# Patient Record
Sex: MALE
[De-identification: names, ages, dates, MRNs, and addresses within clinical notes are randomized per-mention and may not be internally consistent; named-entity substitution may affect disease eponyms.]

---

## 2017-02-25 NOTE — CP.PCM.HP
History of Present Illness





- History of Present Illness


History of Present Illness: 


Patient is a 72 year old male with past medical history including MI, CAD with 

history of CABG and valve replacement 6-7 years ago.  Patient reports that for 

the past 2-3 days he has had shortness of breath and dizziness that has been 

getting progressively worse.  Patient also reports right-sided chest pain that 

is reproducible on palpation.  Patient states he has been living in his car and 

has not had medical care recently since moving to [place].  Patient denies 

palpitations, chest pressure, abdominal pain, nausea, or vomiting.  Patient 

also reports taking 5mg of coumadin nightly but does not know for what 

condition he takes it.  Patient denies history of blood clots. Patient 

currently resting comfortably on high flow oxygen by nasal canula.  








PMHx: CAD, MI, arrhythmia


PSHx: CABG with vessel harvest from right leg, valve replacement


Meds: coumadin 5mg


FamHx: denies


Social Hx: smokes 2-3 cigarettes per day for 60 years, denies alcohol and drugs

, homeless- lives in car








Present on Admission





- Present on Admission


Any Indicators Present on Admission: No





Review of Systems





- Constitutional


Constitutional: Lethargy.  absent: Chills, Fever





- EENT


Eyes: absent: Blurred Vision


Nose/Mouth/Throat: absent: Nasal Congestion





- Cardiovascular


Cardiovascular: Dyspnea, Leg Edema, Lightheadedness.  absent: Chest Pain at Rest

, Palpitations





- Respiratory


Respiratory: Dyspnea, Dyspnea on Exertion





- Gastrointestinal


Gastrointestinal: absent: Diarrhea, Nausea, Vomiting





- Genitourinary


Genitourinary: absent: Difficulty Urinating





- Integumentary


Integumentary: absent: Rash





- Neurological


Neurological: Dizziness





Past Patient History





- Infectious Disease


Hx of Infectious Diseases: None





- Past Medical History & Family History


Past Medical History?: Yes





- Past Social History


Smoking Status: Light Smoker < 10 Cigarettes Daily





- CARDIAC


Hx Atrial Fibrillation: Yes


Hx Cardia Arrhythmia: Yes


Hx Congestive Heart Failure: Yes


Hx Hypertension: Yes


Hx Pacemaker: Yes





- PULMONARY


Hx Chronic Obstructive Pulmonary Disease (COPD): Yes





- MUSCULOSKELETAL/RHEUMATOLOGICAL


Hx Falls: No





- PSYCHIATRIC


Hx Substance Use: No





- SURGICAL HISTORY


Hx Coronary Artery Bypass Graft: Yes (4 yrs ago)





- ANESTHESIA


Hx Anesthesia: Yes


Hx Anesthesia Reactions: No


Hx Malignant Hyperthermia: No





Meds


Allergies/Adverse Reactions: 


 Allergies











Allergy/AdvReac Type Severity Reaction Status Date / Time


 


No Known Allergies Allergy   Verified 02/25/17 12:47














Physical Exam





- Constitutional


Appears: Non-toxic, No Acute Distress





- Head Exam


Head Exam: ATRAUMATIC, NORMOCEPHALIC





- Eye Exam


Eye Exam: EOMI





- ENT Exam


ENT Exam: Mucous Membranes Moist





- Respiratory Exam


Respiratory Exam: Rales, Wheezes





- Cardiovascular Exam


Cardiovascular Exam: +S1, +S2





- GI/Abdominal Exam


GI & Abdominal Exam: Normal Bowel Sounds, Soft.  absent: Tenderness





- Extremities Exam


Extremities exam: Positive for: pedal edema (bilateral pitting edema)


Additional comments: 


scar right leg from vessel harvest





- Neurological Exam


Neurological exam: Alert





- Psychiatric Exam


Psychiatric exam: Normal Affect, Normal Mood





- Skin


Skin Exam: Dry, Warm





Results





- Vital Signs


Recent Vital Signs: 





 Last Vital Signs











Temp  98.0 F   02/25/17 19:46


 


Pulse  80   02/25/17 19:46


 


Resp  16   02/25/17 20:10


 


BP  107/75   02/25/17 19:46


 


Pulse Ox  98   02/25/17 19:59














- Labs


Result Diagrams: 


 02/25/17 13:23





 02/25/17 21:37


Labs: 





 Laboratory Results - last 24 hr











  02/25/17





  16:01


 


PT  14.5 H


 


INR  1.3


 


APTT  33


 


D-Dimer, Quantitative  < 200














Assessment & Plan


(1) Acute exacerbation of CHF (congestive heart failure)


Assessment and Plan: 


admit to telemetry





BNP 4560 on admission


patient received 20mg IV lasix in ER





will continue with lasix 40mg IV BID


starting aldactone 25mg PO Daily


starting digoxin- will give 0.25mg today, start 0.125mg tomorrow


starting lisinopril 10mg daily





patient with AICD, will check echo to determine EF





CXR: mild cardiomegaly, mild pulmonary venous congestion.  s/p sternotomy, 

aortic valve replacement, AICD


Status: Acute





(2) History of MI (myocardial infarction)


Assessment and Plan: 


first troponin 0.0360, second troponin 0.0180


will continue to trend


Status: Acute





(3) Hyperkalemia


Assessment and Plan: 


potassium 5.4 on admission


patient received lasix and albuterol nebulizer in ED





repeat BMP potassium 4.1


Status: Acute





(4) Tachycardia


Assessment and Plan: 


EKG in ER: sinus tachycardia at 100, paced, incomplete RBBB


patient started on cardizem drip at 5mg/h in the ER- will discontinue 





starting patient on digoxin- one dose 0.25mg today, 0.125mg starting tomorrow


Status: Acute





(5) Lower extremity edema


Assessment and Plan: 


will check venous doppler


Status: Acute





(6) Prophylactic measure


Assessment and Plan: 


lovenox 40u sc daily


protonix 40mg daily


Status: Acute

## 2017-02-25 NOTE — RAD
PROCEDURE:  CHEST RADIOGRAPH, 1 VIEW



HISTORY:

chest pain



COMPARISON:

None available.



FINDINGS:



LUNGS:

There is pulmonary venous congestion.  There is no focal consolidation



PLEURA:

No pneumothorax or pleural fluid seen.



CARDIOVASCULAR:

There is mild cardiomegaly.  Status post median sternotomy and aortic 

valve replacement.  There is a left-sided AICD.



OSSEOUS STRUCTURES:

No significant abnormalities.



VISUALIZED UPPER ABDOMEN:

Normal.



OTHER FINDINGS:

None. 



IMPRESSION:

Mild cardiomegaly and pulmonary venous congestion.

## 2017-02-25 NOTE — C.PDOC
History Of Present Illness





<Jerica Gaitan - Last Filed: 02/25/17 13:45>





<Sara Jj - Last Filed: 02/25/17 18:14>


73 yo male w/PMHx of CAD s/p CABG 4 yrs ago, smoker, on Coumadin, admits 

homeless at present time (arrived from [place] and currently leave in car for 3 

months), come in for evaluation of SOB on exertion, peripheral edema gradually 

developed for past few days associated with chest tightness, upper back pain. 

Otherwise, pt denies high fever, chills, headache, dizziness, wheezing, 

palpitation, diaphoresis, abd. pain, N/V/D, UTI sx, denies weakness, sensory or 

vascular deficits. At present time, pt appears in mod respiratory distress.


Poor historian. no family member available. (Sara Jj)








<Jerica Gaitan - Last Filed: 02/25/17 13:45>


History Per: Patient


Onset/Duration Of Symptoms: Gradual


Current Symptoms Are (Timing): Worse





<АннаSara - Last Filed: 02/25/17 18:14>


Time Seen by Provider: 02/25/17 12:46





Past Medical History


Reviewed: Historical Data, Nursing Documentation, Vital Signs





- Medical History


PMH: AMI-STEMI, Atrial Fibrillation, CAD, Cardia Arrhythmia, CHF, COPD, HTN


Surgical History: CABG (4 yrs ago)


Family History: States: No Known Family Hx





- Social History


Hx Tobacco Use: Yes


Hx Alcohol Use: No


Hx Substance Use: No





- Immunization History


Hx Tetanus Toxoid Vaccination: No


Hx Influenza Vaccination: No


Hx Pneumococcal Vaccination: No





<Vinky,Sara - Last Filed: 02/25/17 18:14>


Vital Signs: 


 Last Vital Signs











Temp  97.6 F   02/25/17 12:47


 


Pulse  104 H  02/25/17 15:21


 


Resp  23   02/25/17 15:21


 


BP  119/78   02/25/17 15:21


 


Pulse Ox  100   02/25/17 16:12

















Review Of Systems


Except As Marked, All Systems Reviewed And Found Negative.


Constitutional: Negative for: Fever, Chills


Eyes: Negative for: Vision Change


ENT: Negative for: Mouth Swelling, Throat Pain


Cardiovascular: Positive for: Chest Pain, Orthopnea, Edema.  Negative for: 

Palpitations, Light Headedness


Respiratory: Positive for: Shortness of Breath, SOB with Excertion.  Negative 

for: Cough, Wheezing


Gastrointestinal: Negative for: Nausea, Vomiting, Abdominal Pain


Genitourinary: Negative for: Dysuria, Frequency, Incontinence


Musculoskeletal: Negative for: Neck Pain, Back Pain


Skin: Negative for: Rash


Neurological: Negative for: Weakness, Numbness, Altered Mental Status, Headache

, Dizziness





<Sara Jj - Last Filed: 02/25/17 18:14>





Physical Exam





- Physical Exam


Appears: Well, Non-toxic, No Acute Distress


Skin: Normal Color, Warm, Dry, No Rash


Head: Atraumatic, Normacephalic


Eye(s): bilateral: Normal Inspection, PERRL, EOMI


Ear(s): Bilateral: Normal


Nose: Normal, No Discharge


Oral Mucosa: Moist, No Drooling


Tongue: Normal Appearing


Lips: Normal Appearing


Throat: Normal, No Erythema, No Exudate, No Drooling


Neck: Normal, Normal ROM, Trachea Midline


Cardiovascular: Rhythm Irregular (TACHY), No Friction Rub, No Murmur, JVD


Respiratory: Normal Breath Sounds, Decreased Breath Sounds (bibasilar), No 

Stridor, No Wheezing


Gastrointestinal/Abdominal: Normal Exam, Soft, No Tenderness, No Distention, No 

Guarding


Back: Normal Inspection, No Vertebral Tenderness


Extremity: Normal ROM, Pedal Edema (L>R pitting edema 2+/2+ B/L lEs), No Calf 

Tenderness, No Deformity


Neurological/Psych: Oriented x3, Normal Speech, Normal Motor, Normal Sensation, 

Normal Reflexes





<Sara Jj - Last Filed: 02/25/17 18:14>





ED Course And Treatment





- Laboratory Results


Result Diagrams: 


 02/25/17 13:23





 02/25/17 13:23





<Jerica Gaitan - Last Filed: 02/25/17 13:45>





- Laboratory Results


Result Diagrams: 


 02/25/17 13:23





 02/25/17 13:23


ECG: Interpreted By Me (AND ED ATTENDING)


ECG Interpretation: Normal, No Acute Changes, Abnormal


Interpretation Of ECG: sINUS TACHY WITH PACED@111/MIN,INCOMPLETE RBBB


O2 Sat by Pulse Oximetry: 100


Pulse Ox Interpretation: Normal





- Radiology


CXR: Interpreted by Me, Viewed By Me


CXR Interpretation: Yes: Cardiomegaly





- Other Rad


  ** CXR


X-Ray: Read By Radiologist


Interpretation: Accession No. : [id number].  Patient Name / ID : JOHNNIE BOYLE  / [id number].  Exam Date : 02/25/2017 12:55:47 ( Approved ).  Study 

Comment :  Sex / Age : M  / 72Y.  Creator : NIRMALA MCALLISTER MD.  Dictator : NIRMALA MCALLISTER MD.   :  Approver : NIRMALA MCALLISTER MD.  Approver2 :  Report 

Date : 02/25/2017 13:20:21.  My Comment :  *************************************

**********************************************.  PROCEDURE:  CHEST RADIOGRAPH, 

1 VIEW.  HISTORY:  chest pain.  COMPARISON:  None available.  FINDINGS:  LUNGS:

  There is pulmonary venous congestion.  There is no focal consolidation.  

PLEURA:  No pneumothorax or pleural fluid seen.  CARDIOVASCULAR:  There is mild 

cardiomegaly.  Status post median sternotomy and aortic valve replacement.  

There is a left-sided AICD.  OSSEOUS STRUCTURES:  No significant abnormalities.

  VISUALIZED UPPER ABDOMEN:  Normal.  OTHER FINDINGS:  None.  IMPRESSION:  Mild 

cardiomegaly and pulmonary venous congestion.


Progress Note: Pt was OBS in ED for 2 hours and slightly improved during OBS 

after ED tx.  Lasix/Vapotherm/Cardizem qtt.  Cardiac monitor.  Case discussed 

with ED qattenidng and pt was evaluated by .  Blood work review.  Case 

discussed with med-on-call and admission arranged.  Admission to medicine-on-

call  placed





<Sara Jj - Last Filed: 02/25/17 18:14>





Supervising Attending Note





- Supervising Attending Note


The Documented history was done by the: Physician Extender


The documented physical exam was done by the: Physician Extender


The documented procedures were done by the: Physician Extender


EM CAVEAT: Language Barrier





- Attestation:


I have personally seen and examined this patient.: Yes


I have fully participated in the care of the patient.: Yes


I have reviewed all pertinent clinical information, including history, physical 

exam and plan: Yes





<Jerica Gaitan - Last Filed: 02/25/17 13:45>





<Sara Jj - Last Filed: 02/25/17 18:14>





- Notes:


Notes:: 


?NEW ONSET VS EXAC CHF X 2 DAYS. +SOB/FU. +SWELLING. ON COUMADIN. HO PPM.





HO CABG





EXAM AS ABOVE (Jerica Gaitan)








Critical Care Time





- Critical Care Note


Total Time (in mins): 40


Documented critical care: time excludes all time spent performing seperately 

billable procedures.





<Sara Jj - Last Filed: 02/25/17 18:14>





Disposition





<Jerica Gaitan - Last Filed: 02/25/17 13:45>





- Disposition


Disposition Time: 14:50





<Sara Jj - Last Filed: 02/25/17 18:14>





- Disposition


Disposition: HOSPITALIZED


Condition: STABLE





- Clinical Impression


Clinical Impression: 


 Acute exacerbation of CHF (congestive heart failure)

## 2017-02-26 NOTE — CP.PCM.PN
Subjective





- Date & Time of Evaluation


Date of Evaluation: 02/26/17


Time of Evaluation: 10:00





- Subjective


Subjective: 


Medicine Note- Dr Elizabeth's service 





Patient seen and examined.  Patient states that he "feels bad."  He has been 

having shortness of breath for about 3 days associated with swelling his 

bilateral LE.  He says that this is the first time he has experienced these 

symptoms.  The shortness of breath is worse when he lays flat.  He denies 

difficulty with ambulation.  Patient denies history of arrhythmia.  Denies 

chest pain, cough, palpitations, headache, dizziness, syncope, nausea, vomiting

, and abdominal pain. 





Objective





- Vital Signs/Intake and Output


Vital Signs (last 24 hours): 


 











Temp Pulse Resp BP Pulse Ox


 


 97.9 F   96 H  20   162/78 H  98 


 


 02/26/17 07:00  02/26/17 08:21  02/26/17 08:34  02/26/17 09:31  02/26/17 07:00








Intake and Output: 


 











 02/26/17 02/26/17





 06:59 18:59


 


Intake Total 350 


 


Output Total 300 


 


Balance 50 














- Medications


Medications: 


 Current Medications





Digoxin (Lanoxin)  0.125 mg PO DAILY@1800 [facility]


Enoxaparin Sodium (Lovenox)  40 mg SC DAILY [facility]


   Last Admin: 02/26/17 09:31 Dose:  40 mg


Furosemide (Lasix)  40 mg IVP BID [facility]


   Last Admin: 02/26/17 09:31 Dose:  40 mg


Lisinopril (Zestril)  10 mg PO DAILY [facility]


   Last Admin: 02/26/17 09:30 Dose:  10 mg


Pantoprazole Sodium (Protonix Ec Tab)  40 mg PO DAILY [facility]


   Last Admin: 02/26/17 09:30 Dose:  40 mg


Spironolactone (Aldactone)  25 mg PO DAILY [facility]


   Last Admin: 02/26/17 09:30 Dose:  25 mg











- Labs


Labs: 


 





 02/26/17 03:44 





 02/26/17 03:44 





 











PT  14.5 SECONDS (9.7-12.2)  H  02/25/17  16:01    


 


INR  1.3   02/25/17  16:01    


 


APTT  33 SECONDS (21-34)   02/25/17  16:01    














- Constitutional


Appears: Non-toxic, No Acute Distress





- Head Exam


Head Exam: ATRAUMATIC, NORMOCEPHALIC





- Eye Exam


Eye Exam: EOMI, Normal appearance


Pupil Exam: NORMAL ACCOMODATION





- ENT Exam


ENT Exam: Mucous Membranes Moist





- Respiratory Exam


Respiratory Exam: Decreased Breath Sounds, NORMAL BREATHING PATTERN.  absent: 

Accessory Muscle Use, Rhonchi, Respiratory Distress





- Cardiovascular Exam


Cardiovascular Exam: REGULAR RHYTHM, +S1, +S2.  absent: Irregular Rhythm





- GI/Abdominal Exam


GI & Abdominal Exam: Soft, Normal Bowel Sounds.  absent: Guarding, Rigid, 

Tenderness





- Extremities Exam


Extremities Exam: Pedal Edema (bilateral 1+ pitting edema ).  absent: Tenderness


Additional comments: 


2+ pedal pulses





- Neurological Exam


Neurological Exam: Alert, Awake, CN II-XII Intact, Oriented x3





- Psychiatric Exam


Psychiatric exam: Normal Affect, Normal Mood





- Skin


Skin Exam: Dry, Intact, Normal Color, Warm





Assessment and Plan





- Assessment and Plan (Free Text)


Assessment: 


(1) Acute exacerbation of CHF (congestive heart failure)


Admit to telemetry 


BNP 4560 on admission


patient received 20mg IV lasix in ER





will continue with lasix 40mg IV BID


starting aldactone 25mg PO Daily


starting digoxin- will give 0.25mg today, start 0.125mg 2/27


lisinopril 10mg daily


patient with AICD, will check echo to determine EF


CXR: mild cardiomegaly, mild pulmonary venous congestion.  s/p sternotomy, 

aortic valve replacement, AICD








(2) History of MI (myocardial infarction)


ROMIs negative x3


EKG paced 


Denies chest pain 


Crestor 10mg PO HS 


Per Dr Elizabeth, pt likely not taking Coumadin regularly due to low INR.  Will not 

restart this medication until ECHO done. 





(3) Hyperkalemia


Resolved


potassium 5.4 on admission


patient received lasix and albuterol nebulizer in ED


repeat BMP potassium 4.1








(4) Tachycardia


EKG in ER: sinus tachycardia at 100, paced, incomplete RBBB


patient started on cardizem drip at 5mg/h in the ER- will discontinue 


started patient on digoxin





(5) Lower extremity edema


will check venous doppler





(6) Prophylactic measure


lovenox 40u sc daily


protonix 40mg daily


SCD contraindicated due to LE edema





Discussed with Dr Elizabeth

## 2017-02-27 NOTE — VASCLAB
PROCEDURE:  Lower Extremity Venous Duplex Exam.



HISTORY:

lower extremity edema 



PRIORS:

None. 



TECHNIQUE:

Bilateral common femoral, femoral, popliteal and posterior tibial, 

peroneal and great saphenous veins were evaluated. Flow was assessed 

with color Doppler, compressibility, assessment of phasic flow and 

augmentation response.



Report prepared by   ATTILA Lewis



FINDINGS:



RIGHT:

1. Common Femoral Vein: 



1.1. Compressibility - Fully compressible: Thrombus -  None : Flow - 

Phasic: Augmentation -Normal: Reflux - None.



2. Femoral Vein:



2.1. Compressibility - Fully compressible: Thrombus -  None : Flow - 

Phasic: Augmentation -Normal: Reflux - None.



3. Popliteal Vein: 



3.1. Compressibility - Fully compressible: Thrombus - None :  Flow - 

Phasic: Augmentation -Normal: Reflux - None.



4. Posterior Tibial Vein: 



4.1. Compressibility - Fully compressible: Thrombus -  None: Flow - 

Phasic: Augmentation -Normal: Reflux - None.



5. Peroneal Vein:



5.1. Compressibility - Fully compressible: Thrombus -  None: Flow - 

Phasic: Augmentation -Normal: Reflux - None.



6. Great Saphenous Vein:

6.1. Compressibility - Fully compressible: Thrombus - None: Flow - 

Phasic: Augmentation - Normal: Reflux - None.





LEFT:

1. Common Femoral Vein:



1.1.  Compressibility - Fully compressible: Thrombus -  None: Flow - 

Phasic: Augmentation -Normal: Reflux - None.



2. Femoral Vein:



2.1.  Compressibility - Fully compressible: Thrombus -  None: Flow - 

Phasic: Augmentation -Normal: Reflux - None.



3. Popliteal Vein:



3.1.  Compressibility - Fully compressible: Thrombus -  None : Flow - 

Phasic: Augmentation -Normal: Reflux - None.



4. Posterior Tibial Vein:



4.1.  Compressibility - Fully compressible: Thrombus -  None: Flow - 

Phasic: Augmentation -Normal: Reflux - None.



5. Peroneal Vein:



5.1.  Compressibility - Fully compressible: Thrombus -  None: Flow - 

Phasic: Augmentation -Normal: Reflux - None.



6. Great Saphenous Vein:

6.1.  Compressibility - Fully compressible: Thrombus -  None: Flow - 

Phasic: Augmentation - Normal: Reflux - None.





OTHER FINDINGS:  Right: Lower extremity edema.



Left: Lower extremity edema.



IMPRESSION:

Right: 



No evidence of deep or superficial vein thrombosis of the right lower 

extremity. Normal valve function noted of the right side.     



Left: 



No evidence of deep or superficial vein thrombosis of the left lower 

extremity. Normal valve function noted of the left side.

## 2017-02-27 NOTE — CP.PCM.PN
<Sulaiman Patel - Last Filed: 02/27/17 17:57>





Subjective





- Date & Time of Evaluation


Date of Evaluation: 02/27/17


Time of Evaluation: 07:20





- Subjective


Subjective: 


PGY1 Medicine Note- Dr Elizabeth





Patient seen and examined at bedside. No overnight events per nursing. Patient 

admits to SOB while lying flat and while speaking. +BM yesterday and +

urination. Denies difficulty with ambulation or history of arrhythmia. Denies f/

c, headache, dizziness, chest pain, palpitations, cough, abdominal pain, n/v, d/

c, or any additional complaints. 








Objective





- Vital Signs/Intake and Output


Vital Signs (last 24 hours): 


 











Temp Pulse Resp BP Pulse Ox


 


 98.2 F   87   20   106/69   97 


 


 02/27/17 08:36  02/27/17 08:36  02/27/17 08:36  02/27/17 10:39  02/27/17 08:36








Intake and Output: 


 











 02/27/17 02/27/17





 06:59 18:59


 


Output Total 300 


 


Balance -300 














- Medications


Medications: 


 Current Medications





Digoxin (Lanoxin)  0.125 mg PO DAILY@1800 [facility]


   Last Admin: 02/26/17 17:40 Dose:  0.125 mg


Enoxaparin Sodium (Lovenox)  40 mg SC DAILY [facility]


   Last Admin: 02/27/17 10:39 Dose:  40 mg


Furosemide (Lasix)  40 mg IVP BID [facility]


   Last Admin: 02/27/17 10:39 Dose:  40 mg


Lisinopril (Zestril)  10 mg PO DAILY [facility]


   Last Admin: 02/27/17 10:39 Dose:  10 mg


Pantoprazole Sodium (Protonix Ec Tab)  40 mg PO DAILY [facility]


   Last Admin: 02/27/17 10:39 Dose:  40 mg


Rosuvastatin Calcium (Crestor)  10 mg PO HS [facility]


   Last Admin: 02/26/17 21:20 Dose:  Not Given


Spironolactone (Aldactone)  25 mg PO DAILY [facility]


   Last Admin: 02/27/17 10:39 Dose:  25 mg











- Labs


Labs: 


 





 02/26/17 03:44 





 02/26/17 03:44 





 











PT  14.5 SECONDS (9.7-12.2)  H  02/25/17  16:01    


 


INR  1.3   02/25/17  16:01    


 


APTT  33 SECONDS (21-34)   02/25/17  16:01    














- Constitutional


Appears: Non-toxic, No Acute Distress





- Head Exam


Head Exam: ATRAUMATIC





- Eye Exam


Eye Exam: EOMI





- ENT Exam


ENT Exam: Mucous Membranes Moist





- Respiratory Exam


Respiratory Exam: Decreased Breath Sounds, Wheezes, Respiratory Distress (SOB 

while speaking).  absent: Clear to Ausculation Bilateral





- Cardiovascular Exam


Cardiovascular Exam: REGULAR RHYTHM, +S1, +S2





- GI/Abdominal Exam


GI & Abdominal Exam: Soft, Normal Bowel Sounds.  absent: Tenderness





- Extremities Exam


Extremities Exam: Pedal Edema (b/l 1+ pitting to mid thigh).  absent: Tenderness


Additional comments: 


pedal pulses 2+





- Neurological Exam


Neurological Exam: Alert, Awake, Oriented x3





- Psychiatric Exam


Psychiatric exam: Normal Affect





- Skin


Skin Exam: Dry, Normal Color, Warm





Assessment and Plan





- Assessment and Plan (Free Text)


Assessment: 


72 year old male with past medical history including MI, CAD with history of 

CABG and valve replacement 6-7 years ago.  Reports worstenting SOB and 

dizziness with associated swelling of bilateral LE for past 2-3 days. Admits 

this is the first time he has experienced these symptoms. SOB is worse when he 

lays flat.





Plan:


(1) Acute exacerbation of CHF (congestive heart failure)


Admit to telemetry, BNP 4560 on admission


f/u ECHO - Pending Read


f/u LE Duplex - Pending Read


Lasix 40mg IV BID


Digoxin 0.125mg


Lisinopril 10mg daily


Aldactone 25mg PO Daily


patient with AICD, will check echo to determine EF


CXR 2/25: mild cardiomegaly, mild pulmonary venous congestion.  s/p sternotomy, 

aortic valve replacement, AICD





(2) History of MI (myocardial infarction)


Crestor 10mg PO HS 


Per Dr Elizabeth, pt likely not taking Coumadin regularly due to low INR.  Will not 

restart this medication until ECHO done. 


ROMIs negative x3


EKG paced 


Denies chest pain 





(3) Hyperkalemia


Continue to monitor Labs


Resolved


potassium 5.4 on admission -> repeat 4.1


patient received lasix and albuterol nebulizer in ED





(4) Tachycardia


EKG in ER: sinus tachycardia at 100, paced, incomplete RBBB


Discontinued cardizem drip of 5mg/h started in the ER


Continue Digoxin 0.125mg





(5) Lower extremity edema


f/u LE Duplex - Pending Read





(6) Prophylactic measure


lovenox 40u sc daily


protonix 40mg daily


SCD contraindicated due to LE edema





<Evgeny Elizabeth Jr. - Last Filed: 03/09/17 17:00>





Objective





- Vital Signs/Intake and Output


Vital Signs (last 24 hours): 


 











Temp Pulse Resp BP Pulse Ox


 


 97.6 F   89   20   104/66   100 


 


 03/09/17 16:57  03/09/17 16:57  03/09/17 16:57  03/09/17 16:57  03/09/17 16:57








Intake and Output: 


 











 03/09/17 03/09/17





 06:59 18:59


 


Intake Total 300 480


 


Output Total 800 500


 


Balance -500 -20














- Medications


Medications: 


 Current Medications





Albuterol/Ipratropium (Duoneb 3 Mg/0.5 Mg (3 Ml) Ud)  3 ml INH RQ6 [facility]


   Last Admin: 03/09/17 13:05 Dose:  3 ml


Budesonide (Pulmicort Respules)  0.5 mg INH RQ12 [facility]


   Last Admin: 03/09/17 07:42 Dose:  0.5 mg


Digoxin (Lanoxin)  0.125 mg PO DAILY@1800 [facility]


   Last Admin: 03/08/17 17:02 Dose:  0.125 mg


Furosemide (Lasix)  40 mg IVP BID [facility]


   Last Admin: 03/09/17 09:15 Dose:  40 mg


Guaifenesin (Mucinex La)  600 mg PO BID [facility]


   Last Admin: 03/09/17 10:11 Dose:  600 mg


Lisinopril (Zestril)  10 mg PO DAILY [facility]


   Last Admin: 03/09/17 09:15 Dose:  10 mg


Methylprednisolone (Solu-Medrol)  40 mg IVP Q8 [facility]


   Last Admin: 03/09/17 13:21 Dose:  40 mg


Pantoprazole Sodium (Protonix Ec Tab)  40 mg PO DAILY [facility]


   Last Admin: 03/09/17 09:15 Dose:  40 mg


Rosuvastatin Calcium (Crestor)  10 mg PO HS [facility]


   Last Admin: 03/08/17 21:04 Dose:  10 mg


Spironolactone (Aldactone)  25 mg PO DAILY [facility]


   Last Admin: 03/09/17 09:15 Dose:  25 mg


Tiotropium Bromide (Spiriva)  18 mcg INH RQ24 [facility]


   Last Admin: 03/09/17 07:42 Dose:  18 mcg


Warfarin Sodium (Coumadin)  5 mg PO 1800 [facility]


   Stop: 03/09/17 18:01











- Labs


Labs: 


 





 03/09/17 11:10 





 03/09/17 11:10 





 











PT  32.3 SECONDS (9.7-12.2)  H* D 03/09/17  11:10    


 


INR  2.8  D 03/09/17  11:10    


 


APTT  31 SECONDS (21-34)  D 03/09/17  11:10    














Attending/Attestation





- Attestation


I have personally seen and examined this patient.: Yes


I have fully participated in the care of the patient.: Yes


I have reviewed all pertinent clinical information, including history, physical 

exam and plan: Yes


Notes (Text): 





03/09/17 17:00


Patient seen and examined with the resident. Reviewed resident note and agree 

with findings and plan of care. [ ]

## 2017-02-27 NOTE — CARD
--------------- APPROVED REPORT --------------





EKG Measurement

Heart [id number]

[id number]

[id number]

[id number]



&lt;Conclusion&gt;

Mostly ventricular paced rhythm, baseline of atrial fibrillation

Intraventricular conduction delay left bundle type at the baseline 

rhythm

Probable inappropriate sensing, pacing in the safety is not constant

Abnormal ECG

## 2017-02-28 NOTE — CP.PCM.PN
Subjective





- Date & Time of Evaluation


Date of Evaluation: 02/28/17


Time of Evaluation: 07:30





- Subjective


Subjective: 


PGY1 Medicine Note- Dr Elizabeth





Patient seen and examined at bedside. No overnight events per nursing. Patient 

admits to SOB while lying flat and while speaking. +BM yesterday and +

urination. Denies difficulty with ambulation or history of arrhythmia. Denies f/

c, headache, dizziness, chest pain, palpitations, cough, abdominal pain, n/v, d/

c, or any additional complaints. 





Objective





- Vital Signs/Intake and Output


Vital Signs (last 24 hours): 


 











Temp Pulse Resp BP Pulse Ox


 


 98.8 F   105 H  20   109/77   96 


 


 02/27/17 23:20  02/28/17 01:00  02/27/17 23:20  02/27/17 23:20  02/27/17 23:20








Intake and Output: 


 











 02/28/17 02/28/17





 06:59 18:59


 


Intake Total 500 


 


Balance 500 














- Medications


Medications: 


 Current Medications





Digoxin (Lanoxin)  0.125 mg PO DAILY@1800 [facility]


   Last Admin: 02/27/17 17:49 Dose:  0.125 mg


Enoxaparin Sodium (Lovenox)  40 mg SC DAILY [facility]


   Last Admin: 02/27/17 10:39 Dose:  40 mg


Furosemide (Lasix)  40 mg IVP BID [facility]


   Last Admin: 02/27/17 17:51 Dose:  Not Given


Lisinopril (Zestril)  10 mg PO DAILY [facility]


   Last Admin: 02/27/17 10:39 Dose:  10 mg


Pantoprazole Sodium (Protonix Ec Tab)  40 mg PO DAILY [facility]


   Last Admin: 02/27/17 10:39 Dose:  40 mg


Rosuvastatin Calcium (Crestor)  10 mg PO HS [facility]


   Last Admin: 02/27/17 21:56 Dose:  Not Given


Spironolactone (Aldactone)  25 mg PO DAILY [facility]


   Last Admin: 02/27/17 10:39 Dose:  25 mg











- Labs


Labs: 


 





 02/28/17 07:06 





 02/26/17 03:44 





 











PT  14.5 SECONDS (9.7-12.2)  H  02/25/17  16:01    


 


INR  1.3   02/25/17  16:01    


 


APTT  33 SECONDS (21-34)   02/25/17  16:01    














- Additional Findings


Additional findings: 


- Constitutional


Appears: Non-toxic, No Acute Distress





- Head Exam


Head Exam: ATRAUMATIC





- Eye Exam


Eye Exam: EOMI





- ENT Exam


ENT Exam: Mucous Membranes Moist





- Respiratory Exam


Respiratory Exam: Decreased Breath Sounds, Wheezes, Respiratory Distress (SOB 

while speaking).  absent: Clear to Ausculation Bilateral





- Cardiovascular Exam


Cardiovascular Exam: REGULAR RHYTHM, +S1, +S2





- GI/Abdominal Exam


GI & Abdominal Exam: Soft, Normal Bowel Sounds.  absent: Tenderness





- Extremities Exam


Extremities Exam: Pedal Edema (b/l 1+ pitting to mid thigh).  absent: Tenderness


Additional comments: 


pedal pulses 2+





- Neurological Exam


Neurological Exam: Alert, Awake, Oriented x3





- Psychiatric Exam


Psychiatric exam: Normal Affect





- Skin


Skin Exam: Dry, Normal Color, Warm








Assessment and Plan





- Assessment and Plan (Free Text)


Assessment: 


72 year old male with past medical history including MI, CAD with history of 

CABG and valve replacement 6-7 years ago.  Reports worstenting SOB and 

dizziness with associated swelling of bilateral LE for past 2-3 days. Admits 

this is the first time he has experienced these symptoms. SOB is worse when he 

lays flat.





Plan:


Acute exacerbation of CHF (congestive heart failure)


Admit to telemetry, BNP 4560 on admission


f/u ECHO - Pending Read


Consulted Cardiology, Dr. Dotson - f/u recs


Lasix 40mg IV BID


Digoxin 0.125mg


Lisinopril 10mg daily


Aldactone 25mg PO Daily


patient with AICD, will check echo to determine EF


CXR 2/25: mild cardiomegaly, mild pulmonary venous congestion.  s/p sternotomy, 

aortic valve replacement, AICD





History of Cardiac Valve replacement


Cardiac valve replacement 6-7yrs ago


Start Coumadin 10mg


Bridge with Heparin Drip - cardiac protocol, with bolus


f/u PT/INR





History of MI (myocardial infarction)


Crestor 10mg PO HS  


ROMIs negative x3


EKG paced 


Denies chest pain 





Shortness of Breath


+wheezing


Spiriva 18mcg INH RQ24


Advair Diskus 100/50 1puff INH RQ12





Electrolyte Imbalance


Hyperkalemia - resolved





Tachycardia


EKG in ER: sinus tachycardia at 100, paced, incomplete RBBB


Continue Digoxin 0.125mg


Discontinued cardizem drip of 5mg/h started in the ER





Lower extremity edema


LE Duplex - negative. see full report.





Prophylactic measure


STOP lovenox 40u sc daily


protonix 40mg daily


SCD contraindicated due to LE edema

## 2017-03-01 NOTE — CP.PCM.CON
History of Present Illness





- History of Present Illness


History of Present Illness: 


I was asked to see patient by Dr. Elizabeth.





Patient is a 72 year old male with PMH CAD, s/p CABG (graft status unknown), 

ischemic cardiomyopathy s/p AICD, mechnical MVR who presents with dyspnea.  The 

patient's previous cardiac care was in [place].  He was noted to have 

progressive dyspnea for one week.  The patient denies chest pain.  It is 

unclear how compliant patient has been with medical therapy.  





Review of Systems





- Constitutional


Constitutional: absent: As Per HPI, Anorexia, Chills, Daytime Sleepiness, 

Excessive Sweating, Fatigue, Fever, Frequent Falls, Headache, Increased Appetite

, Lethargy, Malaise, Night Sweats, Snoring, Sleep Apnea, Weight Gain, Weight 

Loss, Weakness, Other





- EENT


Eyes: absent: As Per HPI, Blind Spots, Blurred Vision, Change in Vision, 

Decreased Night Vision, Diplopia, Discharge, Dry Eye, Exophthalmos, Floaters, 

Irritation, Itchy Eyes, Loss of Peripheral Vision, Pain, Photophobia, Requires 

Corrective Lenses, Sees Flashes, Spots in Vision, Tunnel Vision, Other Visual 

Disturbances, Loss of Vision, Other


Nose/Mouth/Throat: absent: As Per HPI, Epistaxis, Nasal Congestion, Nasal 

Discharge, Nasal Obstruction, Nasal Trauma, Nose Pain, Post Nasal Drip, Sinus 

Pain, Sinus Pressure, Bleeding Gums, Change in Voice, Dental Pain, Dry Mouth, 

Dysphagia, Halitosis, Hoarsness, Lip Swelling, Mouth Lesions, Mouth Pain, 

Odynophagia, Sore Throat, Throat Swelling, Tongue Swelling, Facial Pain, Neck 

Pain, Neck Mass, Other





- Cardiovascular


Cardiovascular: Dyspnea, Pedal Edema





- Respiratory


Respiratory: Dyspnea





- Gastrointestinal


Gastrointestinal: absent: As Per HPI, Abdominal Pain, Belching, Bloating, 

Change in Bowel Habits, Change in Stool Character, Coffee Ground Emesis, 

Constipation, Cramping, Diarrhea, Dyspepsia, Dysphagia, Early Satiety, 

Excessive Flatus, Fecal Incontinence, Heartburn, Hematemesis, Hematochezia, 

Loose Stools, Melena, Nausea, Odynophagia, Temesmus, Vomiting, Other





- Genitourinary


Genitourinary: absent: As Per HPI, Change in Urinary Stream, Difficulty 

Urinating, Dysuria, Flank Pain, Hematuria, Pyuria, Nocturia, Urinary 

Incontinence, Urinary Frequency, Urinary Hesitance, Urinary Urgency, Voiding 

Freq/Small Amts, Freq UTI, Hx Renal/Bladder Calculi, Hx /Renal Surgery, 

Bladder Distension, Other





- Musculoskeletal


Musculoskeletal: absent: As Per HPI, Abnormal Gait, Arthralgias, Atrophy, Back 

Pain, Deformity, Joint Swelling, Limited Range of Motion, Loss of Height, 

Muscle Cramps, Muscle Weakness, Myalgias, Neck Pain, Numbness, Radiating Pain 

into Limb, Stiffness, Tingling, Other





- Integumentary


Integumentary: absent: As Per HPI, Acne, Alopecia, Bleeding Lesions, Change in 

Hair, Change in Nails, Change in Pigmentation, Changing Lesions, Dry Skin, 

Erythema, Furuncle, Hirsutism, Lesions, New Lesions, Non-Healing Lesions, 

Photosensitivity, Pruritus, Rash, Skin Pain, Skin Ulcer, Sores, Striae, Swelling

, Unusual Bruising, Wounds, Jaundice, Other





- Neurological


Neurological: absent: As Per HPI, Abnormal Gait, Abnormal Hearing, Abnormal 

Movements, Abnormal Speech, Behavioral Changes, Burning Sensations, Confusion, 

Convulsions, Disequilibrium, Dizziness, Numbness, Focal Weakness, Frequent Falls

, Headaches, Lack of Coordination, Loss of Vision, Memory Loss, Paresthesias, 

Radicular Pain, Restless Legs, Sensory Deficit, Syncope, Tingling, Tremor, 

Vertigo, Weakness, Other Visual Disturbances, Other





- Psychiatric


Psychiatric: absent: As Per HPI, Abnormal Sleep Pattern, Anhedonia, Anxiety, 

Auditory Hallucinations, Behavioral Changes, Change in Appetite, Change in 

Libido, Confusion, Depression, Difficulty Concentrating, Hallucinations, 

Homicidal Ideation, Hopelessness, Irritability, Memory Loss, Mood Swings, Panic 

Attacks, Paranoia, Suicidal Ideation, Visual Hallucinations, Tactile 

Hallucinations, Other





- Endocrine


Endocrine: absent: As Per HPI, Change in Body Appearance, Change in Libido, 

Cold Intolorance, Deepening of Voice, Excessive Sweating, Fatigue, Flushing, 

Heat Intolorance, Increase in Ring/Shoe/Hat Size, Palpitations, Polydipsia, 

Polyphagia, Polyuria, Other





- Hematologic/Lymphatic


Hematologic: absent: As Per HPI, Easy Bleeding, Easy Bruising, Lymphadenopathy, 

Other





Past Patient History





- Infectious Disease


Hx of Infectious Diseases: None





- Past Medical History & Family History


Past Medical History?: Yes





- Past Social History


Smoking Status: Light Smoker < 10 Cigarettes Daily





- CARDIAC


Hx Cardiac Disorders:  (A-fib)


Hx Congestive Heart Failure: Yes


Hx Hypertension: Yes





- PULMONARY


Hx Chronic Obstructive Pulmonary Disease (COPD): Yes





- MUSCULOSKELETAL/RHEUMATOLOGICAL


Hx Falls: No





- PSYCHIATRIC


Hx Substance Use: No





- SURGICAL HISTORY


Hx Coronary Artery Bypass Graft: Yes (4 yrs ago)





- ANESTHESIA


Hx Anesthesia: Yes


Hx Anesthesia Reactions: No


Hx Malignant Hyperthermia: No





Meds


Allergies/Adverse Reactions: 


 Allergies











Allergy/AdvReac Type Severity Reaction Status Date / Time


 


No Known Allergies Allergy   Verified 02/25/17 12:47














- Medications


Medications: 


 Current Medications





Digoxin (Lanoxin)  0.125 mg PO DAILY@1800 [facility]


   Last Admin: 02/28/17 20:16 Dose:  0.125 mg


Furosemide (Lasix)  40 mg IVP BID [facility]


   Last Admin: 02/28/17 18:30 Dose:  40 mg


Heparin Sodium/Sodium Chloride (Heparin 20995 Units/250ml 1/2 Normal Saline)  

250 mls @ 11.323 mls/hr IV .Q22H5M PRN; Protocol; 12 UNITS/KG/HR


   PRN Reason: PROTOCOL


   Last Admin: 02/28/17 18:51 Dose:  11.323 mls/hr


Lisinopril (Zestril)  10 mg PO DAILY [facility]


   Last Admin: 02/28/17 09:58 Dose:  10 mg


Pantoprazole Sodium (Protonix Ec Tab)  40 mg PO DAILY [facility]


   Last Admin: 02/28/17 09:58 Dose:  40 mg


Rosuvastatin Calcium (Crestor)  10 mg PO HS [facility]


   Last Admin: 02/28/17 22:24 Dose:  Not Given


Fluticasone/Salmeterol (Advair Diskus 100/50)  1 puff INH RQ12 [facility]


   Last Admin: 02/28/17 21:05 Dose:  1 puff


Spironolactone (Aldactone)  25 mg PO DAILY [facility]


   Last Admin: 02/28/17 09:57 Dose:  25 mg


Tiotropium Bromide (Spiriva)  18 mcg INH RQ24 [facility]











Physical Exam





- Constitutional


Appears: Non-toxic





- Head Exam


Head Exam: NORMAL INSPECTION





- Eye Exam


Eye Exam: Normal appearance





- ENT Exam


ENT Exam: Mucous Membranes Moist





- Neck Exam


Neck exam: Positive for: Full Rom





- Respiratory Exam


Respiratory Exam: Decreased Breath Sounds





- Cardiovascular Exam


Cardiovascular Exam: Irregular Rhythm


Additional comments: 


mechanical first heart sound





- GI/Abdominal Exam


GI & Abdominal Exam: Normal Bowel Sounds





- Rectal Exam


Rectal Exam: Deferred





- Extremities Exam


Extremities exam: Positive for: pedal edema





- Back Exam


Back exam: NORMAL INSPECTION





- Neurological Exam


Neurological exam: Alert, Oriented x3





- Psychiatric Exam


Psychiatric exam: Normal Affect





- Skin


Skin Exam: Normal Color





Results





- Vital Signs


Recent Vital Signs: 


 Last Vital Signs











Temp  97.8 F   02/28/17 23:30


 


Pulse  68   02/28/17 23:30


 


Resp  20   02/28/17 23:30


 


BP  138/65   02/28/17 23:30


 


Pulse Ox  97   02/28/17 23:30














- Labs


Result Diagrams: 


 02/28/17 07:06





 02/28/17 07:06


Labs: 


 Laboratory Results - last 24 hr











  02/28/17





  07:06


 


WBC  4.2 L


 


RBC  4.18 L


 


Hgb  11.2 L


 


Hct  33.7 L


 


MCV  80.7


 


MCH  26.9 L


 


MCHC  33.3


 


RDW  19.5 H


 


Plt Count  172


 


MPV  8.1


 


Neut % (Auto)  67.9


 


Lymph % (Auto)  14.3 L


 


Mono % (Auto)  13.7 H


 


Eos % (Auto)  3.5


 


Baso % (Auto)  0.6


 


Neut #  2.9


 


Lymph #  0.6 L


 


Mono #  0.6


 


Eos #  0.1


 


Baso #  0.0


 


Sodium  136


 


Potassium  4.3


 


Chloride  97 L


 


Carbon Dioxide  29


 


Anion Gap  14


 


BUN  17


 


Creatinine  1.2


 


Est GFR ( Amer)  > 60


 


Est GFR (Non-Af Amer)  60


 


Random Glucose  81


 


Calcium  9.7


 


Phosphorus  3.5


 


Magnesium  1.8


 


Total Bilirubin  0.9


 


AST  25


 


ALT  26


 


Alkaline Phosphatase  95


 


Total Protein  6.5


 


Albumin  3.5


 


Globulin  3.0


 


Albumin/Globulin Ratio  1.2














- EKG Data


EKG Interpreted by: Myself





Assessment & Plan


(1) Systolic dysfunction with acute on chronic heart failure


Assessment and Plan: 


will need duiresis.  will attempt to obtain [employer] for interrogation.


Status: Acute





(2) Chronic atrial fibrillation


Assessment and Plan: 


recommend coumadin therapy


Status: Acute





(3) CAD (coronary artery disease)


Assessment and Plan: 


unknown graft status.  no evidence of ischemia at present


Status: Acute





(4) H/O mitral valve replacement with mechanical valve


Assessment and Plan: 


needs anticoagulation.  echocardiogram reviewed.  valve leaflets are opening 

and functional.  There is no signficant gradient across the valve.  recommend 

INR 2.5 to 3.5.  Needs heparin until anticoagulation therapeutic.


Status: Acute

## 2017-03-01 NOTE — CARD
--------------- APPROVED REPORT --------------





EKG Measurement

Heart [id number]

[id number]

[id number]

[id number]

[id number]



&lt;Conclusion&gt;

Electronic ventricular pacemaker

## 2017-03-01 NOTE — CP.PCM.PN
<Sulaiman Patel - Last Filed: 03/01/17 17:57>





Subjective





- Date & Time of Evaluation


Date of Evaluation: 03/01/17


Time of Evaluation: 07:00





- Subjective


Subjective: 


PGY1 Medicine Note- Dr Elizabeth





Patient seen and examined at bedside. No overnight events per nursing. Patient 

with continued SOB while while speaking. Admits to chest tenderness at R 

sternal border at 2nd rib. OOB today, walked down hallway to complain about 

losing his iPOD. Patient initially refused heparin tx last night due to lost 

iPOD, but eventually accepted tx. However, patient refused all labs today due 

to lost iPOD. Will reattempt. Denies difficulty with ambulation or history of 

arrhythmia. Denies f/c, dizziness, palpitations, cough, abdominal pain, n/v, d/c

, or any additional complaints. 





Objective





- Vital Signs/Intake and Output


Vital Signs (last 24 hours): 


 











Temp Pulse Resp BP Pulse Ox


 


 97.5 F L  77   20   96/64 L  99 


 


 03/01/17 07:02  03/01/17 07:02  03/01/17 07:02  03/01/17 07:02  03/01/17 07:02








Intake and Output: 


 











 03/01/17 03/01/17





 06:59 18:59


 


Intake Total  88


 


Output Total 700 


 


Balance -700 88














- Medications


Medications: 


 Current Medications





Digoxin (Lanoxin)  0.125 mg PO DAILY@1800 Counts include 234 beds at the [facility]


   Last Admin: 02/28/17 20:16 Dose:  0.125 mg


Furosemide (Lasix)  40 mg IVP BID Counts include 234 beds at the [facility]


   Last Admin: 02/28/17 18:30 Dose:  40 mg


Heparin Sodium/Sodium Chloride (Heparin 01935 Units/250ml 1/2 Normal Saline)  

250 mls @ 11.323 mls/hr IV .Q22H5M PRN; Protocol; 12 UNITS/KG/HR


   PRN Reason: PROTOCOL


   Last Admin: 02/28/17 18:51 Dose:  11.323 mls/hr


Lisinopril (Zestril)  10 mg PO DAILY Counts include 234 beds at the [facility]


   Last Admin: 02/28/17 09:58 Dose:  10 mg


Pantoprazole Sodium (Protonix Ec Tab)  40 mg PO DAILY Counts include 234 beds at the [facility]


   Last Admin: 02/28/17 09:58 Dose:  40 mg


Rosuvastatin Calcium (Crestor)  10 mg PO HS Counts include 234 beds at the [facility]


   Last Admin: 02/28/17 22:24 Dose:  Not Given


Fluticasone/Salmeterol (Advair Diskus 100/50)  1 puff INH RQ12 Counts include 234 beds at the [facility]


   Last Admin: 02/28/17 21:05 Dose:  1 puff


Spironolactone (Aldactone)  25 mg PO DAILY Counts include 234 beds at the [facility]


   Last Admin: 02/28/17 09:57 Dose:  25 mg


Tiotropium Bromide (Spiriva)  18 mcg INH RQ24 Counts include 234 beds at the [facility]











- Labs


Labs: 


 





 02/28/17 07:06 





 02/28/17 07:06 





 











PT  14.5 SECONDS (9.7-12.2)  H  02/25/17  16:01    


 


INR  1.3   02/25/17  16:01    


 


APTT  33 SECONDS (21-34)   02/25/17  16:01    














- Additional Findings


Additional findings: 


- Constitutional


Appears: Non-toxic, No Acute Distress





- Head Exam


Head Exam: ATRAUMATIC





- Eye Exam


Eye Exam: EOMI





- ENT Exam


ENT Exam: Mucous Membranes Moist





- Respiratory Exam


Respiratory Exam: Decreased Breath Sounds, Wheezes, Respiratory Distress (SOB 

while speaking).  absent: Clear to Ausculation Bilateral





- Cardiovascular Exam


Cardiovascular Exam: REGULAR RHYTHM, +S1, +S2





- GI/Abdominal Exam


GI & Abdominal Exam: Soft, Normal Bowel Sounds.  absent: Tenderness





- Extremities Exam


Extremities Exam: Pedal Edema (b/l 1+ pitting to mid thigh).  absent: Tenderness


Additional comments: 


pedal pulses 2+





- Neurological Exam


Neurological Exam: Alert, Awake, Oriented x3





- Psychiatric Exam


Psychiatric exam: Normal Affect





- Skin


Skin Exam: Dry, Normal Color, Warm


Tender to palpation at R sternal border, 2nd rib. Protrusion noted.








Assessment and Plan





- Assessment and Plan (Free Text)


Assessment: 


72 year old male with past medical history including MI, CAD with history of 

CABG and valve replacement 6-7 years ago.  Reports worstenting SOB and 

dizziness with associated swelling of bilateral LE for past 2-3 days. Admits 

this is the first time he has experienced these symptoms. SOB is worse when he 

lays flat.





Plan:


Systolic dysfunction with acute on chronic heart failure


Admit to telemetry, BNP 4560 on admission


Consulted Cardiology, Dr. Dotson - f/u recs


   -will need duiresis.  will attempt to obtain company of [facility] for 

interrogation.


Lasix 40mg IV BID


Digoxin 0.125mg


Lisinopril 10mg daily


Aldactone 25mg PO Daily


patient with AICD, will check echo to determine EF


CXR 2/25: mild cardiomegaly, mild pulmonary venous congestion.  s/p sternotomy, 

aortic valve replacement, AICD





Chronic atrial fibrillation


recommend coumadin therapy


Status: Acute





CAD (coronary artery disease) 


unknown graft status.  no evidence of ischemia at present


Status: Acute





H/O mitral valve replacement with mechanical valve


Cardiac valve replacement 6-7yrs ago


Consulted Cardiology, Dr. Dotson - f/u recs


   -echocardiogram reviewed.  valve leaflets are opening and functional.  There 

is no signficant gradient across the valve.  


   -recommend INR 2.5 to 3.5.


   -Needs heparin until anticoagulation therapeutic.


Coumadin 10mg


STOP Heparin Drip - cardiac protocol, with bolus (because patient is refusing 

blood draws)


START Lovenox 95mg SC Q12


f/u PT/INR





History of MI (myocardial infarction)


Crestor 10mg PO HS  


ROMIs negative x3


EKG paced 


Denies chest pain 





Shortness of Breath


+wheezing


Spiriva 18mcg INH RQ24


Advair Diskus 100/50 1puff INH RQ12





Electrolyte Imbalance


Hyperkalemia - resolved





Tachycardia


EKG in ER: sinus tachycardia at 100, paced, incomplete RBBB


Continue Digoxin 0.125mg


Discontinued cardizem drip of 5mg/h started in the ER





Lower extremity edema


LE Duplex - negative. see full report.





Prophylactic measure


Stopped lovenox 40u sc daily


protonix 40mg daily


SCD contraindicated due to LE edema





<Evgeny Elizabeth Jr. - Last Filed: 03/09/17 17:08>





Objective





- Vital Signs/Intake and Output


Vital Signs (last 24 hours): 


 











Temp Pulse Resp BP Pulse Ox


 


 97.6 F   89   20   104/66   100 


 


 03/09/17 16:57  03/09/17 16:57  03/09/17 16:57  03/09/17 16:57  03/09/17 16:57








Intake and Output: 


 











 03/09/17 03/09/17





 06:59 18:59


 


Intake Total 300 480


 


Output Total 800 500


 


Balance -500 -20














- Medications


Medications: 


 Current Medications





Albuterol/Ipratropium (Duoneb 3 Mg/0.5 Mg (3 Ml) Ud)  3 ml INH RQ6 [facility]


   Last Admin: 03/09/17 13:05 Dose:  3 ml


Budesonide (Pulmicort Respules)  0.5 mg INH RQ12 Counts include 234 beds at the [facility]


   Last Admin: 03/09/17 07:42 Dose:  0.5 mg


Digoxin (Lanoxin)  0.125 mg PO DAILY@1800 Counts include 234 beds at the [facility]


   Last Admin: 03/08/17 17:02 Dose:  0.125 mg


Furosemide (Lasix)  40 mg IVP BID Counts include 234 beds at the [facility]


   Last Admin: 03/09/17 09:15 Dose:  40 mg


Guaifenesin (Mucinex La)  600 mg PO BID Counts include 234 beds at the [facility]


   Last Admin: 03/09/17 10:11 Dose:  600 mg


Lisinopril (Zestril)  10 mg PO DAILY Counts include 234 beds at the [facility]


   Last Admin: 03/09/17 09:15 Dose:  10 mg


Methylprednisolone (Solu-Medrol)  40 mg IVP Q8 Counts include 234 beds at the [facility]


   Last Admin: 03/09/17 13:21 Dose:  40 mg


Pantoprazole Sodium (Protonix Ec Tab)  40 mg PO DAILY Counts include 234 beds at the [facility]


   Last Admin: 03/09/17 09:15 Dose:  40 mg


Rosuvastatin Calcium (Crestor)  10 mg PO HS Counts include 234 beds at the [facility]


   Last Admin: 03/08/17 21:04 Dose:  10 mg


Spironolactone (Aldactone)  25 mg PO DAILY Counts include 234 beds at the [facility]


   Last Admin: 03/09/17 09:15 Dose:  25 mg


Tiotropium Bromide (Spiriva)  18 mcg INH RQ24 Counts include 234 beds at the [facility]


   Last Admin: 03/09/17 07:42 Dose:  18 mcg


Warfarin Sodium (Coumadin)  5 mg PO 1800 Counts include 234 beds at the [facility]


   Stop: 03/09/17 18:01











- Labs


Labs: 


 





 03/09/17 11:10 





 03/09/17 11:10 





 











PT  32.3 SECONDS (9.7-12.2)  H* D 03/09/17  11:10    


 


INR  2.8  D 03/09/17  11:10    


 


APTT  31 SECONDS (21-34)  D 03/09/17  11:10    














Attending/Attestation





- Attestation


I have personally seen and examined this patient.: Yes


I have fully participated in the care of the patient.: Yes


I have reviewed all pertinent clinical information, including history, physical 

exam and plan: Yes


Notes (Text): 





03/09/17 17:08


Patient seen and examined with the resident. Reviewed resident note and agree 

with findings and plan of care. [ ]

## 2017-03-02 NOTE — CP.PCM.PN
Subjective





- Date & Time of Evaluation


Date of Evaluation: 03/02/17


Time of Evaluation: 09:30





- Subjective


Subjective: 


patient is upset because of loss of his cell phone.





Objective





- Vital Signs/Intake and Output


Vital Signs (last 24 hours): 


 











Temp Pulse Resp BP Pulse Ox


 


 98.2 F   71   18   95/62 L  96 


 


 03/02/17 07:10  03/02/17 07:10  03/02/17 07:10  03/02/17 07:10  03/02/17 07:10








Intake and Output: 


 











 03/02/17 03/02/17





 06:59 18:59


 


Output Total 300 


 


Balance -300 














- Medications


Medications: 


 Current Medications





Digoxin (Lanoxin)  0.125 mg PO DAILY@1800 [facility]


   Last Admin: 03/01/17 17:25 Dose:  0.125 mg


Enoxaparin Sodium (Lovenox)  95 mg SC Q12 [facility]


   Last Admin: 03/02/17 10:00 Dose:  Not Given


Furosemide (Lasix)  40 mg IVP BID [facility]


   Last Admin: 03/02/17 10:00 Dose:  Not Given


Lisinopril (Zestril)  10 mg PO DAILY [facility]


   Last Admin: 03/02/17 09:48 Dose:  Not Given


Pantoprazole Sodium (Protonix Ec Tab)  40 mg PO DAILY [facility]


   Last Admin: 03/02/17 10:00 Dose:  Not Given


Rosuvastatin Calcium (Crestor)  10 mg PO HS [facility]


   Last Admin: 03/01/17 21:38 Dose:  Not Given


Fluticasone/Salmeterol (Advair Diskus 100/50)  1 puff INH RQ12 [facility]


   Last Admin: 03/02/17 08:44 Dose:  1 puff


Spironolactone (Aldactone)  25 mg PO DAILY [facility]


   Last Admin: 03/02/17 10:01 Dose:  Not Given


Tiotropium Bromide (Spiriva)  18 mcg INH RQ24 [facility]


   Last Admin: 03/02/17 08:45 Dose:  18 mcg











- Labs


Labs: 


 





 03/01/17 20:11 





 03/01/17 20:11 





 











PT  14.1 SECONDS (9.7-12.2)  H  03/01/17  20:11    


 


INR  1.3   03/01/17  20:11    


 


APTT  29 SECONDS (21-34)   03/01/17  20:11    














- Constitutional


Appears: Non-toxic





- Head Exam


Head Exam: NORMAL INSPECTION





- Eye Exam


Eye Exam: Normal appearance





- ENT Exam


ENT Exam: Mucous Membranes Moist





- Neck Exam


Neck Exam: Full ROM





- Respiratory Exam


Respiratory Exam: Decreased Breath Sounds





- Cardiovascular Exam


Cardiovascular Exam: REGULAR RHYTHM





- GI/Abdominal Exam


GI & Abdominal Exam: Normal Bowel Sounds





- Rectal Exam


Rectal Exam: Deferred





- Extremities Exam


Extremities Exam: Normal Inspection





- Back Exam


Back Exam: NORMAL INSPECTION





- Neurological Exam


Neurological Exam: Alert





- Psychiatric Exam


Psychiatric exam: Normal Affect





- Skin


Skin Exam: Normal Color





Assessment and Plan


(1) Systolic dysfunction with acute on chronic heart failure


Assessment & Plan: 


improved volume status


Status: Acute





(2) Chronic atrial fibrillation


Assessment & Plan: 


needs anticoagulation


Status: Acute





(3) CAD (coronary artery disease)


Status: Acute





(4) H/O mitral valve replacement with mechanical valve


Assessment & Plan: 


need coumadin for gaol INR 2.5-3.5


Status: Acute

## 2017-03-02 NOTE — CP.PCM.PN
Subjective





- Date & Time of Evaluation


Date of Evaluation: 03/02/17


Time of Evaluation: 07:15





- Subjective


Subjective: 


PGY1 Medicine Note- Dr Elizabeth





Patient seen and examined at bedside. No overnight events per nursing. Patient 

with continued SOB while while speaking. Persistent chest tenderness at R 

sternal border at 2nd rib. Patient has refused treatment for the last 2 days 

due to his lost iPOD, he is fixated on this. He will sporadically accept 

treatment and labs. I spoke to him at length about his condition and the need 

to bring his INR to a therapeutic level. Denies difficulty with ambulation or 

history of arrhythmia. Denies f/c, dizziness, palpitations, cough, abdominal 

pain, n/v, d/c, or any additional complaints. 








Objective





- Vital Signs/Intake and Output


Vital Signs (last 24 hours): 


 











Temp Pulse Resp BP Pulse Ox


 


 98.0 F   96 H  20   100/69   98 


 


 03/01/17 23:25  03/02/17 01:50  03/01/17 23:25  03/01/17 23:25  03/01/17 23:25








Intake and Output: 


 











 03/02/17 03/02/17





 06:59 18:59


 


Output Total 300 


 


Balance -300 














- Medications


Medications: 


 Current Medications





Digoxin (Lanoxin)  0.125 mg PO DAILY@1800 [facility]


   Last Admin: 03/01/17 17:25 Dose:  0.125 mg


Enoxaparin Sodium (Lovenox)  95 mg SC Q12 [facility]


   Last Admin: 03/01/17 21:47 Dose:  95 mg


Furosemide (Lasix)  40 mg IVP BID [facility]


   Last Admin: 03/01/17 17:25 Dose:  40 mg


Lisinopril (Zestril)  10 mg PO DAILY [facility]


   Last Admin: 03/01/17 10:13 Dose:  Not Given


Pantoprazole Sodium (Protonix Ec Tab)  40 mg PO DAILY [facility]


   Last Admin: 03/01/17 10:11 Dose:  40 mg


Rosuvastatin Calcium (Crestor)  10 mg PO HS [facility]


   Last Admin: 03/01/17 21:38 Dose:  Not Given


Fluticasone/Salmeterol (Advair Diskus 100/50)  1 puff INH RQ12 [facility]


   Last Admin: 03/01/17 20:13 Dose:  1 puff


Spironolactone (Aldactone)  25 mg PO DAILY [facility]


   Last Admin: 03/01/17 11:02 Dose:  25 mg


Tiotropium Bromide (Spiriva)  18 mcg INH RQ24 [facility]


   Last Admin: 03/01/17 08:50 Dose:  18 mcg











- Labs


Labs: 


 





 03/01/17 20:11 





 03/01/17 20:11 





 











PT  14.1 SECONDS (9.7-12.2)  H  03/01/17  20:11    


 


INR  1.3   03/01/17  20:11    


 


APTT  29 SECONDS (21-34)   03/01/17  20:11    














- Additional Findings


Additional findings: 


- Constitutional


Appears: Non-toxic, No Acute Distress





- Head Exam


Head Exam: ATRAUMATIC





- Eye Exam


Eye Exam: EOMI





- ENT Exam


ENT Exam: Mucous Membranes Moist





- Respiratory Exam


Respiratory Exam: Decreased Breath Sounds, Wheezes, Respiratory Distress (SOB 

while speaking).  absent: Clear to Ausculation Bilateral





- Cardiovascular Exam


Cardiovascular Exam: REGULAR RHYTHM, +S1, +S2





- GI/Abdominal Exam


GI & Abdominal Exam: Soft, Normal Bowel Sounds.  absent: Tenderness





- Extremities Exam


Extremities Exam: Pedal Edema (b/l 1+ pitting to mid thigh).  absent: Tenderness


Additional comments: 


pedal pulses 2+





- Neurological Exam


Neurological Exam: Alert, Awake, Oriented x3





- Psychiatric Exam


Psychiatric exam: Normal Affect





- Skin


Skin Exam: Dry, Normal Color, Warm


Tender to palpation at R sternal border, 2nd rib. Protrusion noted.








Assessment and Plan





- Assessment and Plan (Free Text)


Assessment: 


72 year old male with past medical history including MI, CAD with history of 

CABG and valve replacement 6-7 years ago.  Reports worstenting SOB and 

dizziness with associated swelling of bilateral LE for past 2-3 days. Admits 

this is the first time he has experienced these symptoms. SOB is worse when he 

lays flat.





Plan:


Systolic dysfunction with acute on chronic heart failure


Admit to telemetry, BNP 4560 on admission


Consulted Cardiology, Dr. Dotson - f/u recs


   -will need duiresis.  will attempt to obtain company of [employer] for 

interrogation.


Lasix 40mg IV BID


Digoxin 0.125mg


Lisinopril 10mg daily


Aldactone 25mg PO Daily


patient with AICD, will check echo to determine EF


CXR 2/25: mild cardiomegaly, mild pulmonary venous congestion.  s/p sternotomy, 

aortic valve replacement, AICD





Chronic atrial fibrillation


recommend coumadin therapy


Status: Acute





CAD (coronary artery disease) 


unknown graft status.  no evidence of ischemia at present


Status: Acute





H/O mitral valve replacement with mechanical valve


Cardiac valve replacement 6-7yrs ago


Consulted Cardiology, Dr. Dotson - f/u recs


   -echocardiogram reviewed.  valve leaflets are opening and functional.  There 

is no signficant gradient across the valve.  


   -recommend INR 2.5 to 3.5.


   -Needs heparin until anticoagulation therapeutic.


Coumadin 10mg


STOP Heparin Drip - cardiac protocol, with bolus (because patient is refusing 

blood draws)


Lovenox 95mg SC Q12


3/1: PT/INR 1.3


3/2: PT/INR refused


3/3: f/u PT/INR





History of MI (myocardial infarction)


Crestor 10mg PO HS  


ROMIs negative x3


EKG paced 


Denies chest pain 





Shortness of Breath


+wheezing


Spiriva 18mcg INH RQ24


Advair Diskus 100/50 1puff INH RQ12





Electrolyte Imbalance


Hyperkalemia - resolved





Tachycardia


EKG in ER: sinus tachycardia at 100, paced, incomplete RBBB


Continue Digoxin 0.125mg


Discontinued cardizem drip of 5mg/h started in the ER





Lower extremity edema


LE Duplex - negative. see full report.





Prophylactic measure


Stopped lovenox 40u sc daily (bridging lovenox 95u to Coumadin).


protonix 40mg daily


SCD contraindicated due to LE edema

## 2017-03-02 NOTE — CARD
--------------- APPROVED REPORT --------------





EXAM: Two-dimensional and M-mode echocardiogram with Doppler and 

color Doppler.



Other Information 

Quality : GoodRhythm : 



INDICATION

Cardiac Disease: CAD Congestive Heart Failure HX OF MI



Surgery/Intervention

Status/Post Mitral Valve Replacement: 

Mechanical 

Pacemaker: 

CABG: 



M-Mode DIMENSIONS 

RVDd2.89   (2.1-3.2cm)Left Atrium (MM)5.52   (2.5-4.0cm)

IVSd1.65   (0.7-1.1cm)Aortic Root3.21   (2.2-3.7cm)

LVDd7.25   (4.0-5.6cm)Aortic Cusp Exc.1.90   (1.5-2.0cm)

PWd0.82   (0.7-1.1cm)FS (%) 6   %

LVDs6.84   (2.0-3.8cm)LVEF (%)12   (&gt;50%)



Mitral Valve

MV E Ykwpdydi409.4cm/sMV E Peak Gr.12mmHgMV A Pvhrecdy47.1cm/s

MV E Mean Gr.4mmHgE/A ratio3.3



TDI

E/Lateral E'0.0E/Medial E'0.0



Tricuspid Valve

TR Peak [id number]/sTR Peak Gr.17woZiSWZU15cmNp



 LEFT VENTRICLE 

The Left Ventricle is moderately dilated.

There is normal left ventricular wall thickness.

The ejection fraction is severely impaired.

EF = 10-15%

There is global hypokinesis of the left ventricle.

Tissue Doppler imaging reveals abnormal left ventricular diastolic 

dysfunction.

No left ventricle thrombus noted on this study.

There is no ventricular septal defect visualized.

There is no left ventricular aneurysm. 

There is no mass noted in the left ventricle.



 RIGHT VENTRICLE 

The right ventricle is borderline dilated.

There is normal right ventricular wall thickness.

RV Systolic function is moderately to severely reduced.

DEVICE LEAD NOTED IN RV.



 ATRIA 

The left atrium is mildly dilated.

The right atrium size is normal.

The interatrial septum is intact with no evidence for an atrial 

septal defect.



 AORTIC VALVE 

The aortic valve is normal in structure.

AV is trileaflet

No aortic regurgitation is present.

There is no aortic valvular stenosis. 



 MITRAL VALVE 

Mechanical MV noted.  The valve is well seated, no rocking or regurg. 

 MV GRADIENT APPEARS NORMAL



 TRICUSPID VALVE 

The tricuspid valve is normal in structure.

There is moderate ECCENTRIC tricuspid regurgitation.

PAP is 50-60 mmHg

rap IS 10-15 MMhG

There is no tricuspid valve prolapse or vegetation.

There is no tricuspid valve stenosis. 



 PULMONIC VALVE 

The pulmonary valve is normal in structure.

Mild PI



 GREAT VESSELS 

The aortic root is normal in size.



 PERICARDIAL EFFUSION 

There is no pericardial effusion.



&lt;Conclusion&gt;

EF = 10-15%

The ejection fraction is severely impaired.

There is global hypokinesis of the left ventricle.

Tissue Doppler imaging reveals abnormal left ventricular diastolic 

dysfunction.

RV Systolic function is moderately to severely reduced.

DEVICE LEAD NOTED IN RV.

The left atrium is mildly dilated.

Mechanical MV noted.  The valve is well seated, no rocking or regurg. 

 MV GRADIENT APPEARS NORMAL

There is moderate ECCENTRIC tricuspid regurgitation.

PAP is 50-60 mmHg

rap IS 10-15 MMhG

Mild PI

## 2017-03-03 NOTE — CP.PCM.CON
History of Present Illness





- History of Present Illness


History of Present Illness: 


Patient is a 73 y/o male with a history of CHF, MI, CAD, CABG, and AFib. 

Patient was complaining of SOB and swelling in his legs upon admission on 2/25, 

and has since been treated for CHF exacerbation. 


EchoCardio Gram indicated a low Ejection Fraction, as well as hypokinesis of 

the Left Ventricular wall.


Dopper studies demonstrated to signs of DVT.





Patient states that his SOB is till worsening, expecially when laying down. 

Patient denies coughing or wheezing. 


Auscultation revealed crackles in all quadrants.





Patient is a current smoker and lives in his car.





Review of Systems





- Review of Systems


All systems: reviewed and no additional remarkable complaints except





- Constitutional


Constitutional: Weakness





- Cardiovascular


Cardiovascular: Palpitations





- Respiratory


Respiratory: As Per HPI





Past Patient History





- Infectious Disease


Hx of Infectious Diseases: None





- Past Medical History & Family History


Past Medical History?: Yes





- Past Social History


Smoking Status: Light Smoker < 10 Cigarettes Daily





- CARDIAC


Hx Cardiac Disorders:  (A-fib)


Hx Congestive Heart Failure: Yes


Hx Hypertension: Yes





- PULMONARY


Hx Chronic Obstructive Pulmonary Disease (COPD): Yes





- MUSCULOSKELETAL/RHEUMATOLOGICAL


Hx Falls: No





- PSYCHIATRIC


Hx Substance Use: No





- SURGICAL HISTORY


Hx Coronary Artery Bypass Graft: Yes (4 yrs ago)





- ANESTHESIA


Hx Anesthesia: Yes


Hx Anesthesia Reactions: No


Hx Malignant Hyperthermia: No





Meds


Allergies/Adverse Reactions: 


 Allergies











Allergy/AdvReac Type Severity Reaction Status Date / Time


 


No Known Allergies Allergy   Verified 02/25/17 12:47














- Medications


Medications: 


 Current Medications





Digoxin (Lanoxin)  0.125 mg PO DAILY@1800 [facility]


   Last Admin: 03/02/17 17:55 Dose:  0.125 mg


Enoxaparin Sodium (Lovenox)  95 mg SC Q12 [facility]


   Last Admin: 03/03/17 09:46 Dose:  Not Given


Furosemide (Lasix)  40 mg IVP BID [facility]


   Last Admin: 03/02/17 17:55 Dose:  40 mg


Lisinopril (Zestril)  10 mg PO DAILY [facility]


   Last Admin: 03/03/17 09:42 Dose:  Not Given


Pantoprazole Sodium (Protonix Ec Tab)  40 mg PO DAILY [facility]


   Last Admin: 03/03/17 09:37 Dose:  40 mg


Rosuvastatin Calcium (Crestor)  10 mg PO HS [facility]


   Last Admin: 03/02/17 21:20 Dose:  10 mg


Fluticasone/Salmeterol (Advair Diskus 250/50)  1 puff INH RQ12 [facility]


   Last Admin: 03/03/17 08:20 Dose:  1 puff


Spironolactone (Aldactone)  25 mg PO DAILY [facility]


   Last Admin: 03/03/17 09:44 Dose:  Not Given


Tiotropium Bromide (Spiriva)  18 mcg INH RQ24 [facility]


   Last Admin: 03/03/17 08:20 Dose:  18 mcg











Physical Exam





- Constitutional


Appears: Unkempt





- Head Exam


Head Exam: NORMAL INSPECTION





- Eye Exam


Eye Exam: Normal appearance





- ENT Exam


ENT Exam: Mucous Membranes Moist





- Respiratory Exam


Respiratory Exam: Rales





- Cardiovascular Exam


Cardiovascular Exam: Irregular Rhythm, +S1, +S2





- Neurological Exam


Neurological exam: Alert, Oriented x3





- Psychiatric Exam


Psychiatric exam: Normal Affect, Normal Mood





- Skin


Skin Exam: Normal Color





Results





- Vital Signs


Recent Vital Signs: 


 Last Vital Signs











Temp  97.4 F L  03/03/17 07:45


 


Pulse  83   03/03/17 07:45


 


Resp  18   03/03/17 07:45


 


BP  100/65   03/03/17 07:45


 


Pulse Ox  100   03/03/17 07:45














- Labs


Result Diagrams: 


 03/03/17 04:58





 03/03/17 04:58


Labs: 


 Laboratory Results - last 24 hr











  03/02/17 03/03/17





  11:45 04:58


 


WBC  4.9  5.1


 


RBC  4.37 L  4.21 L


 


Hgb  11.6 L  11.6 L


 


Hct  36.2  33.8 L


 


MCV  82.7  80.3  D


 


MCH  26.6 L  27.6


 


MCHC  32.2 L  34.3


 


RDW  19.6 H  19.2 H


 


Plt Count  175  145


 


MPV  8.9  8.4


 


Neut % (Auto)  70.5  69.0


 


Lymph % (Auto)  11.4 L  14.9 L


 


Mono % (Auto)  14.5 H  11.9 H


 


Eos % (Auto)  2.7  3.4


 


Baso % (Auto)  0.9  0.8


 


Neut #  3.4  3.5


 


Lymph #  0.6 L  0.8 L


 


Mono #  0.7  0.6


 


Eos #  0.1  0.2


 


Baso #  0.0  0.0


 


PT   16.7 H


 


INR   1.5


 


Sodium  139  139


 


Potassium  5.0  4.2


 


Chloride  96 L  96 L


 


Carbon Dioxide  32 H  30


 


Anion Gap  16  17


 


BUN  18  23 H


 


Creatinine  1.1  1.3


 


Est GFR ( Amer)  > 60  > 60


 


Est GFR (Non-Af Amer)  > 60  54


 


Random Glucose  88  86


 


Calcium  9.9  9.7


 


Phosphorus  3.3  3.7


 


Magnesium  2.0  2.0


 


Total Bilirubin  0.7  0.7


 


AST  28  29


 


ALT  19 L  33


 


Alkaline Phosphatase  86  82


 


Total Creatine Kinase   43 L


 


CK-MB (Mass)   1.08


 


Troponin I, Quant   < 0.0120


 


Total Protein  7.1  7.0


 


Albumin  3.9  3.6


 


Globulin  3.2  3.4


 


Albumin/Globulin Ratio  1.2  1.1














Assessment & Plan


(1) COPD (chronic obstructive pulmonary disease)


Status: Acute


Comment: Patient with long history of smoking most likely has underlying COPD.  

Nebulizer treatment.  Inhaled steroids.  Spiriva.  pulmonary function test as 

out pt








(2) Acute exacerbation of CHF (congestive heart failure)


Assessment and Plan: 


Continue diuresis regiment to decrease fluid load


supplemental oxygen


Nebulizer treatment as needed





follow up with results of echocardiogram to asses EF and wall motility


Status: Acute





(3) Chronic atrial fibrillation


Status: Acute

## 2017-03-03 NOTE — CP.PCM.PN
<Sulaiman Patel - Last Filed: 03/03/17 19:38>





Subjective





- Date & Time of Evaluation


Date of Evaluation: 03/03/17


Time of Evaluation: 07:05





- Subjective


Subjective: 


PGY1 Medicine Note- Dr Elizabeth





Patient seen and examined at bedside. No overnight events per nursing. Patient 

with continued SOB while while speaking and laying flat. Patient is more calm 

today about losing his phone and is being more compliant with his medication 

regimen. Denies difficulty with ambulation or history of arrhythmia. Denies f/c

, dizziness, palpitations, cough, abdominal pain, n/v, d/c, or any additional 

complaints. 








Objective





- Vital Signs/Intake and Output


Vital Signs (last 24 hours): 


 











Temp Pulse Resp BP Pulse Ox


 


 98 F   74   20   108/72   98 


 


 03/03/17 04:30  03/03/17 04:30  03/03/17 04:30  03/03/17 04:30  03/03/17 04:30








Intake and Output: 


 











 03/03/17 03/03/17





 06:59 18:59


 


Intake Total 260 


 


Output Total 300 


 


Balance -40 














- Medications


Medications: 


 Current Medications





Digoxin (Lanoxin)  0.125 mg PO DAILY@1800 [facility]


   Last Admin: 03/02/17 17:55 Dose:  0.125 mg


Enoxaparin Sodium (Lovenox)  95 mg SC Q12 [facility]


   Last Admin: 03/02/17 21:20 Dose:  95 mg


Furosemide (Lasix)  40 mg IVP BID [facility]


   Last Admin: 03/02/17 17:55 Dose:  40 mg


Lisinopril (Zestril)  10 mg PO DAILY [facility]


   Last Admin: 03/02/17 09:48 Dose:  Not Given


Pantoprazole Sodium (Protonix Ec Tab)  40 mg PO DAILY [facility]


   Last Admin: 03/02/17 10:00 Dose:  Not Given


Rosuvastatin Calcium (Crestor)  10 mg PO HS [facility]


   Last Admin: 03/02/17 21:20 Dose:  10 mg


Fluticasone/Salmeterol (Advair Diskus 250/50)  1 puff INH RQ12 [facility]


   Last Admin: 03/02/17 20:47 Dose:  1 puff


Spironolactone (Aldactone)  25 mg PO DAILY [facility]


   Last Admin: 03/02/17 10:01 Dose:  Not Given


Tiotropium Bromide (Spiriva)  18 mcg INH RQ24 [facility]


   Last Admin: 03/02/17 08:45 Dose:  18 mcg











- Labs


Labs: 


 





 03/03/17 04:58 





 03/03/17 04:58 





 











PT  16.7 SECONDS (9.7-12.2)  H  03/03/17  04:58    


 


INR  1.5   03/03/17  04:58    


 


APTT  29 SECONDS (21-34)   03/01/17  20:11    














- Additional Findings


Additional findings: 


- Constitutional


Appears: Non-toxic, No Acute Distress





- Head Exam


Head Exam: ATRAUMATIC





- Eye Exam


Eye Exam: EOMI





- ENT Exam


ENT Exam: Mucous Membranes Moist





- Respiratory Exam


Respiratory Exam: Decreased Breath Sounds, Wheezes, Respiratory Distress (SOB 

while speaking).  absent: Clear to Ausculation Bilateral





- Cardiovascular Exam


Cardiovascular Exam: REGULAR RHYTHM, +S1, +S2





- GI/Abdominal Exam


GI & Abdominal Exam: Soft, Normal Bowel Sounds.  absent: Tenderness





- Extremities Exam


Extremities Exam: Pedal Edema (b/l 1+ pitting to mid thigh).  absent: Tenderness


Additional comments: 


pedal pulses 2+





- Neurological Exam


Neurological Exam: Alert, Awake, Oriented x3





- Psychiatric Exam


Psychiatric exam: Normal Affect





- Skin


Skin Exam: Dry, Normal Color, Warm


Tender to palpation at R sternal border, 2nd rib. Protrusion noted.








Assessment and Plan





- Assessment and Plan (Free Text)


Assessment: 


72 year old male with past medical history including MI, CAD with history of 

CABG and valve replacement 6-7 years ago.  Reports worstenting SOB and 

dizziness with associated swelling of bilateral LE for past 2-3 days. Admits 

this is the first time he has experienced these symptoms. SOB is worse when he 

lays flat.


Likely D/C 3/4/17.





Plan:


Systolic dysfunction with acute on chronic heart failure


Admit to telemetry, BNP 4560 on admission


Consulted Cardiology, Dr. Dotson - f/u recs


   -will need duiresis.  will attempt to obtain company of A[employer] for 

interrogation.


Lasix 40mg IV BID


Digoxin 0.125mg


Lisinopril 10mg daily


Aldactone 25mg PO Daily


patient with AICD, will check echo to determine EF


CXR 2/25: mild cardiomegaly, mild pulmonary venous congestion.  s/p sternotomy, 

aortic valve replacement, AICD





H/O mitral valve replacement with mechanical valve


Cardiac valve replacement 6-7yrs ago


Consulted Cardiology, Dr. Dotson - f/u recs


   -echocardiogram reviewed.  valve leaflets are opening and functional.  There 

is no signficant gradient across the valve.  


   -recommend INR 2.5 to 3.5.


Coumadin 10mg


Lovenox 95mg SC Q12


STOP Heparin Drip - cardiac protocol, with bolus (because patient is refusing 

blood draws)


3/1: PT/INR 1.3


3/2: PT/INR refused, Coumadin 10mg


3/3: PT/INR 1.5, increasing, Coumadin 10mg


3/4: f/u PT / INR








Chronic atrial fibrillation


Consulted Cardiology, Dr. Dotson - f/u recs


   -echocardiogram reviewed.  valve leaflets are opening and functional.  There 

is no signficant gradient across the valve.  


   -recommend INR 2.5 to 3.5.


Coumadin 10mg


Lovenox 95mg SC Q12








COPD (chronic obstructive pulmonary disease)


Consulted Pulmonology, Dr. Varela for persistent shortness of breath


-Patient with long history of smoking most likely has underlying COPD.  

Nebulizer treatment.  Inhaled steroids.  Spiriva.  pulmonary function test as 

out pt





CAD (coronary artery disease) 


unknown graft status.  no evidence of ischemia at present








History of MI (myocardial infarction)


Crestor 10mg PO HS  


ROMIs negative x3


EKG paced 


Denies chest pain 





Electrolyte Imbalance


Hyperkalemia - resolved





Tachycardia


EKG in ER: sinus tachycardia at 100, paced, incomplete RBBB


Continue Digoxin 0.125mg


Discontinued cardizem drip of 5mg/h started in the ER





Lower extremity edema


LE Duplex - negative. see full report.





Prophylactic measure


Stopped lovenox 40u sc daily (bridging lovenox 95u to Coumadin).


protonix 40mg daily


SCD contraindicated due to LE edema





<Evgeny Elizabeth Jr. - Last Filed: 03/09/17 17:13>





Objective





- Vital Signs/Intake and Output


Vital Signs (last 24 hours): 


 











Temp Pulse Resp BP Pulse Ox


 


 97.6 F   89   20   104/66   100 


 


 03/09/17 16:57  03/09/17 16:57  03/09/17 16:57  03/09/17 16:57  03/09/17 16:57








Intake and Output: 


 











 03/09/17 03/09/17





 06:59 18:59


 


Intake Total 300 480


 


Output Total 800 500


 


Balance -500 -20














- Medications


Medications: 


 Current Medications





Albuterol/Ipratropium (Duoneb 3 Mg/0.5 Mg (3 Ml) Ud)  3 ml INH RQ6 [facility]


   Last Admin: 03/09/17 13:05 Dose:  3 ml


Budesonide (Pulmicort Respules)  0.5 mg INH RQ12 [facility]


   Last Admin: 03/09/17 07:42 Dose:  0.5 mg


Digoxin (Lanoxin)  0.125 mg PO DAILY@1800 [facility]


   Last Admin: 03/08/17 17:02 Dose:  0.125 mg


Furosemide (Lasix)  40 mg IVP BID [facility]


   Last Admin: 03/09/17 09:15 Dose:  40 mg


Guaifenesin (Mucinex La)  600 mg PO BID [facility]


   Last Admin: 03/09/17 10:11 Dose:  600 mg


Lisinopril (Zestril)  10 mg PO DAILY [facility]


   Last Admin: 03/09/17 09:15 Dose:  10 mg


Methylprednisolone (Solu-Medrol)  40 mg IVP Q8 [facility]


   Last Admin: 03/09/17 13:21 Dose:  40 mg


Pantoprazole Sodium (Protonix Ec Tab)  40 mg PO DAILY [facility]


   Last Admin: 03/09/17 09:15 Dose:  40 mg


Rosuvastatin Calcium (Crestor)  10 mg PO HS [facility]


   Last Admin: 03/08/17 21:04 Dose:  10 mg


Spironolactone (Aldactone)  25 mg PO DAILY [facility]


   Last Admin: 03/09/17 09:15 Dose:  25 mg


Tiotropium Bromide (Spiriva)  18 mcg INH RQ24 [facility]


   Last Admin: 03/09/17 07:42 Dose:  18 mcg


Warfarin Sodium (Coumadin)  5 mg PO 1800 [facility]


   Stop: 03/09/17 18:01











- Labs


Labs: 


 





 03/09/17 11:10 





 03/09/17 11:10 





 











PT  32.3 SECONDS (9.7-12.2)  H* D 03/09/17  11:10    


 


INR  2.8  D 03/09/17  11:10    


 


APTT  31 SECONDS (21-34)  D 03/09/17  11:10    














Attending/Attestation





- Attestation


I have personally seen and examined this patient.: Yes


I have fully participated in the care of the patient.: Yes


I have reviewed all pertinent clinical information, including history, physical 

exam and plan: Yes


Notes (Text): 





03/09/17 17:13


Patient seen and examined with the resident. Reviewed resident note and agree 

with findings and plan of care. [ ]

## 2017-03-04 NOTE — CP.PCM.PN
Subjective





- Date & Time of Evaluation


Date of Evaluation: 03/04/17


Time of Evaluation: 12:25





- Subjective


Subjective: 


patient denies chest pain.  less cough.





Objective





- Vital Signs/Intake and Output


Vital Signs (last 24 hours): 


 











Temp Pulse Resp BP Pulse Ox


 


 97.3 F L  71   20   110/70   97 


 


 03/04/17 08:14  03/04/17 08:14  03/04/17 08:14  03/04/17 09:19  03/04/17 08:14








Intake and Output: 


 











 03/04/17 03/04/17





 06:59 18:59


 


Intake Total 210 


 


Balance 210 














- Medications


Medications: 


 Current Medications





Albuterol/Ipratropium (Duoneb 3 Mg/0.5 Mg (3 Ml) Ud)  3 ml INH RQ6 [facility]


   Last Admin: 03/04/17 08:12 Dose:  3 ml


Budesonide (Pulmicort Respules)  0.5 mg INH RQ12 [facility]


   Last Admin: 03/04/17 08:12 Dose:  0.5 mg


Digoxin (Lanoxin)  0.125 mg PO DAILY@1800 [facility]


   Last Admin: 03/03/17 18:25 Dose:  0.125 mg


Enoxaparin Sodium (Lovenox)  95 mg SC Q12 [facility]


   Last Admin: 03/04/17 09:19 Dose:  95 mg


Furosemide (Lasix)  40 mg IVP BID [facility]


   Last Admin: 03/04/17 09:19 Dose:  40 mg


Lisinopril (Zestril)  10 mg PO DAILY [facility]


   Last Admin: 03/04/17 09:20 Dose:  10 mg


Pantoprazole Sodium (Protonix Ec Tab)  40 mg PO DAILY [facility]


   Last Admin: 03/04/17 09:20 Dose:  40 mg


Rosuvastatin Calcium (Crestor)  10 mg PO HS [facility]


   Last Admin: 03/03/17 21:34 Dose:  10 mg


Spironolactone (Aldactone)  25 mg PO DAILY [facility]


   Last Admin: 03/04/17 09:20 Dose:  25 mg


Tiotropium Bromide (Spiriva)  18 mcg INH RQ24 [facility]


   Last Admin: 03/04/17 08:12 Dose:  18 mcg











- Labs


Labs: 


 





 03/04/17 11:55 





 03/04/17 11:55 





 











PT  16.7 SECONDS (9.7-12.2)  H  03/03/17  04:58    


 


INR  1.5   03/03/17  04:58    


 


APTT  29 SECONDS (21-34)   03/01/17  20:11    














- Constitutional


Appears: Non-toxic





- Head Exam


Head Exam: NORMAL INSPECTION





- Eye Exam


Eye Exam: Normal appearance





- ENT Exam


ENT Exam: Mucous Membranes Moist





- Neck Exam


Neck Exam: Full ROM





- Respiratory Exam


Respiratory Exam: Decreased Breath Sounds





- Cardiovascular Exam


Cardiovascular Exam: Irregular Rhythm


Additional comments: 


mechanical first heart sound





- GI/Abdominal Exam


GI & Abdominal Exam: Normal Bowel Sounds





- Rectal Exam


Rectal Exam: Deferred





- Extremities Exam


Extremities Exam: Pedal Edema





- Back Exam


Back Exam: NORMAL INSPECTION





- Neurological Exam


Neurological Exam: Alert





- Psychiatric Exam


Psychiatric exam: Normal Affect





- Skin


Skin Exam: Normal Color





Assessment and Plan


(1) Systolic dysfunction with acute on chronic heart failure


Assessment & Plan: 


improving volume status


Status: Acute





(2) Chronic atrial fibrillation


Assessment & Plan: 


rate controlled.  recommend coumadin.


Status: Acute





(3) CAD (coronary artery disease)


Assessment & Plan: 


no current angina


Status: Acute





(4) H/O mitral valve replacement with mechanical valve


Assessment & Plan: 


needs INR to be therapeutic (2.5-3.5) prior to discharge.


Status: Acute

## 2017-03-04 NOTE — CP.PCM.PN
<Sulaiman Patel - Last Filed: 03/04/17 15:37>





Subjective





- Date & Time of Evaluation


Date of Evaluation: 03/04/17


Time of Evaluation: 08:00





- Subjective


Subjective: 


PGY1 Medicine Note- Dr Elizabeth





Patient seen and examined at bedside. No overnight events per nursing. Patient 

with continued SOB while while speaking and laying flat. Volume status is 

improving. Patient reports 2 hours worth of chest pressure + numbness/tingling 

in his b/l hands this afternoon with, no radiation. Heart rate was normal. Will 

re-evaluate, Dr. Elizabeth and Dr. Dotson aware. Denies difficulty with ambulation 

or history of arrhythmia. Denies f/c, dizziness, palpitations, cough, abdominal 

pain, n/v, d/c, or any additional complaints. 





Objective





- Vital Signs/Intake and Output


Vital Signs (last 24 hours): 


 











Temp Pulse Resp BP Pulse Ox


 


 97.3 F L  80   20   95/58 L  98 


 


 03/04/17 08:14  03/04/17 14:45  03/04/17 14:45  03/04/17 14:45  03/04/17 14:45








Intake and Output: 


 











 03/04/17 03/04/17





 06:59 18:59


 


Intake Total 210 400


 


Output Total  800


 


Balance 210 -400














- Medications


Medications: 


 Current Medications





Albuterol/Ipratropium (Duoneb 3 Mg/0.5 Mg (3 Ml) Ud)  3 ml INH RQ6 [facility]


   Last Admin: 03/04/17 13:39 Dose:  3 ml


Budesonide (Pulmicort Respules)  0.5 mg INH RQ12 [facility]


   Last Admin: 03/04/17 08:12 Dose:  0.5 mg


Digoxin (Lanoxin)  0.125 mg PO DAILY@1800 [facility]


   Last Admin: 03/03/17 18:25 Dose:  0.125 mg


Enoxaparin Sodium (Lovenox)  95 mg SC Q12 [facility]


   Last Admin: 03/04/17 09:19 Dose:  95 mg


Furosemide (Lasix)  40 mg IVP BID [facility]


   Last Admin: 03/04/17 09:19 Dose:  40 mg


Lisinopril (Zestril)  10 mg PO DAILY [facility]


   Last Admin: 03/04/17 09:20 Dose:  10 mg


Pantoprazole Sodium (Protonix Ec Tab)  40 mg PO DAILY [facility]


   Last Admin: 03/04/17 09:20 Dose:  40 mg


Rosuvastatin Calcium (Crestor)  10 mg PO HS [facility]


   Last Admin: 03/03/17 21:34 Dose:  10 mg


Spironolactone (Aldactone)  25 mg PO DAILY [facility]


   Last Admin: 03/04/17 09:20 Dose:  25 mg


Tiotropium Bromide (Spiriva)  18 mcg INH RQ24 [facility]


   Last Admin: 03/04/17 08:12 Dose:  18 mcg











- Labs


Labs: 


 





 03/04/17 11:55 





 03/04/17 11:55 





 











PT  22.4 SECONDS (9.7-12.2)  H D 03/04/17  11:55    


 


INR  2.0  D 03/04/17  11:55    


 


APTT  43 SECONDS (21-34)  H D 03/04/17  11:55    














- Additional Findings


Additional findings: 


- Constitutional


Appears: Non-toxic, No Acute Distress





- Head Exam


Head Exam: ATRAUMATIC





- Eye Exam


Eye Exam: EOMI





- ENT Exam


ENT Exam: Mucous Membranes Moist





- Respiratory Exam


Respiratory Exam: Decreased Breath Sounds, Wheezes, Respiratory Distress (SOB 

while speaking).  absent: Clear to Ausculation Bilateral





- Cardiovascular Exam


Cardiovascular Exam: REGULAR RHYTHM, +S1, +S2





- GI/Abdominal Exam


GI & Abdominal Exam: Soft, Normal Bowel Sounds.  absent: Tenderness





- Extremities Exam


Extremities Exam: Pedal Edema (b/l 1+ pitting to mid thigh).  absent: Tenderness


Additional comments: 


pedal pulses 2+





- Neurological Exam


Neurological Exam: Alert, Awake, Oriented x3


b/l hand numbness / tingling today





- Psychiatric Exam


Psychiatric exam: Normal Affect





- Skin


Skin Exam: Dry, Normal Color, Warm


Tender to palpation at R sternal border, 2nd rib. Protrusion noted.





Assessment and Plan





- Assessment and Plan (Free Text)


Assessment: 


72 year old male with past medical history including MI, CAD with history of 

CABG and valve replacement 6-7 years ago.  Reports worstenting SOB and 

dizziness with associated swelling of bilateral LE for past 2-3 days. Admits 

this is the first time he has experienced these symptoms. SOB is worse when he 

lays flat.


-Discharge planning once INR in 2.5-3.5 range





Plan:


Systolic dysfunction with acute on chronic heart failure


Admit to telemetry, BNP 4560 on admission


Consulted Cardiology, Dr. Dotson - f/u recs


   -will need duiresis.  will attempt to obtain company of [employer] for 

interrogation.


   -Improving Volume status


Lasix 40mg IV BID


Digoxin 0.125mg


Lisinopril 10mg daily


Aldactone 25mg PO Daily


patient with AICD, will check echo to determine EF


CXR 2/25: mild cardiomegaly, mild pulmonary venous congestion.  s/p sternotomy, 

aortic valve replacement, AICD





H/O mitral valve replacement with mechanical valve


Cardiac valve replacement 6-7yrs ago


Consulted Cardiology, Dr. Mauri Last f/u recs


   -echocardiogram reviewed.  valve leaflets are opening and functional.  There 

is no signficant gradient across the valve.  


   -recommend INR 2.5 to 3.5.


STOP Lovenox 95mg SC Q12 (last dose 3/4/17)


STOP Heparin Drip - cardiac protocol, with bolus (because patient is refusing 

blood draws)


3/1: PT/INR 1.3


3/2: PT/INR refused, Coumadin 10mg


3/3: PT/INR 1.5, Coumadin 10mg


3/4: PT/INR 2.0, Coumadin 10mg








Chronic atrial fibrillation


Consulted Cardiology, Dr. Mauri Last f/u recs


   -Rate controlled


   -echocardiogram reviewed.  valve leaflets are opening and functional.  There 

is no signficant gradient across the valve.  


   -recommend INR 2.5 to 3.5.


   -See PT/INR trends above.


Coumadin 10mg


STOP Lovenox 95mg SC Q12 (last dose 3/4/17)








COPD (chronic obstructive pulmonary disease)


Consulted Pulmonology, Dr. Varela for persistent shortness of breath


-Patient with long history of smoking most likely has underlying COPD.  

Nebulizer treatment.  Inhaled steroids.  Spiriva.  pulmonary function test as 

out pt





CAD (coronary artery disease) 


Consulted Cardiology, Dr. Mauri Last f/u recs


   -unknown graft status.  no evidence of ischemia at present


   -No current angina





History of MI (myocardial infarction)


Crestor 10mg PO HS  


ROMIs negative x3


EKG paced 


Denies chest pain 





Electrolyte Imbalance


Hyperkalemia - resolved





Tachycardia


EKG in ER: sinus tachycardia at 100, paced, incomplete RBBB


Continue Digoxin 0.125mg


Discontinued cardizem drip of 5mg/h started in the ER





Lower extremity edema


LE Duplex - negative. see full report.





Prophylactic measure


Stopped lovenox 40u sc daily (bridging lovenox 95u to Coumadin).


protonix 40mg daily


SCD contraindicated due to LE edema





[All management as per Dr. Elizabeth]





<Evgeny Elizabeth Jr. - Last Filed: 03/09/17 17:14>





Objective





- Vital Signs/Intake and Output


Vital Signs (last 24 hours): 


 











Temp Pulse Resp BP Pulse Ox


 


 97.6 F   89   20   104/66   100 


 


 03/09/17 16:57  03/09/17 16:57  03/09/17 16:57  03/09/17 16:57  03/09/17 16:57








Intake and Output: 


 











 03/09/17 03/09/17





 06:59 18:59


 


Intake Total 300 480


 


Output Total 800 500


 


Balance -500 -20














- Medications


Medications: 


 Current Medications





Albuterol/Ipratropium (Duoneb 3 Mg/0.5 Mg (3 Ml) Ud)  3 ml INH RQ6 [facility]


   Last Admin: 03/09/17 13:05 Dose:  3 ml


Budesonide (Pulmicort Respules)  0.5 mg INH RQ12 [facility]


   Last Admin: 03/09/17 07:42 Dose:  0.5 mg


Digoxin (Lanoxin)  0.125 mg PO DAILY@1800 [facility]


   Last Admin: 03/08/17 17:02 Dose:  0.125 mg


Furosemide (Lasix)  40 mg IVP BID [facility]


   Last Admin: 03/09/17 09:15 Dose:  40 mg


Guaifenesin (Mucinex La)  600 mg PO BID [facility]


   Last Admin: 03/09/17 10:11 Dose:  600 mg


Lisinopril (Zestril)  10 mg PO DAILY [facility]


   Last Admin: 03/09/17 09:15 Dose:  10 mg


Methylprednisolone (Solu-Medrol)  40 mg IVP Q8 [facility]


   Last Admin: 03/09/17 13:21 Dose:  40 mg


Pantoprazole Sodium (Protonix Ec Tab)  40 mg PO DAILY [facility]


   Last Admin: 03/09/17 09:15 Dose:  40 mg


Rosuvastatin Calcium (Crestor)  10 mg PO HS [facility]


   Last Admin: 03/08/17 21:04 Dose:  10 mg


Spironolactone (Aldactone)  25 mg PO DAILY [facility]


   Last Admin: 03/09/17 09:15 Dose:  25 mg


Tiotropium Bromide (Spiriva)  18 mcg INH RQ24 [facility]


   Last Admin: 03/09/17 07:42 Dose:  18 mcg


Warfarin Sodium (Coumadin)  5 mg PO 1800 [facility]


   Stop: 03/09/17 18:01











- Labs


Labs: 


 





 03/09/17 11:10 





 03/09/17 11:10 





 











PT  32.3 SECONDS (9.7-12.2)  H* D 03/09/17  11:10    


 


INR  2.8  D 03/09/17  11:10    


 


APTT  31 SECONDS (21-34)  D 03/09/17  11:10    














Attending/Attestation





- Attestation


I have personally seen and examined this patient.: Yes


I have fully participated in the care of the patient.: Yes


I have reviewed all pertinent clinical information, including history, physical 

exam and plan: Yes


Notes (Text): 





03/09/17 17:14


Patient seen and examined with the resident. Reviewed resident note and agree 

with findings and plan of care. [ ]

## 2017-03-05 NOTE — CP.PCM.PN
<Sulaiman Patel - Last Filed: 03/05/17 19:07>





Subjective





- Date & Time of Evaluation


Date of Evaluation: 03/05/17


Time of Evaluation: 07:05





- Subjective


Subjective: 


PGY1 Medicine Note- Dr Elizabeth





Patient seen and examined at bedside. No overnight events per nursing. Patient 

with less SOB while speaking today, however still c/o SOB. Will have PT walk 

patient without oxygen to monitor for de-saturation. Denies difficulty with 

ambulation or history of arrhythmia. Denies f/c, dizziness, palpitations, cough

, abdominal pain, n/v, d/c, or any additional complaints. 





Objective





- Vital Signs/Intake and Output


Vital Signs (last 24 hours): 


 











Temp Pulse Resp BP Pulse Ox


 


 98.1 F   72   20   99/61 L  98 


 


 03/05/17 07:39  03/05/17 09:00  03/05/17 07:39  03/05/17 07:39  03/05/17 07:39











- Medications


Medications: 


 Current Medications





Albuterol/Ipratropium (Duoneb 3 Mg/0.5 Mg (3 Ml) Ud)  3 ml INH RQ6 [facility]


   Last Admin: 03/05/17 07:39 Dose:  3 ml


Budesonide (Pulmicort Respules)  0.5 mg INH RQ12 [facility]


   Last Admin: 03/05/17 07:39 Dose:  0.5 mg


Digoxin (Lanoxin)  0.125 mg PO DAILY@1800 [facility]


   Last Admin: 03/04/17 18:15 Dose:  0.125 mg


Enoxaparin Sodium (Lovenox)  80 mg SC Q12 [facility]


Furosemide (Lasix)  40 mg IVP BID [facility]


   Last Admin: 03/04/17 18:28 Dose:  Not Given


Lisinopril (Zestril)  10 mg PO DAILY [facility]


   Last Admin: 03/04/17 09:20 Dose:  10 mg


Pantoprazole Sodium (Protonix Ec Tab)  40 mg PO DAILY [facility]


   Last Admin: 03/04/17 09:20 Dose:  40 mg


Rosuvastatin Calcium (Crestor)  10 mg PO HS [facility]


   Last Admin: 03/04/17 23:03 Dose:  Not Given


Spironolactone (Aldactone)  25 mg PO DAILY [facility]


   Last Admin: 03/04/17 09:20 Dose:  25 mg


Tiotropium Bromide (Spiriva)  18 mcg INH RQ24 [facility]


   Last Admin: 03/05/17 07:39 Dose:  18 mcg











- Labs


Labs: 


 





 03/04/17 11:55 





 03/04/17 11:55 





 











PT  22.4 SECONDS (9.7-12.2)  H D 03/04/17  11:55    


 


INR  2.0  D 03/04/17  11:55    


 


APTT  43 SECONDS (21-34)  H D 03/04/17  11:55    














- Additional Findings


Additional findings: 


- Constitutional


Appears: Non-toxic, No Acute Distress





- Head Exam


Head Exam: ATRAUMATIC





- Eye Exam


Eye Exam: EOMI





- ENT Exam


ENT Exam: Mucous Membranes Moist





- Respiratory Exam


Respiratory Exam: Decreased Breath Sounds, Wheezes, Rhonchi, Respiratory 

Distress (SOB while speaking, improving).  absent: Clear to Ausculation 

Bilateral


-reports SOB, however does not always wear NC when it is near, and often 

removes it.





- Cardiovascular Exam


Cardiovascular Exam: REGULAR RHYTHM, +S1, +S2





- GI/Abdominal Exam


GI & Abdominal Exam: Soft, Normal Bowel Sounds.  absent: Tenderness





- Extremities Exam


Extremities Exam: Pedal Edema (b/l 1+ pitting to mid thigh).  absent: Tenderness


Additional comments: 


pedal pulses 2+





- Neurological Exam


Neurological Exam: Alert, Awake, Oriented x3


b/l hand numbness / tingling today





- Psychiatric Exam


Psychiatric exam: Normal Affect





- Skin


Skin Exam: Dry, Normal Color, Warm


Tender to palpation at R sternal border, 2nd rib. Protrusion noted.








Assessment and Plan





- Assessment and Plan (Free Text)


Plan: 


72 year old male with past medical history including MI, CAD with history of 

CABG and valve replacement 6-7 years ago.  Reports worstenting SOB and 

dizziness with associated swelling of bilateral LE for past 2-3 days. Admits 

this is the first time he has experienced these symptoms. SOB is worse when he 

lays flat.


-Discharge planning once INR in 2.5-3.5 range





Plan:


Systolic dysfunction with acute on chronic heart failure


Admit to telemetry, BNP 4560 on admission


Consulted Cardiology, Dr. Dotson - f/u recs


   -will need duiresis.  will attempt to obtain company of [employer] for 

interrogation.


   -Improving Volume status


Lasix 40mg IV BID


Digoxin 0.125mg


Lisinopril 10mg daily


Aldactone 25mg PO Daily


patient with AICD, will check echo to determine EF


CXR 2/25: mild cardiomegaly, mild pulmonary venous congestion.  s/p sternotomy, 

aortic valve replacement, AICD





H/O mitral valve replacement with mechanical valve


Cardiac valve replacement 6-7yrs ago


Consulted Cardiology, Dr. Dotson - f/u recs


   -echocardiogram reviewed.  valve leaflets are opening and functional.  There 

is no signficant gradient across the valve.  


   -recommend INR 2.5 to 3.5.


STOP Lovenox 3/5


STOP Heparin Drip - cardiac protocol, with bolus (because patient is refusing 

blood draws)


3/1: PT/INR 1.3


3/2: PT/INR refused, Coumadin 10mg


3/3: PT/INR 1.5, Coumadin 10mg


3/4: PT/INR 2.0, Coumadin 10mg


3/5: PT/INR 2.4, Coumadin 7.5mg; patient received one more dose of Lovenox 80mg 

SC today, now discontinued as bridge complete.


3/6: f/u PT/INR








Chronic atrial fibrillation


Consulted Cardiology, Dr. Dotson - f/u recs


   -Rate controlled


   -echocardiogram reviewed.  valve leaflets are opening and functional.  There 

is no signficant gradient across the valve.  


   -recommend INR 2.5 to 3.5.


   -See PT/INR trends above


   Coumadin 7.5mg


   STOP Lovenox 3/5








COPD (chronic obstructive pulmonary disease)


Consulted Pulmonology, Dr. Varela for persistent shortness of breath


-Patient with long history of smoking most likely has underlying COPD.  

Nebulizer treatment.  Inhaled steroids.  Spiriva.  pulmonary function test as 

out pt


-f/u Physical Therapy to walk patient and assess for desaturation 3/6





CAD (coronary artery disease) 


Consulted Cardiology, Dr. Dotson - f/u recs


   -unknown graft status.  no evidence of ischemia at present


   -No current angina





History of MI (myocardial infarction)


Crestor 10mg PO HS  


ROMIs negative x3


EKG paced 


Denies chest pain 





Electrolyte Imbalance


Hyperkalemia - resolved





Tachycardia


EKG in ER: sinus tachycardia at 100, paced, incomplete RBBB


Continue Digoxin 0.125mg


Discontinued cardizem drip of 5mg/h started in the ER





Lower extremity edema


LE Duplex - negative. see full report.





Prophylactic measure


Stopped lovenox 40u sc daily (bridging lovenox 95u to Coumadin).


protonix 40mg daily


SCD contraindicated due to LE edema





[All management as per Dr. Elizabeth]





<Evgeny Elizabeth Jr. - Last Filed: 03/09/17 17:17>





Objective





- Vital Signs/Intake and Output


Vital Signs (last 24 hours): 


 











Temp Pulse Resp BP Pulse Ox


 


 97.6 F   89   20   104/66   100 


 


 03/09/17 16:57  03/09/17 16:57  03/09/17 16:57  03/09/17 16:57  03/09/17 16:57








Intake and Output: 


 











 03/09/17 03/09/17





 06:59 18:59


 


Intake Total 300 480


 


Output Total 800 500


 


Balance -500 -20














- Medications


Medications: 


 Current Medications





Albuterol/Ipratropium (Duoneb 3 Mg/0.5 Mg (3 Ml) Ud)  3 ml INH RQ6 [facility]


   Last Admin: 03/09/17 13:05 Dose:  3 ml


Budesonide (Pulmicort Respules)  0.5 mg INH RQ12 [facility]


   Last Admin: 03/09/17 07:42 Dose:  0.5 mg


Digoxin (Lanoxin)  0.125 mg PO DAILY@1800 [facility]


   Last Admin: 03/08/17 17:02 Dose:  0.125 mg


Furosemide (Lasix)  40 mg IVP BID [facility]


   Last Admin: 03/09/17 09:15 Dose:  40 mg


Guaifenesin (Mucinex La)  600 mg PO BID [facility]


   Last Admin: 03/09/17 10:11 Dose:  600 mg


Lisinopril (Zestril)  10 mg PO DAILY [facility]


   Last Admin: 03/09/17 09:15 Dose:  10 mg


Methylprednisolone (Solu-Medrol)  40 mg IVP Q8 [facility]


   Last Admin: 03/09/17 13:21 Dose:  40 mg


Pantoprazole Sodium (Protonix Ec Tab)  40 mg PO DAILY [facility]


   Last Admin: 03/09/17 09:15 Dose:  40 mg


Rosuvastatin Calcium (Crestor)  10 mg PO HS [facility]


   Last Admin: 03/08/17 21:04 Dose:  10 mg


Spironolactone (Aldactone)  25 mg PO DAILY [facility]


   Last Admin: 03/09/17 09:15 Dose:  25 mg


Tiotropium Bromide (Spiriva)  18 mcg INH RQ24 [facility]


   Last Admin: 03/09/17 07:42 Dose:  18 mcg


Warfarin Sodium (Coumadin)  5 mg PO 1800 [facility]


   Stop: 03/09/17 18:01











- Labs


Labs: 


 





 03/09/17 11:10 





 03/09/17 11:10 





 











PT  32.3 SECONDS (9.7-12.2)  H* D 03/09/17  11:10    


 


INR  2.8  D 03/09/17  11:10    


 


APTT  31 SECONDS (21-34)  D 03/09/17  11:10    














Attending/Attestation





- Attestation


I have personally seen and examined this patient.: Yes


I have fully participated in the care of the patient.: Yes


I have reviewed all pertinent clinical information, including history, physical 

exam and plan: Yes


Notes (Text): 





03/09/17 17:17


Patient seen and examined with the resident. Reviewed resident note and agree 

with findings and plan of care. [ ]

## 2017-03-05 NOTE — CP.PCM.PN
Subjective





- Date & Time of Evaluation


Date of Evaluation: 03/05/17


Time of Evaluation: 11:20





- Subjective


Subjective: 


patient wants to go home .  no chest pain.





Objective





- Vital Signs/Intake and Output


Vital Signs (last 24 hours): 


 











Temp Pulse Resp BP Pulse Ox


 


 98.1 F   72   20   99/61 L  98 


 


 03/05/17 07:39  03/05/17 09:00  03/05/17 07:39  03/05/17 11:02  03/05/17 07:39











- Medications


Medications: 


 Current Medications





Albuterol/Ipratropium (Duoneb 3 Mg/0.5 Mg (3 Ml) Ud)  3 ml INH RQ6 [facility]


   Last Admin: 03/05/17 07:39 Dose:  3 ml


Budesonide (Pulmicort Respules)  0.5 mg INH RQ12 [facility]


   Last Admin: 03/05/17 07:39 Dose:  0.5 mg


Digoxin (Lanoxin)  0.125 mg PO DAILY@1800 [facility]


   Last Admin: 03/04/17 18:15 Dose:  0.125 mg


Enoxaparin Sodium (Lovenox)  80 mg SC Q12 [facility]


   Last Admin: 03/05/17 11:03 Dose:  80 mg


Furosemide (Lasix)  40 mg IVP BID [facility]


   Last Admin: 03/05/17 11:02 Dose:  40 mg


Lisinopril (Zestril)  10 mg PO DAILY [facility]


   Last Admin: 03/05/17 11:00 Dose:  Not Given


Pantoprazole Sodium (Protonix Ec Tab)  40 mg PO DAILY [facility]


   Last Admin: 03/05/17 11:03 Dose:  40 mg


Rosuvastatin Calcium (Crestor)  10 mg PO HS [facility]


   Last Admin: 03/04/17 23:03 Dose:  Not Given


Spironolactone (Aldactone)  25 mg PO DAILY [facility]


   Last Admin: 03/05/17 10:59 Dose:  Not Given


Tiotropium Bromide (Spiriva)  18 mcg INH RQ24 [facility]


   Last Admin: 03/05/17 07:39 Dose:  18 mcg











- Labs


Labs: 


 





 03/04/17 11:55 





 03/04/17 11:55 





 











PT  22.4 SECONDS (9.7-12.2)  H D 03/04/17  11:55    


 


INR  2.0  D 03/04/17  11:55    


 


APTT  43 SECONDS (21-34)  H D 03/04/17  11:55    














- Constitutional


Appears: Non-toxic





- Head Exam


Head Exam: NORMAL INSPECTION





- Eye Exam


Eye Exam: Normal appearance





- ENT Exam


ENT Exam: Mucous Membranes Moist





- Neck Exam


Neck Exam: Full ROM





- Respiratory Exam


Respiratory Exam: Decreased Breath Sounds





- Cardiovascular Exam


Cardiovascular Exam: REGULAR RHYTHM





- GI/Abdominal Exam


GI & Abdominal Exam: Normal Bowel Sounds





- Rectal Exam


Rectal Exam: Deferred





- Extremities Exam


Extremities Exam: absent: Pedal Edema





- Back Exam


Back Exam: NORMAL INSPECTION





- Neurological Exam


Neurological Exam: Alert





- Psychiatric Exam


Psychiatric exam: Normal Affect





- Skin


Skin Exam: Normal Color





Assessment and Plan


(1) Systolic dysfunction with acute on chronic heart failure


Assessment & Plan: 


will continue medical therapy


Status: Acute





(2) Chronic atrial fibrillation


Assessment & Plan: 


anticoagulation with coumadin.


Status: Acute





(3) CAD (coronary artery disease)


Assessment & Plan: 


no current angina


Status: Acute





(4) H/O mitral valve replacement with mechanical valve


Assessment & Plan: 


coumadin goal INR 2.5-3.5


Status: Acute

## 2017-03-06 NOTE — CP.PCM.PN
Subjective





- Date & Time of Evaluation


Date of Evaluation: 03/06/17


Time of Evaluation: 10:00





- Subjective


Subjective: 


Patient was seen and examined at bedside


Patient was in no acute distress, however continued to complain of SOB





Patient complains of Cough that kept him up at night, however ceased today.


patient states that he has not gotten up from his bed to move around much since 

admitted.





Objective





- Vital Signs/Intake and Output


Vital Signs (last 24 hours): 


 











Temp Pulse Resp BP Pulse Ox


 


 97.8 F   98 H  18   120/75   96 


 


 03/06/17 07:02  03/06/17 13:34  03/06/17 13:34  03/06/17 10:33  03/06/17 13:34








Intake and Output: 


 











 03/06/17 03/06/17





 06:59 18:59


 


Output Total 300 


 


Balance -300 














- Medications


Medications: 


 Current Medications





Albuterol/Ipratropium (Duoneb 3 Mg/0.5 Mg (3 Ml) Ud)  3 ml INH RQ6 [facility]


   Last Admin: 03/06/17 13:21 Dose:  3 ml


Budesonide (Pulmicort Respules)  0.5 mg INH RQ12 [facility]


   Last Admin: 03/06/17 07:47 Dose:  0.5 mg


Digoxin (Lanoxin)  0.125 mg PO DAILY@1800 [facility]


   Last Admin: 03/05/17 17:02 Dose:  0.125 mg


Furosemide (Lasix)  40 mg IVP BID [facility]


   Last Admin: 03/06/17 10:33 Dose:  40 mg


Lisinopril (Zestril)  10 mg PO DAILY [facility]


   Last Admin: 03/06/17 10:32 Dose:  Not Given


Methylprednisolone (Solu-Medrol)  40 mg IVP Q8 [facility]


   Last Admin: 03/06/17 13:42 Dose:  40 mg


Pantoprazole Sodium (Protonix Ec Tab)  40 mg PO DAILY [facility]


   Last Admin: 03/06/17 10:33 Dose:  40 mg


Rosuvastatin Calcium (Crestor)  10 mg PO HS [facility]


   Last Admin: 03/05/17 21:31 Dose:  10 mg


Spironolactone (Aldactone)  25 mg PO DAILY [facility]


   Last Admin: 03/06/17 10:33 Dose:  25 mg


Tiotropium Bromide (Spiriva)  18 mcg INH RQ24 [facility]


   Last Admin: 03/06/17 07:47 Dose:  18 mcg











- Labs


Labs: 


 





 03/06/17 11:01 





 03/06/17 11:01 





 











PT  28.8 SECONDS (9.7-12.2)  H  03/06/17  11:01    


 


INR  2.5   03/06/17  11:01    


 


APTT  41 SECONDS (21-34)  H D 03/06/17  11:01    














- Constitutional


Appears: No Acute Distress





- Head Exam


Head Exam: NORMAL INSPECTION





- Eye Exam


Eye Exam: Normal appearance





- ENT Exam


ENT Exam: Mucous Membranes Moist





- Respiratory Exam


Respiratory Exam: Decreased Breath Sounds, NORMAL BREATHING PATTERN.  absent: 

Rales


Additional comments: 


Patient's breath sounds much improved over the course of the last 2 days





- Cardiovascular Exam


Cardiovascular Exam: REGULAR RHYTHM, +S1, +S2





- Neurological Exam


Neurological Exam: Alert, Awake, Oriented x3





- Psychiatric Exam


Psychiatric exam: Normal Affect, Normal Mood





Assessment and Plan


(1) COPD (chronic obstructive pulmonary disease)


Assessment & Plan: 


agreed with medical team to discontinue supplemental oxygen via nasal canula 

pending performance of pulse oxymetry walk test for 6 minutes to assess for 

desaturation





continue duoneb, budesonide, solumedrol, tiotroprium


Status: Acute





(2) Acute exacerbation of CHF (congestive heart failure)


Status: Acute





(3) Chronic atrial fibrillation


Status: Acute

## 2017-03-06 NOTE — CP.PCM.PN
Subjective





- Date & Time of Evaluation


Date of Evaluation: 03/06/17


Time of Evaluation: 07:50





- Subjective


Subjective: 


patient mainly complains about his cellphone.





Objective





- Vital Signs/Intake and Output


Vital Signs (last 24 hours): 


 











Temp Pulse Resp BP Pulse Ox


 


 98.8 F   77   20   92/63 L  95 


 


 03/05/17 23:35  03/05/17 23:35  03/05/17 23:35  03/05/17 23:35  03/05/17 23:35








Intake and Output: 


 











 03/06/17 03/06/17





 06:59 18:59


 


Output Total 300 


 


Balance -300 














- Medications


Medications: 


 Current Medications





Albuterol/Ipratropium (Duoneb 3 Mg/0.5 Mg (3 Ml) Ud)  3 ml INH RQ6 [facility]


   Last Admin: 03/06/17 07:47 Dose:  3 ml


Budesonide (Pulmicort Respules)  0.5 mg INH RQ12 [facility]


   Last Admin: 03/06/17 07:47 Dose:  0.5 mg


Digoxin (Lanoxin)  0.125 mg PO DAILY@1800 [facility]


   Last Admin: 03/05/17 17:02 Dose:  0.125 mg


Furosemide (Lasix)  40 mg IVP BID [facility]


   Last Admin: 03/05/17 17:00 Dose:  40 mg


Lisinopril (Zestril)  10 mg PO DAILY [facility]


   Last Admin: 03/05/17 11:00 Dose:  Not Given


Pantoprazole Sodium (Protonix Ec Tab)  40 mg PO DAILY [facility]


   Last Admin: 03/05/17 11:03 Dose:  40 mg


Rosuvastatin Calcium (Crestor)  10 mg PO HS [facility]


   Last Admin: 03/05/17 21:31 Dose:  10 mg


Spironolactone (Aldactone)  25 mg PO DAILY [facility]


   Last Admin: 03/05/17 10:59 Dose:  Not Given


Tiotropium Bromide (Spiriva)  18 mcg INH RQ24 [facility]


   Last Admin: 03/06/17 07:47 Dose:  18 mcg











- Labs


Labs: 


 





 03/05/17 11:00 





 03/05/17 11:00 





 











PT  27.4 SECONDS (9.7-12.2)  H D 03/05/17  11:00    


 


INR  2.4   03/05/17  11:00    


 


APTT  35 SECONDS (21-34)  H D 03/05/17  11:00    














- Constitutional


Appears: Non-toxic





- Head Exam


Head Exam: NORMAL INSPECTION





- Eye Exam


Eye Exam: Normal appearance





- ENT Exam


ENT Exam: Mucous Membranes Moist





- Neck Exam


Neck Exam: Full ROM





- Respiratory Exam


Respiratory Exam: Decreased Breath Sounds





- Cardiovascular Exam


Cardiovascular Exam: Irregular Rhythm





- GI/Abdominal Exam


GI & Abdominal Exam: Normal Bowel Sounds





- Rectal Exam


Rectal Exam: Deferred





- Extremities Exam


Extremities Exam: Pedal Edema





- Back Exam


Back Exam: NORMAL INSPECTION





- Neurological Exam


Neurological Exam: Alert





- Psychiatric Exam


Psychiatric exam: Normal Affect





- Skin


Skin Exam: Normal Color





Assessment and Plan


(1) Systolic dysfunction with acute on chronic heart failure


Assessment & Plan: 


stable.


Status: Acute





(2) Chronic atrial fibrillation


Assessment & Plan: 


continue coumadin


Status: Acute





(3) CAD (coronary artery disease)


Assessment & Plan: 


no current angina


Status: Acute





(4) H/O mitral valve replacement with mechanical valve


Assessment & Plan: 


goal INR 2.5-3.5


Status: Acute

## 2017-03-06 NOTE — CP.PCM.PN
<Sulaiman Patel - Last Filed: 03/06/17 16:03>





Subjective





- Date & Time of Evaluation


Date of Evaluation: 03/06/17


Time of Evaluation: 07:50





- Subjective


Subjective: 


PGY1 Medicine Note- Dr Elizabeth





Patient seen and examined at bedside. No overnight events per nursing. Patient 

continues to be SOB while speaking. Walked 20 steps today down parsons, and 

patient became dizzy, SOB, and almost collapsed. Returned to bed safely. PT to 

walk patient without oxygen to monitor for de-saturation. Denies difficulty 

with ambulation or history of arrhythmia. Denies f/c, dizziness, palpitations, 

cough, abdominal pain, n/v, d/c, or any additional complaints. 





Objective





- Vital Signs/Intake and Output


Vital Signs (last 24 hours): 


 











Temp Pulse Resp BP Pulse Ox


 


 97.8 F   91 H  20   120/75   100 


 


 03/06/17 07:02  03/06/17 08:47  03/06/17 07:02  03/06/17 10:33  03/06/17 07:02








Intake and Output: 


 











 03/06/17 03/06/17





 06:59 18:59


 


Output Total 300 


 


Balance -300 














- Medications


Medications: 


 Current Medications





Albuterol/Ipratropium (Duoneb 3 Mg/0.5 Mg (3 Ml) Ud)  3 ml INH RQ6 [facility]


   Last Admin: 03/06/17 07:47 Dose:  3 ml


Budesonide (Pulmicort Respules)  0.5 mg INH RQ12 [facility]


   Last Admin: 03/06/17 07:47 Dose:  0.5 mg


Digoxin (Lanoxin)  0.125 mg PO DAILY@1800 [facility]


   Last Admin: 03/05/17 17:02 Dose:  0.125 mg


Furosemide (Lasix)  40 mg IVP BID [facility]


   Last Admin: 03/06/17 10:33 Dose:  40 mg


Lisinopril (Zestril)  10 mg PO DAILY [facility]


   Last Admin: 03/06/17 10:32 Dose:  Not Given


Methylprednisolone (Solu-Medrol)  40 mg IVP Q8 PRN


   PRN Reason: Shortness of Breath


Pantoprazole Sodium (Protonix Ec Tab)  40 mg PO DAILY [facility]


   Last Admin: 03/06/17 10:33 Dose:  40 mg


Rosuvastatin Calcium (Crestor)  10 mg PO HS [facility]


   Last Admin: 03/05/17 21:31 Dose:  10 mg


Spironolactone (Aldactone)  25 mg PO DAILY [facility]


   Last Admin: 03/06/17 10:33 Dose:  25 mg


Tiotropium Bromide (Spiriva)  18 mcg INH RQ24 [facility]


   Last Admin: 03/06/17 07:47 Dose:  18 mcg











- Labs


Labs: 


 





 03/06/17 11:01 





 03/06/17 11:01 





 











PT  28.8 SECONDS (9.7-12.2)  H  03/06/17  11:01    


 


INR  2.5   03/06/17  11:01    


 


APTT  41 SECONDS (21-34)  H D 03/06/17  11:01    














- Additional Findings


Additional findings: 


- Constitutional


Appears: Non-toxic, No Acute Distress





- Head Exam


Head Exam: ATRAUMATIC





- Eye Exam


Eye Exam: EOMI





- ENT Exam


ENT Exam: Mucous Membranes Moist





- Respiratory Exam


Respiratory Exam: Decreased Breath Sounds, Wheezes, Rhonchi, Respiratory 

Distress (SOB while speaking).  absent: Clear to Ausculation Bilateral


-attempted walk patient 20 steps today, nearly collapsed from dizziness/SOB





- Cardiovascular Exam


Cardiovascular Exam: REGULAR RHYTHM, +S1, +S2





- GI/Abdominal Exam


GI & Abdominal Exam: Soft, Normal Bowel Sounds.  absent: Tenderness





- Extremities Exam


Extremities Exam: Pedal Edema (b/l 1+ pitting to mid thigh).  absent: Tenderness


Additional comments: 


pedal pulses 2+





- Neurological Exam


Neurological Exam: Alert, Awake, Oriented x3


b/l hand numbness / tingling today





- Psychiatric Exam


Psychiatric exam: Normal Affect





- Skin


Skin Exam: Dry, Normal Color, Warm


Tender to palpation at R sternal border, 2nd rib. Protrusion noted.





Assessment and Plan





- Assessment and Plan (Free Text)


Assessment: 


72 year old male with past medical history including MI, CAD with history of 

CABG and valve replacement 6-7 years ago.  Reports worstenting SOB and 

dizziness with associated swelling of bilateral LE for past 2-3 days. Admits 

this is the first time he has experienced these symptoms. SOB is worse when he 

lays flat.


-Discharge planning to [facility] once INR in 2.5-3.5 range





Plan:


Systolic dysfunction with acute on chronic heart failure


Admit to telemetry, BNP 4560 on admission


Consulted Cardiology, Dr. Dotson - f/u recs


   -will need duiresis.  will attempt to obtain company [employer] for 

interrogation.


   -Improving Volume status


Lasix 40mg IV BID


Digoxin 0.125mg


Lisinopril 10mg daily


Aldactone 25mg PO Daily


patient with AICD, will check echo to determine EF


CXR 2/25: mild cardiomegaly, mild pulmonary venous congestion.  s/p sternotomy, 

aortic valve replacement, AICD





H/O mitral valve replacement with mechanical valve


Cardiac valve replacement 6-7yrs ago


Consulted Cardiology, Dr. Dotson - f/u recs


   -echocardiogram reviewed.  valve leaflets are opening and functional.  There 

is no signficant gradient across the valve.  


   -recommend INR 2.5 to 3.5.


STOP Lovenox 3/5


STOP Heparin Drip - cardiac protocol, with bolus (because patient is refusing 

blood draws)


3/1: PT/INR 1.3


3/2: PT/INR refused, Coumadin 10mg


3/3: PT/INR 1.5, Coumadin 10mg


3/4: PT/INR 2.0, Coumadin 10mg


3/5: PT/INR 2.4, Coumadin 7.5mg; patient received one more dose of Lovenox 80mg 

SC today, now discontinued as bridge complete.


3/6: PT/INR 2.5, Coumadin 7.5mg, f/u INR








Chronic atrial fibrillation


Consulted Cardiology, Dr. Dotson - f/u recs


   -Rate controlled


   -echocardiogram reviewed.  valve leaflets are opening and functional.  There 

is no signficant gradient across the valve.  


   -recommend INR 2.5 to 3.5.


   -See PT/INR trends above


   Coumadin as above


   STOP Lovenox 3/5








COPD (chronic obstructive pulmonary disease)


-3/7:  f/u Physical Therapy to walk patient and assess for desaturation


-3/6: Walked 20 steps today down [address], and patient became dizzy, SOB, and 

almost collapsed.


- Solumedrol 40mg IVP Q8H [facility] (3/6)


- Consulted Pulmonology, Dr. Varela, f/u recs


- persistent shortness of breath


- Patient with long history of smoking most likely has underlying COPD.  

Nebulizer treatment.  Inhaled steroids.  Spiriva.  pulmonary function test as 

out pt








CAD (coronary artery disease) 


Consulted Cardiology, Dr. Dotson - f/u recs


   -unknown graft status.  no evidence of ischemia at present


   -No current angina





History of MI (myocardial infarction)


Crestor 10mg PO HS  


ROMIs negative x3


EKG paced 


Denies chest pain 





Electrolyte Imbalance


Hyperkalemia - resolved





Tachycardia


EKG in ER: sinus tachycardia at 100, paced, incomplete RBBB


Continue Digoxin 0.125mg


Discontinued cardizem drip of 5mg/h started in the ER





Lower extremity edema


LE Duplex - negative. see full report.





Prophylactic measure


Stopped lovenox 40u sc daily (bridging lovenox 95u to Coumadin).


protonix 40mg daily


SCD contraindicated due to LE edema





[All management as per Dr. Elizabeth]








<Evgeny Elizabeth Jr. - Last Filed: 03/09/17 17:20>





Objective





- Vital Signs/Intake and Output


Vital Signs (last 24 hours): 


 











Temp Pulse Resp BP Pulse Ox


 


 97.6 F   89   20   104/66   100 


 


 03/09/17 16:57  03/09/17 16:57  03/09/17 16:57  03/09/17 16:57  03/09/17 16:57








Intake and Output: 


 











 03/09/17 03/09/17





 06:59 18:59


 


Intake Total 300 480


 


Output Total 800 500


 


Balance -500 -20














- Medications


Medications: 


 Current Medications





Albuterol/Ipratropium (Duoneb 3 Mg/0.5 Mg (3 Ml) Ud)  3 ml INH RQ6 [facility]


   Last Admin: 03/09/17 13:05 Dose:  3 ml


Budesonide (Pulmicort Respules)  0.5 mg INH RQ12 [facility]


   Last Admin: 03/09/17 07:42 Dose:  0.5 mg


Digoxin (Lanoxin)  0.125 mg PO DAILY@1800 [facility]


   Last Admin: 03/08/17 17:02 Dose:  0.125 mg


Furosemide (Lasix)  40 mg IVP BID [facility]


   Last Admin: 03/09/17 09:15 Dose:  40 mg


Guaifenesin (Mucinex La)  600 mg PO BID [facility]


   Last Admin: 03/09/17 10:11 Dose:  600 mg


Lisinopril (Zestril)  10 mg PO DAILY [facility]


   Last Admin: 03/09/17 09:15 Dose:  10 mg


Methylprednisolone (Solu-Medrol)  40 mg IVP Q8 [facility]


   Last Admin: 03/09/17 13:21 Dose:  40 mg


Pantoprazole Sodium (Protonix Ec Tab)  40 mg PO DAILY [facility]


   Last Admin: 03/09/17 09:15 Dose:  40 mg


Rosuvastatin Calcium (Crestor)  10 mg PO HS [facility]


   Last Admin: 03/08/17 21:04 Dose:  10 mg


Spironolactone (Aldactone)  25 mg PO DAILY [facility]


   Last Admin: 03/09/17 09:15 Dose:  25 mg


Tiotropium Bromide (Spiriva)  18 mcg INH RQ24 [facility]


   Last Admin: 03/09/17 07:42 Dose:  18 mcg


Warfarin Sodium (Coumadin)  5 mg PO 1800 [facility]


   Stop: 03/09/17 18:01











- Labs


Labs: 


 





 03/09/17 11:10 





 03/09/17 11:10 





 











PT  32.3 SECONDS (9.7-12.2)  H* D 03/09/17  11:10    


 


INR  2.8  D 03/09/17  11:10    


 


APTT  31 SECONDS (21-34)  D 03/09/17  11:10    














Attending/Attestation





- Attestation


I have personally seen and examined this patient.: Yes


I have fully participated in the care of the patient.: Yes


I have reviewed all pertinent clinical information, including history, physical 

exam and plan: Yes


Notes (Text): 





03/09/17 17:20


Patient seen and examined with the resident. Reviewed resident note and agree 

with findings and plan of care. [ ]

## 2017-03-06 NOTE — CARD
--------------- APPROVED REPORT --------------





EKG Measurement

Heart [id number]

[id number]

[id number]

[id number]



&lt;Conclusion&gt;

Demand pacemaker, interpretation is based on intrinsic rhythm

Atrial fibrillation with premature ventricular or aberrantly 

conducted complexes

Right bundle branch block

Abnormal ECG

## 2017-03-07 NOTE — CP.PCM.PN
<Sulaiman Patel - Last Filed: 03/07/17 15:03>





Subjective





- Date & Time of Evaluation


Date of Evaluation: 03/07/17


Time of Evaluation: 07:05





- Subjective


Subjective: 


PGY1 Medicine Note- Dr Elizabeth





Patient seen and examined at bedside. No overnight events per nursing. Patient 

continues to be SOB while speaking. PT performed desaturation test while 

ambulating today. Denies pain with ambulation, although he becomes very SOB. 

Denies f/c, dizziness, palpitations, cough, abdominal pain, n/v, d/c, or any 

additional complaints. 





Objective





- Vital Signs/Intake and Output


Vital Signs (last 24 hours): 


 











Temp Pulse Resp BP Pulse Ox


 


 97.5 F L  87   20   119/75   95 


 


 03/06/17 23:25  03/07/17 02:01  03/06/17 23:25  03/06/17 23:25  03/06/17 23:25








Intake and Output: 


 











 03/07/17 03/07/17





 06:59 18:59


 


Intake Total 650 


 


Output Total 1300 


 


Balance -650 














- Medications


Medications: 


 Current Medications





Albuterol/Ipratropium (Duoneb 3 Mg/0.5 Mg (3 Ml) Ud)  3 ml INH RQ6 [facility]


   Last Admin: 03/07/17 01:29 Dose:  Not Given


Budesonide (Pulmicort Respules)  0.5 mg INH RQ12 [facility]


   Last Admin: 03/06/17 19:37 Dose:  0.5 mg


Digoxin (Lanoxin)  0.125 mg PO DAILY@1800 [facility]


   Last Admin: 03/06/17 19:07 Dose:  0.125 mg


Furosemide (Lasix)  40 mg IVP BID [facility]


   Last Admin: 03/06/17 19:08 Dose:  40 mg


Lisinopril (Zestril)  10 mg PO DAILY [facility]


   Last Admin: 03/06/17 10:32 Dose:  Not Given


Methylprednisolone (Solu-Medrol)  40 mg IVP Q8 [facility]


   Last Admin: 03/07/17 06:41 Dose:  40 mg


Pantoprazole Sodium (Protonix Ec Tab)  40 mg PO DAILY [facility]


   Last Admin: 03/06/17 10:33 Dose:  40 mg


Rosuvastatin Calcium (Crestor)  10 mg PO HS [facility]


   Last Admin: 03/06/17 21:57 Dose:  10 mg


Spironolactone (Aldactone)  25 mg PO DAILY [facility]


   Last Admin: 03/06/17 10:33 Dose:  25 mg


Tiotropium Bromide (Spiriva)  18 mcg INH RQ24 [facility]


   Last Admin: 03/06/17 07:47 Dose:  18 mcg











- Labs


Labs: 


 





 03/06/17 11:01 





 03/06/17 11:01 





 











PT  28.8 SECONDS (9.7-12.2)  H  03/06/17  11:01    


 


INR  2.5   03/06/17  11:01    


 


APTT  41 SECONDS (21-34)  H D 03/06/17  11:01    














- Additional Findings


Additional findings: 


- Constitutional


Appears: Non-toxic, No Acute Distress





- Head Exam


Head Exam: ATRAUMATIC





- Eye Exam


Eye Exam: EOMI





- ENT Exam


ENT Exam: Mucous Membranes Moist





- Respiratory Exam


Respiratory Exam: Decreased Breath Sounds, Wheezes, Rhonchi, Respiratory 

Distress (SOB while speaking).  absent: Clear to Ausculation Bilateral


-desaturates to 82% while walking, 88% with NC 2L





- Cardiovascular Exam


Cardiovascular Exam: REGULAR RHYTHM, +S1, +S2





- GI/Abdominal Exam


GI & Abdominal Exam: Soft, Normal Bowel Sounds.  absent: Tenderness





- Extremities Exam


Extremities Exam: Pedal Edema (b/l 1+ pitting to mid thigh).  absent: Tenderness


Additional comments: 


pedal pulses 2+





- Neurological Exam


Neurological Exam: Alert, Awake, Oriented x3


b/l hand numbness / tingling today resolved





- Psychiatric Exam


Psychiatric exam: Normal Affect





- Skin


Skin Exam: Dry, Normal Color, Warm


Tender to palpation at R sternal border, 2nd rib. Protrusion noted.





Assessment and Plan





- Assessment and Plan (Free Text)


Assessment: 


72 year old male with past medical history including MI, CAD with history of 

CABG and valve replacement 6-7 years ago.  Reports worstenting SOB and 

dizziness with associated swelling of bilateral LE for past 2-3 days. Admits 

this is the first time he has experienced these symptoms. SOB is worse when he 

lays flat.


-Discharge planning to [facility] once INR in 2.5-3.5 range and SOB is better 

controlled.





Plan:


Systolic dysfunction with acute on chronic heart failure


Admit to telemetry, BNP 4560 on admission


Consulted Cardiology, Dr. Dotson - f/u recs


   -will need duiresis.  will attempt to obtain company of [facility] for 

interrogation.


   -Improving Volume status


Lasix 40mg IV BID


Digoxin 0.125mg


Lisinopril 10mg daily


Aldactone 25mg PO Daily


patient with AICD, will check echo to determine EF


CXR 2/25: mild cardiomegaly, mild pulmonary venous congestion.  s/p sternotomy, 

aortic valve replacement, AICD





H/O mitral valve replacement with mechanical valve


Cardiac valve replacement 6-7yrs ago


Consulted Cardiology, Dr. Dotson - f/u recs


   -echocardiogram reviewed.  valve leaflets are opening and functional.  There 

is no signficant gradient across the valve.  


   -recommend INR 2.5 to 3.5.


STOP Lovenox 3/5


STOP Heparin Drip - cardiac protocol, with bolus (because patient is refusing 

blood draws)


3/1: PT/INR 1.3


3/2: PT/INR refused, Coumadin 10mg


3/3: PT/INR 1.5, Coumadin 10mg


3/4: PT/INR 2.0, Coumadin 10mg


3/5: PT/INR 2.4, Coumadin 7.5mg; patient received one more dose of Lovenox 80mg 

SC today, now discontinued as bridge complete.


3/6: PT/INR 2.5, Coumadin 7.5mg


3/7: PT/INR 3.1, Coumadin 5mg, f/u INR





Chronic atrial fibrillation


Consulted Cardiology, Dr. Dotson - f/u recs


   -Rate controlled


   -echocardiogram reviewed.  valve leaflets are opening and functional.  There 

is no signficant gradient across the valve.  


   -recommend INR 2.5 to 3.5.


   -See PT/INR trends above


   Coumadin as above


   STOP Lovenox 3/5








COPD (chronic obstructive pulmonary disease)


-3/7:  PT session: 98% O2 at 2L at rest, 96% room air at rest sitting, 82% room 

air during ambulation, 88% at 2 liter ambulation.


-3/6: Walked 20 steps today down [address], and patient became dizzy, SOB, and 

almost collapsed.


- Solumedrol 40mg IVP Q8H [facility] (3/6)


- Consulted Pulmonology, Dr. Varela, f/u recs


   - Aware of HR in 140's and SOB after eating and low levels of exertion.


   -f/u ABG, LDH, HIV (3/8)


- persistent shortness of breath


- Patient with long history of smoking most likely has underlying COPD.  

Nebulizer treatment.  Inhaled steroids.  Spiriva.  pulmonary function test as 

out pt








CAD (coronary artery disease) 


Consulted Cardiology, Dr. Dotson - f/u recs


   -Aware of HR in 140's and SOB after eating and low levels of exertion.


   -unknown graft status.  no evidence of ischemia at present


   -No current angina





History of MI (myocardial infarction)


Crestor 10mg PO HS  


ROMIs negative x3


EKG paced 


Denies chest pain 





Electrolyte Imbalance


Hyperkalemia - resolved





Tachycardia


3/7: Patient becomes SOB very easily, desaturating to 82% while ambulating. 

Anytime he eats or gets up, HR climbs into 140's


Continue Digoxin 0.125mg


EKG in ER: sinus tachycardia at 100, paced, incomplete RBBB


Discontinued cardizem drip of 5mg/h started in the ER





Lower extremity edema


LE Duplex - negative. see full report.





Prophylactic measure


Stopped lovenox 40u sc daily (bridging lovenox to Coumadin).


protonix 40mg daily


SCD contraindicated due to LE edema





[All management as per Dr. Elizabeth]





<Evgeny Elizabeth Jr. - Last Filed: 03/09/17 17:21>





Objective





- Vital Signs/Intake and Output


Vital Signs (last 24 hours): 


 











Temp Pulse Resp BP Pulse Ox


 


 97.6 F   89   20   104/66   100 


 


 03/09/17 16:57  03/09/17 16:57  03/09/17 16:57  03/09/17 16:57  03/09/17 16:57








Intake and Output: 


 











 03/09/17 03/09/17





 06:59 18:59


 


Intake Total 300 480


 


Output Total 800 500


 


Balance -500 -20














- Medications


Medications: 


 Current Medications





Albuterol/Ipratropium (Duoneb 3 Mg/0.5 Mg (3 Ml) Ud)  3 ml INH RQ6 [facility]


   Last Admin: 03/09/17 13:05 Dose:  3 ml


Budesonide (Pulmicort Respules)  0.5 mg INH RQ12 [facility]


   Last Admin: 03/09/17 07:42 Dose:  0.5 mg


Digoxin (Lanoxin)  0.125 mg PO DAILY@1800 [facility]


   Last Admin: 03/08/17 17:02 Dose:  0.125 mg


Furosemide (Lasix)  40 mg IVP BID [facility]


   Last Admin: 03/09/17 09:15 Dose:  40 mg


Guaifenesin (Mucinex La)  600 mg PO BID [facility]


   Last Admin: 03/09/17 10:11 Dose:  600 mg


Lisinopril (Zestril)  10 mg PO DAILY [facility]


   Last Admin: 03/09/17 09:15 Dose:  10 mg


Methylprednisolone (Solu-Medrol)  40 mg IVP Q8 [facility]


   Last Admin: 03/09/17 13:21 Dose:  40 mg


Pantoprazole Sodium (Protonix Ec Tab)  40 mg PO DAILY [facility]


   Last Admin: 03/09/17 09:15 Dose:  40 mg


Rosuvastatin Calcium (Crestor)  10 mg PO HS [facility]


   Last Admin: 03/08/17 21:04 Dose:  10 mg


Spironolactone (Aldactone)  25 mg PO DAILY [facility]


   Last Admin: 03/09/17 09:15 Dose:  25 mg


Tiotropium Bromide (Spiriva)  18 mcg INH RQ24 [facility]


   Last Admin: 03/09/17 07:42 Dose:  18 mcg


Warfarin Sodium (Coumadin)  5 mg PO 1800 [facility]


   Stop: 03/09/17 18:01











- Labs


Labs: 


 





 03/09/17 11:10 





 03/09/17 11:10 





 











PT  32.3 SECONDS (9.7-12.2)  H* D 03/09/17  11:10    


 


INR  2.8  D 03/09/17  11:10    


 


APTT  31 SECONDS (21-34)  D 03/09/17  11:10    














Attending/Attestation





- Attestation


I have personally seen and examined this patient.: Yes


I have fully participated in the care of the patient.: Yes


I have reviewed all pertinent clinical information, including history, physical 

exam and plan: Yes


Notes (Text): 





03/09/17 17:21


Patient seen and examined with the resident. Reviewed resident note and agree 

with findings and plan of care. [ ]

## 2017-03-07 NOTE — CP.PCM.PN
Subjective





- Date & Time of Evaluation


Date of Evaluation: 03/07/17


Time of Evaluation: 09:50





- Subjective


Subjective: 


patient states breathing is improved.





Objective





- Vital Signs/Intake and Output


Vital Signs (last 24 hours): 


 











Temp Pulse Resp BP Pulse Ox


 


 97.5 F L  89   20   112/67   95 


 


 03/07/17 07:59  03/07/17 08:52  03/07/17 07:59  03/07/17 09:23  03/07/17 07:59








Intake and Output: 


 











 03/07/17 03/07/17





 06:59 18:59


 


Intake Total 650 


 


Output Total 1300 


 


Balance -650 














- Medications


Medications: 


 Current Medications





Albuterol/Ipratropium (Duoneb 3 Mg/0.5 Mg (3 Ml) Ud)  3 ml INH RQ6 [facility]


   Last Admin: 03/07/17 08:37 Dose:  3 ml


Budesonide (Pulmicort Respules)  0.5 mg INH RQ12 [facility]


   Last Admin: 03/07/17 08:37 Dose:  0.5 mg


Digoxin (Lanoxin)  0.125 mg PO DAILY@1800 [facility]


   Last Admin: 03/06/17 19:07 Dose:  0.125 mg


Furosemide (Lasix)  40 mg IVP BID [facility]


   Last Admin: 03/07/17 09:23 Dose:  40 mg


Lisinopril (Zestril)  10 mg PO DAILY [facility]


   Last Admin: 03/06/17 10:32 Dose:  Not Given


Methylprednisolone (Solu-Medrol)  40 mg IVP Q8 [facility]


   Last Admin: 03/07/17 06:41 Dose:  40 mg


Pantoprazole Sodium (Protonix Ec Tab)  40 mg PO DAILY [facility]


   Last Admin: 03/07/17 09:22 Dose:  40 mg


Rosuvastatin Calcium (Crestor)  10 mg PO HS [facility]


   Last Admin: 03/06/17 21:57 Dose:  10 mg


Spironolactone (Aldactone)  25 mg PO DAILY [facility]


   Last Admin: 03/07/17 09:23 Dose:  25 mg


Tiotropium Bromide (Spiriva)  18 mcg INH RQ24 [facility]


   Last Admin: 03/07/17 08:38 Dose:  18 mcg











- Labs


Labs: 


 





 03/06/17 11:01 





 03/06/17 11:01 





 











PT  28.8 SECONDS (9.7-12.2)  H  03/06/17  11:01    


 


INR  2.5   03/06/17  11:01    


 


APTT  41 SECONDS (21-34)  H D 03/06/17  11:01    














- Constitutional


Appears: Non-toxic





- Head Exam


Head Exam: NORMAL INSPECTION





- Eye Exam


Eye Exam: Normal appearance





- ENT Exam


ENT Exam: Mucous Membranes Moist





- Neck Exam


Neck Exam: Full ROM





- Respiratory Exam


Respiratory Exam: Decreased Breath Sounds





- Cardiovascular Exam


Cardiovascular Exam: Irregular Rhythm





- GI/Abdominal Exam


GI & Abdominal Exam: Normal Bowel Sounds





- Rectal Exam


Rectal Exam: Deferred





- Extremities Exam


Extremities Exam: absent: Pedal Edema





- Back Exam


Back Exam: NORMAL INSPECTION





- Neurological Exam


Neurological Exam: Alert





- Psychiatric Exam


Psychiatric exam: Normal Affect





- Skin


Skin Exam: Normal Color





Assessment and Plan


(1) Systolic dysfunction with acute on chronic heart failure


Assessment & Plan: 


improving volume status.


Status: Acute





(2) Chronic atrial fibrillation


Assessment & Plan: 


maintain coumadin


Status: Acute





(3) CAD (coronary artery disease)


Assessment & Plan: 


no current angina


Status: Acute





(4) H/O mitral valve replacement with mechanical valve


Assessment & Plan: 


continue coumadin, goal INR 2.5-3.5


Status: Acute

## 2017-03-07 NOTE — CP.PCM.PN
Subjective





- Date & Time of Evaluation


Date of Evaluation: 03/07/17


Time of Evaluation: 09:30





- Subjective


Subjective: 


Patient was seen and examined at bedside


Patient was in no acute distress. Patient still complains of SOB that waxes and 

wanes in severity. 


Patient states that he was able to ambulate down the parsons yesterday unassisted, 

however he was severely SOB upon this exertional activity





Patient states that his cough is less severe in comparison to yesterday, 

however he Does complain of phlegm buildup





Objective





- Vital Signs/Intake and Output


Vital Signs (last 24 hours): 


 











Temp Pulse Resp BP Pulse Ox


 


 97.5 F L  89   20   112/67   95 


 


 03/07/17 07:59  03/07/17 08:52  03/07/17 07:59  03/07/17 09:23  03/07/17 07:59








Intake and Output: 


 











 03/07/17 03/07/17





 06:59 18:59


 


Intake Total 650 


 


Output Total 1300 


 


Balance -650 














- Medications


Medications: 


 Current Medications





Albuterol/Ipratropium (Duoneb 3 Mg/0.5 Mg (3 Ml) Ud)  3 ml INH RQ6 [facility]


   Last Admin: 03/07/17 08:37 Dose:  3 ml


Budesonide (Pulmicort Respules)  0.5 mg INH RQ12 [facility]


   Last Admin: 03/07/17 08:37 Dose:  0.5 mg


Digoxin (Lanoxin)  0.125 mg PO DAILY@1800 [facility]


   Last Admin: 03/06/17 19:07 Dose:  0.125 mg


Furosemide (Lasix)  40 mg IVP BID [facility]


   Last Admin: 03/07/17 09:23 Dose:  40 mg


Lisinopril (Zestril)  10 mg PO DAILY [facility]


   Last Admin: 03/06/17 10:32 Dose:  Not Given


Methylprednisolone (Solu-Medrol)  40 mg IVP Q8 [facility]


   Last Admin: 03/07/17 06:41 Dose:  40 mg


Pantoprazole Sodium (Protonix Ec Tab)  40 mg PO DAILY [facility]


   Last Admin: 03/07/17 09:22 Dose:  40 mg


Rosuvastatin Calcium (Crestor)  10 mg PO HS [facility]


   Last Admin: 03/06/17 21:57 Dose:  10 mg


Spironolactone (Aldactone)  25 mg PO DAILY [facility]


   Last Admin: 03/07/17 09:23 Dose:  25 mg


Tiotropium Bromide (Spiriva)  18 mcg INH RQ24 [facility]


   Last Admin: 03/07/17 08:38 Dose:  18 mcg











- Labs


Labs: 


 





 03/06/17 11:01 





 03/06/17 11:01 





 











PT  28.8 SECONDS (9.7-12.2)  H  03/06/17  11:01    


 


INR  2.5   03/06/17  11:01    


 


APTT  41 SECONDS (21-34)  H D 03/06/17  11:01    














- Constitutional


Appears: No Acute Distress





- Head Exam


Head Exam: NORMAL INSPECTION





- Eye Exam


Eye Exam: Normal appearance





- ENT Exam


ENT Exam: Mucous Membranes Moist





- Respiratory Exam


Respiratory Exam: Prolonged Expiratory Phase, Rales


Additional comments: 


Right > Left





- Cardiovascular Exam


Cardiovascular Exam: REGULAR RHYTHM, +S1, +S2





- Neurological Exam


Neurological Exam: Alert, Awake, Oriented x3





- Psychiatric Exam


Psychiatric exam: Normal Affect, Normal Mood





- Skin


Skin Exam: Dry, Normal Color, Warm





Assessment and Plan


(1) COPD (chronic obstructive pulmonary disease)


Assessment & Plan: 


Repeat ABG





Continue duoneb, budenoside, solumedrol, tiotroprium


Communicated with medicine service for PT to do a 6 minute pulse oxymetry walk 

test today to test for desaturation


Status: Acute





(2) Acute exacerbation of CHF (congestive heart failure)


Status: Acute





(3) Chronic atrial fibrillation


Status: Acute

## 2017-03-08 NOTE — CP.PCM.PN
Subjective





- Date & Time of Evaluation


Date of Evaluation: 03/08/17


Time of Evaluation: 07:40





- Subjective


Subjective: 


patient denies chest pain.  he had an episode of desaturation with ambulation.





Objective





- Vital Signs/Intake and Output


Vital Signs (last 24 hours): 


 











Temp Pulse Resp BP Pulse Ox


 


 98 F   50 L  20   109/68   97 


 


 03/08/17 00:39  03/08/17 00:39  03/08/17 00:39  03/08/17 00:39  03/08/17 00:39








Intake and Output: 


 











 03/08/17 03/08/17





 06:59 18:59


 


Intake Total 240 


 


Output Total 600 


 


Balance -360 














- Medications


Medications: 


 Current Medications





Albuterol/Ipratropium (Duoneb 3 Mg/0.5 Mg (3 Ml) Ud)  3 ml INH RQ6 [facility]


   Last Admin: 03/08/17 07:16 Dose:  3 ml


Budesonide (Pulmicort Respules)  0.5 mg INH RQ12 [facility]


   Last Admin: 03/07/17 19:07 Dose:  0.5 mg


Digoxin (Lanoxin)  0.125 mg PO DAILY@1800 [facility]


   Last Admin: 03/07/17 18:00 Dose:  0.125 mg


Furosemide (Lasix)  40 mg IVP BID [facility]


   Last Admin: 03/07/17 19:05 Dose:  40 mg


Lisinopril (Zestril)  10 mg PO DAILY [facility]


   Last Admin: 03/07/17 11:50 Dose:  Not Given


Methylprednisolone (Solu-Medrol)  40 mg IVP Q8 [facility]


   Last Admin: 03/08/17 06:12 Dose:  40 mg


Pantoprazole Sodium (Protonix Ec Tab)  40 mg PO DAILY [facility]


   Last Admin: 03/07/17 09:22 Dose:  40 mg


Rosuvastatin Calcium (Crestor)  10 mg PO HS [facility]


   Last Admin: 03/07/17 22:04 Dose:  10 mg


Spironolactone (Aldactone)  25 mg PO DAILY [facility]


   Last Admin: 03/07/17 09:23 Dose:  25 mg


Tiotropium Bromide (Spiriva)  18 mcg INH RQ24 [facility]


   Last Admin: 03/08/17 07:16 Dose:  18 mcg











- Labs


Labs: 


 





 03/07/17 11:10 





 03/07/17 11:10 





 











PT  35.3 SECONDS (9.7-12.2)  H* D 03/07/17  11:10    


 


INR  3.1   03/07/17  11:10    


 


APTT  35 SECONDS (21-34)  H D 03/07/17  11:10    














- Constitutional


Appears: Non-toxic





- Head Exam


Head Exam: NORMAL INSPECTION





- Eye Exam


Eye Exam: Normal appearance





- ENT Exam


ENT Exam: Mucous Membranes Moist





- Neck Exam


Neck Exam: Full ROM





- Respiratory Exam


Respiratory Exam: Decreased Breath Sounds





- Cardiovascular Exam


Cardiovascular Exam: Irregular Rhythm





- GI/Abdominal Exam


GI & Abdominal Exam: Normal Bowel Sounds





- Rectal Exam


Rectal Exam: Deferred





- Extremities Exam


Extremities Exam: absent: Pedal Edema





- Back Exam


Back Exam: NORMAL INSPECTION





- Neurological Exam


Neurological Exam: Alert





- Psychiatric Exam


Psychiatric exam: Normal Affect





- Skin


Skin Exam: Normal Color





Assessment and Plan


(1) Systolic dysfunction with acute on chronic heart failure


Assessment & Plan: 


will need continue diuretic therapy.  likely deconditioned.  will benefit from 

rehab


Status: Acute





(2) Chronic atrial fibrillation


Assessment & Plan: 


maintain coumadin


Status: Acute





(3) CAD (coronary artery disease)


Assessment & Plan: 


no current angina.  


Status: Acute





(4) H/O mitral valve replacement with mechanical valve


Assessment & Plan: 


goal INR 2.5-3.5


Status: Acute

## 2017-03-08 NOTE — CP.PCM.PN
Subjective





- Date & Time of Evaluation


Date of Evaluation: 03/08/17


Time of Evaluation: 10:00





- Subjective


Subjective: 


Patient was seen and examined at bedside. patient appeared to be in no acute 

distress. patient complains of occasional SOB that is worsened upon exertion 

and laying flat. Patient states that he no longer has cough or phlegm buildup.





Walking desaturation test indicated 82% O2 saturation on room air, and 88% O2 

saturation on 2L nasal canula. During the test, patient stated that he was 

extremely SOB





Objective





- Vital Signs/Intake and Output


Vital Signs (last 24 hours): 


 











Temp Pulse Resp BP Pulse Ox


 


 97.9 F   91 H  20   99/66 L  99 


 


 03/08/17 07:22  03/08/17 08:00  03/08/17 07:22  03/08/17 09:34  03/08/17 07:22








Intake and Output: 


 











 03/08/17 03/08/17





 06:59 18:59


 


Intake Total 240 


 


Output Total 600 


 


Balance -360 














- Medications


Medications: 


 Current Medications





Albuterol/Ipratropium (Duoneb 3 Mg/0.5 Mg (3 Ml) Ud)  3 ml INH RQ6 [facility]


   Last Admin: 03/08/17 07:16 Dose:  3 ml


Budesonide (Pulmicort Respules)  0.5 mg INH RQ12 [facility]


   Last Admin: 03/07/17 19:07 Dose:  0.5 mg


Digoxin (Lanoxin)  0.125 mg PO DAILY@1800 [facility]


   Last Admin: 03/07/17 18:00 Dose:  0.125 mg


Furosemide (Lasix)  40 mg IVP BID [facility]


   Last Admin: 03/08/17 09:34 Dose:  Not Given


Lisinopril (Zestril)  10 mg PO DAILY [facility]


   Last Admin: 03/08/17 09:32 Dose:  10 mg


Methylprednisolone (Solu-Medrol)  40 mg IVP Q8 [facility]


   Last Admin: 03/08/17 06:12 Dose:  40 mg


Pantoprazole Sodium (Protonix Ec Tab)  40 mg PO DAILY [facility]


   Last Admin: 03/08/17 09:32 Dose:  40 mg


Rosuvastatin Calcium (Crestor)  10 mg PO HS [facility]


   Last Admin: 03/07/17 22:04 Dose:  10 mg


Spironolactone (Aldactone)  25 mg PO DAILY [facility]


   Last Admin: 03/08/17 09:32 Dose:  25 mg


Tiotropium Bromide (Spiriva)  18 mcg INH RQ24 [facility]


   Last Admin: 03/08/17 07:16 Dose:  18 mcg











- Labs


Labs: 


 





 03/07/17 11:10 





 03/07/17 11:10 





 











PT  35.3 SECONDS (9.7-12.2)  H* D 03/07/17  11:10    


 


INR  3.1   03/07/17  11:10    


 


APTT  35 SECONDS (21-34)  H D 03/07/17  11:10    














- Constitutional


Appears: Non-toxic, No Acute Distress





- Head Exam


Head Exam: NORMAL INSPECTION





- Eye Exam


Eye Exam: Normal appearance





- ENT Exam


ENT Exam: Mucous Membranes Moist





- Respiratory Exam


Respiratory Exam: Decreased Breath Sounds, Prolonged Expiratory Phase, Wheezes





- Cardiovascular Exam


Cardiovascular Exam: REGULAR RHYTHM, +S1, +S2





- Extremities Exam


Extremities Exam: Pedal Edema





- Neurological Exam


Neurological Exam: Alert, Awake, Oriented x3





- Skin


Skin Exam: Normal Color





Assessment and Plan


(1) COPD (chronic obstructive pulmonary disease)


Assessment & Plan: 


Will need home oxygen





Continue duoneb, prednisone, tiotroprium





Follow up with outpatient treatment of COPD and CHF once discharged


Status: Acute





(2) Acute exacerbation of CHF (congestive heart failure)


Status: Acute





(3) Chronic atrial fibrillation


Status: Acute

## 2017-03-08 NOTE — CP.PCM.PN
<Sulaiman Patel - Last Filed: 03/08/17 22:50>





Subjective





- Date & Time of Evaluation


Date of Evaluation: 03/08/17


Time of Evaluation: 07:15





- Subjective


Subjective: 


PGY1 Medicine Note- Dr Elizabeth





Patient seen and examined at bedside, resting comfortably, slept well. No 

overnight events per nursing. Patient less SOB while speaking today. PT 

performed desaturation test while ambulating yesterday - 82% on room air / 88% 

on 2L NC. Denies pain with ambulation, although he becomes very SOB. +BM, +

urination. Denies f/c, dizziness, palpitations, cough, abdominal pain, n/v, d/c

, or any additional complaints. 





Objective





- Vital Signs/Intake and Output


Vital Signs (last 24 hours): 


 











Temp Pulse Resp BP Pulse Ox


 


 98 F   50 L  20   109/68   97 


 


 03/08/17 00:39  03/08/17 00:39  03/08/17 00:39  03/08/17 00:39  03/08/17 00:39








Intake and Output: 


 











 03/08/17 03/08/17





 06:59 18:59


 


Intake Total 240 


 


Output Total 600 


 


Balance -360 














- Medications


Medications: 


 Current Medications





Albuterol/Ipratropium (Duoneb 3 Mg/0.5 Mg (3 Ml) Ud)  3 ml INH RQ6 [facility]


   Last Admin: 03/08/17 07:16 Dose:  3 ml


Budesonide (Pulmicort Respules)  0.5 mg INH RQ12 [facility]


   Last Admin: 03/07/17 19:07 Dose:  0.5 mg


Digoxin (Lanoxin)  0.125 mg PO DAILY@1800 [facility]


   Last Admin: 03/07/17 18:00 Dose:  0.125 mg


Furosemide (Lasix)  40 mg IVP BID [facility]


   Last Admin: 03/07/17 19:05 Dose:  40 mg


Lisinopril (Zestril)  10 mg PO DAILY [facility]


   Last Admin: 03/07/17 11:50 Dose:  Not Given


Methylprednisolone (Solu-Medrol)  40 mg IVP Q8 [facility]


   Last Admin: 03/08/17 06:12 Dose:  40 mg


Pantoprazole Sodium (Protonix Ec Tab)  40 mg PO DAILY [facility]


   Last Admin: 03/07/17 09:22 Dose:  40 mg


Rosuvastatin Calcium (Crestor)  10 mg PO HS [facility]


   Last Admin: 03/07/17 22:04 Dose:  10 mg


Spironolactone (Aldactone)  25 mg PO DAILY [facility]


   Last Admin: 03/07/17 09:23 Dose:  25 mg


Tiotropium Bromide (Spiriva)  18 mcg INH RQ24 [facility]


   Last Admin: 03/08/17 07:16 Dose:  18 mcg











- Labs


Labs: 


 





 03/07/17 11:10 





 03/07/17 11:10 





 











PT  35.3 SECONDS (9.7-12.2)  H* D 03/07/17  11:10    


 


INR  3.1   03/07/17  11:10    


 


APTT  35 SECONDS (21-34)  H D 03/07/17  11:10    














- Additional Findings


Additional findings: 


- Constitutional


Appears: Non-toxic, No Acute Distress





- Head Exam


Head Exam: ATRAUMATIC





- Eye Exam


Eye Exam: EOMI





- ENT Exam


ENT Exam: Mucous Membranes Moist





- Respiratory Exam


Respiratory Exam: Decreased Breath Sounds, Wheezes (improving), Rhonchi (mid-

lung), Respiratory Distress (SOB while speaking improved on NC).  absent: Clear 

to Ausculation Bilateral


-desaturates to 82% while walking, 88% with NC 2L





- Cardiovascular Exam


Cardiovascular Exam: REGULAR RHYTHM, +S1, +S2





- GI/Abdominal Exam


GI & Abdominal Exam: Soft, Normal Bowel Sounds.  absent: Tenderness





- Extremities Exam


Extremities Exam: Pedal Edema (b/l 1+ pitting to mid thigh).  absent: Tenderness


Additional comments: 


pedal pulses 2+





- Neurological Exam


Neurological Exam: Alert, Awake, Oriented x3


b/l hand numbness / tingling today resolved





- Psychiatric Exam


Psychiatric exam: Normal Affect





- Skin


Skin Exam: Dry, Normal Color, Warm


Tender to palpation at R sternal border, 2nd rib. Protrusion noted.





Assessment and Plan





- Assessment and Plan (Free Text)


Assessment: 


72 year old male with past medical history including MI, CAD with history of 

CABG and valve replacement 6-7 years ago.  Reports worstenting SOB and 

dizziness with associated swelling of bilateral LE for past 2-3 days. Admits 

this is the first time he has experienced these symptoms. SOB is worse when he 

lays flat.


-Discharge planning to [facility] once INR in 2.5-3.5 range and SOB is better 

controlled. Case working on 





Plan:


Systolic dysfunction with acute on chronic heart failure


Admit to telemetry, BNP 4560 on admission


Consulted Cardiology, Dr. Dotson - f/u recs


   -will attempt to obtain company of AICD for interrogation.


   -continue diuretic therapy.  likely deconditioned.  will benefit from rehab


Lasix 40mg IV BID


Digoxin 0.125mg


Lisinopril 10mg daily


Aldactone 25mg PO Daily


patient with AICD, will check echo to determine EF


CXR 2/25: mild cardiomegaly, mild pulmonary venous congestion.  s/p sternotomy, 

aortic valve replacement, AICD





H/O mitral valve replacement with mechanical valve


Cardiac valve replacement 6-7yrs ago


Consulted Cardiology, Dr. Dotson - f/u recs


   -echocardiogram reviewed.  valve leaflets are opening and functional.  There 

is no signficant gradient across the valve.  


   -recommend INR 2.5 to 3.5.


STOP Lovenox 3/5


STOP Heparin Drip - cardiac protocol, with bolus (because patient is refusing 

blood draws)


3/1: PT/INR 1.3


3/2: PT/INR refused, Coumadin 10mg


3/3: PT/INR 1.5, Coumadin 10mg


3/4: PT/INR 2.0, Coumadin 10mg


3/5: PT/INR 2.4, Coumadin 7.5mg; patient received one more dose of Lovenox 80mg 

SC today, now discontinued as bridge complete.


3/6: PT/INR 2.5, Coumadin 7.5mg


3/7: PT/INR 3.1, Coumadin 5mg


3/8: PT/INR 3.7, Coumadin 5mg, f/u INR in am





Chronic atrial fibrillation


Consulted Cardiology, Dr. Dotson - f/u recs


   -Rate controlled


   -echocardiogram reviewed.  valve leaflets are opening and functional.  There 

is no signficant gradient across the valve.  


   -recommend INR 2.5 to 3.5.


   -See PT/INR trends above


   Coumadin as above


   STOP Lovenox 3/5








COPD (chronic obstructive pulmonary disease)


-3/7:  PT session: 98% O2 at 2L at rest, 96% room air at rest sitting, 82% room 

air during ambulation, 88% at 2 liter ambulation.


-3/6: Walked 20 steps today down [address], and patient became dizzy, SOB, and 

almost collapsed.


- Solumedrol 40mg IVP Q8H [facility] (3/6)


- Consulted Pulmonology, Dr. aVrela, f/u recs


   - Aware of HR in 140's and SOB after eating and low levels of exertion.


   -f/u ABG (not collected)


   -LDH 654H


   -HIV - negative


   -planning for home O2


- persistent shortness of breath


- Patient with long history of smoking most likely has underlying COPD.  

Nebulizer treatment.  Inhaled steroids.  Spiriva.  pulmonary function test as 

out pt








CAD (coronary artery disease) 


Consulted Cardiology, Dr. Dotson - f/u recs


   -Aware of HR in 140's and SOB after eating and low levels of exertion.


   -unknown graft status.  no evidence of ischemia at present


   -No current angina





History of MI (myocardial infarction)


Crestor 10mg PO HS  


ROMIs negative x3


EKG paced 


Denies chest pain 





Electrolyte Imbalance


Hyperkalemia - resolved





Tachycardia


3/7: Patient becomes SOB very easily, desaturating to 82% while ambulating. 

Anytime he eats or gets up, HR climbs into 140's


Continue Digoxin 0.125mg


EKG in ER: sinus tachycardia at 100, paced, incomplete RBBB


Discontinued cardizem drip of 5mg/h started in the ER





Lower extremity edema


LE Duplex - negative. see full report.





Prophylactic measure


Stopped lovenox 40u sc daily (bridging lovenox to Coumadin).


protonix 40mg daily


SCD contraindicated due to LE edema





[All management as per Dr. Elizabeth]





<Evgeny Elizabeth Jr. - Last Filed: 03/09/17 17:22>





Objective





- Vital Signs/Intake and Output


Vital Signs (last 24 hours): 


 











Temp Pulse Resp BP Pulse Ox


 


 97.6 F   89   20   104/66   100 


 


 03/09/17 16:57  03/09/17 16:57  03/09/17 16:57  03/09/17 16:57  03/09/17 16:57








Intake and Output: 


 











 03/09/17 03/09/17





 06:59 18:59


 


Intake Total 300 480


 


Output Total 800 500


 


Balance -500 -20














- Medications


Medications: 


 Current Medications





Albuterol/Ipratropium (Duoneb 3 Mg/0.5 Mg (3 Ml) Ud)  3 ml INH RQ6 [facility]


   Last Admin: 03/09/17 13:05 Dose:  3 ml


Budesonide (Pulmicort Respules)  0.5 mg INH RQ12 [facility]


   Last Admin: 03/09/17 07:42 Dose:  0.5 mg


Digoxin (Lanoxin)  0.125 mg PO DAILY@1800 [facility]


   Last Admin: 03/08/17 17:02 Dose:  0.125 mg


Furosemide (Lasix)  40 mg IVP BID [facility]


   Last Admin: 03/09/17 09:15 Dose:  40 mg


Guaifenesin (Mucinex La)  600 mg PO BID [facility]


   Last Admin: 03/09/17 10:11 Dose:  600 mg


Lisinopril (Zestril)  10 mg PO DAILY [facility]


   Last Admin: 03/09/17 09:15 Dose:  10 mg


Methylprednisolone (Solu-Medrol)  40 mg IVP Q8 [facility]


   Last Admin: 03/09/17 13:21 Dose:  40 mg


Pantoprazole Sodium (Protonix Ec Tab)  40 mg PO DAILY [facility]


   Last Admin: 03/09/17 09:15 Dose:  40 mg


Rosuvastatin Calcium (Crestor)  10 mg PO HS [facility]


   Last Admin: 03/08/17 21:04 Dose:  10 mg


Spironolactone (Aldactone)  25 mg PO DAILY [facility]


   Last Admin: 03/09/17 09:15 Dose:  25 mg


Tiotropium Bromide (Spiriva)  18 mcg INH RQ24 [facility]


   Last Admin: 03/09/17 07:42 Dose:  18 mcg


Warfarin Sodium (Coumadin)  5 mg PO 1800 [facility]


   Stop: 03/09/17 18:01











- Labs


Labs: 


 





 03/09/17 11:10 





 03/09/17 11:10 





 











PT  32.3 SECONDS (9.7-12.2)  H* D 03/09/17  11:10    


 


INR  2.8  D 03/09/17  11:10    


 


APTT  31 SECONDS (21-34)  D 03/09/17  11:10    














Attending/Attestation





- Attestation


I have personally seen and examined this patient.: Yes


I have fully participated in the care of the patient.: Yes


I have reviewed all pertinent clinical information, including history, physical 

exam and plan: Yes


Notes (Text): 





03/09/17 17:22


Patient seen and examined with the resident. Reviewed resident note and agree 

with findings and plan of care. [ ]

## 2017-03-09 NOTE — CP.PCM.PN
<Sulaiman Patel - Last Filed: 03/09/17 14:19>





Subjective





- Date & Time of Evaluation


Date of Evaluation: 03/09/17


Time of Evaluation: 07:10





- Subjective


Subjective: 


PGY1 Medicine Note- Dr Elizabeth





Patient seen and examined at bedside, resting comfortably, slept well. No 

overnight events per nursing. Patient complains of worsening cough with green-

tinged phlegm. Patient also states he has persistent orthopnea and SOB at rest. 

Patient states that the nasal cannula is not providing enough oxygen and needs 

something better. Patient complains of intermittent pressure in the chest; also 

states he experiences dizziness upon sitting/standing. Denies f/c, palpitations

, n/v, d/c, or any additional complaints. 





Objective





- Vital Signs/Intake and Output


Vital Signs (last 24 hours): 


 











Temp Pulse Resp BP Pulse Ox


 


 98.4 F   90   20   100/71   99 


 


 03/09/17 00:00  03/09/17 00:09  03/09/17 00:00  03/09/17 00:00  03/09/17 00:00








Intake and Output: 


 











 03/09/17 03/09/17





 06:59 18:59


 


Intake Total 300 


 


Output Total 800 


 


Balance -500 














- Medications


Medications: 


 Current Medications





Albuterol/Ipratropium (Duoneb 3 Mg/0.5 Mg (3 Ml) Ud)  3 ml INH RQ6 [facility]


   Last Admin: 03/09/17 07:42 Dose:  3 ml


Budesonide (Pulmicort Respules)  0.5 mg INH RQ12 [facility]


   Last Admin: 03/09/17 07:42 Dose:  0.5 mg


Digoxin (Lanoxin)  0.125 mg PO DAILY@1800 [facility]


   Last Admin: 03/08/17 17:02 Dose:  0.125 mg


Furosemide (Lasix)  40 mg IVP BID [facility]


   Last Admin: 03/08/17 17:02 Dose:  40 mg


Lisinopril (Zestril)  10 mg PO DAILY [facility]


   Last Admin: 03/08/17 09:32 Dose:  10 mg


Methylprednisolone (Solu-Medrol)  40 mg IVP Q8 [facility]


   Last Admin: 03/09/17 06:02 Dose:  40 mg


Pantoprazole Sodium (Protonix Ec Tab)  40 mg PO DAILY [facility]


   Last Admin: 03/08/17 09:32 Dose:  40 mg


Rosuvastatin Calcium (Crestor)  10 mg PO HS [facility]


   Last Admin: 03/08/17 21:04 Dose:  10 mg


Spironolactone (Aldactone)  25 mg PO DAILY [facility]


   Last Admin: 03/08/17 09:32 Dose:  25 mg


Tiotropium Bromide (Spiriva)  18 mcg INH RQ24 [facility]


   Last Admin: 03/09/17 07:42 Dose:  18 mcg











- Labs


Labs: 


 





 03/07/17 11:10 





 03/08/17 13:54 





 











PT  43.1 SECONDS (9.7-12.2)  H* D 03/08/17  13:54    


 


INR  3.7   03/08/17  13:54    


 


APTT  39 SECONDS (21-34)  H  03/08/17  13:54    














- Additional Findings


Additional findings: 


- Constitutional


Appears: Non-toxic, No Acute Distress





- Head Exam


Head Exam: ATRAUMATIC





- Eye Exam


Eye Exam: EOMI





- ENT Exam


ENT Exam: Mucous Membranes Moist





- Respiratory Exam


Respiratory Exam: Decreased Breath Sounds, Wheezes, Rhonchi (mid-lung b/l), 

Respiratory Distress (orthopnea).  absent: Clear to Ausculation Bilateral


-desaturates to 82% while walking, 88% with NC 2L





- Cardiovascular Exam


Cardiovascular Exam: REGULAR RHYTHM, +S1, +S2





- GI/Abdominal Exam


GI & Abdominal Exam: Soft, Normal Bowel Sounds, Tenderness (LUQ)





- Extremities Exam


Extremities Exam: Pedal Edema (b/l 1+ pitting to mid thigh).  absent: Tenderness


Additional comments: 


pedal pulses 2+





- Neurological Exam


Neurological Exam: Alert, Awake, Oriented x3


b/l hand numbness / tingling resolved





- Psychiatric Exam


Psychiatric exam: Normal Affect





- Skin


Skin Exam: Dry, Normal Color, Warm


Tender to palpation at R sternal border, 2nd rib. Protrusion noted.





Assessment and Plan





- Assessment and Plan (Free Text)


Assessment: 


72 year old male with past medical history including MI, CAD with history of 

CABG and valve replacement 6-7 years ago.  Reports worstenting SOB and 

dizziness with associated swelling of bilateral LE for past 2-3 days. Admits 

this is the first time he has experienced these symptoms. SOB is worse when he 

lays flat.


-Discharge planning to [facility] once INR in 2.5-3.5 range and SOB is better 

controlled. Case working on logistics.





Plan:


COPD (chronic obstructive pulmonary disease)


-3/9-3/9: Patient complaining of SOB with exertion and orthopnea. Maintain Head 

of bed elevated 30 degrees.


-3/7:  PT session: 98% O2 at 2L at rest, 96% room air at rest sitting, 82% room 

air during ambulation, 88% at 2 liter ambulation.


-3/6: Walked 20 steps today down [address], and patient became dizzy, SOB, and 

almost collapsed.


- Solumedrol 40mg IVP Q8H [facility] (3/6)


- Consulted Pulmonology, Dr. Varela, f/u recs


   - Aware of HR in 140's and SOB after eating and low levels of exertion.


   -f/u ABG (not collected)


   -LDH 654H


   -HIV - negative


   -planning for home O2


   -persistent shortness of breath


   - Patient with long history of smoking most likely has underlying COPD.  

Nebulizer treatment.  Inhaled steroids.  Spiriva.  PFT as out pt








Cough, acute


-3/9: patient developed productive cough with yellow sputum today.


-Mucinex 600mg PO BID.





Systolic dysfunction with acute on chronic heart failure


Admit to telemetry, BNP 4560 on admission


Consulted Cardiology, Dr. Dotson - f/u recs


   -will attempt to obtain company of AICD for interrogation.


   -continue diuretic therapy.  likely deconditioned.  will benefit from rehab


Lasix 40mg IV BID


Digoxin 0.125mg


Lisinopril 10mg daily


Aldactone 25mg PO Daily


patient with AICD, will check echo to determine EF


CXR 2/25: mild cardiomegaly, mild pulmonary venous congestion.  s/p sternotomy, 

aortic valve replacement, AICD








H/O mitral valve replacement with mechanical valve


Cardiac valve replacement 6-7yrs ago


Consulted Cardiology, Dr. Dotson - f/u recs


   -echocardiogram reviewed.  valve leaflets are opening and functional.  There 

is no signficant gradient across the valve.  


   -recommend INR 2.5 to 3.5.


STOP Lovenox 3/5


STOP Heparin Drip - cardiac protocol, with bolus (because patient is refusing 

blood draws)


3/1: PT/INR 1.3


3/2: PT/INR refused, Coumadin 10mg


3/3: PT/INR 1.5, Coumadin 10mg


3/4: PT/INR 2.0, Coumadin 10mg


3/5: PT/INR 2.4, Coumadin 7.5mg; patient received one more dose of Lovenox 80mg 

SC today, now discontinued as bridge complete.


3/6: PT/INR 2.5, Coumadin 7.5mg


3/7: PT/INR 3.1, Coumadin 5mg


3/8: PT/INR 3.7, Coumadin 5mg (stopped by pharmacy)


3/9: PT/INR 2.8, Coumadin 5mg, f/u inr








Chronic atrial fibrillation


Consulted Cardiology, Dr. Dotson - f/u recs


   -Rate controlled


   -echocardiogram reviewed.  valve leaflets are opening and functional.  There 

is no signficant gradient across the valve.  


   -recommend INR 2.5 to 3.5.


   -See PT/INR trends above


   Coumadin as above


   STOP Lovenox 3/5








CAD (coronary artery disease) 


Consulted Cardiology, Dr. Dotson - f/u recs


   -Aware of HR in 140's and SOB after eating and low levels of exertion.


   -unknown graft status.  no evidence of ischemia at present


   -No current angina





History of MI (myocardial infarction)


Crestor 10mg PO HS  


ROMIs negative x3


EKG paced 


Denies chest pain 





Transaminitis, acute


- AST 77 / ALT 98 -> previously normal.


- Continue to monitor





Electrolyte Imbalance


Hyperkalemia - resolved





Tachycardia


3/7: Patient becomes SOB very easily, desaturating to 82% while ambulating. 

Anytime he eats or gets up, HR climbs into 140's


Continue Digoxin 0.125mg


EKG in ER: sinus tachycardia at 100, paced, incomplete RBBB


Discontinued cardizem drip of 5mg/h started in the ER





Lower extremity edema


LE Duplex - negative. see full report.





Prophylactic measure


Stopped lovenox 40u sc daily (bridging lovenox to Coumadin).


protonix 40mg daily


SCD contraindicated due to LE edema





[All management as per Dr. Elizabeth]





<Evgeny Elizabeth Jr. - Last Filed: 03/09/17 17:24>





Objective





- Vital Signs/Intake and Output


Vital Signs (last 24 hours): 


 











Temp Pulse Resp BP Pulse Ox


 


 97.6 F   89   20   104/66   100 


 


 03/09/17 16:57  03/09/17 16:57  03/09/17 16:57  03/09/17 16:57  03/09/17 16:57








Intake and Output: 


 











 03/09/17 03/09/17





 06:59 18:59


 


Intake Total 300 480


 


Output Total 800 500


 


Balance -500 -20














- Medications


Medications: 


 Current Medications





Albuterol/Ipratropium (Duoneb 3 Mg/0.5 Mg (3 Ml) Ud)  3 ml INH RQ6 [facility]


   Last Admin: 03/09/17 13:05 Dose:  3 ml


Budesonide (Pulmicort Respules)  0.5 mg INH RQ12 [facility]


   Last Admin: 03/09/17 07:42 Dose:  0.5 mg


Digoxin (Lanoxin)  0.125 mg PO DAILY@1800 [facility]


   Last Admin: 03/08/17 17:02 Dose:  0.125 mg


Furosemide (Lasix)  40 mg IVP BID [facility]


   Last Admin: 03/09/17 09:15 Dose:  40 mg


Guaifenesin (Mucinex La)  600 mg PO BID [facility]


   Last Admin: 03/09/17 10:11 Dose:  600 mg


Lisinopril (Zestril)  10 mg PO DAILY [facility]


   Last Admin: 03/09/17 09:15 Dose:  10 mg


Methylprednisolone (Solu-Medrol)  40 mg IVP Q8 [facility]


   Last Admin: 03/09/17 13:21 Dose:  40 mg


Pantoprazole Sodium (Protonix Ec Tab)  40 mg PO DAILY [facility]


   Last Admin: 03/09/17 09:15 Dose:  40 mg


Rosuvastatin Calcium (Crestor)  10 mg PO HS [facility]


   Last Admin: 03/08/17 21:04 Dose:  10 mg


Spironolactone (Aldactone)  25 mg PO DAILY [facility]


   Last Admin: 03/09/17 09:15 Dose:  25 mg


Tiotropium Bromide (Spiriva)  18 mcg INH RQ24 [facility]


   Last Admin: 03/09/17 07:42 Dose:  18 mcg


Warfarin Sodium (Coumadin)  5 mg PO 1800 [facility]


   Stop: 03/09/17 18:01











- Labs


Labs: 


 





 03/09/17 11:10 





 03/09/17 11:10 





 











PT  32.3 SECONDS (9.7-12.2)  H* D 03/09/17  11:10    


 


INR  2.8  D 03/09/17  11:10    


 


APTT  31 SECONDS (21-34)  D 03/09/17  11:10    














Attending/Attestation





- Attestation


I have personally seen and examined this patient.: Yes


I have fully participated in the care of the patient.: Yes


I have reviewed all pertinent clinical information, including history, physical 

exam and plan: Yes


Notes (Text): 





03/09/17 17:24


Patient seen and examined with the resident. Reviewed resident note and agree 

with findings and plan of care. [ ]

## 2017-03-09 NOTE — CP.PCM.PN
Objective





- Vital Signs/Intake and Output


Vital Signs (last 24 hours): 


 











Temp Pulse Resp BP Pulse Ox


 


 98.5 F   94 H  20   116/69   100 


 


 03/09/17 08:11  03/09/17 08:11  03/09/17 08:11  03/09/17 09:15  03/09/17 08:11








Intake and Output: 


 











 03/09/17 03/09/17





 06:59 18:59


 


Intake Total 300 


 


Output Total 800 


 


Balance -500 














- Medications


Medications: 


 Current Medications





Albuterol/Ipratropium (Duoneb 3 Mg/0.5 Mg (3 Ml) Ud)  3 ml INH RQ6 [facility]


   Last Admin: 03/09/17 07:42 Dose:  3 ml


Budesonide (Pulmicort Respules)  0.5 mg INH RQ12 [facility]


   Last Admin: 03/09/17 07:42 Dose:  0.5 mg


Digoxin (Lanoxin)  0.125 mg PO DAILY@1800 [facility]


   Last Admin: 03/08/17 17:02 Dose:  0.125 mg


Furosemide (Lasix)  40 mg IVP BID [facility]


   Last Admin: 03/09/17 09:15 Dose:  40 mg


Guaifenesin (Mucinex La)  600 mg PO BID [facility]


   Last Admin: 03/09/17 10:11 Dose:  600 mg


Lisinopril (Zestril)  10 mg PO DAILY [facility]


   Last Admin: 03/09/17 09:15 Dose:  10 mg


Methylprednisolone (Solu-Medrol)  40 mg IVP Q8 [facility]


   Last Admin: 03/09/17 06:02 Dose:  40 mg


Pantoprazole Sodium (Protonix Ec Tab)  40 mg PO DAILY [facility]


   Last Admin: 03/09/17 09:15 Dose:  40 mg


Rosuvastatin Calcium (Crestor)  10 mg PO HS [facility]


   Last Admin: 03/08/17 21:04 Dose:  10 mg


Spironolactone (Aldactone)  25 mg PO DAILY [facility]


   Last Admin: 03/09/17 09:15 Dose:  25 mg


Tiotropium Bromide (Spiriva)  18 mcg INH RQ24 [facility]


   Last Admin: 03/09/17 07:42 Dose:  18 mcg











- Labs


Labs: 


 





 03/07/17 11:10 





 03/08/17 13:54 





 











PT  43.1 SECONDS (9.7-12.2)  H* D 03/08/17  13:54    


 


INR  3.7   03/08/17  13:54    


 


APTT  39 SECONDS (21-34)  H  03/08/17  13:54    














Assessment and Plan


(1) COPD (chronic obstructive pulmonary disease)


Status: Acute





(2) Acute exacerbation of CHF (congestive heart failure)


Status: Acute





(3) Chronic atrial fibrillation


Status: Acute

## 2017-03-10 NOTE — CP.PCM.PN
Objective





- Vital Signs/Intake and Output


Vital Signs (last 24 hours): 


 











Temp Pulse Resp BP Pulse Ox


 


 98.5 F   84   20   93/58 L  99 


 


 03/10/17 16:55  03/10/17 16:55  03/10/17 16:55  03/10/17 16:55  03/10/17 16:55








Intake and Output: 


 











 03/10/17 03/11/17





 18:59 06:59


 


Intake Total 800 


 


Output Total 900 


 


Balance -100 














- Medications


Medications: 


 Current Medications





Albuterol/Ipratropium (Duoneb 3 Mg/0.5 Mg (3 Ml) Ud)  3 ml INH RQ6 [facility]


   Last Admin: 03/10/17 19:17 Dose:  3 ml


Budesonide (Pulmicort Respules)  0.5 mg INH RQ12 [facility]


   Last Admin: 03/10/17 19:17 Dose:  0.5 mg


Digoxin (Lanoxin)  0.125 mg PO DAILY@1800 [facility]


   Last Admin: 03/10/17 18:57 Dose:  0.125 mg


Furosemide (Lasix)  40 mg IVP DAILY [facility]


Guaifenesin (Mucinex La)  600 mg PO BID [facility]


   Last Admin: 03/10/17 18:57 Dose:  600 mg


Lisinopril (Zestril)  10 mg PO DAILY [facility]


   Last Admin: 03/10/17 09:28 Dose:  10 mg


Methylprednisolone (Solu-Medrol)  40 mg IVP Q8 [facility]


   Last Admin: 03/10/17 13:17 Dose:  40 mg


Pantoprazole Sodium (Protonix Ec Tab)  40 mg PO DAILY [facility]


   Last Admin: 03/10/17 09:28 Dose:  40 mg


Rosuvastatin Calcium (Crestor)  10 mg PO HS [facility]


   Last Admin: 03/09/17 21:38 Dose:  10 mg


Spironolactone (Aldactone)  25 mg PO DAILY [facility]


   Last Admin: 03/10/17 09:27 Dose:  25 mg


Tiotropium Bromide (Spiriva)  18 mcg INH RQ24 [facility]


   Last Admin: 03/10/17 09:16 Dose:  18 mcg











- Labs


Labs: 


 





 03/10/17 10:55 





 03/10/17 13:03 





 











PT  30.2 SECONDS (9.7-12.2)  H*  03/10/17  13:03    


 


INR  2.6   03/10/17  13:03    


 


APTT  33 SECONDS (21-34)   03/10/17  10:55

## 2017-03-10 NOTE — CP.PCM.PN
Subjective





- Date & Time of Evaluation


Date of Evaluation: 03/10/17


Time of Evaluation: 10:30





- Subjective


Subjective: 


Patient is a 72 year old male with PMH CAD, s/p CABG (graft status unknown), 

ischemic cardiomyopathy s/p AICD, mechnical MVR who presents with dyspnea.  The 

patient's previous cardiac care was in [place].  He was noted to have 

progressive dyspnea for one week.  The patient denies chest pain.  It is 

unclear how compliant patient has been with medical therapy.  





Pt acutely dyspnic with accesory muscle use on exam.  Rhonchi and wheezing 

noted.  unable to complete sentences.  Rn alerted, resp treatment initiated.  

EKG to follow.





Objective





- Vital Signs/Intake and Output


Vital Signs (last 24 hours): 


 











Temp Pulse Resp BP Pulse Ox


 


 98.5 F   84   20   93/58 L  99 


 


 03/10/17 16:55  03/10/17 16:55  03/10/17 16:55  03/10/17 16:55  03/10/17 16:55








Intake and Output: 


 











 03/10/17 03/11/17





 18:59 06:59


 


Intake Total 800 


 


Output Total 900 


 


Balance -100 














- Medications


Medications: 


 Current Medications





Albuterol/Ipratropium (Duoneb 3 Mg/0.5 Mg (3 Ml) Ud)  3 ml INH RQ6 [facility]


   Last Admin: 03/10/17 19:17 Dose:  3 ml


Budesonide (Pulmicort Respules)  0.5 mg INH RQ12 [facility]


   Last Admin: 03/10/17 19:17 Dose:  0.5 mg


Digoxin (Lanoxin)  0.125 mg PO DAILY@1800 [facility]


   Last Admin: 03/10/17 18:57 Dose:  0.125 mg


Furosemide (Lasix)  40 mg IVP DAILY [facility]


Guaifenesin (Mucinex La)  600 mg PO BID [facility]


   Last Admin: 03/10/17 18:57 Dose:  600 mg


Lisinopril (Zestril)  10 mg PO DAILY [facility]


   Last Admin: 03/10/17 09:28 Dose:  10 mg


Methylprednisolone (Solu-Medrol)  40 mg IVP Q8 [facility]


   Last Admin: 03/10/17 13:17 Dose:  40 mg


Pantoprazole Sodium (Protonix Ec Tab)  40 mg PO DAILY [facility]


   Last Admin: 03/10/17 09:28 Dose:  40 mg


Rosuvastatin Calcium (Crestor)  10 mg PO HS [facility]


   Last Admin: 03/09/17 21:38 Dose:  10 mg


Spironolactone (Aldactone)  25 mg PO DAILY [facility]


   Last Admin: 03/10/17 09:27 Dose:  25 mg


Tiotropium Bromide (Spiriva)  18 mcg INH RQ24 [facility]


   Last Admin: 03/10/17 09:16 Dose:  18 mcg











- Labs


Labs: 


 





 03/10/17 10:55 





 03/10/17 13:03 





 











PT  30.2 SECONDS (9.7-12.2)  H*  03/10/17  13:03    


 


INR  2.6   03/10/17  13:03    


 


APTT  33 SECONDS (21-34)   03/10/17  10:55    














- Constitutional


Appears: Well





- Head Exam


Head Exam: ATRAUMATIC, NORMAL INSPECTION, NORMOCEPHALIC





- Eye Exam


Eye Exam: EOMI, Normal appearance, PERRL





- ENT Exam


ENT Exam: Mucous Membranes Moist, Normal Exam





- Neck Exam


Neck Exam: Full ROM, Normal Inspection.  absent: Lymphadenopathy





- Respiratory Exam


Respiratory Exam: Rhonchi, Wheezes


Additional comments: 


tachipnic with accessory muscle use.





- Cardiovascular Exam


Cardiovascular Exam: Irregular Rhythm, +S1, +S2, Murmur





- GI/Abdominal Exam


GI & Abdominal Exam: Soft, Normal Bowel Sounds.  absent: Tenderness





- Extremities Exam


Extremities Exam: Pedal Edema





- Back Exam


Back Exam: NORMAL INSPECTION





- Neurological Exam


Neurological Exam: Alert, Awake, Oriented x3





- Psychiatric Exam


Psychiatric exam: Normal Affect, Normal Mood





- Skin


Skin Exam: Dry, Intact, Normal Color, Warm





Assessment and Plan


(1) CAD (coronary artery disease)


Status: Acute





(2) COPD (chronic obstructive pulmonary disease)


Status: Acute





(3) Chronic atrial fibrillation


Status: Acute





(4) H/O mitral valve replacement with mechanical valve


Status: Acute





(5) History of MI (myocardial infarction)


Status: Acute





(6) Lower extremity edema


Status: Acute








- Assessment and Plan (Free Text)


Plan: 


pts sob appears pulmonary in etiology.  no crackles and min edema.  no jvd.  

not improved with elevation of head of bed.  exp wheezing


neb treatment ordered.  


afib controlled


ekg once less treatment given


alerted staff


continue tele monitor.


will follow


90 min critical care time

## 2017-03-10 NOTE — CP.PCM.PN
<Sulaiman Patel - Last Filed: 03/10/17 20:41>





Subjective





- Date & Time of Evaluation


Date of Evaluation: 03/10/17


Time of Evaluation: 07:05





- Subjective


Subjective: 


PGY1 Medicine Note- Dr Clara MARQUEZ CALLED AT 12:35. Patient in acute distress, with SOB which was not far 

from his baseline. BP in 80's/50's, tachychardic, saturating at 98% on 3L NC. 

His typical baseline has been 90/60's - 120/70's. Patient AAO x3. Dr. Elizabeth was 

contacted, and patient was bolused 500cc of NS, stat EKG showed paced 

tachycardia, stat CXR ordered showing mild hazy opacities in lung bases with 

mild increase in pulmonary vascular congestion. ICU eval was called - Dr. Cobos ordered BIPAP 10/5 at 30%.  ProBNP 4070H and SHEYLA negative. Patient 

stabilized, however later refused BIPAP and refused ABG.





Patient seen and examined at bedside in the AM, resting comfortably, slept 

well. Nursing staff noted BP of 80/52 yesterday at 8PM; lasix held. Patient 

still complains of worsening cough with yellow phlegm. Patient also states he 

has persistent orthopnea and SOB at rest. Patient states that the nasal cannula 

is not providing enough oxygen, especially with the nasal congestion he is now 

experiencing. Patient complains of intermittent pressure in the chest, 

localized to the right side, which is largely unchanged since admission. 

Patient also complains of dizziness upon sitting/standing. Denies f/c, 

palpitations, n/v, d/c, or any additional complaints. 





Objective





- Vital Signs/Intake and Output


Vital Signs (last 24 hours): 


 











Temp Pulse Resp BP Pulse Ox


 


 97.6 F   93 H  20   91/53 L  100 


 


 03/09/17 23:20  03/09/17 23:30  03/09/17 23:20  03/09/17 23:20  03/09/17 23:20











- Medications


Medications: 


 Current Medications





Albuterol/Ipratropium (Duoneb 3 Mg/0.5 Mg (3 Ml) Ud)  3 ml INH RQ6 [facility]


   Last Admin: 03/10/17 01:11 Dose:  Not Given


Budesonide (Pulmicort Respules)  0.5 mg INH RQ12 [facility]


   Last Admin: 03/09/17 19:29 Dose:  0.5 mg


Digoxin (Lanoxin)  0.125 mg PO DAILY@1800 [facility]


   Last Admin: 03/09/17 18:10 Dose:  0.125 mg


Furosemide (Lasix)  40 mg IVP BID [facility]


   Last Admin: 03/09/17 18:05 Dose:  Not Given


Guaifenesin (Mucinex La)  600 mg PO BID [facility]


   Last Admin: 03/09/17 18:10 Dose:  600 mg


Lisinopril (Zestril)  10 mg PO DAILY [facility]


   Last Admin: 03/09/17 09:15 Dose:  10 mg


Methylprednisolone (Solu-Medrol)  40 mg IVP Q8 [facility]


   Last Admin: 03/10/17 06:05 Dose:  40 mg


Pantoprazole Sodium (Protonix Ec Tab)  40 mg PO DAILY [facility]


   Last Admin: 03/09/17 09:15 Dose:  40 mg


Rosuvastatin Calcium (Crestor)  10 mg PO HS [facility]


   Last Admin: 03/09/17 21:38 Dose:  10 mg


Spironolactone (Aldactone)  25 mg PO DAILY [facility]


   Last Admin: 03/09/17 09:15 Dose:  25 mg


Tiotropium Bromide (Spiriva)  18 mcg INH RQ24 [facility]


   Last Admin: 03/09/17 07:42 Dose:  18 mcg











- Labs


Labs: 


 





 03/09/17 11:10 





 03/09/17 11:10 





 











PT  32.3 SECONDS (9.7-12.2)  H* D 03/09/17  11:10    


 


INR  2.8  D 03/09/17  11:10    


 


APTT  31 SECONDS (21-34)  D 03/09/17  11:10    














- Additional Findings


Additional findings: 


- Constitutional


Appears: Non-toxic, No Acute Distress





- Head Exam


Head Exam: ATRAUMATIC





- Eye Exam


Eye Exam: EOMI





- ENT Exam


ENT Exam: Mucous Membranes Moist





- Respiratory Exam


Respiratory Exam: Decreased Breath Sounds, Wheezes, Rhonchi (mid/upper zones b/l

), Respiratory Distress (orthopnea).


-desaturates to 82% while walking, 88% with NC 2L


-accessory muscle use





- Cardiovascular Exam


Cardiovascular Exam: REGULAR RHYTHM, +S1, +S2





- GI/Abdominal Exam


GI & Abdominal Exam: Soft, Normal Bowel Sounds, Tenderness (LUQ, mild)





- Extremities Exam


Extremities Exam: Pedal Edema (b/l 1+ pitting to mid thigh).  absent: Tenderness


Additional comments: 


pedal pulses 2+





- Neurological Exam


Neurological Exam: Alert, Awake, Oriented x3


b/l hand numbness / tingling returned today





- Psychiatric Exam


Psychiatric exam: Normal Affect





- Skin


Skin Exam: Dry, Normal Color, Warm


Tender to palpation at R sternal border, 2nd rib. Protrusion noted.





Assessment and Plan





- Assessment and Plan (Free Text)


Assessment: 


72 year old male with past medical history including MI, CAD with history of 

CABG and valve replacement 6-7 years ago.  Reports worstenting SOB and 

dizziness with associated swelling of bilateral LE for past 2-3 days. Admits 

this is the first time he has experienced these symptoms. SOB is worse when he 

lays flat.


-Discharge planning to [facility] once INR in 2.5-3.5 range and SOB is better 

controlled. Case working on logistics.





Plan:


COPD (chronic obstructive pulmonary disease)


-3/10: RAPID called due to SOB. BP 80/50's. Bolused 500cc NS. Ordered BIPAP and 

ABG, however patient refused. Placed back on NC 3L.


-3/8-3/9: Patient complaining of SOB with exertion and orthopnea. Maintain Head 

of bed elevated 30 degrees.


-3/7:  PT session: 98% O2 at 2L at rest, 96% room air at rest sitting, 82% room 

air during ambulation, 88% at 2 liter ambulation.


-3/6: Walked 20 steps today down [address], and patient became dizzy, SOB, and 

almost collapsed.


- Solumedrol 40mg IVP Q8H [facility] (3/6)


- Consulted Pulmonology, Dr. Varela, f/u recs


   - Aware of HR in 140's and SOB after eating and low levels of exertion.


   -f/u ABG (not collected)


   -LDH 654H


   -HIV - negative


   -planning for home O2


   -persistent shortness of breath


   - Patient with long history of smoking most likely has underlying COPD.  

Nebulizer treatment.  Inhaled steroids.  Spiriva.  PFT as out pt








Cough, acute


-3/9: patient developed productive cough with yellow sputum today.


-Mucinex 600mg PO BID.





Systolic dysfunction with acute on chronic heart failure


-3/10: RAPID called due to SOB. BP 80/50's. Bolused 500cc NS. Ordered BIPAP and 

ABG, however patient refused. Placed back on NC 3L. ProBNP 4070H (less than # 

on admission) and SHEYLA negative. 


-3/10: Decrease to Lasix 40mg PO daily (was BID).


Admit to telemetry, BNP 4560 on admission


Consulted Cardiology, Dr. Dotson - f/u recs


   -will attempt to obtain company of AICD for interrogation.


   -continue diuretic therapy.  likely deconditioned.  will benefit from rehab


- EF 10-15%, mechanical MV- no regurg, tricuspid regurg. see full report


Digoxin 0.125mg


Lisinopril 10mg daily


Aldactone 25mg PO Daily


patient with AICD, will check echo to determine EF


CXR 2/25: mild cardiomegaly, mild pulmonary venous congestion.  s/p sternotomy, 

aortic valve replacement, AICD








H/O mitral valve replacement with mechanical valve


Cardiac valve replacement 6-7yrs ago


Consulted Cardiology, Dr. Dotson - f/u recs


   -echocardiogram reviewed.  valve leaflets are opening and functional.  There 

is no signficant gradient across the valve.  


   -recommend INR 2.5 to 3.5.


STOP Lovenox 3/5


STOP Heparin Drip - cardiac protocol, with bolus (because patient is refusing 

blood draws)


3/1: PT/INR 1.3


3/2: PT/INR refused, Coumadin 10mg


3/3: PT/INR 1.5, Coumadin 10mg


3/4: PT/INR 2.0, Coumadin 10mg


3/5: PT/INR 2.4, Coumadin 7.5mg; patient received one more dose of Lovenox 80mg 

SC today, now discontinued as bridge complete.


3/6: PT/INR 2.5, Coumadin 7.5mg


3/7: PT/INR 3.1, Coumadin 5mg


3/8: PT/INR 3.7, Coumadin 5mg (stopped by pharmacy)


3/9: PT/INR 2.8, Coumadin 5mg


3/10: PT/INR 2.6, Coumadin 4mg, f/u INR am








Chronic atrial fibrillation


Consulted Cardiology, Dr. Dotson - f/u recs


   -Rate controlled


   -echocardiogram reviewed.  valve leaflets are opening and functional.  There 

is no signficant gradient across the valve.  


   -recommend INR 2.5 to 3.5.


   -See PT/INR trends above


   Coumadin as above


   STOP Lovenox 3/5








CAD (coronary artery disease) 


Consulted Cardiology, Dr. Dotson - f/u recs


   -Aware of HR in 140's and SOB after eating and low levels of exertion.


   -unknown graft status.  no evidence of ischemia at present


   -No current angina





History of MI (myocardial infarction)


Crestor 10mg PO HS  


ROMIs negative x3


EKG paced 


Denies chest pain 





Transaminitis, acute


3/10: AST 90 /  - increasing -> hold Crestor tonight.


- AST 77 / ALT 98 -> previously normal.


- Continue to monitor





Electrolyte Imbalance


Hyperkalemia - resolved





Tachycardia


3/7: Patient becomes SOB very easily, desaturating to 82% while ambulating. 

Anytime he eats or gets up, HR climbs into 140's


Continue Digoxin 0.125mg


EKG in ER: sinus tachycardia at 100, paced, incomplete RBBB


Discontinued cardizem drip of 5mg/h started in the ER





Lower extremity edema


LE Duplex - negative. see full report.





Prophylactic measure


Stopped lovenox 40u sc daily (bridging lovenox to Coumadin).


protonix 40mg daily


SCD contraindicated due to LE edema





[All management as per Dr. Elizabeth]





<Evgeny Elizabeth Jr. - Last Filed: 03/12/17 19:52>





Objective





- Vital Signs/Intake and Output


Vital Signs (last 24 hours): 


 











Temp Pulse Resp BP Pulse Ox


 


 97.6 F   57 L  20   94/57 L  98 


 


 03/12/17 15:43  03/12/17 15:43  03/12/17 15:43  03/12/17 15:43  03/12/17 15:43











- Medications


Medications: 


 Current Medications





Albuterol/Ipratropium (Duoneb 3 Mg/0.5 Mg (3 Ml) Ud)  3 ml INH RQ6 [facility]


   Last Admin: 03/12/17 19:25 Dose:  3 ml


Budesonide (Pulmicort Respules)  0.5 mg INH RQ12 [facility]


   Last Admin: 03/12/17 19:25 Dose:  0.5 mg


Digoxin (Lanoxin)  0.125 mg PO DAILY@1800 [facility]


   Last Admin: 03/12/17 18:07 Dose:  0.125 mg


Furosemide (Lasix)  40 mg IVP DAILY [facility]


   Last Admin: 03/12/17 10:32 Dose:  40 mg


Guaifenesin (Mucinex La)  600 mg PO BID [facility]


   Last Admin: 03/12/17 18:07 Dose:  600 mg


Lisinopril (Zestril)  10 mg PO DAILY [facility]


   Last Admin: 03/12/17 10:32 Dose:  10 mg


Methylprednisolone (Solu-Medrol)  40 mg IVP Q8 [facility]


   Last Admin: 03/12/17 13:23 Dose:  40 mg


Pantoprazole Sodium (Protonix Ec Tab)  40 mg PO DAILY [facility]


   Last Admin: 03/12/17 10:32 Dose:  40 mg


Rosuvastatin Calcium (Crestor)  10 mg PO HS [facility]


   Last Admin: 03/11/17 22:41 Dose:  10 mg


Spironolactone (Aldactone)  25 mg PO DAILY [facility]


   Last Admin: 03/12/17 10:32 Dose:  25 mg


Tiotropium Bromide (Spiriva)  18 mcg INH RQ24 [facility]


   Last Admin: 03/12/17 08:29 Dose:  18 mcg











- Labs


Labs: 


 





 03/12/17 10:50 





 03/12/17 10:50 





 











PT  25.6 SECONDS (9.7-12.2)  H D 03/12/17  10:50    


 


INR  2.3   03/12/17  10:50    


 


APTT  29 SECONDS (21-34)   03/12/17  10:50    














Attending/Attestation





- Attestation


I have personally seen and examined this patient.: Yes


I have fully participated in the care of the patient.: Yes


I have reviewed all pertinent clinical information, including history, physical 

exam and plan: Yes


Notes (Text): 





03/12/17 19:52


Patient seen and examined with the resident. Reviewed resident note and agree 

with findings and plan of care. [ ]

## 2017-03-10 NOTE — RAD
HISTORY:

hypotension, SOB  



COMPARISON:

02/25/2017 



FINDINGS:



LUNGS:

Mild hazy opacities in the lung bases. Mild increased pulmonary 

vascular congestion. 



PLEURA:

No significant pleural effusion identified, no pneumothorax apparent.



CARDIOVASCULAR:

Enlarged cardiomediastinal silhouette, stable.



OSSEOUS STRUCTURES:

No significant abnormalities.



VISUALIZED UPPER ABDOMEN:

Upper abdomen is suboptimally evaluated.



OTHER FINDINGS:

Midline sternotomy wires and surgical clips along the left heart 

border noted. AICD. 



IMPRESSION:

Mild hazy opacities in the lung bases. Mild increased pulmonary 

vascular congestion.

## 2017-03-10 NOTE — CP.PCM.PN
Subjective





- Date & Time of Evaluation


Date of Evaluation: 03/10/17


Time of Evaluation: 09:10





- Subjective


Subjective: 


Pt seen and examined at bedside. Pt laying in bed with headrest at 30 degrees.


Pt continues to complain of SOB, but symptoms improve on nebulizer treatment 

and supplemental oxygen. 


Pt denies chest pain, but admits to occasional wheezing. 


S/P rapid response, placed on BIPAP





Objective





- Vital Signs/Intake and Output


Vital Signs (last 24 hours): 


 











Temp Pulse Resp BP Pulse Ox


 


 97.5 F L  80   20   102/68   100 


 


 03/10/17 07:20  03/10/17 08:38  03/10/17 07:20  03/10/17 09:28  03/10/17 07:20











- Medications


Medications: 


 Current Medications





Albuterol/Ipratropium (Duoneb 3 Mg/0.5 Mg (3 Ml) Ud)  3 ml INH RQ6 [facility]


   Last Admin: 03/10/17 09:16 Dose:  3 ml


Budesonide (Pulmicort Respules)  0.5 mg INH RQ12 [facility]


   Last Admin: 03/10/17 09:16 Dose:  0.5 mg


Digoxin (Lanoxin)  0.125 mg PO DAILY@1800 [facility]


   Last Admin: 03/09/17 18:10 Dose:  0.125 mg


Furosemide (Lasix)  40 mg IVP BID [facility]


   Last Admin: 03/10/17 09:28 Dose:  40 mg


Guaifenesin (Mucinex La)  600 mg PO BID [facility]


   Last Admin: 03/10/17 09:28 Dose:  600 mg


Lisinopril (Zestril)  10 mg PO DAILY [facility]


   Last Admin: 03/10/17 09:28 Dose:  10 mg


Methylprednisolone (Solu-Medrol)  40 mg IVP Q8 [facility]


   Last Admin: 03/10/17 06:05 Dose:  40 mg


Pantoprazole Sodium (Protonix Ec Tab)  40 mg PO DAILY [facility]


   Last Admin: 03/10/17 09:28 Dose:  40 mg


Rosuvastatin Calcium (Crestor)  10 mg PO HS [facility]


   Last Admin: 03/09/17 21:38 Dose:  10 mg


Spironolactone (Aldactone)  25 mg PO DAILY [facility]


   Last Admin: 03/10/17 09:27 Dose:  25 mg


Tiotropium Bromide (Spiriva)  18 mcg INH RQ24 [facility]


   Last Admin: 03/10/17 09:16 Dose:  18 mcg











- Labs


Labs: 


 





 03/09/17 11:10 





 03/09/17 11:10 





 











PT  32.3 SECONDS (9.7-12.2)  H* D 03/09/17  11:10    


 


INR  2.8  D 03/09/17  11:10    


 


APTT  31 SECONDS (21-34)  D 03/09/17  11:10    














- Constitutional


Appears: No Acute Distress





- Head Exam


Head Exam: ATRAUMATIC, NORMOCEPHALIC





- Eye Exam


Eye Exam: Normal appearance


Pupil Exam: NORMAL ACCOMODATION





- ENT Exam


ENT Exam: Mucous Membranes Moist





- Respiratory Exam


Respiratory Exam: Wheezes (bilateral, mild), NORMAL BREATHING PATTERN.  absent: 

Prolonged Expiratory Phase, Rales, Rhonchi, Respiratory Distress





- Cardiovascular Exam


Cardiovascular Exam: REGULAR RHYTHM, +S1, +S2





- Neurological Exam


Neurological Exam: Alert, Awake, Oriented x3





- Psychiatric Exam


Psychiatric exam: Normal Affect, Normal Mood





- Skin


Skin Exam: Dry, Intact, Normal Color, Warm





Assessment and Plan


(1) COPD (chronic obstructive pulmonary disease)


Assessment & Plan: 


Continue duonebs, prednisone, tiotroprium, bipap, F/U ABG





Recommend outpatient treatment followup for COPD and CHF once discharged





Needs home oxygen





Status: Acute





(2) Acute exacerbation of CHF (congestive heart failure)


Status: Acute





(3) Chronic atrial fibrillation


Status: Acute

## 2017-03-11 NOTE — CP.PCM.PN
<Cyrus Duval - Last Filed: 03/11/17 00:50>





Subjective





- Date & Time of Evaluation


Date of Evaluation: 03/11/17


Time of Evaluation: 00:50





- Subjective


Subjective: 


PGY-1 note for Dr Schulte service





Pt seen and examined at bedside. Pt states that he feels ok but that he begins 

to cough more with lots of talking. He denies any sob at rest. Denies fevers, 

chills, chest pain, palpitations, nausea or vomiting. 





Objective





- Vital Signs/Intake and Output


Vital Signs (last 24 hours): 


 











Temp Pulse Resp BP Pulse Ox


 


 98.5 F   100 H  20   93/58 L  99 


 


 03/10/17 16:55  03/10/17 20:49  03/10/17 16:55  03/10/17 16:55  03/10/17 16:55








Intake and Output: 


 











 03/10/17 03/11/17





 18:59 06:59


 


Intake Total 800 550


 


Output Total 900 


 


Balance -100 550














- Medications


Medications: 


 Current Medications





Albuterol/Ipratropium (Duoneb 3 Mg/0.5 Mg (3 Ml) Ud)  3 ml INH RQ6 [facility]


   Last Admin: 03/10/17 19:17 Dose:  3 ml


Budesonide (Pulmicort Respules)  0.5 mg INH RQ12 [facility]


   Last Admin: 03/10/17 19:17 Dose:  0.5 mg


Digoxin (Lanoxin)  0.125 mg PO DAILY@1800 [facility]


   Last Admin: 03/10/17 18:57 Dose:  0.125 mg


Furosemide (Lasix)  40 mg IVP DAILY [facility]


Guaifenesin (Mucinex La)  600 mg PO BID [facility]


   Last Admin: 03/10/17 18:57 Dose:  600 mg


Lisinopril (Zestril)  10 mg PO DAILY [facility]


   Last Admin: 03/10/17 09:28 Dose:  10 mg


Methylprednisolone (Solu-Medrol)  40 mg IVP Q8 [facility]


   Last Admin: 03/10/17 22:09 Dose:  40 mg


Pantoprazole Sodium (Protonix Ec Tab)  40 mg PO DAILY [facility]


   Last Admin: 03/10/17 09:28 Dose:  40 mg


Rosuvastatin Calcium (Crestor)  10 mg PO HS [facility]


   Last Admin: 03/09/17 21:38 Dose:  10 mg


Spironolactone (Aldactone)  25 mg PO DAILY [facility]


   Last Admin: 03/10/17 09:27 Dose:  25 mg


Tiotropium Bromide (Spiriva)  18 mcg INH RQ24 [facility]


   Last Admin: 03/10/17 09:16 Dose:  18 mcg











- Labs


Labs: 


 





 03/10/17 10:55 





 03/10/17 13:03 





 











PT  30.2 SECONDS (9.7-12.2)  H*  03/10/17  13:03    


 


INR  2.6   03/10/17  13:03    


 


APTT  33 SECONDS (21-34)   03/10/17  10:55    














- Constitutional


Appears: Chronically Ill





- Head Exam


Head Exam: ATRAUMATIC, NORMOCEPHALIC





- Eye Exam


Eye Exam: Normal appearance


Pupil Exam: PERRL





- ENT Exam


ENT Exam: Mucous Membranes Moist





- Respiratory Exam


Respiratory Exam: Clear to Ausculation Bilateral, NORMAL BREATHING PATTERN.  

absent: Rales, Rhonchi, Wheezes





- Cardiovascular Exam


Cardiovascular Exam: +S1, +S2





- GI/Abdominal Exam


GI & Abdominal Exam: Soft, Normal Bowel Sounds





- Neurological Exam


Neurological Exam: Alert, Awake





- Skin


Skin Exam: Dry, Warm





Assessment and Plan





- Assessment and Plan (Free Text)


Assessment: 


72 year old male with past medical history including MI, CAD with history of 

CABG and valve replacement 6-7 years ago.  Reports worstenting SOB and 

dizziness with associated swelling of bilateral LE for past 2-3 days. Admits 

this is the first time he has experienced these symptoms. SOB is worse when he 

lays flat.


-Discharge planning to [facility] ([place]) once INR in 2.5-3.5 range and SOB is better 

controlled. Case working on logistics.


Plan: 


COPD (chronic obstructive pulmonary disease)


-3/11: no events, Pt just complains of some sob/coughing with too much talking. 

Pt still not using BiPAP


-3/10: RAPID called due to SOB. BP 80/50's. Bolused 500cc NS. Ordered BIPAP and 

ABG, however patient refused. Placed back on NC 3L.


-3/8-3/9: Patient complaining of SOB with exertion and orthopnea. Maintain Head 

of bed elevated 30 degrees.


-3/7:  PT session: 98% O2 at 2L at rest, 96% room air at rest sitting, 82% room 

air during ambulation, 88% at 2 liter ambulation.


-3/6: Walked 20 steps today down [address]ons, and patient became dizzy, SOB, and 

almost collapsed.


- Solumedrol 40mg IVP Q8H [facility] (3/6)


- Consulted Pulmonology, Dr. Varela, f/u recs


   - Aware of HR in 140's and SOB after eating and low levels of exertion.


   -f/u ABG (not collected)


   -LDH 654H


   -HIV - negative


   -planning for home O2


   -persistent shortness of breath


   - Patient with long history of smoking most likely has underlying COPD.  

Nebulizer treatment.  Inhaled steroids.  Spiriva.  PFT as out pt








Cough, acute


-3/11: cough is persistent and exacerbated with talking


-3/9: patient developed productive cough with yellow sputum today.


-Mucinex 600mg PO BID.





Systolic dysfunction with acute on chronic heart failure


-3/10: RAPID called due to SOB. BP 80/50's. Bolused 500cc NS. Ordered BIPAP and 

ABG, however patient refused. Placed back on NC 3L. ProBNP 4070H (less than # 

on admission) and SHEYLA negative. 


-3/10: Decrease to Lasix 40mg PO daily (was BID).


Admit to telemetry, BNP 4560 on admission


Consulted Cardiology, Dr. Dotson - f/u recs


   -will attempt to obtain company of AICD for interrogation.


   -continue diuretic therapy.  likely deconditioned.  will benefit from rehab


- EF 10-15%, mechanical MV- no regurg, tricuspid regurg. see full report


Digoxin 0.125mg


Lisinopril 10mg daily


Aldactone 25mg PO Daily


patient with AICD, will check echo to determine EF


CXR 2/25: mild cardiomegaly, mild pulmonary venous congestion.  s/p sternotomy, 

aortic valve replacement, AICD








H/O mitral valve replacement with mechanical valve


Cardiac valve replacement 6-7yrs ago


Consulted Cardiology, Dr. Dotson - f/u recs


   -echocardiogram reviewed.  valve leaflets are opening and functional.  There 

is no signficant gradient across the valve.  


   -recommend INR 2.5 to 3.5.


STOP Lovenox 3/5


STOP Heparin Drip - cardiac protocol, with bolus (because patient is refusing 

blood draws)


3/1: PT/INR 1.3


3/2: PT/INR refused, Coumadin 10mg


3/3: PT/INR 1.5, Coumadin 10mg


3/4: PT/INR 2.0, Coumadin 10mg


3/5: PT/INR 2.4, Coumadin 7.5mg; patient received one more dose of Lovenox 80mg 

SC today, now discontinued as bridge complete.


3/6: PT/INR 2.5, Coumadin 7.5mg


3/7: PT/INR 3.1, Coumadin 5mg


3/8: PT/INR 3.7, Coumadin 5mg (stopped by pharmacy)


3/9: PT/INR 2.8, Coumadin 5mg


3/10: PT/INR 2.6, Coumadin 4mg


3/11: F/U PT/INR in AM








Chronic atrial fibrillation


Consulted Cardiology, Dr. Dotson - f/u recs


   -Rate controlled


   -echocardiogram reviewed.  valve leaflets are opening and functional.  There 

is no signficant gradient across the valve.  


   -recommend INR 2.5 to 3.5.


   -See PT/INR trends above


   Coumadin as above


   STOP Lovenox 3/5








CAD (coronary artery disease) 


Consulted Cardiology, Dr. Dotson - f/u recs


   -Aware of HR in 140's and SOB after eating and low levels of exertion.


   -unknown graft status.  no evidence of ischemia at present


   -No current angina





History of MI (myocardial infarction)


Crestor 10mg PO HS  - HELD due to transaminitis


ROMIs negative x3


EKG paced 


Denies chest pain 





Transaminitis, acute


3/11: trend liver enzymes  f/u


3/10: AST 90 /  - increasing -> hold Crestor 


- AST 77 / ALT 98 -> previously normal.


- Continue to monitor





Electrolyte Imbalance


Hyperkalemia - resolved





Tachycardia


3/7: Patient becomes SOB very easily, desaturating to 82% while ambulating. 

Anytime he eats or gets up, HR climbs into 140's


Continue Digoxin 0.125mg


EKG in ER: sinus tachycardia at 100, paced, incomplete RBBB


Discontinued cardizem drip of 5mg/h started in the ER





Lower extremity edema


LE Duplex - negative. see full report.





Prophylactic measure


Stopped lovenox 40u sc daily (bridging lovenox to Coumadin).


protonix 40mg daily


SCD contraindicated due to LE edema





[All management as per Dr. Elizabeth]





<Evgeny Elizabeth Jr. - Last Filed: 03/12/17 19:54>





Objective





- Vital Signs/Intake and Output


Vital Signs (last 24 hours): 


 











Temp Pulse Resp BP Pulse Ox


 


 97.6 F   57 L  20   94/57 L  98 


 


 03/12/17 15:43  03/12/17 15:43  03/12/17 15:43  03/12/17 15:43  03/12/17 15:43











- Medications


Medications: 


 Current Medications





Albuterol/Ipratropium (Duoneb 3 Mg/0.5 Mg (3 Ml) Ud)  3 ml INH RQ6 [facility]


   Last Admin: 03/12/17 19:25 Dose:  3 ml


Budesonide (Pulmicort Respules)  0.5 mg INH RQ12 [facility]


   Last Admin: 03/12/17 19:25 Dose:  0.5 mg


Digoxin (Lanoxin)  0.125 mg PO DAILY@1800 [facility]


   Last Admin: 03/12/17 18:07 Dose:  0.125 mg


Furosemide (Lasix)  40 mg IVP DAILY [facility]


   Last Admin: 03/12/17 10:32 Dose:  40 mg


Guaifenesin (Mucinex La)  600 mg PO BID [facility]


   Last Admin: 03/12/17 18:07 Dose:  600 mg


Lisinopril (Zestril)  10 mg PO DAILY [facility]


   Last Admin: 03/12/17 10:32 Dose:  10 mg


Methylprednisolone (Solu-Medrol)  40 mg IVP Q8 [facility]


   Last Admin: 03/12/17 13:23 Dose:  40 mg


Pantoprazole Sodium (Protonix Ec Tab)  40 mg PO DAILY [facility]


   Last Admin: 03/12/17 10:32 Dose:  40 mg


Rosuvastatin Calcium (Crestor)  10 mg PO HS [facility]


   Last Admin: 03/11/17 22:41 Dose:  10 mg


Spironolactone (Aldactone)  25 mg PO DAILY [facility]


   Last Admin: 03/12/17 10:32 Dose:  25 mg


Tiotropium Bromide (Spiriva)  18 mcg INH RQ24 [facility]


   Last Admin: 03/12/17 08:29 Dose:  18 mcg











- Labs


Labs: 


 





 03/12/17 10:50 





 03/12/17 10:50 





 











PT  25.6 SECONDS (9.7-12.2)  H D 03/12/17  10:50    


 


INR  2.3   03/12/17  10:50    


 


APTT  29 SECONDS (21-34)   03/12/17  10:50    














Attending/Attestation





- Attestation


I have personally seen and examined this patient.: Yes


I have fully participated in the care of the patient.: Yes


I have reviewed all pertinent clinical information, including history, physical 

exam and plan: Yes


Notes (Text): 





03/12/17 19:54


Patient seen and examined with the resident. Reviewed resident note and agree 

with findings and plan of care. [ ]

## 2017-03-11 NOTE — CP.PCM.PN
Subjective





- Date & Time of Evaluation


Date of Evaluation: 03/11/17


Time of Evaluation: 14:35





- Subjective


Subjective: 


patient seen and examined


Shortness of breath


On BiPAP


Complaining of cough


afebrile





Objective





- Vital Signs/Intake and Output


Vital Signs (last 24 hours): 


 











Temp Pulse Resp BP Pulse Ox


 


 97.2 F L  99 H  18   120/65   100 


 


 03/11/17 07:25  03/11/17 07:35  03/11/17 07:25  03/11/17 10:03  03/11/17 07:25








Intake and Output: 


 











 03/11/17 03/11/17





 06:59 18:59


 


Intake Total 550 


 


Output Total 1100 


 


Balance -550 














- Medications


Medications: 


 Current Medications





Albuterol/Ipratropium (Duoneb 3 Mg/0.5 Mg (3 Ml) Ud)  3 ml INH RQ6 [facility]


   Last Admin: 03/11/17 13:45 Dose:  3 ml


Budesonide (Pulmicort Respules)  0.5 mg INH RQ12 [facility]


   Last Admin: 03/11/17 07:45 Dose:  0.5 mg


Digoxin (Lanoxin)  0.125 mg PO DAILY@1800 [facility]


   Last Admin: 03/10/17 18:57 Dose:  0.125 mg


Furosemide (Lasix)  40 mg IVP DAILY [facility]


   Last Admin: 03/11/17 10:03 Dose:  40 mg


Guaifenesin (Mucinex La)  600 mg PO BID [facility]


   Last Admin: 03/11/17 10:01 Dose:  600 mg


Lisinopril (Zestril)  10 mg PO DAILY [facility]


   Last Admin: 03/11/17 10:00 Dose:  10 mg


Methylprednisolone (Solu-Medrol)  40 mg IVP Q8 [facility]


   Last Admin: 03/11/17 13:20 Dose:  40 mg


Pantoprazole Sodium (Protonix Ec Tab)  40 mg PO DAILY [facility]


   Last Admin: 03/11/17 10:00 Dose:  40 mg


Rosuvastatin Calcium (Crestor)  10 mg PO HS [facility]


   Last Admin: 03/09/17 21:38 Dose:  10 mg


Spironolactone (Aldactone)  25 mg PO DAILY [facility]


   Last Admin: 03/11/17 10:00 Dose:  25 mg


Tiotropium Bromide (Spiriva)  18 mcg INH RQ24 [facility]


   Last Admin: 03/11/17 07:45 Dose:  18 mcg











- Labs


Labs: 


 





 03/11/17 10:32 





 03/11/17 10:32 





 











PT  31.2 SECONDS (9.7-12.2)  H*  03/11/17  10:32    


 


INR  2.7   03/11/17  10:32    


 


APTT  33 SECONDS (21-34)   03/11/17  10:32    














- Head Exam


Head Exam: ATRAUMATIC, NORMOCEPHALIC





- Eye Exam


Eye Exam: Normal appearance





- ENT Exam


ENT Exam: Mucous Membranes Moist





- Neck Exam


Neck Exam: Normal Inspection





- Respiratory Exam


Respiratory Exam: Rales, Rhonchi





- Cardiovascular Exam


Cardiovascular Exam: Irregular Rhythm





- GI/Abdominal Exam


GI & Abdominal Exam: Soft, Normal Bowel Sounds





- Extremities Exam


Extremities Exam: Normal Inspection





- Neurological Exam


Neurological Exam: Awake





Assessment and Plan


(1) COPD (chronic obstructive pulmonary disease)


Assessment & Plan: 


continue IV steroids and nebulizer medicines


Continue BiPAP


Continue antibiotics


Follow-up chest x-ray


home oxygen


Status: Acute





(2) Acute exacerbation of CHF (congestive heart failure)


Status: Acute





(3) Chronic atrial fibrillation


Status: Chronic

## 2017-03-11 NOTE — CP.PCM.PN
Subjective





- Date & Time of Evaluation


Date of Evaluation: 03/11/17


Time of Evaluation: 11:00





- Subjective


Subjective: 


LESS SOB TODAY.  GETTING NEBS AND STEROIDS





Objective





- Vital Signs/Intake and Output


Vital Signs (last 24 hours): 


 











Temp Pulse Resp BP Pulse Ox


 


 98.2 F   84   20   120/76   96 


 


 03/11/17 15:00  03/11/17 16:18  03/11/17 15:00  03/11/17 15:00  03/11/17 15:00








Intake and Output: 


 











 03/11/17 03/11/17





 06:59 18:59


 


Intake Total 550 


 


Output Total 1100 


 


Balance -550 














- Medications


Medications: 


 Current Medications





Albuterol/Ipratropium (Duoneb 3 Mg/0.5 Mg (3 Ml) Ud)  3 ml INH RQ6 [facility]


   Last Admin: 03/11/17 13:45 Dose:  3 ml


Budesonide (Pulmicort Respules)  0.5 mg INH RQ12 [facility]


   Last Admin: 03/11/17 07:45 Dose:  0.5 mg


Digoxin (Lanoxin)  0.125 mg PO DAILY@1800 [facility]


   Last Admin: 03/10/17 18:57 Dose:  0.125 mg


Furosemide (Lasix)  40 mg IVP DAILY [facility]


   Last Admin: 03/11/17 10:03 Dose:  40 mg


Guaifenesin (Mucinex La)  600 mg PO BID [facility]


   Last Admin: 03/11/17 10:01 Dose:  600 mg


Lisinopril (Zestril)  10 mg PO DAILY [facility]


   Last Admin: 03/11/17 10:00 Dose:  10 mg


Methylprednisolone (Solu-Medrol)  40 mg IVP Q8 [facility]


   Last Admin: 03/11/17 13:20 Dose:  40 mg


Pantoprazole Sodium (Protonix Ec Tab)  40 mg PO DAILY [facility]


   Last Admin: 03/11/17 10:00 Dose:  40 mg


Rosuvastatin Calcium (Crestor)  10 mg PO HS [facility]


   Last Admin: 03/09/17 21:38 Dose:  10 mg


Spironolactone (Aldactone)  25 mg PO DAILY [facility]


   Last Admin: 03/11/17 10:00 Dose:  25 mg


Tiotropium Bromide (Spiriva)  18 mcg INH RQ24 [facility]


   Last Admin: 03/11/17 07:45 Dose:  18 mcg


Warfarin Sodium (Coumadin)  4 mg PO 1800 [facility]


   Stop: 03/11/17 18:01











- Labs


Labs: 


 





 03/11/17 10:32 





 03/11/17 10:32 





 











PT  31.2 SECONDS (9.7-12.2)  H*  03/11/17  10:32    


 


INR  2.7   03/11/17  10:32    


 


APTT  33 SECONDS (21-34)   03/11/17  10:32    














- Constitutional


Appears: Well





- Head Exam


Head Exam: ATRAUMATIC, NORMAL INSPECTION, NORMOCEPHALIC





- Eye Exam


Eye Exam: EOMI, Normal appearance, PERRL


Pupil Exam: NORMAL ACCOMODATION, PERRL





- ENT Exam


ENT Exam: Mucous Membranes Moist, Normal Exam





- Neck Exam


Neck Exam: Full ROM, Normal Inspection.  absent: Lymphadenopathy





- Respiratory Exam


Respiratory Exam: Rhonchi, Wheezes, NORMAL BREATHING PATTERN





- Cardiovascular Exam


Cardiovascular Exam: Irregular Rhythm, Murmur





- GI/Abdominal Exam


GI & Abdominal Exam: Soft, Normal Bowel Sounds.  absent: Tenderness





- Extremities Exam


Extremities Exam: Full ROM, Normal Capillary Refill, Normal Inspection.  absent

: Joint Swelling, Pedal Edema





- Back Exam


Back Exam: NORMAL INSPECTION





- Neurological Exam


Neurological Exam: Alert, Awake, CN II-XII Intact, Normal Gait, Oriented x3





- Psychiatric Exam


Psychiatric exam: Normal Affect, Normal Mood





- Skin


Skin Exam: Dry, Intact, Normal Color, Warm





Assessment and Plan


(1) CAD (coronary artery disease)


Status: Acute





(2) COPD (chronic obstructive pulmonary disease)


Status: Acute





(3) Chronic atrial fibrillation


Status: Acute





(4) H/O mitral valve replacement with mechanical valve


Status: Acute





(5) History of MI (myocardial infarction)


Status: Acute





(6) Lower extremity edema


Status: Acute








- Assessment and Plan (Free Text)


Plan: 


CONTINUE CURRENT CARE


HR CONTROLLED


BP CONTROLLED


ECHO PENDING


WILL FOLLOW

## 2017-03-12 NOTE — CP.PCM.PN
Subjective





- Date & Time of Evaluation


Date of Evaluation: 03/12/17


Time of Evaluation: 21:39





- Subjective


Subjective: 


C/O SOB, COUGH, IMPROVED FROM PRIOR EXAM.





Objective





- Vital Signs/Intake and Output


Vital Signs (last 24 hours): 


 











Temp Pulse Resp BP Pulse Ox


 


 97.6 F   57 L  20   94/57 L  98 


 


 03/12/17 15:43  03/12/17 15:43  03/12/17 15:43  03/12/17 15:43  03/12/17 15:43











- Medications


Medications: 


 Current Medications





Albuterol/Ipratropium (Duoneb 3 Mg/0.5 Mg (3 Ml) Ud)  3 ml INH RQ6 [facility]


   Last Admin: 03/12/17 19:25 Dose:  3 ml


Budesonide (Pulmicort Respules)  0.5 mg INH RQ12 [facility]


   Last Admin: 03/12/17 19:25 Dose:  0.5 mg


Digoxin (Lanoxin)  0.125 mg PO DAILY@1800 [facility]


   Last Admin: 03/12/17 18:07 Dose:  0.125 mg


Guaifenesin (Mucinex La)  600 mg PO BID [facility]


   Last Admin: 03/12/17 18:07 Dose:  600 mg


Lisinopril (Zestril)  10 mg PO DAILY [facility]


   Last Admin: 03/12/17 10:32 Dose:  10 mg


Methylprednisolone (Solu-Medrol)  40 mg IVP Q8 [facility]


   Last Admin: 03/12/17 13:23 Dose:  40 mg


Pantoprazole Sodium (Protonix Ec Tab)  40 mg PO DAILY [facility]


   Last Admin: 03/12/17 10:32 Dose:  40 mg


Rosuvastatin Calcium (Crestor)  10 mg PO HS [facility]


   Last Admin: 03/11/17 22:41 Dose:  10 mg


Spironolactone (Aldactone)  25 mg PO DAILY [facility]


   Last Admin: 03/12/17 10:32 Dose:  25 mg


Tiotropium Bromide (Spiriva)  18 mcg INH RQ24 [facility]


   Last Admin: 03/12/17 08:29 Dose:  18 mcg











- Labs


Labs: 


 





 03/12/17 10:50 





 03/12/17 10:50 





 











PT  25.6 SECONDS (9.7-12.2)  H D 03/12/17  10:50    


 


INR  2.3   03/12/17  10:50    


 


APTT  29 SECONDS (21-34)   03/12/17  10:50    














- Constitutional


Appears: Well





- Head Exam


Head Exam: ATRAUMATIC, NORMAL INSPECTION, NORMOCEPHALIC





- Eye Exam


Eye Exam: EOMI, Normal appearance, PERRL


Pupil Exam: NORMAL ACCOMODATION, PERRL





- ENT Exam


ENT Exam: Mucous Membranes Dry





- Neck Exam


Neck Exam: Full ROM, Normal Inspection.  absent: Lymphadenopathy





- Respiratory Exam


Respiratory Exam: Rhonchi, Wheezes





- Cardiovascular Exam


Cardiovascular Exam: +S1, +S2, Murmur





- GI/Abdominal Exam


GI & Abdominal Exam: Soft, Normal Bowel Sounds.  absent: Tenderness





- Extremities Exam


Extremities Exam: Full ROM, Normal Capillary Refill, Normal Inspection.  absent

: Joint Swelling, Pedal Edema





- Back Exam


Back Exam: NORMAL INSPECTION





- Neurological Exam


Neurological Exam: Alert, Awake, CN II-XII Intact, Normal Gait, Oriented x3





- Psychiatric Exam


Psychiatric exam: Normal Affect, Normal Mood





- Skin


Skin Exam: Dry, Intact, Normal Color, Warm





Assessment and Plan


(1) CAD (coronary artery disease)


Status: Chronic





(2) COPD (chronic obstructive pulmonary disease)


Status: Acute





(3) Chronic atrial fibrillation


Status: Chronic





(4) H/O mitral valve replacement with mechanical valve


Status: Chronic





(5) History of MI (myocardial infarction)


Status: Chronic





(6) Lower extremity edema


Status: Resolved








- Assessment and Plan (Free Text)


Plan: 


PTS SOB APPEARS TO BE SECONDARY TO PULM ETIOLOGY.  IMPROVES WITH NEBS.  PT WITH 

RHONCHI B/L.  MAY BENEFIT FROM PULM TOILET.  WILL HOLD LASIX FOR 1 TO 2 DAYS 

AND CONT MUCINEX.  PT PRERENAL AND APPEARS DRY.  ON BOTH LASIX AND 

SPIRONOLACTONE.





CONT TELE AND HTN CONTROL

## 2017-03-12 NOTE — CP.PCM.PN
<MukundCyrus - Last Filed: 03/12/17 00:08>





Subjective





- Date & Time of Evaluation


Date of Evaluation: 03/12/17


Time of Evaluation: 00:08





- Subjective


Subjective: 


PGY-1 note for Dr Schulte service





Pt seen and examined at bedside. Pt still refusing ABG and BiPAP but also still 

complaining of some sob with coughing. States he may try BiPAP later.  Denies 

fevers, chills, chest pain, palpitations, nausea or vomiting. 





Objective





- Vital Signs/Intake and Output


Vital Signs (last 24 hours): 


 











Temp Pulse Resp BP Pulse Ox


 


 98.2 F   86   20   120/76   96 


 


 03/11/17 15:00  03/11/17 23:58  03/11/17 15:00  03/11/17 15:00  03/11/17 15:00











- Medications


Medications: 


 Current Medications





Albuterol/Ipratropium (Duoneb 3 Mg/0.5 Mg (3 Ml) Ud)  3 ml INH RQ6 [facility]


   Last Admin: 03/11/17 19:26 Dose:  3 ml


Budesonide (Pulmicort Respules)  0.5 mg INH RQ12 [facility]


   Last Admin: 03/11/17 19:26 Dose:  0.5 mg


Digoxin (Lanoxin)  0.125 mg PO DAILY@1800 [facility]


   Last Admin: 03/11/17 19:00 Dose:  0.125 mg


Furosemide (Lasix)  40 mg IVP DAILY [facility]


   Last Admin: 03/11/17 10:03 Dose:  40 mg


Guaifenesin (Mucinex La)  600 mg PO BID [facility]


   Last Admin: 03/11/17 19:02 Dose:  600 mg


Lisinopril (Zestril)  10 mg PO DAILY [facility]


   Last Admin: 03/11/17 10:00 Dose:  10 mg


Methylprednisolone (Solu-Medrol)  40 mg IVP Q8 [facility]


   Last Admin: 03/11/17 22:41 Dose:  40 mg


Pantoprazole Sodium (Protonix Ec Tab)  40 mg PO DAILY [facility]


   Last Admin: 03/11/17 10:00 Dose:  40 mg


Rosuvastatin Calcium (Crestor)  10 mg PO HS [facility]


   Last Admin: 03/11/17 22:41 Dose:  10 mg


Spironolactone (Aldactone)  25 mg PO DAILY [facility]


   Last Admin: 03/11/17 10:00 Dose:  25 mg


Tiotropium Bromide (Spiriva)  18 mcg INH RQ24 [facility]


   Last Admin: 03/11/17 07:45 Dose:  18 mcg











- Labs


Labs: 


 





 03/11/17 10:32 





 03/11/17 10:32 





 











PT  31.2 SECONDS (9.7-12.2)  H*  03/11/17  10:32    


 


INR  2.7   03/11/17  10:32    


 


APTT  33 SECONDS (21-34)   03/11/17  10:32    














- Constitutional


Appears: Chronically Ill





- Head Exam


Head Exam: ATRAUMATIC, NORMOCEPHALIC





- Eye Exam


Eye Exam: Normal appearance


Pupil Exam: PERRL





- ENT Exam


ENT Exam: Mucous Membranes Moist





- Respiratory Exam


Respiratory Exam: Wheezes, NORMAL BREATHING PATTERN





- Cardiovascular Exam


Cardiovascular Exam: +S1, +S2





- GI/Abdominal Exam


GI & Abdominal Exam: Soft, Normal Bowel Sounds.  absent: Tenderness





- Neurological Exam


Neurological Exam: Alert, Awake





- Skin


Skin Exam: Dry, Warm





Assessment and Plan





- Assessment and Plan (Free Text)


Assessment: 


72 year old male with past medical history including MI, CAD with history of 

CABG and valve replacement 6-7 years ago.  Reports worsening SOB and dizziness 

with associated swelling of bilateral LE for past 2-3 days. Admits this is the 

first time he has experienced these symptoms. SOB is worse when he lays flat.


-Discharge planning to [facility] once INR in 2.5-3.5 range and SOB is better 

controlled. Case working on logistics.


Plan: 


COPD (chronic obstructive pulmonary disease)


- 3/12: Cont current management


-3/11: no events, Pt just complains of some sob/coughing with too much talking. 

Pt still not using BiPAP


-3/10: RAPID called due to SOB. BP 80/50's. Bolused 500cc NS. Ordered BIPAP and 

ABG, however patient refused. Placed back on NC 3L.


-3/8-3/9: Patient complaining of SOB with exertion and orthopnea. Maintain Head 

of bed elevated 30 degrees.


-3/7:  PT session: 98% O2 at 2L at rest, 96% room air at rest sitting, 82% room 

air during ambulation, 88% at 2 liter ambulation.


-3/6: Walked 20 steps today down parsons, and patient became dizzy, SOB, and 

almost collapsed.


- Solumedrol 40mg IVP Q8H [facility] (3/6)


- Consulted Pulmonology, Dr. Varela, f/u recs


   - Aware of HR in 140's and SOB after eating and low levels of exertion.


   -f/u ABG (not collected)


   -LDH 654H


   -HIV - negative


   -planning for home O2


   -persistent shortness of breath


   - Patient with long history of smoking most likely has underlying COPD.  

Nebulizer treatment.  Inhaled steroids.  Spiriva.  PFT as out pt








Cough, acute


-3/11: cough is persistent and exacerbated with talking


-3/9: patient developed productive cough with yellow sputum today.


-Mucinex 600mg PO BID.





Systolic dysfunction with acute on chronic heart failure


-3/10: RAPID called due to SOB. BP 80/50's. Bolused 500cc NS. Ordered BIPAP and 

ABG, however patient refused. Placed back on NC 3L. ProBNP 4070H (less than # 

on admission) and SHEYLA negative. 


-3/10: Decrease to Lasix 40mg PO daily (was BID).


Admit to telemetry, BNP 4560 on admission


Consulted Cardiology, Dr. Dotson - f/u recs


   -will attempt to obtain company of [employer] for interrogation.


   -continue diuretic therapy.  likely deconditioned.  will benefit from rehab


- EF 10-15%, mechanical MV- no regurg, tricuspid regurg. see full report


Digoxin 0.125mg


Lisinopril 10mg daily


Aldactone 25mg PO Daily


patient with AICD, will check echo to determine EF


CXR 2/25: mild cardiomegaly, mild pulmonary venous congestion.  s/p sternotomy, 

aortic valve replacement, AICD








H/O mitral valve replacement with mechanical valve


Cardiac valve replacement 6-7yrs ago


Consulted Cardiology, Dr. Dotson - f/u recs


   -echocardiogram reviewed.  valve leaflets are opening and functional.  There 

is no signficant gradient across the valve.  


   -recommend INR 2.5 to 3.5.


STOP Lovenox 3/5


STOP Heparin Drip - cardiac protocol, with bolus (because patient is refusing 

blood draws)


3/1: PT/INR 1.3


3/2: PT/INR refused, Coumadin 10mg


3/3: PT/INR 1.5, Coumadin 10mg


3/4: PT/INR 2.0, Coumadin 10mg


3/5: PT/INR 2.4, Coumadin 7.5mg; patient received one more dose of Lovenox 80mg 

SC today, now discontinued as bridge complete.


3/6: PT/INR 2.5, Coumadin 7.5mg


3/7: PT/INR 3.1, Coumadin 5mg


3/8: PT/INR 3.7, Coumadin 5mg (stopped by pharmacy)


3/9: PT/INR 2.8, Coumadin 5mg


3/10: PT/INR 2.6, Coumadin 4mg


3/11: PT/INR 2.7, Coumadin 4 mg


3/12: f/u Am labs








Chronic atrial fibrillation


Consulted Cardiology, Dr. Dotson - f/u recs


   -Rate controlled


   -echocardiogram reviewed.  valve leaflets are opening and functional.  There 

is no signficant gradient across the valve.  


   -recommend INR 2.5 to 3.5.


   -See PT/INR trends above


   Coumadin as above


   STOP Lovenox 3/5








CAD (coronary artery disease) 


Consulted Cardiology, Dr. Dotson - f/u recs


   -Aware of HR in 140's and SOB after eating and low levels of exertion.


   -unknown graft status.  no evidence of ischemia at present


   -No current angina





History of MI (myocardial infarction)


Crestor 10mg PO HS  - HELD due to transaminitis


ROMIs negative x3


EKG paced 


Denies chest pain 





Transaminitis, acute


3/11: AST/ALT trending up


3/10: AST 90 /  - increasing -> hold Crestor 


- AST 77 / ALT 98 -> previously normal.


- Continue to monitor





Electrolyte Imbalance


Hyperkalemia - resolved





Tachycardia


3/7: Patient becomes SOB very easily, desaturating to 82% while ambulating. 

Anytime he eats or gets up, HR climbs into 140's


Continue Digoxin 0.125mg


EKG in ER: sinus tachycardia at 100, paced, incomplete RBBB


Discontinued cardizem drip of 5mg/h started in the ER





Lower extremity edema


LE Duplex - negative. see full report.





Prophylactic measure


Stopped lovenox 40u sc daily (bridging lovenox to Coumadin).


protonix 40mg daily


SCD contraindicated due to LE edema





[All management as per Dr. Elizabeth]








<Evgeny Elizabeth Jr. - Last Filed: 03/12/17 19:58>





Objective





- Vital Signs/Intake and Output


Vital Signs (last 24 hours): 


 











Temp Pulse Resp BP Pulse Ox


 


 97.6 F   57 L  20   94/57 L  98 


 


 03/12/17 15:43  03/12/17 15:43  03/12/17 15:43  03/12/17 15:43  03/12/17 15:43











- Medications


Medications: 


 Current Medications





Albuterol/Ipratropium (Duoneb 3 Mg/0.5 Mg (3 Ml) Ud)  3 ml INH RQ6 [facility]


   Last Admin: 03/12/17 19:25 Dose:  3 ml


Budesonide (Pulmicort Respules)  0.5 mg INH RQ12 [facility]


   Last Admin: 03/12/17 19:25 Dose:  0.5 mg


Digoxin (Lanoxin)  0.125 mg PO DAILY@1800 [facility]


   Last Admin: 03/12/17 18:07 Dose:  0.125 mg


Furosemide (Lasix)  40 mg IVP DAILY [facility]


   Last Admin: 03/12/17 10:32 Dose:  40 mg


Guaifenesin (Mucinex La)  600 mg PO BID [facility]


   Last Admin: 03/12/17 18:07 Dose:  600 mg


Lisinopril (Zestril)  10 mg PO DAILY [facility]


   Last Admin: 03/12/17 10:32 Dose:  10 mg


Methylprednisolone (Solu-Medrol)  40 mg IVP Q8 [facility]


   Last Admin: 03/12/17 13:23 Dose:  40 mg


Pantoprazole Sodium (Protonix Ec Tab)  40 mg PO DAILY [facility]


   Last Admin: 03/12/17 10:32 Dose:  40 mg


Rosuvastatin Calcium (Crestor)  10 mg PO HS [facility]


   Last Admin: 03/11/17 22:41 Dose:  10 mg


Spironolactone (Aldactone)  25 mg PO DAILY [facility]


   Last Admin: 03/12/17 10:32 Dose:  25 mg


Tiotropium Bromide (Spiriva)  18 mcg INH RQ24 [facility]


   Last Admin: 03/12/17 08:29 Dose:  18 mcg











- Labs


Labs: 


 





 03/12/17 10:50 





 03/12/17 10:50 





 











PT  25.6 SECONDS (9.7-12.2)  H D 03/12/17  10:50    


 


INR  2.3   03/12/17  10:50    


 


APTT  29 SECONDS (21-34)   03/12/17  10:50    














Attending/Attestation





- Attestation


I have personally seen and examined this patient.: Yes


I have fully participated in the care of the patient.: Yes


I have reviewed all pertinent clinical information, including history, physical 

exam and plan: Yes


Notes (Text): 





03/12/17 19:58


Patient seen and examined with the resident. Reviewed resident note and agree 

with findings and plan of care. [ ]

## 2017-03-13 NOTE — CP.PCM.PN
<Sulaiman Patel - Last Filed: 03/13/17 19:34>





Subjective





- Date & Time of Evaluation


Date of Evaluation: 03/13/17


Time of Evaluation: 07:25





- Subjective


Subjective: 


PGY-1 note for Dr Schulte service





Patient seen and examined at bedside. No overnight events as per nursing. 

Patient still complains of worsening cough with yellow phlegm. Patient also 

states he has persistent orthopnea and SOB at rest; He was observed wearing 

BIPAP today, sometimes refuses. Patient complains of dizziness upon sitting/

standing. +BM (last night). +urination. Denies f/c, palpitations, n/v, d/c, or 

any additional complaints. PT was held on Friday 3/10 secondary to rapid 

response at request of nursing staff. Will resume.





Objective





- Vital Signs/Intake and Output


Vital Signs (last 24 hours): 


 











Temp Pulse Resp BP Pulse Ox


 


 97.6 F   78   20   114/59 L  95 


 


 03/13/17 08:45  03/13/17 08:45  03/13/17 08:45  03/13/17 08:45  03/13/17 08:45











- Medications


Medications: 


 Current Medications





Albuterol/Ipratropium (Duoneb 3 Mg/0.5 Mg (3 Ml) Ud)  3 ml INH RQ6 [facility]


   Last Admin: 03/13/17 07:48 Dose:  3 ml


Budesonide (Pulmicort Respules)  0.5 mg INH RQ12 [facility]


   Last Admin: 03/13/17 07:48 Dose:  0.5 mg


Digoxin (Lanoxin)  0.125 mg PO DAILY@1800 [facility]


   Last Admin: 03/12/17 18:07 Dose:  0.125 mg


Guaifenesin (Mucinex La)  600 mg PO BID [facility]


   Last Admin: 03/13/17 09:21 Dose:  600 mg


Methylprednisolone (Solu-Medrol)  40 mg IVP Q8 [facility]


   Last Admin: 03/13/17 06:28 Dose:  40 mg


Rosuvastatin Calcium (Crestor)  10 mg PO HS [facility]


   Last Admin: 03/12/17 22:03 Dose:  10 mg


Tiotropium Bromide (Spiriva)  18 mcg INH RQ24 [facility]


   Last Admin: 03/13/17 07:48 Dose:  18 mcg











- Labs


Labs: 


 





 03/12/17 10:50 





 03/12/17 10:50 





 











PT  25.6 SECONDS (9.7-12.2)  H D 03/12/17  10:50    


 


INR  2.3   03/12/17  10:50    


 


APTT  29 SECONDS (21-34)   03/12/17  10:50    














- Additional Findings


Additional findings: 


- Constitutional


Appears: Non-toxic, No Acute Distress





- Head Exam


Head Exam: ATRAUMATIC





- Eye Exam


Eye Exam: EOMI





- ENT Exam


ENT Exam: Mucous Membranes Moist





- Respiratory Exam


Respiratory Exam: Decreased Breath Sounds, Wheezes, Rhonchi (mid/upper zones b/l

), Respiratory Distress (orthopnea).


-desaturates to 82% while walking, 88% with NC 2L





- Cardiovascular Exam


Cardiovascular Exam: REGULAR RHYTHM, +S1, +S2


Reproducible chest pain with palpation localized to the mid-sternum and over 

site of pacemaker.





- GI/Abdominal Exam


GI & Abdominal Exam: Soft, Normal Bowel Sounds,  absent: Tenderness





- Extremities Exam


Extremities Exam: Pedal Edema (b/l 1+ pitting to mid thigh).  absent: Tenderness


Additional comments: 


pedal pulses 2+





- Neurological Exam


Neurological Exam: Alert, Awake, Oriented x3


b/l hand numbness / tingling resolved





- Psychiatric Exam


Psychiatric exam: Normal Affect





- Skin


Skin Exam: Dry, Normal Color, Warm


Tender to palpation at R sternal border, 2nd rib. Protrusion noted.





Assessment and Plan





- Assessment and Plan (Free Text)


Assessment: 


72 year old male with past medical history including MI, CAD with history of 

CABG and valve replacement 6-7 years ago.  Reports worstenting SOB and 

dizziness with associated swelling of bilateral LE for past 2-3 days. Admits 

this is the first time he has experienced these symptoms. SOB is worse when he 

lays flat.


-Discharge planning to [facility] once INR in 2.5-3.5 range and SOB is better 

controlled. Case working on logistics.





Plan:


COPD (chronic obstructive pulmonary disease)


-3/13: Continue BIPAP, it helps however patient sometimes refuses despite being 

SOB. 


-3/10: RAPID called due to SOB. BP 80/50's. Bolused 500cc NS. Ordered BIPAP and 

ABG, however patient refused. Placed back on NC 3L.


-3/8-3/9: Patient complaining of SOB with exertion and orthopnea. Maintain Head 

of bed elevated 30 degrees.


-3/7:  PT session: 98% O2 at 2L at rest, 96% room air at rest sitting, 82% room 

air during ambulation, 88% at 2 liter ambulation.


-3/6: Walked 20 steps today down parsons, and patient became dizzy, SOB, and 

almost collapsed.


- Solumedrol 40mg IVP Q8H [facility] (3/6)


- Consulted Pulmonology, Dr. Varela, f/u recs


   - Aware of HR in 140's and SOB after eating and low levels of exertion.


   -f/u ABG (not collected)


   -LDH 654H


   -HIV - negative


   -planning for home O2


   -persistent shortness of breath


   - Patient with long history of smoking most likely has underlying COPD.  

Nebulizer treatment.  Inhaled steroids.  Spiriva.  PFT as out pt








Cough, acute


-3/13: Persists. Continue Mucinex.


-3/9: patient developed productive cough with yellow sputum today.


-Mucinex 600mg PO BID.





Systolic dysfunction with acute on chronic heart failure





-3/10: RAPID called due to SOB. BP 80/50's. Bolused 500cc NS. Ordered BIPAP and 

ABG, however patient refused. Placed back on NC 3L. ProBNP 4070H (less than # 

on admission) and SHEYLA negative. 


-3/10: Decrease to Lasix 40mg PO daily (was BID).


Admit to telemetry, BNP 4560 on admission


Consulted Cardiology, Dr. Dotson - f/u recs


   -3/13: HOLD LASIX FOR 1 TO 2 DAYS AND CONT MUCINEX.  PT PRERENAL AND APPEARS 

DRY.  ON BOTH LASIX AND SPIRONOLACTONE.


   -SOB APPEARS TO BE SECONDARY TO PULM ETIOLOGY.  IMPROVES WITH NEBS.  PT WITH 

RHONCHI B/L.  MAY BENEFIT FROM PULM TOILET.


   [facility].


   -likely deconditioned.  will benefit from rehab


- EF 10-15%, mechanical MV- no regurg, tricuspid regurg. see full report


Digoxin 0.125mg


Lisinopril 10mg daily


Aldactone 25mg PO Daily


patient with AICD, will check echo to determine EF


CXR 2/25: mild cardiomegaly, mild pulmonary venous congestion.  s/p sternotomy, 

aortic valve replacement, AICD








H/O mitral valve replacement with mechanical valve


Cardiac valve replacement 6-7yrs ago


Consulted Cardiology, Dr. Dotson - f/u recs


   -echocardiogram reviewed.  valve leaflets are opening and functional.  There 

is no signficant gradient across the valve.  


   -recommend INR 2.5 to 3.5.


STOP Lovenox 3/5


STOP Heparin Drip - cardiac protocol, with bolus (because patient is refusing 

blood draws)


3/1: PT/INR 1.3


3/2: PT/INR refused, Coumadin 10mg


3/3: PT/INR 1.5, Coumadin 10mg


3/4: PT/INR 2.0, Coumadin 10mg


3/5: PT/INR 2.4, Coumadin 7.5mg; patient received one more dose of Lovenox 80mg 

SC today, now discontinued as bridge complete.


3/6: PT/INR 2.5, Coumadin 7.5mg


3/7: PT/INR 3.1, Coumadin 5mg


3/8: PT/INR 3.7, Coumadin 5mg (stopped by pharmacy)


3/9: PT/INR 2.8, Coumadin 5mg


3/10: PT/INR 2.6, Coumadin 4mg


3/11: PT/INR 2.7, Coumadin 4 mg


3/12: PT/INR 2.3  Coumadin 4mg 


3/13: PT/INR 2.1, Coumadin 6mg





Chronic atrial fibrillation


Consulted Cardiology, Dr. Dotson - f/u recs


   -Rate controlled


   -echocardiogram reviewed.  valve leaflets are opening and functional.  There 

is no signficant gradient across the valve.  


   -recommend INR 2.5 to 3.5.


   -See PT/INR trends above


   Coumadin as above


   STOP Lovenox 3/5








CAD (coronary artery disease) 


Consulted Cardiology, Dr. Dotson - f/u recs


   -Aware of HR in 140's and SOB after eating and low levels of exertion.


   -unknown graft status.  no evidence of ischemia at present


   -No current angina





History of MI (myocardial infarction)


Crestor 10mg PO HS  


ROMIs negative x3


EKG paced 


Denies chest pain 





Transaminitis, acute


3/13: AST 70 /  - increasing -> hold crestor for 2 nights.


3/10: AST 90 /  - increasing -> hold Crestor tonight.


- AST 77 / ALT 98 -> previously normal.


- Continue to monitor





Electrolyte Imbalance


Hyperkalemia - resolved





Tachycardia


3/7: Patient becomes SOB very easily, desaturating to 82% while ambulating. 

Anytime he eats or gets up, HR climbs into 140's


Continue Digoxin 0.125mg


EKG in ER: sinus tachycardia at 100, paced, incomplete RBBB


Discontinued cardizem drip of 5mg/h started in the ER





Lower extremity edema


LE Duplex - negative. see full report.





Prophylactic measure


Stopped lovenox 40u sc daily (bridging lovenox to Coumadin).


protonix 40mg daily


SCD contraindicated due to LE edema





[All management as per Dr. Elizabeth]








<Evgeny Elizabeth Jr. - Last Filed: 03/14/17 14:30>





Objective





- Vital Signs/Intake and Output


Vital Signs (last 24 hours): 


 











Temp Pulse Resp BP Pulse Ox


 


 97.6 F   76   20   111/70   99 


 


 03/14/17 07:43  03/14/17 08:00  03/14/17 07:43  03/14/17 07:43  03/14/17 07:43








Intake and Output: 


 











 03/14/17 03/14/17





 06:59 18:59


 


Intake Total 320 


 


Output Total 1650 


 


Balance -1330 














- Medications


Medications: 


 Current Medications





Albuterol/Ipratropium (Duoneb 3 Mg/0.5 Mg (3 Ml) Ud)  3 ml INH RQ6 [facility]


   Last Admin: 03/14/17 13:12 Dose:  3 ml


Budesonide (Pulmicort Respules)  0.5 mg INH RQ12 [facility]


   Last Admin: 03/14/17 07:52 Dose:  Not Given


Digoxin (Lanoxin)  0.125 mg PO DAILY@1800 [facility]


   Last Admin: 03/13/17 17:37 Dose:  0.125 mg


Guaifenesin (Mucinex La)  600 mg PO BID [facility]


   Last Admin: 03/14/17 08:59 Dose:  600 mg


Methylprednisolone (Solu-Medrol)  40 mg IVP Q8 [facility]


   Last Admin: 03/14/17 14:01 Dose:  40 mg


Rosuvastatin Calcium (Crestor)  10 mg PO HS [facility]


   Last Admin: 03/12/17 22:03 Dose:  10 mg


Tiotropium Bromide (Spiriva)  18 mcg INH RQ24 [facility]


   Last Admin: 03/14/17 07:52 Dose:  Not Given











- Labs


Labs: 


 





 03/13/17 11:00 





 03/14/17 11:00 





 











PT  23.5 SECONDS (9.7-12.2)  H  03/13/17  11:00    


 


INR  2.1   03/13/17  11:00    


 


APTT  27 SECONDS (21-34)   03/13/17  11:00    














Attending/Attestation





- Attestation


I have personally seen and examined this patient.: Yes


I have fully participated in the care of the patient.: Yes


I have reviewed all pertinent clinical information, including history, physical 

exam and plan: Yes


Notes (Text): 





03/14/17 14:29

## 2017-03-13 NOTE — CP.PCM.PN
Subjective





- Date & Time of Evaluation


Date of Evaluation: 03/13/17


Time of Evaluation: 10:45





- Subjective


Subjective: 


Pt seen and examined at bedside. Pt laying in bed with head rest at 30 degrees. 


Pt states that he continues to have shortness of breath which is exacerbated by 

him exerting himself while walking to the bathroom. He also states that his SOB 

gets worse after talking for too long. 





Pt also admits to coughing at times but denies any productivity of phlegm. Pt 

also denies CP or subjective fever. 





As per nurses note today, patient is refusing his Bi-Pap treatment. 








Objective





- Vital Signs/Intake and Output


Vital Signs (last 24 hours): 


 











Temp Pulse Resp BP Pulse Ox


 


 97.6 F   78   20   114/59 L  95 


 


 03/13/17 08:45  03/13/17 08:45  03/13/17 08:45  03/13/17 08:45  03/13/17 08:45











- Medications


Medications: 


 Current Medications





Albuterol/Ipratropium (Duoneb 3 Mg/0.5 Mg (3 Ml) Ud)  3 ml INH RQ6 [facility]


   Last Admin: 03/13/17 07:48 Dose:  3 ml


Budesonide (Pulmicort Respules)  0.5 mg INH RQ12 [facility]


   Last Admin: 03/13/17 07:48 Dose:  0.5 mg


Digoxin (Lanoxin)  0.125 mg PO DAILY@1800 [facility]


   Last Admin: 03/12/17 18:07 Dose:  0.125 mg


Guaifenesin (Mucinex La)  600 mg PO BID [facility]


   Last Admin: 03/13/17 09:21 Dose:  600 mg


Methylprednisolone (Solu-Medrol)  40 mg IVP Q8 [facility]


   Last Admin: 03/13/17 06:28 Dose:  40 mg


Rosuvastatin Calcium (Crestor)  10 mg PO HS [facility]


   Last Admin: 03/12/17 22:03 Dose:  10 mg


Tiotropium Bromide (Spiriva)  18 mcg INH RQ24 [facility]


   Last Admin: 03/13/17 07:48 Dose:  18 mcg


Warfarin Sodium (Coumadin)  6 mg PO 1800 [facility]


   Stop: 03/13/17 18:01











- Labs


Labs: 


 





 03/13/17 11:00 





 03/13/17 11:00 





 











PT  23.5 SECONDS (9.7-12.2)  H  03/13/17  11:00    


 


INR  2.1   03/13/17  11:00    


 


APTT  27 SECONDS (21-34)   03/13/17  11:00    














- Constitutional


Appears: Non-toxic, No Acute Distress





- Head Exam


Head Exam: ATRAUMATIC, NORMOCEPHALIC





- Eye Exam


Eye Exam: Normal appearance


Pupil Exam: NORMAL ACCOMODATION





- ENT Exam


ENT Exam: Mucous Membranes Moist





- Respiratory Exam


Respiratory Exam: Decreased Breath Sounds, Rhonchi.  absent: Respiratory 

Distress





- Cardiovascular Exam


Cardiovascular Exam: REGULAR RHYTHM, +S1, +S2





- Neurological Exam


Neurological Exam: Alert, Awake, Oriented x3





- Skin


Skin Exam: Dry, Normal Color, Warm





Assessment and Plan


(1) COPD (chronic obstructive pulmonary disease)


Assessment & Plan: 


Continue duonebs, pulmicort, solu-medrol, spirivia. 





 pt on importance of bipap to help with his SOB





Recommend outpatient treatment followup for COPD and CHF





Pt needs home oxygen upon discharge








Status: Acute





(2) Acute exacerbation of CHF (congestive heart failure)


Status: Acute





(3) Chronic atrial fibrillation


Status: Chronic

## 2017-03-14 NOTE — CP.PCM.PN
Subjective





- Date & Time of Evaluation


Date of Evaluation: 03/14/17


Time of Evaluation: 19:00





- Subjective


Subjective: 


patient seen and examined.


Still complaining of dyspnea on minimal exertion


On BiPAP at night


Afebrile


denies any chest pain








Objective





- Vital Signs/Intake and Output


Vital Signs (last 24 hours): 


 











Temp Pulse Resp BP Pulse Ox


 


 98 F   86   20   98/56 L  100 


 


 03/14/17 15:56  03/14/17 17:00  03/14/17 15:56  03/14/17 15:56  03/14/17 15:56








Intake and Output: 


 











 03/14/17 03/14/17





 06:59 18:59


 


Intake Total 320 


 


Output Total 1650 


 


Balance -1330 














- Medications


Medications: 


 Current Medications





Albuterol/Ipratropium (Duoneb 3 Mg/0.5 Mg (3 Ml) Ud)  3 ml [facility]H [facility]


   Last Admin: 03/14/17 13:12 Dose:  3 ml


Budesonide (Pulmicort Respules)  0.5 mg INH RQ12 [facility]


   Last Admin: 03/14/17 07:52 Dose:  Not Given


Digoxin (Lanoxin)  0.125 mg PO DAILY@1800 [facility]


   Last Admin: 03/13/17 17:37 Dose:  0.125 mg


Guaifenesin (Mucinex La)  600 mg PO BID [facility]


   Last Admin: 03/14/17 08:59 Dose:  600 mg


Methylprednisolone (Solu-Medrol)  40 mg IVP Q8 [facility]


   Last Admin: 03/14/17 14:01 Dose:  40 mg


Rosuvastatin Calcium (Crestor)  10 mg PO HS [facility]


   Last Admin: 03/12/17 22:03 Dose:  10 mg


Tiotropium Bromide (Spiriva)  18 mcg INH RQ24 [facility]


   Last Admin: 03/14/17 07:52 Dose:  Not Given


Warfarin Sodium (Coumadin)  5 mg PO 1800 [facility]


   Stop: 03/14/17 18:32











- Labs


Labs: 


 





 03/14/17 17:09 





 03/14/17 11:00 





 











PT  29.5 SECONDS (9.7-12.2)  H* D 03/14/17  17:09    


 


INR  2.6  D 03/14/17  17:09    


 


APTT  29 SECONDS (21-34)   03/14/17  17:09    














- Head Exam


Head Exam: ATRAUMATIC, NORMOCEPHALIC





- Eye Exam


Eye Exam: Normal appearance





- ENT Exam


ENT Exam: Mucous Membranes Moist





- Neck Exam


Neck Exam: Normal Inspection





- Respiratory Exam


Respiratory Exam: Rales, Rhonchi





- Cardiovascular Exam


Cardiovascular Exam: Irregular Rhythm





- GI/Abdominal Exam


GI & Abdominal Exam: Soft, Normal Bowel Sounds





- Extremities Exam


Extremities Exam: Normal Inspection





- Neurological Exam


Neurological Exam: Awake, Oriented x3





Assessment and Plan


(1) COPD (chronic obstructive pulmonary disease)


Assessment & Plan: 


continue BiPAP as needed


continue IV steroids and bronchodilators


will need home oxygen


Status: Acute





(2) Acute exacerbation of CHF (congestive heart failure)


Status: Acute





(3) Chronic atrial fibrillation


Status: Chronic

## 2017-03-14 NOTE — CP.PCM.PN
<Sulaiman Patel - Last Filed: 03/14/17 18:30>





Subjective





- Date & Time of Evaluation


Date of Evaluation: 03/14/17


Time of Evaluation: 07:05





- Subjective


Subjective: 


PGY-1 note for Dr Schulte service





Patient seen and examined at bedside. No overnight events as per nursing. 

Patient with persistent cough, non-productive, however he felt mucinex helped 

loosen mucous. Orthopnea and SOB at rest persist; Patient did not wear BIPAP 

overnight, however he will wear this morning (he sometimes refuses). Denies f/c

, palpitations, n/v, d/c, or any additional complaints.








Objective





- Vital Signs/Intake and Output


Vital Signs (last 24 hours): 


 











Temp Pulse Resp BP Pulse Ox


 


 97.6 F   83   20   111/70   99 


 


 03/14/17 07:43  03/14/17 07:53  03/14/17 07:43  03/14/17 07:43  03/14/17 07:43








Intake and Output: 


 











 03/14/17 03/14/17





 06:59 18:59


 


Intake Total 320 


 


Output Total 1650 


 


Balance -1330 














- Medications


Medications: 


 Current Medications





Albuterol/Ipratropium (Duoneb 3 Mg/0.5 Mg (3 Ml) Ud)  3 ml INH RQ6 [facility]


   Last Admin: 03/14/17 07:48 Dose:  3 ml


Budesonide (Pulmicort Respules)  0.5 mg INH RQ12 [facility]


   Last Admin: 03/14/17 07:52 Dose:  Not Given


Digoxin (Lanoxin)  0.125 mg PO DAILY@1800 [facility]


   Last Admin: 03/13/17 17:37 Dose:  0.125 mg


Guaifenesin (Mucinex La)  600 mg PO BID [facility]


   Last Admin: 03/13/17 17:37 Dose:  600 mg


Methylprednisolone (Solu-Medrol)  40 mg IVP Q8 [facility]


   Last Admin: 03/14/17 06:21 Dose:  40 mg


Rosuvastatin Calcium (Crestor)  10 mg PO HS [facility]


   Last Admin: 03/12/17 22:03 Dose:  10 mg


Tiotropium Bromide (Spiriva)  18 mcg INH RQ24 [facility]


   Last Admin: 03/14/17 07:52 Dose:  Not Given











- Labs


Labs: 


 





 03/13/17 11:00 





 03/13/17 11:00 





 











PT  23.5 SECONDS (9.7-12.2)  H  03/13/17  11:00    


 


INR  2.1   03/13/17  11:00    


 


APTT  27 SECONDS (21-34)   03/13/17  11:00    














- Additional Findings


Additional findings: 


- Constitutional


Appears: Non-toxic, No Acute Distress





- Head Exam


Head Exam: ATRAUMATIC





- Eye Exam


Eye Exam: EOMI





- ENT Exam


ENT Exam: Mucous Membranes Moist





- Respiratory Exam


Respiratory Exam: Decreased Breath Sounds, Wheezes, Rhonchi (mid/upper zones b/l

), Respiratory Distress (orthopnea).


-desaturates to 82% while walking, 88% with NC 2L





- Cardiovascular Exam


Cardiovascular Exam: REGULAR RHYTHM, +S1, +S2


Tender to palpation at R sternal border, 2nd rib. Protrusion noted (chronic)


Tender to palpation over site of pacemaker (chronic)





- GI/Abdominal Exam


GI & Abdominal Exam: Soft, Normal Bowel Sounds,  absent: Tenderness





- Extremities Exam


Extremities Exam: Pedal Edema (b/l 1+ pitting to mid thigh).  absent: Tenderness


Additional comments: 


pedal pulses 2+





- Neurological Exam


Neurological Exam: Alert, Awake, Oriented x3


b/l hand numbness / tingling present this morning





- Psychiatric Exam


Psychiatric exam: Normal Affect





- Skin


Skin Exam: Dry, Normal Color, Warm








Assessment and Plan





- Assessment and Plan (Free Text)


Assessment: 


72 year old male with past medical history including MI, CAD with history of 

CABG and valve replacement 6-7 years ago.  Reports worstenting SOB and 

dizziness with associated swelling of bilateral LE for past 2-3 days. Admits 

this is the first time he has experienced these symptoms. SOB is worse when he 

lays flat.


-Discharge planning to [facility] once INR in 2.5-3.5 range and SOB is better 

controlled. Case working on logistics.





Plan:


COPD (chronic obstructive pulmonary disease)


-3/13-3/14: Continue BIPAP, it helps however patient sometimes refuses despite 

being SOB. 


-3/10: RAPID called due to SOB. BP 80/50's. Bolused 500cc NS. Ordered BIPAP and 

ABG, however patient refused. Placed back on NC 3L.


-3/8-3/9: Patient complaining of SOB with exertion and orthopnea. Maintain Head 

of bed elevated 30 degrees.


-3/7:  PT session: 98% O2 at 2L at rest, 96% room air at rest sitting, 82% room 

air during ambulation, 88% at 2 liter ambulation.


-3/6: Walked 20 steps today down [address], and patient became dizzy, SOB, and 

almost collapsed.


- Solumedrol 40mg IVP Q8H [facility] (3/6)


- Consulted Pulmonology, Dr. Varela, f/u recs


   - Aware of HR in 140's and SOB after eating and low levels of exertion.


   -f/u ABG (not collected)


   -LDH 654H


   -HIV - negative


   -planning for home O2


   -persistent shortness of breath


   - Patient with long history of smoking most likely has underlying COPD.  

Nebulizer treatment.  Inhaled steroids.  Spiriva.  PFT as out pt








Cough, acute


-3/13: Persists. Continue Mucinex.


-3/9: patient developed productive cough with yellow sputum today.


-Mucinex 600mg PO BID.





Systolic dysfunction with acute on chronic heart failure


-3/10: RAPID called due to SOB. BP 80/50's. Bolused 500cc NS. Ordered BIPAP and 

ABG, however patient refused. Placed back on NC 3L. ProBNP 4070H (less than # 

on admission) and SHEYLA negative. 


-3/10: Decrease to Lasix 40mg PO daily (was BID).


Admit to telemetry, BNP 4560 on admission


Consulted Cardiology, Dr. Dotson - f/u recs


   -3/13: HOLD LASIX FOR 1 TO 2 DAYS AND CONT MUCINEX.  PT PRERENAL AND APPEARS 

DRY.  ON BOTH LASIX AND SPIRONOLACTONE.


   -SOB APPEARS TO BE SECONDARY TO PULM ETIOLOGY.  IMPROVES WITH NEBS.  PT WITH 

RHONCHI B/L.  MAY BENEFIT FROM PULM TOILET.


   [facility].


   -likely deconditioned.  will benefit from rehab


- EF 10-15%, mechanical MV- no regurg, tricuspid regurg. see full report


Digoxin 0.125mg


Lisinopril 10mg daily


Aldactone 25mg PO Daily


patient with AICD, will check echo to determine EF


CXR 2/25: mild cardiomegaly, mild pulmonary venous congestion.  s/p sternotomy, 

aortic valve replacement, AICD








H/O mitral valve replacement with mechanical valve


Cardiac valve replacement 6-7yrs ago


Consulted Cardiology, Dr. Dotson - f/u recs


   -echocardiogram reviewed.  valve leaflets are opening and functional.  There 

is no signficant gradient across the valve.  


   -recommend INR 2.5 to 3.5.


STOP Lovenox 3/5


STOP Heparin Drip - cardiac protocol, with bolus (because patient is refusing 

blood draws)


3/1: INR 1.3


3/2: INR refused, Coumadin 10mg


3/3: INR 1.5, Coumadin 10mg


3/4: INR 2.0, Coumadin 10mg


3/5: INR 2.4, Coumadin 7.5mg; patient received one more dose of Lovenox 80mg SC 

today, now discontinued as bridge complete.


3/6: INR 2.5, Coumadin 7.5mg


3/7: INR 3.1, Coumadin 5mg


3/8: INR 3.7, Coumadin 5mg (stopped by pharmacy)


3/9: INR 2.8, Coumadin 5mg


3/10: INR 2.6, Coumadin 4mg


3/11: INR 2.7, Coumadin 4 mg


3/12: INR 2.3  Coumadin 4mg 


3/13: INR 2.1, Coumadin 6mg


3/14: INR 2.6, Coumadin 5mg, f/u INR





Chronic atrial fibrillation


Consulted Cardiology, Dr. Dotson - f/u recs


   -Rate controlled


   -echocardiogram reviewed.  valve leaflets are opening and functional.  There 

is no signficant gradient across the valve.  


   -recommend INR 2.5 to 3.5.


   -See PT/INR trends above


   Coumadin as above


   STOP Lovenox 3/5








CAD (coronary artery disease) 


Consulted Cardiology, Dr. Dotson - f/u recs


   -Aware of HR in 140's and SOB after eating and low levels of exertion.


   -unknown graft status.  no evidence of ischemia at present


   -No current angina





History of MI (myocardial infarction)


HOLD Crestor 10mg PO HS (last dose 3/12- due to elevated LFTs)


ROMIs negative x3


EKG paced 


Denies chest pain 





Transaminitis, acute


3/14: AST 75 /  -> HOLD Crestor 10mg PO HS (last dose 3/12- due to 

elevated LFTs)


3/13: AST 70 /  - increasing -> hold crestor for 2 nights 3/13, 3/14


3/10: AST 90 /  - increasing -> hold Crestor tonight.


- AST 77 / ALT 98 -> previously normal.


- Continue to monitor





Electrolyte Imbalance


Hyperkalemia - resolved


Hypokalemia, 





Tachycardia


3/14: HR WNL today.


3/7: Patient becomes SOB very easily, desaturating to 82% while ambulating. 

Anytime he eats or gets up, HR climbs into 140's


Continue Digoxin 0.125mg


EKG in ER: sinus tachycardia at 100, paced, incomplete RBBB


Discontinued cardizem drip of 5mg/h started in the ER





Lower extremity edema


LE Duplex - negative. see full report.





Prophylactic measure


Coumadin as above.


protonix 40mg daily


SCD contraindicated due to LE edema





[All management as per Dr. Elizabeth]





<Evgeny Elizabeth Jr. - Last Filed: 03/15/17 14:53>





Objective





- Vital Signs/Intake and Output


Vital Signs (last 24 hours): 


 











Temp Pulse Resp BP Pulse Ox


 


 97.4 F L  70   18   106/71   100 


 


 03/15/17 07:02  03/15/17 08:00  03/15/17 07:02  03/15/17 07:02  03/15/17 07:02








Intake and Output: 


 











 03/15/17 03/15/17





 06:59 18:59


 


Intake Total 500 


 


Output Total 1100 


 


Balance -600 














- Medications


Medications: 


 Current Medications





Albuterol/Ipratropium (Duoneb 3 Mg/0.5 Mg (3 Ml) Ud)  3 ml INH RQ6 [facility]


   Last Admin: 03/15/17 13:26 Dose:  3 ml


Budesonide (Pulmicort Respules)  0.5 mg INH RQ12 [facility]


   Last Admin: 03/15/17 08:06 Dose:  0.5 mg


Digoxin (Lanoxin)  0.125 mg PO DAILY@1800 [facility]


   Last Admin: 03/14/17 21:00 Dose:  0.125 mg


Guaifenesin (Mucinex La)  600 mg PO BID [facility]


   Last Admin: 03/15/17 09:31 Dose:  600 mg


Methylprednisolone (Solu-Medrol)  40 mg IVP Q8 [facility]


   Last Admin: 03/15/17 13:00 Dose:  40 mg


Rosuvastatin Calcium (Crestor)  10 mg PO HS [facility]


   Last Admin: 03/12/17 22:03 Dose:  10 mg


Tiotropium Bromide (Spiriva)  18 mcg INH RQ24 [facility]


   Last Admin: 03/15/17 08:06 Dose:  18 mcg











- Labs


Labs: 


 





 03/15/17 11:01 





 03/15/17 11:01 





 











PT  29.8 SECONDS (9.7-12.2)  H*  03/15/17  11:01    


 


INR  2.6   03/15/17  11:01    


 


APTT  30 SECONDS (21-34)   03/15/17  11:01    














Attending/Attestation





- Attestation


I have personally seen and examined this patient.: Yes


I have fully participated in the care of the patient.: Yes


I have reviewed all pertinent clinical information, including history, physical 

exam and plan: Yes


Notes (Text): 





03/15/17 14:53


Patient seen and examined with the resident. Reviewed resident note and agree 

with findings and plan of care. [ ]

## 2017-03-15 NOTE — CP.PCM.PN
Subjective





- Date & Time of Evaluation


Date of Evaluation: 03/15/17


Time of Evaluation: 10:00





- Subjective


Subjective: 


Patient was seen and examined at bedside. Patient appeared to be in no acute 

distress. Patient continues to complain of SOB and wheezing. Patient states 

that BiPAP treatment greatly helps him at night. Though patient is SOB upon 

examination, he was able to converse without the aid of using his BiPAP





Objective





- Vital Signs/Intake and Output


Vital Signs (last 24 hours): 


 











Temp Pulse Resp BP Pulse Ox


 


 97.4 F L  75   18   106/71   100 


 


 03/15/17 07:02  03/15/17 07:02  03/15/17 07:02  03/15/17 07:02  03/15/17 07:02








Intake and Output: 


 











 03/15/17 03/15/17





 06:59 18:59


 


Intake Total 500 


 


Output Total 1100 


 


Balance -600 














- Medications


Medications: 


 Current Medications





Albuterol/Ipratropium (Duoneb 3 Mg/0.5 Mg (3 Ml) Ud)  3 ml INH RQ6 [facility]


   Last Admin: 03/15/17 08:06 Dose:  3 ml


Budesonide (Pulmicort Respules)  0.5 mg INH RQ12 [facility]


   Last Admin: 03/15/17 08:06 Dose:  0.5 mg


Digoxin (Lanoxin)  0.125 mg PO DAILY@1800 [facility]


   Last Admin: 03/14/17 21:00 Dose:  0.125 mg


Guaifenesin (Mucinex La)  600 mg PO BID [facility]


   Last Admin: 03/15/17 09:31 Dose:  600 mg


Methylprednisolone (Solu-Medrol)  40 mg IVP Q8 [facility]


   Last Admin: 03/15/17 05:18 Dose:  40 mg


Rosuvastatin Calcium (Crestor)  10 mg PO HS [facility]


   Last Admin: 03/12/17 22:03 Dose:  10 mg


Tiotropium Bromide (Spiriva)  18 mcg INH RQ24 [facility]


   Last Admin: 03/15/17 08:06 Dose:  18 mcg











- Labs


Labs: 


 





 03/14/17 17:09 





 03/14/17 11:00 





 











PT  29.5 SECONDS (9.7-12.2)  H* D 03/14/17  17:09    


 


INR  2.6  D 03/14/17  17:09    


 


APTT  29 SECONDS (21-34)   03/14/17  17:09    














- Constitutional


Appears: Non-toxic, No Acute Distress





- Head Exam


Head Exam: NORMAL INSPECTION





- Eye Exam


Eye Exam: Normal appearance





- ENT Exam


ENT Exam: Mucous Membranes Moist





- Respiratory Exam


Respiratory Exam: Prolonged Expiratory Phase, Rales, Wheezes





- Cardiovascular Exam


Cardiovascular Exam: REGULAR RHYTHM





- Neurological Exam


Neurological Exam: Alert, Awake





- Psychiatric Exam


Psychiatric exam: Normal Affect, Normal Mood





- Skin


Skin Exam: Dry, Intact, Normal Color, Warm





Assessment and Plan


(1) COPD (chronic obstructive pulmonary disease)


Assessment & Plan: 


Continue duonebs, pulmicort, solu-medrol, spirivia. 





Patient is now agreeing to use BiPAP regularly, especially at night when he 

sleeps.





Recommend outpatient treatment followup for COPD and CHF. Recommend home oxygen





Status: Acute





(2) Acute exacerbation of CHF (congestive heart failure)


Status: Acute





(3) Chronic atrial fibrillation


Status: Chronic

## 2017-03-15 NOTE — CP.PCM.PN
<Sulaiman Patel - Last Filed: 03/15/17 19:38>





Subjective





- Date & Time of Evaluation


Date of Evaluation: 03/15/17


Time of Evaluation: 07:15





- Subjective


Subjective: 


PGY-1 note for Dr Schulte service





Patient seen and examined at bedside. No overnight events as per nursing. 

Patient wore BIPAP throughout the night, for the first time, reporting that it 

helped him. Mild non-productive cough, will continue mucinex. Orthopnea and SOB 

at rest persist. Denies f/c, palpitations, n/v, d/c, or any additional 

complaints.





Objective





- Vital Signs/Intake and Output


Vital Signs (last 24 hours): 


 











Temp Pulse Resp BP Pulse Ox


 


 98.0 F   85   20   107/73   97 


 


 03/14/17 23:35  03/15/17 02:10  03/14/17 23:35  03/14/17 23:35  03/14/17 23:35








Intake and Output: 


 











 03/15/17 03/15/17





 06:59 18:59


 


Intake Total 500 


 


Output Total 1100 


 


Balance -600 














- Medications


Medications: 


 Current Medications





Albuterol/Ipratropium (Duoneb 3 Mg/0.5 Mg (3 Ml) Ud)  3 ml INH RQ6 [facility]


   Last Admin: 03/15/17 02:09 Dose:  3 ml


Budesonide (Pulmicort Respules)  0.5 mg INH RQ12 [facility]


   Last Admin: 03/14/17 19:32 Dose:  0.5 mg


Digoxin (Lanoxin)  0.125 mg PO DAILY@1800 [facility]


   Last Admin: 03/14/17 21:00 Dose:  0.125 mg


Guaifenesin (Mucinex La)  600 mg PO BID [facility]


   Last Admin: 03/14/17 21:00 Dose:  600 mg


Methylprednisolone (Solu-Medrol)  40 mg IVP Q8 [facility]


   Last Admin: 03/15/17 05:18 Dose:  40 mg


Rosuvastatin Calcium (Crestor)  10 mg PO HS [facility]


   Last Admin: 03/12/17 22:03 Dose:  10 mg


Tiotropium Bromide (Spiriva)  18 mcg INH RQ24 [facility]


   Last Admin: 03/14/17 07:52 Dose:  Not Given











- Labs


Labs: 


 





 03/14/17 17:09 





 03/14/17 11:00 





 











PT  29.5 SECONDS (9.7-12.2)  H* D 03/14/17  17:09    


 


INR  2.6  D 03/14/17  17:09    


 


APTT  29 SECONDS (21-34)   03/14/17  17:09    














- Additional Findings


Additional findings: 


- Constitutional


Appears: Non-toxic, No Acute Distress





- Head Exam


Head Exam: ATRAUMATIC





- Eye Exam


Eye Exam: EOMI





- ENT Exam


ENT Exam: Mucous Membranes Moist





- Respiratory Exam


Respiratory Exam: Decreased Breath Sounds, Wheezes, Rhonchi (mid/upper zones b/l

), Respiratory Distress (orthopnea).


-desaturates to 82% while walking, 88% with NC 2L





- Cardiovascular Exam


Cardiovascular Exam: REGULAR RHYTHM, +S1, +S2


-Tender to palpation at R sternal border, 2nd rib. Protrusion noted (chronic)


-Waxing/waning tenderness to palpation over site of pacemaker (chronic)





- GI/Abdominal Exam


GI & Abdominal Exam: Soft, Normal Bowel Sounds,  absent: Tenderness





- Extremities Exam


Extremities Exam: Pedal Edema (b/l 1+ pitting to mid thigh).  absent: Tenderness


Additional comments: 


pedal pulses 2+





- Neurological Exam


Neurological Exam: Alert, Awake, Oriented x3


-waxing/waning b/l hand numbness / tingling (present when more SOB).





- Psychiatric Exam


Psychiatric exam: Normal Affect





- Skin


Skin Exam: Dry, Normal Color, Warm





Assessment and Plan





- Assessment and Plan (Free Text)


Assessment: 


72 year old male with past medical history including MI, CAD with history of 

CABG and valve replacement 6-7 years ago.  Reports worstenting SOB and 

dizziness with associated swelling of bilateral LE for past 2-3 days. Admits 

this is the first time he has experienced these symptoms. SOB is worse when he 

lays flat.


-Discharge planning to [facility] once INR in 2.5-3.5 range and SOB is better 

controlled. Case working on logistics.





Plan:


COPD (chronic obstructive pulmonary disease)


-3/13-3/15: Continue BIPAP, patient more compliant (sometimes refuses despite 

being SOB). 


-3/10: RAPID called due to SOB. BP 80/50's. Bolused 500cc NS. Ordered BIPAP and 

ABG, however patient refused. Placed back on NC 3L.


-3/8-3/9: Patient complaining of SOB with exertion and orthopnea. Maintain Head 

of bed elevated 30 degrees.


-3/7:  PT session: 98% O2 at 2L at rest, 96% room air at rest sitting, 82% room 

air during ambulation, 88% at 2 liter ambulation.


-3/6: Walked 20 steps today down [address], and patient became dizzy, SOB, and 

almost collapsed.


- Solumedrol 40mg IVP Q8H [facility] (3/6)


- Consulted Pulmonology, Dr. Varela, f/u recs


   - Aware of HR in 140's and SOB after eating and low levels of exertion.


   -f/u ABG (not collected)


   -LDH 654H


   -HIV - negative


   -planning for home O2


   -persistent shortness of breath


   - Patient with long history of smoking most likely has underlying COPD.  

Nebulizer treatment.  Inhaled steroids.  Spiriva.  PFT as out pt








Cough, acute


-3/13-3/15: Persists. Continue Mucinex.


-3/9: patient developed productive cough with yellow sputum today.


-Mucinex 600mg PO BID.





Systolic dysfunction with acute on chronic heart failure


-3/10: RAPID called due to SOB. BP 80/50's. Bolused 500cc NS. Ordered BIPAP and 

ABG, however patient refused. Placed back on NC 3L. ProBNP 4070H (less than # 

on admission) and SHEYLA negative. 


-3/10: Decrease to Lasix 40mg PO daily (was BID).


Admit to telemetry, BNP 4560 on admission


Consulted Cardiology, Dr. Dotson - f/u recs


   -3/13: HOLD LASIX FOR 1 TO 2 DAYS AND CONT MUCINEX.  PT PRERENAL AND APPEARS 

DRY.  ON BOTH LASIX AND SPIRONOLACTONE.


   -SOB APPEARS TO BE SECONDARY TO PULM ETIOLOGY.  IMPROVES WITH NEBS.  PT WITH 

RHONCHI B/L.  MAY BENEFIT FROM PULM TOILET.


   -will attempt to obtain company of AICD for interrogation.


   -likely deconditioned.  will benefit from rehab


- EF 10-15%, mechanical MV- no regurg, tricuspid regurg. see full report


Digoxin 0.125mg


Lisinopril 10mg daily


Aldactone 25mg PO Daily


patient with AICD, will check echo to determine EF


CXR 2/25: mild cardiomegaly, mild pulmonary venous congestion.  s/p sternotomy, 

aortic valve replacement, AICD








H/O mitral valve replacement with mechanical valve


Cardiac valve replacement 6-7yrs ago


Consulted Cardiology, Dr. Dotson - f/u recs


   -echocardiogram reviewed.  valve leaflets are opening and functional.  There 

is no signficant gradient across the valve.  


   -recommend INR 2.5 to 3.5.


STOP Lovenox 3/5


STOP Heparin Drip - cardiac protocol, with bolus (because patient is refusing 

blood draws)


3/1: INR 1.3


3/2: INR refused, Coumadin 10mg


3/3: INR 1.5, Coumadin 10mg


3/4: INR 2.0, Coumadin 10mg


3/5: INR 2.4, Coumadin 7.5mg; patient received one more dose of Lovenox 80mg SC 

today, now discontinued as bridge complete.


3/6: INR 2.5, Coumadin 7.5mg


3/7: INR 3.1, Coumadin 5mg


3/8: INR 3.7, Coumadin 5mg (stopped by pharmacy)


3/9: INR 2.8, Coumadin 5mg


3/10: INR 2.6, Coumadin 4mg


3/11: INR 2.7, Coumadin 4 mg


3/12: INR 2.3  Coumadin 4mg 


3/13: INR 2.1, Coumadin 6mg


3/14: INR 2.6, Coumadin 5mg


3/15: INR 2.6, Coumadin 5mg, f/u INR





Chronic atrial fibrillation


Consulted Cardiology, Dr. Dotson - f/u recs


   -Rate controlled


   -echocardiogram reviewed.  valve leaflets are opening and functional.  There 

is no signficant gradient across the valve.  


   -recommend INR 2.5 to 3.5.


   -See PT/INR trends above


   Coumadin as above


   STOP Lovenox 3/5








CAD (coronary artery disease) 


Consulted Cardiology, Dr. Dotson - f/u recs


   -Aware of HR in 140's and SOB after eating and low levels of exertion.


   -unknown graft status.  no evidence of ischemia at present


   -No current angina





History of MI (myocardial infarction)


HOLD Crestor 10mg PO HS (last dose 3/12- due to elevated LFTs)


ROMIs negative x3


EKG paced 


Denies chest pain 





Transaminitis, acute


3/15: AST 57 / , decreasing. HOLD Crestor 10mg PO HS (last dose 3/12- 

due to elevated LFTs)


3/14: AST 75 /   


3/13: AST 70 /  - increasing -> hold crestor for 2 nights 3/13, 3/14


3/10: AST 90 /  - increasing -> hold Crestor tonight.


- AST 77 / ALT 98 -> previously normal.


- Continue to monitor





Electrolyte Imbalance


Hyperkalemia - resolved


Hypokalemia, 





Tachycardia


3/14-3/15: HR WNL


3/7: Patient becomes SOB very easily, desaturating to 82% while ambulating. 

Anytime he eats or gets up, HR climbs into 140's


Continue Digoxin 0.125mg


EKG in ER: sinus tachycardia at 100, paced, incomplete RBBB


Discontinued cardizem drip of 5mg/h started in the ER





Lower extremity edema


LE Duplex - negative. see full report.





Prophylactic measure


Coumadin as above.


protonix 40mg daily


SCD contraindicated due to LE edema





[All management as per Dr. Elizabeth]





<Evgeny Elizabeth Jr. - Last Filed: 03/19/17 14:01>





Objective





- Vital Signs/Intake and Output


Vital Signs (last 24 hours): 


 











Temp Pulse Resp BP Pulse Ox


 


 97.7 F   71   18   112/65   99 


 


 03/19/17 09:04  03/19/17 09:04  03/19/17 09:04  03/19/17 10:46  03/19/17 09:04








Intake and Output: 


 











 03/19/17 03/19/17





 06:59 18:59


 


Output Total 1300 


 


Balance -1300 














- Medications


Medications: 


 Current Medications





Albuterol/Ipratropium (Duoneb 3 Mg/0.5 Mg (3 Ml) Ud)  3 ml INH RQ6 [facility]


   Last Admin: 03/19/17 13:05 Dose:  3 ml


Budesonide (Pulmicort Respules)  0.5 mg INH RQ12 [facility]


   Last Admin: 03/19/17 07:30 Dose:  0.5 mg


Digoxin (Lanoxin)  0.125 mg PO DAILY@1800 [facility]


   Last Admin: 03/18/17 18:23 Dose:  0.125 mg


Furosemide (Lasix)  40 mg IVP DAILY [facility]


   Last Admin: 03/19/17 10:46 Dose:  40 mg


Guaifenesin (Mucinex La)  600 mg PO BID [facility]


   Last Admin: 03/19/17 10:47 Dose:  600 mg


Methylprednisolone (Solu-Medrol)  40 mg IVP Q8 [facility]


   Last Admin: 03/19/17 05:56 Dose:  40 mg


Rosuvastatin Calcium (Crestor)  10 mg PO HS [facility]


   Last Admin: 03/12/17 22:03 Dose:  10 mg


Spironolactone (Aldactone)  25 mg PO DAILY [facility]


   Last Admin: 03/19/17 10:46 Dose:  25 mg


Tiotropium Bromide (Spiriva)  18 mcg INH RQ24 [facility]


   Last Admin: 03/19/17 07:45 Dose:  18 mcg











- Labs


Labs: 


 





 03/18/17 11:33 





 03/18/17 11:33 





 











PT  42.4 SECONDS (9.7-12.2)  H* D 03/18/17  11:33    


 


INR  3.6   03/18/17  11:33    


 


APTT  30 SECONDS (21-34)   03/18/17  11:33    














Attending/Attestation





- Attestation


I have personally seen and examined this patient.: Yes


I have fully participated in the care of the patient.: Yes


I have reviewed all pertinent clinical information, including history, physical 

exam and plan: Yes


Notes (Text): 





03/19/17 14:01


Patient seen and examined with the resident. Reviewed resident note and agree 

with findings and plan of care. [ ]

## 2017-03-15 NOTE — CARD
--------------- APPROVED REPORT --------------





EKG Measurement

Heart [id number]

[id number]

[id number]

[id number]



<Conclusion>

Bi ventricular pacemaker

## 2017-03-16 NOTE — RAD
HISTORY:

worstening pulmonary congestion  



COMPARISON:

3/10/2017 



FINDINGS:



LUNGS:

Low lung volumes noted. No consolidation.



PLEURA:

No significant pleural effusion identified, no pneumothorax apparent.



CARDIOVASCULAR:

Cardiomegaly with left ventricular enlargement configuration -similar



OSSEOUS STRUCTURES:

Acromioclavicular joint osteoarthrosis.



VISUALIZED UPPER ABDOMEN:

Normal.



OTHER FINDINGS:

Pacemaker/AICD device.  Valvular/ring prosthesis . Intact sternal 

wires noted 



IMPRESSION:

No interval pathology noted

## 2017-03-16 NOTE — CP.PCM.PN
Subjective





- Date & Time of Evaluation


Date of Evaluation: 03/16/17


Time of Evaluation: 08:00





- Subjective


Subjective: 


Patient seen and examined.


Complaining of dyspnea on minimal exertion


Remains on oxygen


For CAT scan of the chest





Objective





- Vital Signs/Intake and Output


Vital Signs (last 24 hours): 


 











Temp Pulse Resp BP Pulse Ox


 


 97.7 F   78   20   113/70   100 


 


 03/16/17 09:06  03/16/17 09:06  03/16/17 09:06  03/16/17 09:06  03/16/17 09:06








Intake and Output: 


 











 03/16/17 03/16/17





 06:59 18:59


 


Intake Total 500 


 


Balance 500 














- Medications


Medications: 


 Current Medications





Albuterol/Ipratropium (Duoneb 3 Mg/0.5 Mg (3 Ml) Ud)  3 ml INH RQ6 [facility]


   Last Admin: 03/16/17 07:23 Dose:  3 ml


Budesonide (Pulmicort Respules)  0.5 mg INH RQ12 [facility]


   Last Admin: 03/16/17 07:23 Dose:  0.5 mg


Digoxin (Lanoxin)  0.125 mg PO DAILY@1800 [facility]


   Last Admin: 03/15/17 17:26 Dose:  0.125 mg


Furosemide (Lasix)  40 mg IVP DAILY [facility]


Guaifenesin (Mucinex La)  600 mg PO BID [facility]


   Last Admin: 03/16/17 09:46 Dose:  600 mg


Methylprednisolone (Solu-Medrol)  40 mg IVP Q8 [facility]


   Last Admin: 03/16/17 06:15 Dose:  40 mg


Rosuvastatin Calcium (Crestor)  10 mg PO HS [facility]


   Last Admin: 03/12/17 22:03 Dose:  10 mg


Spironolactone (Aldactone)  25 mg PO DAILY [facility]


   Last Admin: 03/16/17 09:46 Dose:  25 mg


Tiotropium Bromide (Spiriva)  18 mcg INH RQ24 [facility]


   Last Admin: 03/16/17 08:23 Dose:  18 mcg


Warfarin Sodium (Coumadin)  6 mg PO 1800 [facility]


   Stop: 03/16/17 18:01











- Labs


Labs: 


 





 03/15/17 11:01 





 03/15/17 11:01 





 











PT  29.8 SECONDS (9.7-12.2)  H*  03/15/17  11:01    


 


INR  2.6   03/15/17  11:01    


 


APTT  30 SECONDS (21-34)   03/15/17  11:01    














- Head Exam


Head Exam: ATRAUMATIC, NORMOCEPHALIC





- Eye Exam


Eye Exam: Normal appearance





- ENT Exam


ENT Exam: Mucous Membranes Moist





- Neck Exam


Neck Exam: Normal Inspection





- Respiratory Exam


Respiratory Exam: Rales, Rhonchi





- Cardiovascular Exam


Cardiovascular Exam: REGULAR RHYTHM





- GI/Abdominal Exam


GI & Abdominal Exam: Soft, Normal Bowel Sounds





- Extremities Exam


Extremities Exam: Pedal Edema





Assessment and Plan


(1) COPD (chronic obstructive pulmonary disease)


Assessment & Plan: 


Continue IV steroids and nebulizer treatment


Continue oxygen and BiPAP as needed


Status: Acute





(2) Acute exacerbation of CHF (congestive heart failure)


Status: Acute





(3) Chronic atrial fibrillation


Status: Chronic

## 2017-03-16 NOTE — CT
CT chest without IV contrast 



Indication: persistent SOB



Technique: 



Contiguous axial images were obtained through the chest without 

intravenous contrast enhancement. Sagittal and coronal 

reconstructions were generated and reviewed.







Radiation dose (DLP):  729.21 MGy-cm. 



Comparison: Chest x-ray performed 3/16/17 



Findings: 



Streak artifact obscures evaluation of the neck/upper chest. Limited 

visualization of the inferior thyroid gland appears heterogeneous. 

Cardiomegaly. Cardiac valve prosthesis. Left-sided AICD. Dense 

coronary artery calcifications. 



Small consolidation at the left lung base. Bibasilar atelectasis. 

Small airways disease. Mild ground-glass and nodular opacities within 

the right upper lobe. 



7 mm left upper lobe pulmonary nodule (series 3, image 31). 



Small hiatal hernia/distal esophageal wall thickening. 



Hepatic capsular calcification. Numerous low-density lesions 

throughout the liver. Decompressed gallbladder limits evaluation. 

Bilateral hypodense renal lesions. Right adrenal gland hypertrophy. 

12 mm left adrenal gland nodule measures approximately 9 Hounsfield 

units, likely adenoma. 



Median sternotomy wires.  Degenerative changes of the spine. 



Impression:  



Heterogeneous included portions of the thyroid gland. Cardiomegaly. 

Cardiac valve prosthesis. Left-sided AICD. Dense coronary artery 

calcifications. 



Small consolidation (favored to reflect atelectasis rather than 

pneumonia) at the left lung base. Bibasilar atelectasis. Small 

airways disease. Mild ground-glass and fine nodular opacities within 

the right upper lobe, possibly infectious or inflammatory.  Recommend 

attention on follow-up. 



7 mm left upper lobe pulmonary nodule. Guidelines by the Fleischner 

society (radiology 2005; 237:390 5-400) suggests that in patients 

with low risk for lung cancer, with nodules less than or equal to 8 

mm in diameter should have follow-up in approximately 6-12 months.  

In patients with high-risk, including smokers, follow-up is 

recommended 3-6 months.  Patient with a known malignancy for risk for 

metastases should receive 3 month follow-up. 



Hepatic capsular calcification. 



Numerous low-density lesions throughout the liver, possibly cysts. 

Decompressed gallbladder limits evaluation. Bilateral hypodense renal 

lesions. Right adrenal gland hypertrophy. 12 mm left adrenal gland 

nodule measures approximately 9 Hounsfield units, likely adenoma.

## 2017-03-16 NOTE — CP.PCM.PN
Subjective





- Date & Time of Evaluation


Date of Evaluation: 03/16/17


Time of Evaluation: 11:00





- Subjective


Subjective: 


patient has dyspnea.  he denies chest pain.





Objective





- Vital Signs/Intake and Output


Vital Signs (last 24 hours): 


 











Temp Pulse Resp BP Pulse Ox


 


 97.7 F   78   20   113/70   100 


 


 03/16/17 09:06  03/16/17 09:06  03/16/17 09:06  03/16/17 09:06  03/16/17 09:06








Intake and Output: 


 











 03/16/17 03/16/17





 06:59 18:59


 


Intake Total 500 


 


Balance 500 














- Medications


Medications: 


 Current Medications





Albuterol/Ipratropium (Duoneb 3 Mg/0.5 Mg (3 Ml) Ud)  3 ml INH RQ6 [facility]


   Last Admin: 03/16/17 07:23 Dose:  3 ml


Budesonide (Pulmicort Respules)  0.5 mg INH RQ12 [facility]


   Last Admin: 03/16/17 07:23 Dose:  0.5 mg


Digoxin (Lanoxin)  0.125 mg PO DAILY@1800 [facility]


   Last Admin: 03/15/17 17:26 Dose:  0.125 mg


Furosemide (Lasix)  40 mg IVP DAILY [facility]


Guaifenesin (Mucinex La)  600 mg PO BID [facility]


   Last Admin: 03/16/17 09:46 Dose:  600 mg


Methylprednisolone (Solu-Medrol)  40 mg IVP Q8 [facility]


   Last Admin: 03/16/17 06:15 Dose:  40 mg


Rosuvastatin Calcium (Crestor)  10 mg PO HS [facility]


   Last Admin: 03/12/17 22:03 Dose:  10 mg


Spironolactone (Aldactone)  25 mg PO DAILY [facility]


   Last Admin: 03/16/17 09:46 Dose:  25 mg


Tiotropium Bromide (Spiriva)  18 mcg INH RQ24 [facility]


   Last Admin: 03/16/17 08:23 Dose:  18 mcg


Warfarin Sodium (Coumadin)  6 mg PO 1800 [facility]


   Stop: 03/16/17 18:01











- Labs


Labs: 


 





 03/16/17 11:20 





 03/16/17 11:20 





 











PT  28.9 SECONDS (9.7-12.2)  H  03/16/17  11:20    


 


INR  2.5   03/16/17  11:20    


 


APTT  30 SECONDS (21-34)   03/16/17  11:20    














- Constitutional


Appears: Chronically Ill





- Head Exam


Head Exam: NORMAL INSPECTION





- Eye Exam


Eye Exam: Normal appearance





- ENT Exam


ENT Exam: Mucous Membranes Moist





- Neck Exam


Neck Exam: Full ROM





- Respiratory Exam


Respiratory Exam: Decreased Breath Sounds





- Cardiovascular Exam


Cardiovascular Exam: REGULAR RHYTHM





- GI/Abdominal Exam


GI & Abdominal Exam: Normal Bowel Sounds





- Rectal Exam


Rectal Exam: Deferred





- Extremities Exam


Extremities Exam: absent: Pedal Edema





- Back Exam


Back Exam: NORMAL INSPECTION





- Neurological Exam


Neurological Exam: Alert





- Psychiatric Exam


Psychiatric exam: Normal Affect





- Skin


Skin Exam: Normal Color





Assessment and Plan


(1) Systolic dysfunction with acute on chronic heart failure


Assessment & Plan: 


will restart IV diuretic therapy


Status: Acute





(2) Chronic atrial fibrillation


Assessment & Plan: 


continue coumadin.  rate controlled


Status: Chronic





(3) CAD (coronary artery disease)


Assessment & Plan: 


no current angina


Status: Chronic





(4) H/O mitral valve replacement with mechanical valve


Assessment & Plan: 


INR 2.5-3.5


Status: Chronic

## 2017-03-16 NOTE — CP.PCM.PN
<Sulaiman Patel - Last Filed: 03/16/17 20:31>





Subjective





- Date & Time of Evaluation


Date of Evaluation: 03/16/17


Time of Evaluation: 07:05





- Subjective


Subjective: 


PGY-1 Medicine Note - Dr. Hanna (covering for Dr Elizabeth)





Patient seen and examined at bedside. No overnight events as per nursing. 

Patient wore BIPAP throughout the night again, compliant. Non-productive cough 

persists. Orthopnea and SOB at rest persist. Feels SOB is worst despite mildly 

improving CXR. Denies f/c, palpitations, n/v, d/c, or any additional complaints.





Objective





- Vital Signs/Intake and Output


Vital Signs (last 24 hours): 


 











Temp Pulse Resp BP Pulse Ox


 


 97.7 F   78   20   113/70   100 


 


 03/16/17 09:06  03/16/17 09:06  03/16/17 09:06  03/16/17 09:06  03/16/17 09:06








Intake and Output: 


 











 03/16/17 03/16/17





 06:59 18:59


 


Intake Total 500 


 


Balance 500 














- Medications


Medications: 


 Current Medications





Albuterol/Ipratropium (Duoneb 3 Mg/0.5 Mg (3 Ml) Ud)  3 ml INH RQ6 [facility]


   Last Admin: 03/16/17 07:23 Dose:  3 ml


Budesonide (Pulmicort Respules)  0.5 mg INH RQ12 [facility]


   Last Admin: 03/16/17 07:23 Dose:  0.5 mg


Digoxin (Lanoxin)  0.125 mg PO DAILY@1800 [facility]


   Last Admin: 03/15/17 17:26 Dose:  0.125 mg


Furosemide (Lasix)  20 mg PO DAILY [facility]


Guaifenesin (Mucinex La)  600 mg PO BID [facility]


   Last Admin: 03/16/17 09:46 Dose:  600 mg


Methylprednisolone (Solu-Medrol)  40 mg IVP Q8 [facility]


   Last Admin: 03/16/17 06:15 Dose:  40 mg


Rosuvastatin Calcium (Crestor)  10 mg PO HS [facility]


   Last Admin: 03/12/17 22:03 Dose:  10 mg


Spironolactone (Aldactone)  25 mg PO DAILY [facility]


   Last Admin: 03/16/17 09:46 Dose:  25 mg


Tiotropium Bromide (Spiriva)  18 mcg INH RQ24 [facility]


   Last Admin: 03/16/17 08:23 Dose:  18 mcg











- Labs


Labs: 


 





 03/15/17 11:01 





 03/15/17 11:01 





 











PT  29.8 SECONDS (9.7-12.2)  H*  03/15/17  11:01    


 


INR  2.6   03/15/17  11:01    


 


APTT  30 SECONDS (21-34)   03/15/17  11:01    














- Additional Findings


Additional findings: 


- Constitutional


Appears: Non-toxic, No Acute Distress





- Head Exam


Head Exam: ATRAUMATIC





- Eye Exam


Eye Exam: EOMI





- ENT Exam


ENT Exam: Mucous Membranes Moist





- Respiratory Exam


Respiratory Exam: Decreased Breath Sounds, Wheezes, Rhonchi (scattered, improved

), Respiratory Distress (orthopnea).


-desaturates to 82% while walking, 88% with NC 2L





- Cardiovascular Exam


Cardiovascular Exam: REGULAR RHYTHM, +S1, +S2


-Tender to palpation at R sternal border, 2nd rib. Protrusion noted (chronic)


-Waxing/waning tenderness to palpation over site of pacemaker (chronic)





- GI/Abdominal Exam


GI & Abdominal Exam: Soft, Normal Bowel Sounds,  absent: Tenderness





- Extremities Exam


Extremities Exam: Pedal Edema (b/l 1+ pitting to mid thigh).  absent: Tenderness


Additional comments: 


pedal pulses 2+





- Neurological Exam


Neurological Exam: Alert, Awake, Oriented x3


-waxing/waning b/l hand numbness / tingling (present when more SOB).





- Psychiatric Exam


Psychiatric exam: Normal Affect





- Skin


Skin Exam: Dry, Normal Color, Warm





Assessment and Plan





- Assessment and Plan (Free Text)


Assessment: 


72 year old male with past medical history including MI, CAD with history of 

CABG and valve replacement 6-7 years ago.  Reports worstenting SOB and 

dizziness with associated swelling of bilateral LE for past 2-3 days. Admits 

this is the first time he has experienced these symptoms. SOB is worse when he 

lays flat.





Plan:


-Discharge planning to [facility] once INR in 2.5-3.5 range and SOB is better 

controlled. Case working on logistics.


-3/16: Dr. Hanna recommended pulmonary rehab, f/u.








COPD (chronic obstructive pulmonary disease)


-3/13-3/16: Continue BIPAP, patient more compliant (sometimes refuses despite 

being SOB). 


-3/10: RAPID called due to SOB. BP 80/50's. Bolused 500cc NS. Ordered BIPAP and 

ABG, however patient refused. Placed back on NC 3L.


-3/8-3/9: Patient complaining of SOB with exertion and orthopnea. Maintain Head 

of bed elevated 30 degrees.


-3/7:  PT session: 98% O2 at 2L at rest, 96% room air at rest sitting, 82% room 

air during ambulation, 88% at 2 liter ambulation.


-3/6: Walked 20 steps today down [address], and patient became dizzy, SOB, and 

almost collapsed.


- Solumedrol 40mg IVP Q8H MOISES (3/6)


- Consulted Pulmonology, Dr. Varela, f/u recs


   - Aware of HR in 140's and SOB after eating and low levels of exertion.


   -f/u ABG (not collected)


   -LDH 654H


   -HIV - negative


   -planning for home O2


   -persistent shortness of breath


   - Patient with long history of smoking most likely has underlying COPD.  

Nebulizer treatment.  Inhaled steroids.  Spiriva.  PFT as out pt








Systolic dysfunction with acute on chronic heart failure


-Chest CT 3/16 - Cardiomegaly. Cardiac valve prosthesis. Left-sided AICD. Dense 

coronary artery calcifications. 


   -Small consolidation (favored to reflect atelectasis rather than pneumonia) 

at the left lung base. 


   -Bibasilar atelectasis. Small airways disease. Mild ground-glass and fine 

nodular opacities within the right upper lobe, possibly infectious or 

inflammatory.    


   -7 mm left upper lobe pulmonary nodule. Guidelines by the Fleischner society 

(radiology 2005; 237:390 5-400) suggests that in patients with low risk for 

lung cancer, with nodules less than or equal to 8 mm in diameter should have 

follow-up in approximately 6-12 months.  In patients with high-risk, including 

smokers, follow-up is recommended 3-6 months.  Patient with a known malignancy 

for risk for metastases should receive 3 month follow-up. 


   -Hepatic capsular calcification. 


   -Numerous low-density lesions throughout the liver, possibly cysts. 

Decompressed gallbladder limits evaluation. Bilateral hypodense renal lesions. 

Right adrenal gland hypertrophy. 12 mm left adrenal gland nodule measures 

approximately 9 Hounsfield units, likely adenoma.


-Consulted Cardiology, Dr. Dotson - f/u recs


   -3/16: Lasix 40mg IVP daily, Spironolactone 25mg daily. CXR - no interval 

pathology change. 


   -3/13: HOLD LASIX FOR 1 TO 2 DAYS AND CONT MUCINEX.  PT PRERENAL AND APPEARS 

DRY.  ON BOTH LASIX AND SPIRONOLACTONE.


   -SOB APPEARS TO BE SECONDARY TO PULM ETIOLOGY.  IMPROVES WITH NEBS.  PT WITH 

RHONCHI B/L.  MAY BENEFIT FROM PULM TOILET.


   -will attempt to obtain company of AICD for interrogation.


   -likely deconditioned.  will benefit from rehab


- EF 10-15%, mechanical MV- no regurg, tricuspid regurg. see full report


-3/10: RAPID called due to SOB. BP 80/50's. Bolused 500cc NS. Ordered BIPAP and 

ABG, however patient refused. Placed back on NC 3L. ProBNP 4070H (less than # 

on admission) and SHEYLA negative. 


-3/10: Decrease to Lasix 40mg PO daily (was BID).


Admit to telemetry, BNP 4560 on admission


Digoxin 0.125mg


Lisinopril 10mg daily


Aldactone 25mg PO Daily


patient with AICD, will check echo to determine EF


CXR 2/25: mild cardiomegaly, mild pulmonary venous congestion.  s/p sternotomy, 

aortic valve replacement, AICD








H/O mitral valve replacement with mechanical valve


Cardiac valve replacement 6-7yrs ago


Consulted Cardiology, Dr. Dotson - f/u recs


   -echocardiogram reviewed.  valve leaflets are opening and functional.  There 

is no signficant gradient across the valve.  


   -recommend INR 2.5 to 3.5.


STOP Lovenox 3/5


STOP Heparin Drip - cardiac protocol, with bolus (because patient is refusing 

blood draws)


3/1: INR 1.3


3/2: INR refused, Coumadin 10mg


3/3: INR 1.5, Coumadin 10mg


3/4: INR 2.0, Coumadin 10mg


3/5: INR 2.4, Coumadin 7.5mg; patient received one more dose of Lovenox 80mg SC 

today, now discontinued as bridge complete.


3/6: INR 2.5, Coumadin 7.5mg


3/7: INR 3.1, Coumadin 5mg


3/8: INR 3.7, Coumadin 5mg (stopped by pharmacy)


3/9: INR 2.8, Coumadin 5mg


3/10: INR 2.6, Coumadin 4mg


3/11: INR 2.7, Coumadin 4 mg


3/12: INR 2.3  Coumadin 4mg 


3/13: INR 2.1, Coumadin 6mg


3/14: INR 2.6, Coumadin 5mg


3/15: INR 2.6, Coumadin 5mg


3/16: INR 2.5, Coumadin 6mg, f/u INR





Chronic atrial fibrillation


Consulted Cardiology, Dr. Dotson - f/u recs


   -Rate controlled


   -echocardiogram reviewed.  valve leaflets are opening and functional.  There 

is no signficant gradient across the valve.  


   -recommend INR 2.5 to 3.5.


   -See PT/INR trends above


   Coumadin as above


   STOP Lovenox 3/5








Cough, acute


-3/13-3/16: Persists. Non-productive. Continue Mucinex.


-3/9: patient developed productive cough with yellow sputum today.


-Mucinex 600mg PO BID.








CAD (coronary artery disease) 


Consulted Cardiology, Dr. Dotson - f/u recs


   -Aware of HR in 140's and SOB after eating and low levels of exertion.


   -unknown graft status.  no evidence of ischemia at present


   -No current angina





History of MI (myocardial infarction)


HOLD Crestor 10mg PO HS (last dose 3/12- due to elevated LFTs)


ROMIs negative x3


EKG paced 


Denies chest pain 





Transaminitis, acute


3/16: AST / ALT down trending - continue to hold Crestor


3/15: AST 57 / , decreasing. HOLD Crestor 10mg PO HS (last dose 3/12- 

due to elevated LFTs)


3/14: AST 75 /   


3/13: AST 70 /  - increasing -> hold crestor for 2 nights 3/13, 3/14


3/10: AST 90 /  - increasing -> hold Crestor tonight.


- AST 77 / ALT 98 -> previously normal.


- Continue to monitor





Electrolyte Imbalance


Hyperkalemia - resolved


Hypokalemia, 





Tachycardia


3/14-3/15: HR WNL


3/7: Patient becomes SOB very easily, desaturating to 82% while ambulating. 

Anytime he eats or gets up, HR climbs into 140's


Continue Digoxin 0.125mg


EKG in ER: sinus tachycardia at 100, paced, incomplete RBBB


Discontinued cardizem drip of 5mg/h started in the ER





Lower extremity edema


LE Duplex - negative. see full report.





Prophylactic measure


Coumadin as above.


protonix 40mg daily


SCD contraindicated due to LE edema





[All management as per Dr. Elizabeth]





<Jimenez Hanna - Last Filed: 04/11/17 22:11>





Objective





- Vital Signs/Intake and Output


Vital Signs (last 24 hours): 


 











Temp Pulse Resp BP Pulse Ox


 


 97.4 F L  82   20   95/64 L  99 


 


 04/11/17 16:25  04/11/17 16:25  04/11/17 16:25  04/11/17 16:25  04/11/17 16:25











- Medications


Medications: 


 Current Medications





Acetylcysteine (Acetylcysteine 20%)  4 ml INH RQ6 [facility]


   Last Admin: 04/11/17 19:23 Dose:  4 ml


Albuterol Sulfate (Albuterol 0.042% Inhal Sol (1.25mg/3ml) Ud)  1.25 mg INH RQ4 

[facility]


   Last Admin: 04/11/17 19:24 Dose:  1.25 mg


Benzocaine/Menthol (Cepacol Sore Throat)  1 keegan MT Q6H PRN


   PRN Reason: Sore Throat


   Last Admin: 04/09/17 09:32 Dose:  1 keegan


Budesonide (Pulmicort Respules)  0.5 mg INH RQ12 [facility]


   Last Admin: 04/07/17 07:34 Dose:  0.5 mg


Digoxin (Lanoxin)  0.25 mg PO DAILY@1800 [facility]


   Last Admin: 04/11/17 17:15 Dose:  0.25 mg


Famotidine (Pepcid)  20 mg PO DAILY [facility]


   Last Admin: 04/11/17 11:05 Dose:  20 mg


Guaifenesin (Mucinex La)  600 mg PO BID [facility]


   Last Admin: 04/11/17 17:15 Dose:  600 mg


Cefepime HCl (Maxipime Iv 2 Gm Premix)  100 mls @ 100 mls/hr IVPB Q12 [facility]


   Stop: 04/12/17 22:01


   Last Admin: 04/11/17 21:02 Dose:  Not Given


Vancomycin/Sodium Chloride (Vancocin)  200 mls @ 100 mls/hr IVPB DAILY [facility]


   Last Admin: 04/11/17 11:05 Dose:  Not Given


Ipratropium Bromide (Atrovent)  0.5 mg IH RQ4 [facility]


   Last Admin: 04/11/17 15:56 Dose:  0.5 mg


Lisinopril (Zestril)  2.5 mg PO DAILY [facility]


   Last Admin: 04/11/17 11:05 Dose:  2.5 mg


Prednisone (Prednisone Tab)  20 mg PO DAILY [facility]


   Last Admin: 04/11/17 11:05 Dose:  20 mg


Spironolactone (Aldactone)  25 mg PO DAILY [facility]


   Last Admin: 04/11/17 11:06 Dose:  25 mg


Tiotropium Bromide (Spiriva)  18 mcg INH RQ24 [facility]


   Last Admin: 04/11/17 07:34 Dose:  Not Given











- Labs


Labs: 


 





 04/10/17 13:23 





 04/10/17 13:23 





 











PT  36.3 SECONDS (9.7-12.2)  H* D 04/10/17  13:23    


 


INR  3.0   04/10/17  13:23    


 


APTT  41 SECONDS (21-34)  H  04/10/17  13:23    














Attending/Attestation





- Attestation


I have personally seen and examined this patient.: Yes


I have fully participated in the care of the patient.: Yes


I have reviewed all pertinent clinical information, including history, physical 

exam and plan: Yes

## 2017-03-17 NOTE — CP.PCM.PN
<Sulaiman Patel - Last Filed: 03/17/17 23:30>





Subjective





- Date & Time of Evaluation


Date of Evaluation: 03/17/17


Time of Evaluation: 07:20





- Subjective


Subjective: 


PGY-1 Medicine Note - Dr. Hanna (covering for Dr Elizabeth)





Patient seen and examined at bedside. No overnight events as per nursing. 

Patient states he is still experiencing SOB and reports that the high flow 

oxygen mask is not helping, although it is better than NC. States that he has 

dizziness upon standing/sitting, which has been ongoing since admission. +BM, +

urination, +appetite. Denies fever, chills, CP, N/V, D/C. Denies any other 

complaints. 








Objective





- Vital Signs/Intake and Output


Vital Signs (last 24 hours): 


 











Temp Pulse Resp BP Pulse Ox


 


 97.4 F L  75   18   103/64   100 


 


 03/17/17 07:02  03/17/17 07:02  03/17/17 07:02  03/17/17 07:02  03/17/17 07:02








Intake and Output: 


 











 03/17/17 03/17/17





 06:59 18:59


 


Intake Total 120 


 


Output Total 1100 


 


Balance -980 














- Medications


Medications: 


 Current Medications





Albuterol/Ipratropium (Duoneb 3 Mg/0.5 Mg (3 Ml) Ud)  3 ml INH RQ6 [facility]


   Last Admin: 03/17/17 07:33 Dose:  3 ml


Budesonide (Pulmicort Respules)  0.5 mg INH RQ12 [facility]


   Last Admin: 03/17/17 07:33 Dose:  0.5 mg


Digoxin (Lanoxin)  0.125 mg PO DAILY@1800 [facility]


   Last Admin: 03/16/17 18:09 Dose:  0.125 mg


Furosemide (Lasix)  40 mg IVP DAILY [facility]


Guaifenesin (Mucinex La)  600 mg PO BID [facility]


   Last Admin: 03/16/17 18:09 Dose:  600 mg


Methylprednisolone (Solu-Medrol)  40 mg IVP Q8 [facility]


   Last Admin: 03/17/17 05:38 Dose:  40 mg


Rosuvastatin Calcium (Crestor)  10 mg PO HS [facility]


   Last Admin: 03/12/17 22:03 Dose:  10 mg


Spironolactone (Aldactone)  25 mg PO DAILY [facility]


   Last Admin: 03/16/17 09:46 Dose:  25 mg


Tiotropium Bromide (Spiriva)  18 mcg INH RQ24 [facility]


   Last Admin: 03/17/17 07:33 Dose:  18 mcg











- Labs


Labs: 


 





 03/16/17 11:20 





 03/16/17 11:20 





 











PT  28.9 SECONDS (9.7-12.2)  H  03/16/17  11:20    


 


INR  2.5   03/16/17  11:20    


 


APTT  30 SECONDS (21-34)   03/16/17  11:20    














- Additional Findings


Additional findings: 


- Constitutional


Appears: Non-toxic, No Acute Distress





- Head Exam


Head Exam: ATRAUMATIC





- Eye Exam


Eye Exam: EOMI





- ENT Exam


ENT Exam: Mucous Membranes Moist





- Respiratory Exam


Respiratory Exam: Decreased Breath Sounds, Wheezes (expiratory, diffuse), 

Rhonchi (scattered, improved), Respiratory Distress (orthopnea).


-desaturates to 82% while walking, 88% with NC 2L





- Cardiovascular Exam


Cardiovascular Exam: REGULAR RHYTHM, +S1, +S2


-Tender to palpation at R sternal border, 2nd rib. Protrusion noted (chronic)


-Waxing/waning tenderness to palpation over site of pacemaker (chronic)





- GI/Abdominal Exam


GI & Abdominal Exam: Soft, Normal Bowel Sounds, Tenderness (+suprapubic 

tenderness)





- Extremities Exam


Extremities Exam: Pedal Edema (b/l 1+ pitting to mid thigh).  absent: Tenderness


Additional comments: 


pedal pulses 2+





- Neurological Exam


Neurological Exam: Alert, Awake, Oriented x3


-waxing/waning b/l hand numbness / tingling (present when more SOB).





- Psychiatric Exam


Psychiatric exam: Normal Affect





- Skin


Skin Exam: Dry, Normal Color, Warm





Assessment and Plan





- Assessment and Plan (Free Text)


Assessment: 


72 year old male with past medical history including MI, CAD with history of 

CABG and valve replacement 6-7 years ago.  Reports worstenting SOB and 

dizziness with associated swelling of bilateral LE for past 2-3 days. Admits 

this is the first time he has experienced these symptoms. SOB is worse when he 

lays flat.





Plan:


-Discharge planning to [facility] once INR in 2.5-3.5 range and SOB is better 

controlled. Patient has [place] insurance, case is working on it.


-3/16: Dr. Hanna recommends pulmonary rehab, case is looking into facilities.








COPD (chronic obstructive pulmonary disease)


-3/13-3/17: Continue BIPAP, patient more compliant (sometimes refuses despite 

being SOB). 


-3/10: RAPID called due to SOB. BP 80/50's. Bolused 500cc NS. Ordered BIPAP and 

ABG, however patient refused. Placed back on NC 3L.


-3/8-3/9: Patient complaining of SOB with exertion and orthopnea. Maintain Head 

of bed elevated 30 degrees.


-3/7:  PT session: 98% O2 at 2L at rest, 96% room air at rest sitting, 82% room 

air during ambulation, 88% at 2 liter ambulation.


-3/6: Walked 20 steps today down [address], and patient became dizzy, SOB, and 

almost collapsed.


- Solumedrol 40mg IVP Q8H [facility] (3/6)


- Consulted Pulmonology, Dr. Varela, f/u recs


   - Aware of HR in 140's and SOB after eating and low levels of exertion.


   -f/u ABG (not collected)


   -LDH 654H


   -HIV - negative


   -planning for home O2


   -persistent shortness of breath


   - Patient with long history of smoking most likely has underlying COPD.  

Nebulizer treatment.  Inhaled steroids.  Spiriva.  PFT as out pt








Systolic dysfunction with acute on chronic heart failure


-Chest CT 3/16 - Cardiomegaly. Cardiac valve prosthesis. Left-sided AICD. Dense 

coronary artery calcifications. 


   -Small consolidation (favored to reflect atelectasis rather than pneumonia) 

at the left lung base. 


   -Bibasilar atelectasis. Small airways disease. Mild ground-glass and fine 

nodular opacities within the right upper lobe, possibly infectious or 

inflammatory.    


   -7 mm left upper lobe pulmonary nodule. Guidelines by the Fleischner society 

(radiology 2005; 237:390 5-400) suggests that in patients with low risk for 

lung cancer, with nodules less than or equal to 8 mm in diameter should have 

follow-up in approximately 6-12 months.  In patients with high-risk, including 

smokers, follow-up is recommended 3-6 months.  Patient with a known malignancy 

for risk for metastases should receive 3 month follow-up. 


   -Hepatic capsular calcification. 


   -Numerous low-density lesions throughout the liver, possibly cysts. 

Decompressed gallbladder limits evaluation. Bilateral hypodense renal lesions. 

Right adrenal gland hypertrophy. 12 mm left adrenal gland nodule measures 

approximately 9 Hounsfield units, likely adenoma.


-Consulted Cardiology, Dr. Dotson - f/u recs


   -3/16: Lasix 40mg IVP daily, Spironolactone 25mg daily. CXR - no interval 

pathology change. 


   -3/13: HOLD LASIX FOR 1 TO 2 DAYS AND CONT MUCINEX.  PT PRERENAL AND APPEARS 

DRY.  ON BOTH LASIX AND SPIRONOLACTONE.


   -SOB APPEARS TO BE SECONDARY TO PULM ETIOLOGY.  IMPROVES WITH NEBS.  PT WITH 

RHONCHI B/L.  MAY BENEFIT FROM PULM TOILET.


   -will attempt to obtain company of [employer] for interrogation.


   -likely deconditioned.  will benefit from rehab


- EF 10-15%, mechanical MV- no regurg, tricuspid regurg. see full report


-3/10: RAPID called due to SOB. BP 80/50's. Bolused 500cc NS. Ordered BIPAP and 

ABG, however patient refused. Placed back on NC 3L. ProBNP 4070H (less than # 

on admission) and SHEYLA negative. 


-3/10: Decrease to Lasix 40mg PO daily (was BID).


Admit to telemetry, BNP 4560 on admission


Digoxin 0.125mg


Lisinopril 10mg daily


Aldactone 25mg PO Daily


patient with AICD, will check echo to determine EF


CXR 2/25: mild cardiomegaly, mild pulmonary venous congestion.  s/p sternotomy, 

aortic valve replacement, AICD








H/O mitral valve replacement with mechanical valve


Cardiac valve replacement 6-7yrs ago


Consulted Cardiology, Dr. Dotson - f/u recs


   -echocardiogram reviewed.  valve leaflets are opening and functional.  There 

is no signficant gradient across the valve.  


   -recommend INR 2.5 to 3.5.


STOP Lovenox 3/5


STOP Heparin Drip - cardiac protocol, with bolus (because patient is refusing 

blood draws)


3/1: INR 1.3


3/2: INR refused, Coumadin 10mg


3/3: INR 1.5, Coumadin 10mg


3/4: INR 2.0, Coumadin 10mg


3/5: INR 2.4, Coumadin 7.5mg; patient received one more dose of Lovenox 80mg SC 

today, now discontinued as bridge complete.


3/6: INR 2.5, Coumadin 7.5mg


3/7: INR 3.1, Coumadin 5mg


3/8: INR 3.7, Coumadin 5mg (stopped by pharmacy)


3/9: INR 2.8, Coumadin 5mg


3/10: INR 2.6, Coumadin 4mg


3/11: INR 2.7, Coumadin 4 mg


3/12: INR 2.3  Coumadin 4mg 


3/13: INR 2.1, Coumadin 6mg


3/14: INR 2.6, Coumadin 5mg


3/15: INR 2.6, Coumadin 5mg


3/16: INR 2.5, Coumadin 6mg


3/17: INR 2.9, Coumadin 6mg, f/u INR





Chronic atrial fibrillation


Consulted Cardiology, Dr. Dotson - f/u recs


   -Rate controlled


   -echocardiogram reviewed.  valve leaflets are opening and functional.  There 

is no signficant gradient across the valve.  


   -recommend INR 2.5 to 3.5.


   -See PT/INR trends above


   Coumadin as above


   STOP Lovenox 3/5








Cough, acute


-3/13-3/16: Persists. Non-productive. Continue Mucinex.


-3/9: patient developed productive cough with yellow sputum today.


-Mucinex 600mg PO BID.








CAD (coronary artery disease) 


Consulted Cardiology, Dr. Dotson - f/u recs


   -Aware of HR in 140's and SOB after eating and low levels of exertion.


   -unknown graft status.  no evidence of ischemia at present


   -No current angina





History of MI (myocardial infarction)


HOLD Crestor 10mg PO HS (last dose 3/12- due to elevated LFTs)


ROMIs negative x3


EKG paced 


Denies chest pain 





Transaminitis, acute


3/16-3/17: AST / ALT down trending - continue to hold Crestor


3/15: AST 57 / , decreasing. HOLD Crestor 10mg PO HS (last dose 3/12- 

due to elevated LFTs)


3/14: AST 75 /   


3/13: AST 70 /  - increasing -> hold crestor for 2 nights 3/13, 3/14


3/10: AST 90 /  - increasing -> hold Crestor tonight.


- AST 77 / ALT 98 -> previously normal.


- Continue to monitor





Electrolyte Imbalance


Hyperkalemia - resolved


Hypokalemia, 





Tachycardia


3/14-3/17: HR WNL


3/7: Patient becomes SOB very easily, desaturating to 82% while ambulating. 

Anytime he eats or gets up, HR climbs into 140's


Continue Digoxin 0.125mg


EKG in ER: sinus tachycardia at 100, paced, incomplete RBBB


Discontinued cardizem drip of 5mg/h started in the ER





Lower extremity edema


LE Duplex - negative. see full report.





Prophylactic measure


Coumadin as above.


protonix 40mg daily


SCD contraindicated due to LE edema





[All management as per Dr. Elizabeth]





<Jimenez Hanna - Last Filed: 03/18/17 12:15>





Objective





- Vital Signs/Intake and Output


Vital Signs (last 24 hours): 


 











Temp Pulse Resp BP Pulse Ox


 


 97.5 F L  86   22   120/70   98 


 


 03/18/17 08:11  03/18/17 09:52  03/18/17 09:52  03/18/17 10:23  03/18/17 09:52








Intake and Output: 


 











 03/18/17 03/18/17





 06:59 18:59


 


Output Total 750 


 


Balance -750 














- Medications


Medications: 


 Current Medications





Albuterol/Ipratropium (Duoneb 3 Mg/0.5 Mg (3 Ml) Ud)  3 ml INH RQ6 [facility]


   Last Admin: 03/18/17 07:20 Dose:  3 ml


Budesonide (Pulmicort Respules)  0.5 mg INH RQ12 [facility]


   Last Admin: 03/18/17 07:20 Dose:  0.5 mg


Digoxin (Lanoxin)  0.125 mg PO DAILY@1800 [facility]


   Last Admin: 03/17/17 17:31 Dose:  0.125 mg


Furosemide (Lasix)  40 mg IVP DAILY [facility]


   Last Admin: 03/18/17 10:23 Dose:  40 mg


Guaifenesin (Mucinex La)  600 mg PO BID [facility]


   Last Admin: 03/18/17 10:23 Dose:  600 mg


Methylprednisolone (Solu-Medrol)  40 mg IVP Q8 [facility]


   Last Admin: 03/18/17 05:58 Dose:  40 mg


Rosuvastatin Calcium (Crestor)  10 mg PO HS [facility]


   Last Admin: 03/12/17 22:03 Dose:  10 mg


Spironolactone (Aldactone)  25 mg PO DAILY [facility]


   Last Admin: 03/18/17 10:23 Dose:  25 mg


Tiotropium Bromide (Spiriva)  18 mcg INH RQ24 [facility]


   Last Admin: 03/18/17 07:52 Dose:  18 mcg











- Labs


Labs: 


 





 03/18/17 11:33 





 03/18/17 11:33 





 











PT  33.4 SECONDS (9.7-12.2)  H*  03/17/17  10:25    


 


INR  2.9   03/17/17  10:25    


 


APTT  29 SECONDS (21-34)   03/17/17  10:25    














Attending/Attestation





- Attestation


I have personally seen and examined this patient.: Yes


I have fully participated in the care of the patient.: Yes


I have reviewed all pertinent clinical information, including history, physical 

exam and plan: Yes


Notes (Text): 


Patient admitted with COPD exacerbation, CHF w/ systolic dysfunction; still 

short of breath but relatively unchanged; relatively euvolemic on exam;





On coumadin for mechanical mitral valve, INR 2.9 close to goal, continue 

coumadin 6 mg daily;





On lasix IV 40 mg daily and spironolactone 25 mg PO daily, continue;





On solumedrol 40 mg q8h; unclear benefit at this point, will discuss with pulm 

about tapering;





Dispo: Patient awaiting insurance authorization for [facility] placement.

## 2017-03-18 NOTE — CP.PCM.PN
<Lizandro Rosas - Last Filed: 03/18/17 23:49>





Subjective





- Date & Time of Evaluation


Date of Evaluation: 03/18/17


Time of Evaluation: 10:40





- Subjective


Subjective: 


PGY-1 Medicine Note - Dr. Hanna (covering for Dr Elizabeth)





Patient seen and examined at bedside. No overnight events as per nursing. 

Patient was having difficulty breathing on room air and after reconnecting his 

nasal canula, he started breathing fine. States that he has dizziness upon 

standing/sitting, which has been ongoing since admission. Denies headache, fever

, chills, chest pain, palpitations, nausea, vomiting, diarrhea. 





Objective





- Vital Signs/Intake and Output


Vital Signs (last 24 hours): 


 











Temp Pulse Resp BP Pulse Ox


 


 97.1 F L  72   20   96/58 L  100 


 


 03/18/17 15:44  03/18/17 15:44  03/18/17 15:44  03/18/17 15:44  03/18/17 15:44








Intake and Output: 


 











 03/18/17 03/19/17





 18:59 06:59


 


Intake Total 700 


 


Output Total 2600 


 


Balance -1900 














- Medications


Medications: 


 Current Medications





Albuterol/Ipratropium (Duoneb 3 Mg/0.5 Mg (3 Ml) Ud)  3 ml INH RQ6 [facility]


   Last Admin: 03/18/17 19:10 Dose:  3 ml


Budesonide (Pulmicort Respules)  0.5 mg INH RQ12 [facility]


   Last Admin: 03/18/17 19:10 Dose:  0.5 mg


Digoxin (Lanoxin)  0.125 mg PO DAILY@1800 [facility]


   Last Admin: 03/18/17 18:23 Dose:  0.125 mg


Furosemide (Lasix)  40 mg IVP DAILY [facility]


   Last Admin: 03/18/17 10:23 Dose:  40 mg


Guaifenesin (Mucinex La)  600 mg PO BID [facility]


   Last Admin: 03/18/17 18:24 Dose:  600 mg


Methylprednisolone (Solu-Medrol)  40 mg IVP Q8 [facility]


   Last Admin: 03/18/17 21:58 Dose:  40 mg


Rosuvastatin Calcium (Crestor)  10 mg PO HS [facility]


   Last Admin: 03/12/17 22:03 Dose:  10 mg


Spironolactone (Aldactone)  25 mg PO DAILY [facility]


   Last Admin: 03/18/17 10:23 Dose:  25 mg


Tiotropium Bromide (Spiriva)  18 mcg INH RQ24 [facility]


   Last Admin: 03/18/17 07:52 Dose:  18 mcg











- Labs


Labs: 


 





 03/18/17 11:33 





 03/18/17 11:33 





 











PT  42.4 SECONDS (9.7-12.2)  H* D 03/18/17  11:33    


 


INR  3.6   03/18/17  11:33    


 


APTT  30 SECONDS (21-34)   03/18/17  11:33    














- Constitutional


Appears: Non-toxic, No Acute Distress





- Head Exam


Head Exam: ATRAUMATIC, NORMOCEPHALIC





- Eye Exam


Eye Exam: EOMI





- ENT Exam


ENT Exam: Mucous Membranes Moist





- Neck Exam


Neck Exam: Full ROM





- Respiratory Exam


Respiratory Exam: Rhonchi (right side), NORMAL BREATHING PATTERN





- Cardiovascular Exam


Cardiovascular Exam: REGULAR RHYTHM, +S1, +S2


Additional comments: 


-Tender to palpation at R sternal border, 2nd rib. Protrusion noted (chronic)


-Waxing/waning tenderness to palpation over site of pacemaker (chronic)





- GI/Abdominal Exam


GI & Abdominal Exam: Soft, Normal Bowel Sounds





- Neurological Exam


Neurological Exam: Alert, Awake





- Psychiatric Exam


Psychiatric exam: Normal Affect, Normal Mood





- Skin


Skin Exam: Dry, Warm





Assessment and Plan





- Assessment and Plan (Free Text)


Plan: 


Assessment: 


72 year old male with past medical history including MI, CAD with history of 

CABG and valve replacement 6-7 years ago.  Reports worstenting SOB and 

dizziness with associated swelling of bilateral LE for past 2-3 days. Admits 

this is the first time he has experienced these symptoms. SOB is worse when he 

lays flat.





Plan:


-Discharge planning to [facility] once INR in 2.5-3.5 range and SOB is better 

controlled. Patient has [place] insurance, case is working on it.


-3/16: Dr. Hanna recommends pulmonary rehab, case is looking into facilities.








COPD (chronic obstructive pulmonary disease)


-3/13-3/17: Continue BIPAP, patient more compliant (sometimes refuses despite 

being SOB). 


-3/10: RAPID called due to SOB. BP 80/50's. Bolused 500cc NS. Ordered BIPAP and 

ABG, however patient refused. Placed back on NC 3L.


-3/8-3/9: Patient complaining of SOB with exertion and orthopnea. Maintain Head 

of bed elevated 30 degrees.


-3/7:  PT session: 98% O2 at 2L at rest, 96% room air at rest sitting, 82% room 

air during ambulation, 88% at 2 liter ambulation.


-3/6: Walked 20 steps today down [address], and patient became dizzy, SOB, and 

almost collapsed.


- Solumedrol 40mg IVP Q8H [facility] (3/6)


- Consulted Pulmonology, Dr. Varela, f/u recs


   - Aware of HR in 140's and SOB after eating and low levels of exertion.


   -f/u ABG (not collected)


   -LDH 654H


   -HIV - negative


   -planning for home O2


   -persistent shortness of breath


   - Patient with long history of smoking most likely has underlying COPD.  

Nebulizer treatment.  Inhaled steroids.  Spiriva.  PFT as out pt








Systolic dysfunction with acute on chronic heart failure


-Chest CT 3/16 - Cardiomegaly. Cardiac valve prosthesis. Left-sided AICD. Dense 

coronary artery calcifications. 


   -Small consolidation (favored to reflect atelectasis rather than pneumonia) 

at the left lung base. 


   -Bibasilar atelectasis. Small airways disease. Mild ground-glass and fine 

nodular opacities within the right upper lobe, possibly infectious or 

inflammatory.    


   -7 mm left upper lobe pulmonary nodule. Guidelines by the Fleischner society 

(radiology 2005; 237:390 5-400) suggests that in patients with low risk for 

lung cancer, with nodules less than or equal to 8 mm in diameter should have 

follow-up in approximately 6-12 months.  In patients with high-risk, including 

smokers, follow-up is recommended 3-6 months.  Patient with a known malignancy 

for risk for metastases should receive 3 month follow-up. 


   -Hepatic capsular calcification. 


   -Numerous low-density lesions throughout the liver, possibly cysts. 

Decompressed gallbladder limits evaluation. Bilateral hypodense renal lesions. 

Right adrenal gland hypertrophy. 12 mm left adrenal gland nodule measures 

approximately 9 Hounsfield units, likely adenoma.


-Consulted Cardiology, Dr. Dotson - f/u recs


   -3/16: Lasix 40mg IVP daily, Spironolactone 25mg daily. CXR - no interval 

pathology change. 


   -3/13: HOLD LASIX FOR 1 TO 2 DAYS AND CONT MUCINEX.  PT PRERENAL AND APPEARS 

DRY.  ON BOTH LASIX AND SPIRONOLACTONE.


   -SOB APPEARS TO BE SECONDARY TO PULM ETIOLOGY.  IMPROVES WITH NEBS.  PT WITH 

RHONCHI B/L.  MAY BENEFIT FROM PULM TOILET.


   -will attempt to obtain company of AICD for interrogation.


   -likely deconditioned.  will benefit from rehab


- EF 10-15%, mechanical MV- no regurg, tricuspid regurg. see full report


-3/10: RAPID called due to SOB. BP 80/50's. Bolused 500cc NS. Ordered BIPAP and 

ABG, however patient refused. Placed back on NC 3L. ProBNP 4070H (less than # 

on admission) and SHEYLA negative. 


-3/10: Decrease to Lasix 40mg PO daily (was BID).


Admit to telemetry, BNP 4560 on admission


Digoxin 0.125mg


Lisinopril 10mg daily


Aldactone 25mg PO Daily


patient with AICD, will check echo to determine EF


CXR 2/25: mild cardiomegaly, mild pulmonary venous congestion.  s/p sternotomy, 

aortic valve replacement, AICD








H/O mitral valve replacement with mechanical valve


Cardiac valve replacement 6-7yrs ago


Consulted Cardiology, Dr. Dotson - f/u recs


   -echocardiogram reviewed.  valve leaflets are opening and functional.  There 

is no signficant gradient across the valve.  


   -recommend INR 2.5 to 3.5.


STOP Lovenox 3/5


STOP Heparin Drip - cardiac protocol, with bolus (because patient is refusing 

blood draws)


3/1: INR 1.3


3/2: INR refused, Coumadin 10mg


3/3: INR 1.5, Coumadin 10mg


3/4: INR 2.0, Coumadin 10mg


3/5: INR 2.4, Coumadin 7.5mg; patient received one more dose of Lovenox 80mg SC 

today, now discontinued as bridge complete.


3/6: INR 2.5, Coumadin 7.5mg


3/7: INR 3.1, Coumadin 5mg


3/8: INR 3.7, Coumadin 5mg (stopped by pharmacy)


3/9: INR 2.8, Coumadin 5mg


3/10: INR 2.6, Coumadin 4mg


3/11: INR 2.7, Coumadin 4 mg


3/12: INR 2.3  Coumadin 4mg 


3/13: INR 2.1, Coumadin 6mg


3/14: INR 2.6, Coumadin 5mg


3/15: INR 2.6, Coumadin 5mg


3/16: INR 2.5, Coumadin 6mg


3/17: INR 2.9, Coumadin 6mg, 


3/18: INR 3.6, Coumadin 6mg, f/u INR





Chronic atrial fibrillation


Consulted Cardiology, Dr. Dotson - f/u recs


   -Rate controlled


   -echocardiogram reviewed.  valve leaflets are opening and functional.  There 

is no signficant gradient across the valve.  


   -recommend INR 2.5 to 3.5.


   -See PT/INR trends above


   Coumadin as above


   STOP Lovenox 3/5








Cough, acute


-3/18 does not complain of cough


-3/13-3/16: Persists. Non-productive. Continue Mucinex.


-3/9: patient developed productive cough with yellow sputum today.


-Mucinex 600mg PO BID.








CAD (coronary artery disease) 


Consulted Cardiology, Dr. Dotson - f/u recs


   -Aware of HR in 140's and SOB after eating and low levels of exertion.


   -unknown graft status.  no evidence of ischemia at present


   -No current angina





History of MI (myocardial infarction)


HOLD Crestor 10mg PO HS (last dose 3/12- due to elevated LFTs)


ROMIs negative x3


EKG paced 


Denies chest pain 





Transaminitis, acute


3/18: 51/164 AST / ALT down trending


3/16-3/17: AST / ALT down trending - continue to hold Crestor


3/15: AST 57 / , decreasing. HOLD Crestor 10mg PO HS (last dose 3/12- 

due to elevated LFTs)


3/14: AST 75 /   


3/13: AST 70 /  - increasing -> hold crestor for 2 nights 3/13, 3/14


3/10: AST 90 /  - increasing -> hold Crestor tonight.


- AST 77 / ALT 98 -> previously normal.


- Continue to monitor





Electrolyte Imbalance


Hyperkalemia - resolved


Hypokalemia, 





Tachycardia


3/14-3/17: HR WNL


3/7: Patient becomes SOB very easily, desaturating to 82% while ambulating. 

Anytime he eats or gets up, HR climbs into 140's


Continue Digoxin 0.125mg


EKG in ER: sinus tachycardia at 100, paced, incomplete RBBB


Discontinued cardizem drip of 5mg/h started in the ER





Lower extremity edema


LE Duplex - negative. see full report.





Prophylactic measure


Coumadin as above.


protonix 40mg daily


SCD contraindicated due to LE edema


D/C when bed available





<Jimenez Hanna - Last Filed: 03/19/17 10:38>





Objective





- Vital Signs/Intake and Output


Vital Signs (last 24 hours): 


 











Temp Pulse Resp BP Pulse Ox


 


 97.7 F   71   18   112/65   99 


 


 03/19/17 09:04  03/19/17 09:04  03/19/17 09:04  03/19/17 09:04  03/19/17 09:04








Intake and Output: 


 











 03/19/17 03/19/17





 06:59 18:59


 


Output Total 1300 


 


Balance -1300 














- Medications


Medications: 


 Current Medications





Albuterol/Ipratropium (Duoneb 3 Mg/0.5 Mg (3 Ml) Ud)  3 ml INH RQ6 [facility]


   Last Admin: 03/19/17 07:30 Dose:  3 ml


Budesonide (Pulmicort Respules)  0.5 mg INH RQ12 [facility]


   Last Admin: 03/19/17 07:30 Dose:  0.5 mg


Digoxin (Lanoxin)  0.125 mg PO DAILY@1800 [facility]


   Last Admin: 03/18/17 18:23 Dose:  0.125 mg


Furosemide (Lasix)  40 mg IVP DAILY [facility]


   Last Admin: 03/18/17 10:23 Dose:  40 mg


Guaifenesin (Mucinex La)  600 mg PO BID [facility]


   Last Admin: 03/18/17 18:24 Dose:  600 mg


Methylprednisolone (Solu-Medrol)  40 mg IVP Q8 [facility]


   Last Admin: 03/19/17 05:56 Dose:  40 mg


Rosuvastatin Calcium (Crestor)  10 mg PO HS [facility]


   Last Admin: 03/12/17 22:03 Dose:  10 mg


Spironolactone (Aldactone)  25 mg PO DAILY [facility]


   Last Admin: 03/18/17 10:23 Dose:  25 mg


Tiotropium Bromide (Spiriva)  18 mcg INH RQ24 [facility]


   Last Admin: 03/19/17 07:45 Dose:  18 mcg











- Labs


Labs: 


 





 03/18/17 11:33 





 03/18/17 11:33 





 











PT  42.4 SECONDS (9.7-12.2)  H* D 03/18/17  11:33    


 


INR  3.6   03/18/17  11:33    


 


APTT  30 SECONDS (21-34)   03/18/17  11:33    














Attending/Attestation





- Attestation


I have personally seen and examined this patient.: Yes


I have fully participated in the care of the patient.: Yes


I have reviewed all pertinent clinical information, including history, physical 

exam and plan: Yes


Notes (Text): 


Patient admitted with COPD exacerbation, CHF w/ systolic dysfunction; still 

short of breath but relatively unchanged; 





On coumadin 6 mg daily for mechanical mitral valve, INR 2.9-> 3.6 (goal 2.5-3.5)

; holding dose today, reassess tomorrow; likely will need alternating doses;





On lasix IV 40 mg daily and spironolactone 25 mg PO daily, continue; relatively 

euvolemic on exam;





On solumedrol 40 mg q8h; unclear benefit at this point although pulm saying to 

continue;





Dispo: Patient awaiting insurance authorization for [facility] placement.

## 2017-03-18 NOTE — CP.PCM.PN
Subjective





- Date & Time of Evaluation


Date of Evaluation: 03/18/17


Time of Evaluation: 13:00





- Subjective


Subjective: 


Patient seen and examined.


Feels better with oxygen


Dyspnea on minimal exertion


Awaiting for transfer to subacute


Continue nebulizer treatment and IV steroids








Objective





- Vital Signs/Intake and Output


Vital Signs (last 24 hours): 


 











Temp Pulse Resp BP Pulse Ox


 


 97.1 F L  72   20   96/58 L  100 


 


 03/18/17 15:44  03/18/17 15:44  03/18/17 15:44  03/18/17 15:44  03/18/17 15:44








Intake and Output: 


 











 03/18/17 03/19/17





 18:59 06:59


 


Intake Total 700 


 


Output Total 2600 


 


Balance -1900 














- Medications


Medications: 


 Current Medications





Albuterol/Ipratropium (Duoneb 3 Mg/0.5 Mg (3 Ml) Ud)  3 ml INH RQ6 [facility]


   Last Admin: 03/18/17 19:10 Dose:  3 ml


Budesonide (Pulmicort Respules)  0.5 mg INH RQ12 [facility]


   Last Admin: 03/18/17 19:10 Dose:  0.5 mg


Digoxin (Lanoxin)  0.125 mg PO DAILY@1800 [facility]


   Last Admin: 03/18/17 18:23 Dose:  0.125 mg


Furosemide (Lasix)  40 mg IVP DAILY [facility]


   Last Admin: 03/18/17 10:23 Dose:  40 mg


Guaifenesin (Mucinex La)  600 mg PO BID [facility]


   Last Admin: 03/18/17 18:24 Dose:  600 mg


Methylprednisolone (Solu-Medrol)  40 mg IVP Q8 [facility]


   Last Admin: 03/18/17 13:12 Dose:  40 mg


Rosuvastatin Calcium (Crestor)  10 mg PO HS [facility]


   Last Admin: 03/12/17 22:03 Dose:  10 mg


Spironolactone (Aldactone)  25 mg PO DAILY [facility]


   Last Admin: 03/18/17 10:23 Dose:  25 mg


Tiotropium Bromide (Spiriva)  18 mcg INH RQ24 [facility]


   Last Admin: 03/18/17 07:52 Dose:  18 mcg











- Labs


Labs: 


 





 03/18/17 11:33 





 03/18/17 11:33 





 











PT  42.4 SECONDS (9.7-12.2)  H* D 03/18/17  11:33    


 


INR  3.6   03/18/17  11:33    


 


APTT  30 SECONDS (21-34)   03/18/17  11:33    














Assessment and Plan


(1) COPD (chronic obstructive pulmonary disease)


Status: Acute





(2) Acute exacerbation of CHF (congestive heart failure)


Status: Acute





(3) Chronic atrial fibrillation


Status: Chronic

## 2017-03-19 NOTE — CP.PCM.PN
Subjective





- Date & Time of Evaluation


Date of Evaluation: 03/19/17


Time of Evaluation: 07:10





- Subjective


Subjective: 


PGY-1 Medicine Note - Dr. Dr Elizabeth





Patient seen and examined at bedside. No overnight events as per nursing. 

Patient was having difficulty breathing on room air and after reconnecting his 

nasal canula, he started breathing fine. States that he has dizziness upon 

standing/sitting, which has been ongoing since admission. He refused his labs 

this morning. He denies headache, fever, chills, chest pain, palpitations, 

nausea, vomiting, diarrhea.








Objective





- Vital Signs/Intake and Output


Vital Signs (last 24 hours): 


 











Temp Pulse Resp BP Pulse Ox


 


 97.3 F L  76   20   104/65   99 


 


 03/19/17 16:00  03/19/17 16:00  03/19/17 16:00  03/19/17 16:00  03/19/17 16:00








Intake and Output: 


 











 03/19/17 03/20/17





 18:59 06:59


 


Intake Total 600 


 


Output Total 400 300


 


Balance 200 -300














- Medications


Medications: 


 Current Medications





Albuterol/Ipratropium (Duoneb 3 Mg/0.5 Mg (3 Ml) Ud)  3 ml INH RQ6 MOISES


   Last Admin: 03/19/17 19:11 Dose:  3 ml


Budesonide (Pulmicort Respules)  0.5 mg INH RQ12 MOISES


   Last Admin: 03/19/17 19:11 Dose:  0.5 mg


Digoxin (Lanoxin)  0.125 mg PO DAILY@1800 MOISES


   Last Admin: 03/19/17 18:21 Dose:  0.125 mg


Furosemide (Lasix)  40 mg IVP DAILY MOISES


   Last Admin: 03/19/17 10:46 Dose:  40 mg


Guaifenesin (Mucinex La)  600 mg PO BID MOISES


   Last Admin: 03/19/17 18:24 Dose:  600 mg


Methylprednisolone (Solu-Medrol)  40 mg IVP Q8 MOISES


   Last Admin: 03/19/17 14:29 Dose:  40 mg


Rosuvastatin Calcium (Crestor)  10 mg PO HS [facility]


   Last Admin: 03/12/17 22:03 Dose:  10 mg


Spironolactone (Aldactone)  25 mg PO DAILY MOISES


   Last Admin: 03/19/17 10:46 Dose:  25 mg


Tiotropium Bromide (Spiriva)  18 mcg INH RQ24 MOISES


   Last Admin: 03/19/17 07:45 Dose:  18 mcg











- Labs


Labs: 


 





 03/18/17 11:33 





 03/18/17 11:33 





 











PT  42.4 SECONDS (9.7-12.2)  H* D 03/18/17  11:33    


 


INR  3.6   03/18/17  11:33    


 


APTT  30 SECONDS (21-34)   03/18/17  11:33    














- Constitutional


Appears: No Acute Distress





- Head Exam


Head Exam: ATRAUMATIC, NORMOCEPHALIC





- Eye Exam


Eye Exam: EOMI


Pupil Exam: NORMAL ACCOMODATION





- ENT Exam


ENT Exam: Mucous Membranes Moist, Normal Exam





- Neck Exam


Neck Exam: Full ROM, Normal Inspection





- Respiratory Exam


Respiratory Exam: Rhonchi (right side )





- Cardiovascular Exam


Cardiovascular Exam: +S1, +S2, Murmur





- GI/Abdominal Exam


GI & Abdominal Exam: Soft, Tenderness


Additional comments: 


-Tender to palpation at R sternal border, 2nd rib. Protrusion noted (chronic)


-Waxing/waning tenderness to palpation over site of pacemaker (chronic)





- Extremities Exam


Extremities Exam: Joint Swelling (improved from previous)





- Neurological Exam


Neurological Exam: Alert, Awake





- Psychiatric Exam


Psychiatric exam: Normal Affect, Normal Mood





- Skin


Skin Exam: Intact, Warm





Assessment and Plan





- Assessment and Plan (Free Text)


Assessment: 


72 year old male with past medical history including MI, CAD with history of 

CABG and valve replacement 6-7 years ago.  Reports worstenting SOB and 

dizziness with associated swelling of bilateral LE for past 2-3 days. Admits 

this is the first time he has experienced these symptoms. SOB is worse when he 

lays flat.





Plan: 


-Discharge planning to [facility] ([place]) once INR in 2.5-3.5 range and SOB is better 

controlled. Patient has [place] insurance, case is working on it.


-3/16: Dr. Hanna recommends pulmonary rehab, case is looking into facilities.








COPD (chronic obstructive pulmonary disease)


-3/13-3/17: Continue BIPAP, patient more compliant (sometimes refuses despite 

being SOB). 


-3/10: RAPID called due to SOB. BP 80/50's. Bolused 500cc NS. Ordered BIPAP and 

ABG, however patient refused. Placed back on NC 3L.


-3/8-3/9: Patient complaining of SOB with exertion and orthopnea. Maintain Head 

of bed elevated 30 degrees.


-3/7:  PT session: 98% O2 at 2L at rest, 96% room air at rest sitting, 82% room 

air during ambulation, 88% at 2 liter ambulation.


-3/6: Walked 20 steps today down [address], and patient became dizzy, SOB, and 

almost collapsed.


- Solumedrol 40mg IVP Q8H MOISES (3/6)


- Consulted Pulmonology, Dr. Varela, f/u recs


   - Aware of HR in 140's and SOB after eating and low levels of exertion.


   -f/u ABG (not collected)


   -LDH 654H


   -HIV - negative


   -planning for home O2


   -persistent shortness of breath


   - Patient with long history of smoking most likely has underlying COPD.  

Nebulizer treatment.  Inhaled steroids.  Spiriva.  PFT as out pt








Systolic dysfunction with acute on chronic heart failure


-Chest CT 3/16 - Cardiomegaly. Cardiac valve prosthesis. Left-sided AICD. Dense 

coronary artery calcifications. 


   -Small consolidation (favored to reflect atelectasis rather than pneumonia) 

at the left lung base. 


   -Bibasilar atelectasis. Small airways disease. Mild ground-glass and fine 

nodular opacities within the right upper lobe, possibly infectious or 

inflammatory.    


   -7 mm left upper lobe pulmonary nodule. Guidelines by the Fleischner society 

(radiology 2005; 237:390 5-400) suggests that in patients with low risk for 

lung cancer, with nodules less than or equal to 8 mm in diameter should have 

follow-up in approximately 6-12 months.  In patients with high-risk, including 

smokers, follow-up is recommended 3-6 months.  Patient with a known malignancy 

for risk for metastases should receive 3 month follow-up. 


   -Hepatic capsular calcification. 


   -Numerous low-density lesions throughout the liver, possibly cysts. 

Decompressed gallbladder limits evaluation. Bilateral hypodense renal lesions. 

Right adrenal gland hypertrophy. 12 mm left adrenal gland nodule measures 

approximately 9 Hounsfield units, likely adenoma.


-Consulted Cardiology, Dr. Dotson - f/u recs


   -3/16: Lasix 40mg IVP daily, Spironolactone 25mg daily. CXR - no interval 

pathology change. 


   -3/13: HOLD LASIX FOR 1 TO 2 DAYS AND CONT MUCINEX.  PT PRERENAL AND APPEARS 

DRY.  ON BOTH LASIX AND SPIRONOLACTONE.


   -SOB APPEARS TO BE SECONDARY TO PULM ETIOLOGY.  IMPROVES WITH NEBS.  PT WITH 

RHONCHI B/L.  MAY BENEFIT FROM PULM TOILET.


   -will attempt to obtain company of AICD for interrogation.


   -likely deconditioned.  will benefit from rehab


- EF 10-15%, mechanical MV- no regurg, tricuspid regurg. see full report


-3/10: RAPID called due to SOB. BP 80/50's. Bolused 500cc NS. Ordered BIPAP and 

ABG, however patient refused. Placed back on NC 3L. ProBNP 4070H (less than # 

on admission) and SHEYLA negative. 


-3/10: Decrease to Lasix 40mg PO daily (was BID).


Admit to telemetry, BNP 4560 on admission


Digoxin 0.125mg


Lisinopril 10mg daily


Aldactone 25mg PO Daily


patient with AICD, will check echo to determine EF


CXR 2/25: mild cardiomegaly, mild pulmonary venous congestion.  s/p sternotomy, 

aortic valve replacement, AICD








H/O mitral valve replacement with mechanical valve


Cardiac valve replacement 6-7yrs ago


Consulted Cardiology, Dr. Dotson - f/u recs


   -echocardiogram reviewed.  valve leaflets are opening and functional.  There 

is no signficant gradient across the valve.  


   -recommend INR 2.5 to 3.5.


STOP Lovenox 3/5


STOP Heparin Drip - cardiac protocol, with bolus (because patient is refusing 

blood draws)


3/1: INR 1.3


3/2: INR refused, Coumadin 10mg


3/3: INR 1.5, Coumadin 10mg


3/4: INR 2.0, Coumadin 10mg


3/5: INR 2.4, Coumadin 7.5mg; patient received one more dose of Lovenox 80mg SC 

today, now discontinued as bridge complete.


3/6: INR 2.5, Coumadin 7.5mg


3/7: INR 3.1, Coumadin 5mg


3/8: INR 3.7, Coumadin 5mg (stopped by pharmacy)


3/9: INR 2.8, Coumadin 5mg


3/10: INR 2.6, Coumadin 4mg


3/11: INR 2.7, Coumadin 4 mg


3/12: INR 2.3  Coumadin 4mg 


3/13: INR 2.1, Coumadin 6mg


3/14: INR 2.6, Coumadin 5mg


3/15: INR 2.6, Coumadin 5mg


3/16: INR 2.5, Coumadin 6mg


3/17: INR 2.9, Coumadin 6mg, 


3/18: INR 3.6, Coumadin 6mg, f/u INR


3/19: HELD PT, Pt. denied blood work today





Chronic atrial fibrillation


Consulted Cardiology, Dr. Dotson - f/u recs


   -Rate controlled


   -echocardiogram reviewed.  valve leaflets are opening and functional.  There 

is no signficant gradient across the valve.  


   -recommend INR 2.5 to 3.5.


   -See PT/INR trends above


   Coumadin as above


   STOP Lovenox 3/5








Cough, acute


-3/18 does not complain of cough


-3/13-3/16: Persists. Non-productive. Continue Mucinex.


-3/9: patient developed productive cough with yellow sputum today.


-Mucinex 600mg PO BID.








CAD (coronary artery disease) 


Consulted Cardiology, Dr. Dotson - f/u recs


   -Aware of HR in 140's and SOB after eating and low levels of exertion.


   -unknown graft status.  no evidence of ischemia at present


   -No current angina





History of MI (myocardial infarction)


HOLD Crestor 10mg PO HS (last dose 3/12- due to elevated LFTs)


ROMIs negative x3


EKG paced 


Denies chest pain 





Transaminitis, acute


3/18: 51/164 AST / ALT down trending


3/16-3/17: AST / ALT down trending - continue to hold Crestor


3/15: AST 57 / , decreasing. HOLD Crestor 10mg PO HS (last dose 3/12- 

due to elevated LFTs)


3/14: AST 75 /   


3/13: AST 70 /  - increasing -> hold crestor for 2 nights 3/13, 3/14


3/10: AST 90 /  - increasing -> hold Crestor tonight.


- AST 77 / ALT 98 -> previously normal.


- Continue to monitor





Electrolyte Imbalance


Hyperkalemia - resolved


Hypokalemia, 





Tachycardia


3/14-3/17: HR WNL


3/7: Patient becomes SOB very easily, desaturating to 82% while ambulating. 

Anytime he eats or gets up, HR climbs into 140's


Continue Digoxin 0.125mg


EKG in ER: sinus tachycardia at 100, paced, incomplete RBBB


Discontinued cardizem drip of 5mg/h started in the ER





Lower extremity edema


LE Duplex - negative. see full report.





Prophylactic measure


Coumadin as above.


protonix 40mg daily


SCD contraindicated due to LE edema


D/C when bed available

## 2017-03-19 NOTE — CP.PCM.PN
Subjective





- Date & Time of Evaluation


Date of Evaluation: 03/19/17


Time of Evaluation: 10:00





- Subjective


Subjective: 


patient has less dyspnea.





Objective





- Vital Signs/Intake and Output


Vital Signs (last 24 hours): 


 











Temp Pulse Resp BP Pulse Ox


 


 97.7 F   71   18   112/65   99 


 


 03/19/17 09:04  03/19/17 09:04  03/19/17 09:04  03/19/17 10:46  03/19/17 09:04








Intake and Output: 


 











 03/19/17 03/19/17





 06:59 18:59


 


Output Total 1300 


 


Balance -1300 














- Medications


Medications: 


 Current Medications





Albuterol/Ipratropium (Duoneb 3 Mg/0.5 Mg (3 Ml) Ud)  3 ml INH RQ6 [facility]


   Last Admin: 03/19/17 13:05 Dose:  3 ml


Budesonide (Pulmicort Respules)  0.5 mg INH RQ12 [facility]


   Last Admin: 03/19/17 07:30 Dose:  0.5 mg


Digoxin (Lanoxin)  0.125 mg PO DAILY@1800 [facility]


   Last Admin: 03/18/17 18:23 Dose:  0.125 mg


Furosemide (Lasix)  40 mg IVP DAILY [facility]ISES


   Last Admin: 03/19/17 10:46 Dose:  40 mg


Guaifenesin (Mucinex La)  600 mg PO BID [facility]


   Last Admin: 03/19/17 10:47 Dose:  600 mg


Methylprednisolone (Solu-Medrol)  40 mg IVP Q8 MOISES


   Last Admin: 03/19/17 05:56 Dose:  40 mg


Rosuvastatin Calcium (Crestor)  10 mg PO HS [facility]


   Last Admin: 03/12/17 22:03 Dose:  10 mg


Spironolactone (Aldactone)  25 mg PO DAILY [facility]


   Last Admin: 03/19/17 10:46 Dose:  25 mg


Tiotropium Bromide (Spiriva)  18 mcg INH RQ24 MOISES


   Last Admin: 03/19/17 07:45 Dose:  18 mcg











- Labs


Labs: 


 





 03/18/17 11:33 





 03/18/17 11:33 





 











PT  42.4 SECONDS (9.7-12.2)  H* D 03/18/17  11:33    


 


INR  3.6   03/18/17  11:33    


 


APTT  30 SECONDS (21-34)   03/18/17  11:33    














- Constitutional


Appears: Non-toxic





- Head Exam


Head Exam: NORMAL INSPECTION





- Eye Exam


Eye Exam: Normal appearance





- ENT Exam


ENT Exam: Mucous Membranes Moist





- Neck Exam


Neck Exam: absent: Thyromegaly





- Respiratory Exam


Respiratory Exam: Decreased Breath Sounds, Rhonchi, Wheezes





- Cardiovascular Exam


Cardiovascular Exam: Irregular Rhythm





- GI/Abdominal Exam


GI & Abdominal Exam: Normal Bowel Sounds





- Rectal Exam


Rectal Exam: Deferred





- Extremities Exam


Extremities Exam: absent: Pedal Edema





- Back Exam


Back Exam: NORMAL INSPECTION





- Neurological Exam


Neurological Exam: Alert





- Psychiatric Exam


Psychiatric exam: Normal Affect





- Skin


Skin Exam: Normal Color





Assessment and Plan


(1) Systolic dysfunction with acute on chronic heart failure


Assessment & Plan: 


will continue lasix


Status: Acute





(2) Chronic atrial fibrillation


Assessment & Plan: 


anticoagulation


Status: Chronic





(3) CAD (coronary artery disease)


Assessment & Plan: 


no angina


Status: Chronic





(4) H/O mitral valve replacement with mechanical valve


Assessment & Plan: 


goal INR 2.5-3.5


Status: Chronic

## 2017-03-20 NOTE — CP.PCM.PN
Subjective





- Date & Time of Evaluation


Date of Evaluation: 03/20/17


Time of Evaluation: 10:00





- Subjective


Subjective: 


Patient seen and examined at bedside. Pt was lying in bed, not using nasal 

cannula at this time. 





Pt states similar complaints of SOB with exertion, orthopnea, and some cough. Pt

's symptoms are relieved by his nasal cannula as well as Bipap machine. Pt is 

moderately compliant with Bipap. Pt denies CP and subjective fever today. 








Objective





- Vital Signs/Intake and Output


Vital Signs (last 24 hours): 


 











Temp Pulse Resp BP Pulse Ox


 


 97.7 F   61   20   100/60   99 


 


 03/20/17 07:30  03/20/17 10:02  03/20/17 07:30  03/20/17 11:18  03/20/17 07:30








Intake and Output: 


 











 03/20/17 03/20/17





 06:59 18:59


 


Intake Total 400 


 


Output Total 1250 


 


Balance -850 














- Medications


Medications: 


 Current Medications





Albuterol/Ipratropium (Duoneb 3 Mg/0.5 Mg (3 Ml) Ud)  3 ml INH RQ6 MOISES


   Last Admin: 03/20/17 07:46 Dose:  3 ml


Budesonide (Pulmicort Respules)  0.5 mg INH RQ12 MOISES


   Last Admin: 03/20/17 07:46 Dose:  0.5 mg


Digoxin (Lanoxin)  0.125 mg PO DAILY@1800 MOISES


   Last Admin: 03/19/17 18:21 Dose:  0.125 mg


Furosemide (Lasix)  40 mg PO DAILY MOISES


   Last Admin: 03/20/17 11:18 Dose:  40 mg


Guaifenesin (Mucinex La)  600 mg PO BID [facility]ES


   Last Admin: 03/20/17 09:59 Dose:  600 mg


Methylprednisolone (Solu-Medrol)  40 mg IVP Q8 MOISES


   Last Admin: 03/20/17 05:44 Dose:  40 mg


Rosuvastatin Calcium (Crestor)  10 mg PO HS MOISES


   Last Admin: 03/12/17 22:03 Dose:  10 mg


Spironolactone (Aldactone)  25 mg PO DAILY MOISES


   Last Admin: 03/20/17 10:01 Dose:  Not Given


Tiotropium Bromide (Spiriva)  18 mcg INH RQ24 MOISES


   Last Admin: 03/20/17 07:46 Dose:  18 mcg











- Labs


Labs: 


 





 03/18/17 11:33 





 03/18/17 11:33 





 











PT  42.4 SECONDS (9.7-12.2)  H* D 03/18/17  11:33    


 


INR  3.6   03/18/17  11:33    


 


APTT  30 SECONDS (21-34)   03/18/17  11:33    














- Constitutional


Appears: Non-toxic, No Acute Distress





- Head Exam


Head Exam: ATRAUMATIC, NORMOCEPHALIC





- Eye Exam


Eye Exam: Normal appearance


Pupil Exam: NORMAL ACCOMODATION





- ENT Exam


ENT Exam: Mucous Membranes Moist





- Respiratory Exam


Respiratory Exam: Decreased Breath Sounds, NORMAL BREATHING PATTERN





- Cardiovascular Exam


Cardiovascular Exam: REGULAR RHYTHM





- Neurological Exam


Neurological Exam: Alert, Awake, Oriented x3





- Psychiatric Exam


Psychiatric exam: Normal Affect, Normal Mood





- Skin


Skin Exam: Dry, Intact, Normal Color, Warm





Assessment and Plan


(1) COPD (chronic obstructive pulmonary disease)


Assessment & Plan: 


CT scan on 03/16/17 showed small consolidation at left lung base favoring to 

reflect atelectasis rather than pneumonia. Bibasilar atelectasis, small airways 

disease, mild ground-glass and fine nodular opacities within the right upper 

lobe. See report for full details. 





Consider switching from Solu-Medrol to Prednisone


Continue nebulizer treatment


Continue oxygen and BiPAP as needed


Pending placement. Pt will need home oxygen upon discharge. 





Continue to monitor for increased SOB, productive cough, fever, and CP. 





Status: Acute





(2) Acute exacerbation of CHF (congestive heart failure)


Status: Acute





(3) Chronic atrial fibrillation


Status: Chronic

## 2017-03-20 NOTE — CP.PCM.PN
<Sulaiman Patel - Last Filed: 03/20/17 21:36>





Subjective





- Date & Time of Evaluation


Date of Evaluation: 03/20/17


Time of Evaluation: 07:00





- Subjective


Subjective: 


PGY-1 Medicine Note - Dr. Dr Elizabeth





Patient seen and examined at bedside. No overnight events as per nursing. Pt 

was lying in bed, eating and watching tv, not using nasal cannula. Appeared 

comfortable. However once I approached him, he began to c/o SOB. He reports 

similar complaints of SOB with exertion, orthopnea, and some cough. Pt's 

symptoms are relieved by his nasal cannula as well as Bipap machine (moderately 

compliant). Denies headache, fever, chills, chest pain, palpitations, nausea, 

vomiting, diarrhea.





Objective





- Vital Signs/Intake and Output


Vital Signs (last 24 hours): 


 











Temp Pulse Resp BP Pulse Ox


 


 97.7 F   82   20   106/72   98 


 


 03/20/17 04:23  03/20/17 04:23  03/20/17 04:23  03/20/17 04:23  03/20/17 04:23








Intake and Output: 


 











 03/20/17 03/20/17





 06:59 18:59


 


Intake Total 400 


 


Output Total 1250 


 


Balance -850 














- Medications


Medications: 


 Current Medications





Albuterol/Ipratropium (Duoneb 3 Mg/0.5 Mg (3 Ml) Ud)  3 ml INH RQ6 [facility]


   Last Admin: 03/20/17 07:46 Dose:  3 ml


Budesonide (Pulmicort Respules)  0.5 mg INH RQ12 [facility]


   Last Admin: 03/20/17 07:46 Dose:  0.5 mg


Digoxin (Lanoxin)  0.125 mg PO DAILY@1800 [facility]


   Last Admin: 03/19/17 18:21 Dose:  0.125 mg


Furosemide (Lasix)  40 mg IVP DAILY [facility]


   Last Admin: 03/19/17 10:46 Dose:  40 mg


Guaifenesin (Mucinex La)  600 mg PO BID [facility]


   Last Admin: 03/19/17 18:24 Dose:  600 mg


Methylprednisolone (Solu-Medrol)  40 mg IVP Q8 [facility]


   Last Admin: 03/20/17 05:44 Dose:  40 mg


Rosuvastatin Calcium (Crestor)  10 mg PO HS [facility]


   Last Admin: 03/12/17 22:03 Dose:  10 mg


Spironolactone (Aldactone)  25 mg PO DAILY [facility]


   Last Admin: 03/19/17 10:46 Dose:  25 mg


Tiotropium Bromide (Spiriva)  18 mcg INH RQ24 [facility]


   Last Admin: 03/20/17 07:46 Dose:  18 mcg











- Labs


Labs: 


 





 03/18/17 11:33 





 03/18/17 11:33 





 











PT  42.4 SECONDS (9.7-12.2)  H* D 03/18/17  11:33    


 


INR  3.6   03/18/17  11:33    


 


APTT  30 SECONDS (21-34)   03/18/17  11:33    














- Additional Findings


Additional findings: 


- Constitutional


Appears: No Acute Distress





- Head Exam


Head Exam: ATRAUMATIC, NORMOCEPHALIC





- Eye Exam


Eye Exam: EOMI


Pupil Exam: NORMAL ACCOMODATION





- ENT Exam


ENT Exam: Mucous Membranes Moist, Normal Exam





- Neck Exam


Neck Exam: Full ROM, Normal Inspection





- Respiratory Exam


Respiratory Exam: Rhonchi (right side )





- Cardiovascular Exam


Cardiovascular Exam: +S1, +S2, Murmur





- GI/Abdominal Exam


GI & Abdominal Exam: Soft, Tenderness


Additional comments: 


-Tender to palpation at R sternal border, 2nd rib. Protrusion noted (chronic)


-Waxing/waning tenderness to palpation over site of pacemaker (chronic)





- Extremities Exam


Extremities Exam: Joint Swelling (improved from previous)





- Neurological Exam


Neurological Exam: Alert, Awake





- Psychiatric Exam


Psychiatric exam: Normal Affect, Normal Mood





- Skin


Skin Exam: Intact, Warm





Assessment and Plan





- Assessment and Plan (Free Text)


Assessment: 


72 year old male with past medical history including MI, CAD with history of 

CABG and valve replacement 6-7 years ago.  Reports worstenting SOB and 

dizziness with associated swelling of bilateral LE for past 2-3 days. Admits 

this is the first time he has experienced these symptoms. SOB is worse when he 

lays flat.





Plan:


-Discharge planning to [facility] once INR in 2.5-3.5 range and SOB is better 

controlled. Patient has [place] insurance, case is working on it.


-3/16: Dr. Hanna recommends pulmonary rehab, case is looking into facilities.








COPD (chronic obstructive pulmonary disease)


-3/13-3/20: Continue BIPAP, patient more compliant (sometimes refuses despite 

being SOB).


Consider changing Solumedrol 40mg IVP Q8H [facility] (3/6) -> Prednisone


-3/10: RAPID called due to SOB. BP 80/50's. Bolused 500cc NS. Ordered BIPAP and 

ABG, however patient refused. Placed back on NC 3L.


-3/8-3/9: Patient complaining of SOB with exertion and orthopnea. Maintain Head 

of bed elevated 30 degrees.


-3/7:  PT session: 98% O2 at 2L at rest, 96% room air at rest sitting, 82% room 

air during ambulation, 88% at 2 liter ambulation.


-3/6: Walked 20 steps today down [address], and patient became dizzy, SOB, and 

almost collapsed.


- Consulted Pulmonology, Dr. Varela, f/u recs


   - Aware of HR in 140's and SOB after eating and low levels of exertion.


   -f/u ABG (not collected)


   -LDH 654H


   -HIV - negative


   -planning for home O2


   -persistent shortness of breath


   - Patient with long history of smoking most likely has underlying COPD.  

Nebulizer treatment.  Inhaled steroids.  Spiriva.  PFT as out pt








Systolic dysfunction with acute on chronic heart failure


-Chest CT 3/16 - Cardiomegaly. Cardiac valve prosthesis. Left-sided AICD. Dense 

coronary artery calcifications. 


   -Small consolidation (favored to reflect atelectasis rather than pneumonia) 

at the left lung base. 


   -Bibasilar atelectasis. Small airways disease. Mild ground-glass and fine 

nodular opacities within the right upper lobe, possibly infectious or 

inflammatory.    


   -7 mm left upper lobe pulmonary nodule. Guidelines by the Fleischner society 

(radiology 2005; 237:390 5-400) suggests that in patients with low risk for 

lung cancer, with nodules less than or equal to 8 mm in diameter should have 

follow-up in approximately 6-12 months.  In patients with high-risk, including 

smokers, follow-up is recommended 3-6 months.  Patient with a known malignancy 

for risk for metastases should receive 3 month follow-up. 


   -Hepatic capsular calcification. 


   -Numerous low-density lesions throughout the liver, possibly cysts. 

Decompressed gallbladder limits evaluation. Bilateral hypodense renal lesions. 

Right adrenal gland hypertrophy. 12 mm left adrenal gland nodule measures 

approximately 9 Hounsfield units, likely adenoma.


-Consulted Cardiology, Dr. Dotson - f/u recs


   -3/16: Lasix 40mg IVP daily, Spironolactone 25mg daily. CXR - no interval 

pathology change. 


   -3/13: HOLD LASIX FOR 1 TO 2 DAYS AND CONT MUCINEX.  PT PRERENAL AND APPEARS 

DRY.  ON BOTH LASIX AND SPIRONOLACTONE.


   -SOB APPEARS TO BE SECONDARY TO PULM ETIOLOGY.  IMPROVES WITH NEBS.  PT WITH 

RHONCHI B/L.  MAY BENEFIT FROM PULM TOILET.


   -will attempt to obtain company of AICD for interrogation.


   -likely deconditioned.  will benefit from rehab


- EF 10-15%, mechanical MV- no regurg, tricuspid regurg. see full report


-3/10: RAPID called due to SOB. BP 80/50's. Bolused 500cc NS. Ordered BIPAP and 

ABG, however patient refused. Placed back on NC 3L. ProBNP 4070H (less than # 

on admission) and SHEYLA negative. 


-3/10: Decrease to Lasix 40mg PO daily (was BID).


Admit to telemetry, BNP 4560 on admission


Digoxin 0.125mg


Lisinopril 10mg daily


Aldactone 25mg PO Daily


patient with AICD, will check echo to determine EF


CXR 2/25: mild cardiomegaly, mild pulmonary venous congestion.  s/p sternotomy, 

aortic valve replacement, AICD








H/O mitral valve replacement with mechanical valve


Cardiac valve replacement 6-7yrs ago


Consulted Cardiology, Dr. Dotson - f/u recs


   -echocardiogram reviewed.  valve leaflets are opening and functional.  There 

is no signficant gradient across the valve.  


   -recommend INR 2.5 to 3.5.


STOP Lovenox 3/5


STOP Heparin Drip - cardiac protocol, with bolus (because patient is refusing 

blood draws)


3/1: INR 1.3


3/2: INR refused, Coumadin 10mg


3/3: INR 1.5, Coumadin 10mg


3/4: INR 2.0, Coumadin 10mg


3/5: INR 2.4, Coumadin 7.5mg; patient received one more dose of Lovenox 80mg SC 

today, now discontinued as bridge complete.


3/6: INR 2.5, Coumadin 7.5mg


3/7: INR 3.1, Coumadin 5mg


3/8: INR 3.7, Coumadin 5mg (stopped by pharmacy)


3/9: INR 2.8, Coumadin 5mg


3/10: INR 2.6, Coumadin 4mg


3/11: INR 2.7, Coumadin 4 mg


3/12: INR 2.3  Coumadin 4mg 


3/13: INR 2.1, Coumadin 6mg


3/14: INR 2.6, Coumadin 5mg


3/15: INR 2.6, Coumadin 5mg


3/16: INR 2.5, Coumadin 6mg


3/17: INR 2.9, Coumadin 6mg, 


3/18: INR 3.6, Coumadin 6mg, f/u INR


3/19: HELD Coumadin, Pt. denied blood work today


3/20: HELD Coumadin, Pt. denied blood work today





Chronic atrial fibrillation


Consulted Cardiology, Dr. Dotson - f/u recs


   -Rate controlled


   -echocardiogram reviewed.  valve leaflets are opening and functional.  There 

is no signficant gradient across the valve.  


   -recommend INR 2.5 to 3.5.


   -See PT/INR trends above


   Coumadin as above


   STOP Lovenox 3/5








Cough, acute


-3/18 does not complain of cough


-3/13-3/16: Persists. Non-productive. Continue Mucinex.


-3/9: patient developed productive cough with yellow sputum today.


-Mucinex 600mg PO BID.








CAD (coronary artery disease) 


Consulted Cardiology, Dr. Dotson - f/u recs


   -Aware of HR in 140's and SOB after eating and low levels of exertion.


   -unknown graft status.  no evidence of ischemia at present


   -No current angina





History of MI (myocardial infarction)


HOLD Crestor 10mg PO HS (last dose 3/12- due to elevated LFTs)


ROMIs negative x3


EKG paced 


Denies chest pain 





Transaminitis, acute


3/19-3/20: Patient refused blood work


3/18: 51/164 AST / ALT down trending


3/16-3/17: AST / ALT down trending - continue to hold Crestor


3/15: AST 57 / , decreasing. HOLD Crestor 10mg PO HS (last dose 3/12- 

due to elevated LFTs)


3/14: AST 75 /   


3/13: AST 70 /  - increasing -> hold crestor for 2 nights 3/13, 3/14


3/10: AST 90 /  - increasing -> hold Crestor tonight.


- AST 77 / ALT 98 -> previously normal.


- Continue to monitor





Electrolyte Imbalance


Hyperkalemia - resolved


Hypokalemia, 





Tachycardia


3/14-3/17: HR WNL


3/7: Patient becomes SOB very easily, desaturating to 82% while ambulating. 

Anytime he eats or gets up, HR climbs into 140's


Continue Digoxin 0.125mg


EKG in ER: sinus tachycardia at 100, paced, incomplete RBBB


Discontinued cardizem drip of 5mg/h started in the ER





Lower extremity edema


LE Duplex - negative. see full report.





Prophylactic measure


Coumadin as above.


protonix 40mg daily


SCD contraindicated due to LE edema


D/C when bed available





<Evgeny Elizabeth Jr. - Last Filed: 03/25/17 12:00>





Objective





- Vital Signs/Intake and Output


Vital Signs (last 24 hours): 


 











Temp Pulse Resp BP Pulse Ox


 


 98 F   80   18   97/59 L  99 


 


 03/25/17 07:30  03/25/17 07:30  03/25/17 07:30  03/25/17 07:30  03/25/17 07:30








Intake and Output: 


 











 03/25/17 03/25/17





 06:59 18:59


 


Intake Total 500 


 


Balance 500 














- Medications


Medications: 


 Current Medications





Albuterol/Ipratropium (Duoneb 3 Mg/0.5 Mg (3 Ml) Ud)  3 ml INH RQ6 [facility]


   Last Admin: 03/25/17 09:14 Dose:  3 ml


Benzocaine/Menthol (Cepacol Sore Throat)  1 keegan MT Q6H PRN


   PRN Reason: Sore Throat


   Last Admin: 03/25/17 00:53 Dose:  1 keegan


Budesonide (Pulmicort Respules)  0.5 mg INH RQ12 [facility]


   Last Admin: 03/25/17 09:14 Dose:  0.5 mg


Digoxin (Lanoxin)  0.125 mg PO DAILY@1800 [facility]


   Last Admin: 03/24/17 17:12 Dose:  0.125 mg


Furosemide (Lasix)  40 mg PO DAILY [facility]


   Last Admin: 03/24/17 09:03 Dose:  40 mg


Guaifenesin (Mucinex La)  600 mg PO BID [facility]


   Last Admin: 03/24/17 17:19 Dose:  600 mg


Prednisone (Prednisone Tab)  40 mg PO DAILY [facility]


   Last Admin: 03/24/17 09:03 Dose:  40 mg


Rosuvastatin Calcium (Crestor)  10 mg PO HS [facility]


   Last Admin: 03/12/17 22:03 Dose:  10 mg


Spironolactone (Aldactone)  25 mg PO DAILY [facility]


   Last Admin: 03/24/17 09:03 Dose:  25 mg


Tiotropium Bromide (Spiriva)  18 mcg INH RQ24 [facility]


   Last Admin: 03/25/17 09:12 Dose:  18 mcg











- Labs


Labs: 


 





 03/23/17 17:03 





 03/23/17 17:03 





 











PT  37.8 SECONDS (9.7-12.2)  H* D 03/24/17  11:15    


 


INR  3.3   03/24/17  11:15    


 


APTT  30 SECONDS (21-34)   03/24/17  11:15    














Attending/Attestation





- Attestation


I have personally seen and examined this patient.: Yes


I have fully participated in the care of the patient.: Yes


I have reviewed all pertinent clinical information, including history, physical 

exam and plan: Yes

## 2017-03-21 NOTE — CP.PCM.PN
<Sulaiman Patel - Last Filed: 03/21/17 20:58>





Subjective





- Date & Time of Evaluation


Date of Evaluation: 03/21/17


Time of Evaluation: 07:15





- Subjective


Subjective: 


PGY1 Medicine Note - Dr. Elizabeth





Patient seen and examined at bedside. No overnight events as per nursing. Pt 

was lying in bed, eating and watching tv, not using nasal cannula. Appeared 

comfortable. However once I approached him, he began to c/o SOB. He reports 

similar complaints of SOB with exertion, orthopnea, and some cough. Pt's 

symptoms are relieved by his nasal cannula as well as Bipap machine (moderately 

compliant). Denies headache, fever, chills, chest pain, palpitations, nausea, 

vomiting, diarrhea.





Objective





- Vital Signs/Intake and Output


Vital Signs (last 24 hours): 


 











Temp Pulse Resp BP Pulse Ox


 


 97.9 F   78   20   97/60 L  98 


 


 03/20/17 23:30  03/20/17 23:33  03/20/17 23:30  03/20/17 23:30  03/20/17 23:30








Intake and Output: 


 











 03/21/17 03/21/17





 06:59 18:59


 


Intake Total 700 


 


Output Total 3575 


 


Balance -2875 














- Medications


Medications: 


 Current Medications





Albuterol/Ipratropium (Duoneb 3 Mg/0.5 Mg (3 Ml) Ud)  3 ml INH RQ6 [facility]


   Last Admin: 03/21/17 07:24 Dose:  3 ml


Budesonide (Pulmicort Respules)  0.5 mg INH RQ12 [facility]


   Last Admin: 03/21/17 07:24 Dose:  0.5 mg


Digoxin (Lanoxin)  0.125 mg PO DAILY@1800 [facility]


   Last Admin: 03/20/17 16:59 Dose:  0.125 mg


Furosemide (Lasix)  40 mg PO DAILY [facility]


   Last Admin: 03/20/17 11:18 Dose:  40 mg


Guaifenesin (Mucinex La)  600 mg PO BID [facility]


   Last Admin: 03/20/17 16:59 Dose:  600 mg


Methylprednisolone (Solu-Medrol)  40 mg IVP Q8 [facility]


   Last Admin: 03/21/17 06:07 Dose:  40 mg


Rosuvastatin Calcium (Crestor)  10 mg PO HS [facility]


   Last Admin: 03/12/17 22:03 Dose:  10 mg


Spironolactone (Aldactone)  25 mg PO DAILY [facility]


   Last Admin: 03/20/17 10:01 Dose:  Not Given


Tiotropium Bromide (Spiriva)  18 mcg INH RQ24 [facility]


   Last Admin: 03/21/17 07:24 Dose:  18 mcg











- Labs


Labs: 


 





 03/18/17 11:33 





 03/18/17 11:33 





 











PT  42.4 SECONDS (9.7-12.2)  H* D 03/18/17  11:33    


 


INR  3.6   03/18/17  11:33    


 


APTT  30 SECONDS (21-34)   03/18/17  11:33    














- Additional Findings


Additional findings: 


- Constitutional


Appears: No Acute Distress





- Head Exam


Head Exam: ATRAUMATIC, NORMOCEPHALIC





- Eye Exam


Eye Exam: EOMI


Pupil Exam: NORMAL ACCOMODATION





- ENT Exam


ENT Exam: Mucous Membranes Moist, Normal Exam





- Neck Exam


Neck Exam: Full ROM, Normal Inspection





- Respiratory Exam


Respiratory Exam: Rhonchi (right side )





- Cardiovascular Exam


Cardiovascular Exam: +S1, +S2, Murmur





- GI/Abdominal Exam


GI & Abdominal Exam: Soft, Tenderness


Additional comments: 


-Tender to palpation at R sternal border, 2nd rib. Protrusion noted (chronic)


-Waxing/waning tenderness to palpation over site of pacemaker (chronic)





- Extremities Exam


Extremities Exam: Joint Swelling (improved from previous)





- Neurological Exam


Neurological Exam: Alert, Awake





- Psychiatric Exam


Psychiatric exam: Normal Affect, Normal Mood





- Skin


Skin Exam: Intact, Warm





Assessment and Plan





- Assessment and Plan (Free Text)


Assessment: 


72 year old male with past medical history including MI, CAD with history of 

CABG and valve replacement 6-7 years ago.  Reports worstenting SOB and 

dizziness with associated swelling of bilateral LE for past 2-3 days. Admits 

this is the first time he has experienced these symptoms. SOB is worse when he 

lays flat.





Plan:


-Discharge planning to [facility] once INR in 2.5-3.5 range and SOB is better 

controlled. Patient has [place] insurance, case is working on it.


-3/16: Dr. Hanna recommends pulmonary rehab, case is looking into facilities.








COPD (chronic obstructive pulmonary disease)


-3/13-3/20: Continue BIPAP, patient more compliant (sometimes refuses despite 

being SOB).


Consider changing Solumedrol 40mg IVP Q8H [facility] (3/6) -> Prednisone


-3/10: RAPID called due to SOB. BP 80/50's. Bolused 500cc NS. Ordered BIPAP and 

ABG, however patient refused. Placed back on NC 3L.


-3/8-3/9: Patient complaining of SOB with exertion and orthopnea. Maintain Head 

of bed elevated 30 degrees.


-3/7:  PT session: 98% O2 at 2L at rest, 96% room air at rest sitting, 82% room 

air during ambulation, 88% at 2 liter ambulation.


-3/6: Walked 20 steps today down [address], and patient became dizzy, SOB, and 

almost collapsed.


- Consulted Pulmonology, Dr. Varela, f/u recs


   -planning for home O2


   - Aware of HR in 140's and SOB after eating and low levels of exertion.


   -f/u ABG (not collected)


   -LDH 654H


   -HIV - negative


   -persistent shortness of breath


   - Patient with long history of smoking most likely has underlying COPD.  

Nebulizer treatment.  Inhaled steroids.  Spiriva.  PFT as out pt








Systolic dysfunction with acute on chronic heart failure


- EF 10-15%, mechanical MV- no regurg, tricuspid regurg. see full report


-Chest CT 3/16 - Cardiomegaly. Cardiac valve prosthesis. Left-sided AICD. Dense 

coronary artery calcifications. 


   -Small consolidation (favored to reflect atelectasis rather than pneumonia) 

at the left lung base. 


   -Bibasilar atelectasis. Small airways disease. Mild ground-glass and fine 

nodular opacities within the right upper lobe, possibly infectious or 

inflammatory.    


   -7 mm left upper lobe pulmonary nodule. Guidelines by the Fleischner society 

(radiology 2005; 237:390 5-400) suggests that in patients with low risk for 

lung cancer, with nodules less than or equal to 8 mm in diameter should have 

follow-up in approximately 6-12 months.  In patients with high-risk, including 

smokers, follow-up is recommended 3-6 months.  Patient with a known malignancy 

for risk for metastases should receive 3 month follow-up. 


   -Hepatic capsular calcification. 


   -Numerous low-density lesions throughout the liver, possibly cysts. 

Decompressed gallbladder limits evaluation. Bilateral hypodense renal lesions. 

Right adrenal gland hypertrophy. 12 mm left adrenal gland nodule measures 

approximately 9 Hounsfield units, likely adenoma.


-Consulted Cardiology, Dr. Dotson - f/u recs


   -3/16: Lasix 40mg IVP daily, Spironolactone 25mg daily. CXR - no interval 

pathology change. 


   -3/13: HOLD LASIX FOR 1 TO 2 DAYS AND CONT MUCINEX.  PT PRERENAL AND APPEARS 

DRY.  ON BOTH LASIX AND SPIRONOLACTONE.


   -SOB APPEARS TO BE SECONDARY TO PULM ETIOLOGY.  IMPROVES WITH NEBS.  PT WITH 

RHONCHI B/L.  MAY BENEFIT FROM PULM TOILET.


   [facility].


   -likely deconditioned.  will benefit from rehab


-3/10: RAPID called due to SOB. BP 80/50's. Bolused 500cc NS. Ordered BIPAP and 

ABG, however patient refused. Placed back on NC 3L. ProBNP 4070H (less than # 

on admission) and SHEYLA negative. 


-3/10: Decrease to Lasix 40mg PO daily (was BID).


Admit to telemetry, BNP 4560 on admission


Digoxin 0.125mg


Lisinopril 10mg daily


Aldactone 25mg PO Daily


patient with AICD, will check echo to determine EF


CXR 2/25: mild cardiomegaly, mild pulmonary venous congestion.  s/p sternotomy, 

aortic valve replacement, AICD








H/O mitral valve replacement with mechanical valve


Cardiac valve replacement 6-7yrs ago


Consulted Cardiology, Dr. Dotson - f/u recs


   -echocardiogram reviewed.  valve leaflets are opening and functional.  There 

is no signficant gradient across the valve.  


   -recommend INR 2.5 to 3.5.


   STOP Lovenox 3/5


   STOP Heparin Drip - cardiac protocol, with bolus (because patient is 

refusing blood draws)


3/1: INR 1.3


3/2: INR refused, Coumadin 10mg


3/3: INR 1.5, Coumadin 10mg


3/4: INR 2.0, Coumadin 10mg


3/5: INR 2.4, Coumadin 7.5mg; patient received one more dose of Lovenox 80mg SC 

today, now discontinued as bridge complete.


3/6: INR 2.5, Coumadin 7.5mg


3/7: INR 3.1, Coumadin 5mg


3/8: INR 3.7, Coumadin 5mg (stopped by pharmacy)


3/9: INR 2.8, Coumadin 5mg


3/10: INR 2.6, Coumadin 4mg


3/11: INR 2.7, Coumadin 4 mg


3/12: INR 2.3  Coumadin 4mg 


3/13: INR 2.1, Coumadin 6mg


3/14: INR 2.6, Coumadin 5mg


3/15: INR 2.6, Coumadin 5mg


3/16: INR 2.5, Coumadin 6mg


3/17: INR 2.9, Coumadin 6mg, 


3/18: INR 3.6, Coumadin 6mg, f/u INR


3/19: HELD Coumadin, Pt. denied blood work today


3/20: HELD Coumadin, Pt. denied blood work today


3/21: INR 1.3, Coumadin 7mg, f/u INR





Chronic atrial fibrillation


Consulted Cardiology, Dr. Dotson - f/u recs


   -Rate controlled


   -echocardiogram reviewed.  valve leaflets are opening and functional.  There 

is no signficant gradient across the valve.  


   -recommend INR 2.5 to 3.5.


   -See PT/INR trends above


   Coumadin as above


   STOP Lovenox 3/5








Cough, acute


-3/18 does not complain of cough


-3/13-3/16: Persists. Non-productive. Continue Mucinex.


-3/9: patient developed productive cough with yellow sputum today.


-Mucinex 600mg PO BID.








CAD (coronary artery disease) 


Consulted Cardiology, Dr. Dotson - f/u recs


   -Aware of HR in 140's and SOB after eating and low levels of exertion.


   -unknown graft status.  no evidence of ischemia at present


   -No current angina





History of MI (myocardial infarction)


HOLD Crestor 10mg PO HS (last dose 3/12- due to elevated LFTs)


ROMIs negative x3


EKG paced 


Denies chest pain 





Transaminitis, acute


3/21: AST 37 /  - continue to hold crestor.


3/19-3/20: Patient refused blood work


3/18: 51/164 AST / ALT down trending


3/16-3/17: AST / ALT down trending - continue to hold Crestor


3/15: AST 57 / , decreasing. HOLD Crestor 10mg PO HS (last dose 3/12- 

due to elevated LFTs)


3/14: AST 75 /   


3/13: AST 70 /  - increasing -> hold crestor for 2 nights 3/13, 3/14


3/10: AST 90 /  - increasing -> hold Crestor tonight.


- AST 77 / ALT 98 -> previously normal.


- Continue to monitor





Electrolyte Imbalance


Hyperkalemia - resolved


Hypokalemia, 


3/21: Hyponatremia, Na 127 - f/u





Tachycardia


HR WNL, continue to monitor


3/14-3/17: HR WNL


3/7: Patient becomes SOB very easily, desaturating to 82% while ambulating. 

Anytime he eats or gets up, HR climbs into 140's


Continue Digoxin 0.125mg


EKG in ER: sinus tachycardia at 100, paced, incomplete RBBB


Discontinued cardizem drip of 5mg/h started in the ER





Lower extremity edema


LE Duplex - negative. see full report.





Prophylactic measure


Coumadin as above.


protonix 40mg daily


SCD contraindicated due to LE edema


D/C when bed available








<Evgeny Elizabeth Jr. - Last Filed: 03/25/17 12:01>





Objective





- Vital Signs/Intake and Output


Vital Signs (last 24 hours): 


 











Temp Pulse Resp BP Pulse Ox


 


 98 F   80   18   97/59 L  99 


 


 03/25/17 07:30  03/25/17 07:30  03/25/17 07:30  03/25/17 07:30  03/25/17 07:30








Intake and Output: 


 











 03/25/17 03/25/17





 06:59 18:59


 


Intake Total 500 


 


Balance 500 














- Medications


Medications: 


 Current Medications





Albuterol/Ipratropium (Duoneb 3 Mg/0.5 Mg (3 Ml) Ud)  3 ml INH RQ6 [facility]


   Last Admin: 03/25/17 09:14 Dose:  3 ml


Benzocaine/Menthol (Cepacol Sore Throat)  1 keegan MT Q6H PRN


   PRN Reason: Sore Throat


   Last Admin: 03/25/17 00:53 Dose:  1 keegan


Budesonide (Pulmicort Respules)  0.5 mg INH RQ12 [facility]


   Last Admin: 03/25/17 09:14 Dose:  0.5 mg


Digoxin (Lanoxin)  0.125 mg PO DAILY@1800 [facility]


   Last Admin: 03/24/17 17:12 Dose:  0.125 mg


Furosemide (Lasix)  40 mg PO DAILY [facility]


   Last Admin: 03/24/17 09:03 Dose:  40 mg


Guaifenesin (Mucinex La)  600 mg PO BID [facility]


   Last Admin: 03/24/17 17:19 Dose:  600 mg


Prednisone (Prednisone Tab)  40 mg PO DAILY [facility]


   Last Admin: 03/24/17 09:03 Dose:  40 mg


Rosuvastatin Calcium (Crestor)  10 mg PO HS [facility]


   Last Admin: 03/12/17 22:03 Dose:  10 mg


Spironolactone (Aldactone)  25 mg PO DAILY [facility]


   Last Admin: 03/24/17 09:03 Dose:  25 mg


Tiotropium Bromide (Spiriva)  18 mcg INH RQ24 [facility]


   Last Admin: 03/25/17 09:12 Dose:  18 mcg











- Labs


Labs: 


 





 03/23/17 17:03 





 03/23/17 17:03 





 











PT  37.8 SECONDS (9.7-12.2)  H* D 03/24/17  11:15    


 


INR  3.3   03/24/17  11:15    


 


APTT  30 SECONDS (21-34)   03/24/17  11:15    














Attending/Attestation





- Attestation


I have personally seen and examined this patient.: Yes


I have fully participated in the care of the patient.: Yes


I have reviewed all pertinent clinical information, including history, physical 

exam and plan: Yes

## 2017-03-21 NOTE — CP.PCM.PN
Subjective





- Date & Time of Evaluation


Date of Evaluation: 03/21/17


Time of Evaluation: 09:05





- Subjective


Subjective: 


Patient seen and examined at bedside.


Pt was lying in bed comfortable, and was using nasal cannula at the time of 

examination. Patient stated that this treatment with nasal canula was enough to 

keep him comfortable, and he has not felt the need to use BiPAP thus far today.





Pt states similar complaints of SOB with exertion, orthopnea, and some non 

productive dry cough that wax and wane in severity. 





Pt continues to deny congestion, CP and subjective fever today. 





Objective





- Vital Signs/Intake and Output


Vital Signs (last 24 hours): 


 











Temp Pulse Resp BP Pulse Ox


 


 97.7 F   85   22   110/70   98 


 


 03/21/17 07:08  03/21/17 08:00  03/21/17 07:08  03/21/17 10:30  03/21/17 07:08








Intake and Output: 


 











 03/21/17 03/21/17





 06:59 18:59


 


Intake Total 700 


 


Output Total 3575 


 


Balance -2875 














- Medications


Medications: 


 Current Medications





Albuterol/Ipratropium (Duoneb 3 Mg/0.5 Mg (3 Ml) Ud)  3 ml INH RQ6 [facility]


   Last Admin: 03/21/17 07:24 Dose:  3 ml


Budesonide (Pulmicort Respules)  0.5 mg INH RQ12 [facility]


   Last Admin: 03/21/17 07:24 Dose:  0.5 mg


Digoxin (Lanoxin)  0.125 mg PO DAILY@1800 [facility]


   Last Admin: 03/20/17 16:59 Dose:  0.125 mg


Furosemide (Lasix)  40 mg PO DAILY [facility]


   Last Admin: 03/21/17 10:30 Dose:  40 mg


Guaifenesin (Mucinex La)  600 mg PO BID [facility]


   Last Admin: 03/21/17 10:30 Dose:  600 mg


Methylprednisolone (Solu-Medrol)  40 mg IVP Q8 [facility]


   Last Admin: 03/21/17 06:07 Dose:  40 mg


Rosuvastatin Calcium (Crestor)  10 mg PO HS [facility]


   Last Admin: 03/12/17 22:03 Dose:  10 mg


Spironolactone (Aldactone)  25 mg PO DAILY [facility]


   Last Admin: 03/21/17 10:30 Dose:  25 mg


Tiotropium Bromide (Spiriva)  18 mcg INH RQ24 [facility]


   Last Admin: 03/21/17 07:24 Dose:  18 mcg











- Labs


Labs: 


 





 03/18/17 11:33 





 03/18/17 11:33 





 











PT  42.4 SECONDS (9.7-12.2)  H* D 03/18/17  11:33    


 


INR  3.6   03/18/17  11:33    


 


APTT  30 SECONDS (21-34)   03/18/17  11:33    














- Constitutional


Appears: Non-toxic, No Acute Distress





- Head Exam


Head Exam: NORMAL INSPECTION





- Eye Exam


Eye Exam: EOMI, Normal appearance





- ENT Exam


ENT Exam: Mucous Membranes Moist





- Neck Exam


Neck Exam: Normal Inspection





- Respiratory Exam


Respiratory Exam: Rhonchi (minimal), NORMAL BREATHING PATTERN





- Cardiovascular Exam


Cardiovascular Exam: REGULAR RHYTHM, +S1, +S2





- Extremities Exam


Extremities Exam: absent: Pedal Edema





- Neurological Exam


Neurological Exam: Alert, Awake, Oriented x3





- Psychiatric Exam


Psychiatric exam: Normal Affect, Normal Mood





- Skin


Skin Exam: Dry, Intact, Normal Color, Warm





Assessment and Plan


(1) COPD (chronic obstructive pulmonary disease)


Assessment & Plan: 


Switch from Solu-Medrol to Prednisone





Continue nebulizer treatment as needed


Continue oxygen and BiPAP as needed








Pending placement. Pt will need home oxygen upon discharge. 





Continue to monitor for increased SOB, productive cough, fever, and CP. 


Status: Acute





(2) Acute exacerbation of CHF (congestive heart failure)


Status: Acute





(3) Chronic atrial fibrillation


Status: Chronic

## 2017-03-22 NOTE — CP.PCM.PN
<Sulaiman Patel - Last Filed: 03/22/17 19:57>





Subjective





- Date & Time of Evaluation


Date of Evaluation: 03/22/17


Time of Evaluation: 07:05





- Subjective


Subjective: 


PGY1 Medicine Note - Dr. Elizabeth





Patient seen and examined at bedside. No overnight events as per nursing. Pt 

was lying in bed, eating and watching tv, wearing oxygen mask today, says it 

eases his SOB. SOB with exertion, orthopnea, and some cough persist. Nasal 

cannula and Bipap help. Denies headache, fever, chills, chest pain, palpitations

, nausea, vomiting, diarrhea.





Objective





- Vital Signs/Intake and Output


Vital Signs (last 24 hours): 


 











Temp Pulse Resp BP Pulse Ox


 


 97.9 F   84   20   96/64 L  97 


 


 03/22/17 07:39  03/22/17 07:39  03/22/17 07:39  03/22/17 07:39  03/22/17 07:39








Intake and Output: 


 











 03/22/17 03/22/17





 06:59 18:59


 


Output Total 600 


 


Balance -600 














- Medications


Medications: 


 Current Medications





Albuterol/Ipratropium (Duoneb 3 Mg/0.5 Mg (3 Ml) Ud)  3 ml INH RQ6 [facility]


   Last Admin: 03/22/17 07:27 Dose:  3 ml


Budesonide (Pulmicort Respules)  0.5 mg INH RQ12 [facility]


   Last Admin: 03/22/17 07:27 Dose:  0.5 mg


Digoxin (Lanoxin)  0.125 mg PO DAILY@1800 [facility]


   Last Admin: 03/21/17 17:26 Dose:  0.125 mg


Furosemide (Lasix)  40 mg PO DAILY [facility]


   Last Admin: 03/21/17 10:30 Dose:  40 mg


Guaifenesin (Mucinex La)  600 mg PO BID [facility]


   Last Admin: 03/21/17 19:23 Dose:  600 mg


Methylprednisolone (Solu-Medrol)  40 mg IVP Q8 [facility]


   Last Admin: 03/22/17 05:30 Dose:  40 mg


Rosuvastatin Calcium (Crestor)  10 mg PO HS [facility]


   Last Admin: 03/12/17 22:03 Dose:  10 mg


Spironolactone (Aldactone)  25 mg PO DAILY [facility]


   Last Admin: 03/21/17 10:30 Dose:  25 mg


Tiotropium Bromide (Spiriva)  18 mcg INH RQ24 [facility]


   Last Admin: 03/22/17 07:27 Dose:  18 mcg











- Labs


Labs: 


 





 03/21/17 13:36 





 03/21/17 13:42 





 











PT  14.9 SECONDS (9.7-12.2)  H  03/21/17  13:36    


 


INR  1.3   03/21/17  13:36    


 


APTT  25 SECONDS (21-34)   03/21/17  13:36    














- Additional Findings


Additional findings: 


- Constitutional


Appears: No Acute Distress





- Head Exam


Head Exam: ATRAUMATIC, NORMOCEPHALIC





- Eye Exam


Eye Exam: EOMI


Pupil Exam: NORMAL ACCOMODATION





- ENT Exam


ENT Exam: Mucous Membranes Moist, Normal Exam





- Neck Exam


Neck Exam: Full ROM, Normal Inspection





- Respiratory Exam


Respiratory Exam: Decreased Breath Sounds, Wheezes (expiratory, diffuse), 

Rhonchi (scattered), Respiratory Distress (with orthopnea).


-desaturates to 82% while walking, 88% with NC 2L





- Cardiovascular Exam


Cardiovascular Exam: +S1, +S2, Murmur





- GI/Abdominal Exam


GI & Abdominal Exam: Soft, Tenderness


Additional comments: 


-Tender to palpation at R sternal border, 2nd rib. Protrusion noted (chronic)


-Waxing/waning tenderness to palpation over site of pacemaker (chronic)





- Extremities Exam


Extremities Exam: Joint Swelling (improved from previous)





- Neurological Exam


Neurological Exam: Alert, Awake





- Psychiatric Exam


Psychiatric exam: Normal Affect, Normal Mood





- Skin


Skin Exam: Intact, Warm





Assessment and Plan





- Assessment and Plan (Free Text)


Assessment: 


72 year old male with past medical history including MI, CAD with history of 

CABG and valve replacement 6-7 years ago.  Reports worstenting SOB and 

dizziness with associated swelling of bilateral LE for past 2-3 days. Admits 

this is the first time he has experienced these symptoms. SOB is worse when he 

lays flat.





Plan:


-Discharge planning to [facility] once INR in 2.5-3.5 range and SOB is better 

controlled. Patient has [place] insurance, case is working on it.


-3/16: Dr. Hanna recommends pulmonary rehab, case is looking into facilities - 

none present locally








COPD (chronic obstructive pulmonary disease)


-3/22: Solumedrol reduced to 40mg IVP Q12H. Prior to discharge, change 

Solumedrol -> Prednisone


-3/13-3/22: Continue BIPAP, patient more compliant (sometimes refuses despite 

being SOB).


-3/10: RAPID called due to SOB. BP 80/50's. Bolused 500cc NS. Ordered BIPAP and 

ABG, however patient refused. Placed back on NC 3L.


-3/8-3/9: Patient complaining of SOB with exertion and orthopnea. Maintain Head 

of bed elevated 30 degrees.


-3/7:  PT session: 98% O2 at 2L at rest, 96% room air at rest sitting, 82% room 

air during ambulation, 88% at 2 liter ambulation.


-3/6: Walked 20 steps today down [address], and patient became dizzy, SOB, and 

almost collapsed.


- Consulted Pulmonology, Dr. Varela, f/u recs


   -planning for home O2


   - Aware of HR in 140's and SOB after eating and low levels of exertion.


   -f/u ABG (not collected)


   -LDH 654H


   -HIV - negative


   -persistent shortness of breath


   - Patient with long history of smoking most likely has underlying COPD.  

Nebulizer treatment.  Inhaled steroids.  Spiriva.  PFT as out pt








Systolic dysfunction with acute on chronic heart failure


- EF 10-15%, mechanical MV- no regurg, tricuspid regurg. see full report


-Chest CT 3/16 - Cardiomegaly. Cardiac valve prosthesis. Left-sided AICD. Dense 

coronary artery calcifications. 


   -Small consolidation (favored to reflect atelectasis rather than pneumonia) 

at the left lung base. 


   -Bibasilar atelectasis. Small airways disease. Mild ground-glass and fine 

nodular opacities within the right upper lobe, possibly infectious or 

inflammatory.    


   -7 mm left upper lobe pulmonary nodule. Guidelines by the Fleischner society 

(radiology 2005; 237:390 5-400) suggests that in patients with low risk for 

lung cancer, with nodules less than or equal to 8 mm in diameter should have 

follow-up in approximately 6-12 months.  In patients with high-risk, including 

smokers, follow-up is recommended 3-6 months.  Patient with a known malignancy 

for risk for metastases should receive 3 month follow-up. 


   -Hepatic capsular calcification. 


   -Numerous low-density lesions throughout the liver, possibly cysts. 

Decompressed gallbladder limits evaluation. Bilateral hypodense renal lesions. 

Right adrenal gland hypertrophy. 12 mm left adrenal gland nodule measures 

approximately 9 Hounsfield units, likely adenoma.


-Consulted Cardiology, Dr. Dotson - f/u recs


   -3/16: Lasix 40mg IVP daily, Spironolactone 25mg daily. CXR - no interval 

pathology change. 


   -3/13: HOLD LASIX FOR 1 TO 2 DAYS AND CONT MUCINEX.  PT PRERENAL AND APPEARS 

DRY.  ON BOTH LASIX AND SPIRONOLACTONE.


   -SOB APPEARS TO BE SECONDARY TO PULM ETIOLOGY.  IMPROVES WITH NEBS.  PT WITH 

RHONCHI B/L.  MAY BENEFIT FROM PULM TOILET.


   -will attempt to obtain company of [facility] for interrogation.


   -likely deconditioned.  will benefit from rehab


-3/10: RAPID called due to SOB. BP 80/50's. Bolused 500cc NS. Ordered BIPAP and 

ABG, however patient refused. Placed back on NC 3L. ProBNP 4070H (less than # 

on admission) and SHEYLA negative. 


-3/10: Decrease to Lasix 40mg PO daily (was BID).


Admit to telemetry, BNP 4560 on admission


Digoxin 0.125mg


Lisinopril 10mg daily


Aldactone 25mg PO Daily


patient with AICD, will check echo to determine EF


CXR 2/25: mild cardiomegaly, mild pulmonary venous congestion.  s/p sternotomy, 

aortic valve replacement, AICD








H/O mitral valve replacement with mechanical valve


Cardiac valve replacement 6-7yrs ago


Consulted Cardiology, Dr. Dotson - f/u recs


   -echocardiogram reviewed.  valve leaflets are opening and functional.  There 

is no signficant gradient across the valve.  


   -recommend INR 2.5 to 3.5.


   STOP Lovenox 3/5


   STOP Heparin Drip - cardiac protocol, with bolus (because patient is 

refusing blood draws)


3/1: INR 1.3


3/2: INR refused, Coumadin 10mg


3/3: INR 1.5, Coumadin 10mg


3/4: INR 2.0, Coumadin 10mg


3/5: INR 2.4, Coumadin 7.5mg; patient received one more dose of Lovenox 80mg SC 

today, now discontinued as bridge complete.


3/6: INR 2.5, Coumadin 7.5mg


3/7: INR 3.1, Coumadin 5mg


3/8: INR 3.7, Coumadin 5mg (stopped by pharmacy)


3/9: INR 2.8, Coumadin 5mg


3/10: INR 2.6, Coumadin 4mg


3/11: INR 2.7, Coumadin 4 mg


3/12: INR 2.3  Coumadin 4mg 


3/13: INR 2.1, Coumadin 6mg


3/14: INR 2.6, Coumadin 5mg


3/15: INR 2.6, Coumadin 5mg


3/16: INR 2.5, Coumadin 6mg


3/17: INR 2.9, Coumadin 6mg, 


3/18: INR 3.6, Coumadin 6mg, f/u INR


3/19: HELD Coumadin, Pt. denied blood work today


3/20: HELD Coumadin, Pt. denied blood work today


3/21: INR 1.3, Coumadin 7mg, f/u


3/22: INR 1.5, Coumadin 7mg, f/u INR





Chronic atrial fibrillation


Consulted Cardiology, Dr. Dotson - f/u recs


   -Rate controlled


   -echocardiogram reviewed.  valve leaflets are opening and functional.  There 

is no signficant gradient across the valve.  


   -recommend INR 2.5 to 3.5.


   -See PT/INR trends above


   Coumadin as above


   STOP Lovenox 3/5








Cough, acute


-3/22: cough intermittent


-3/18 does not complain of cough


-3/13-3/16: Persists. Non-productive. Continue Mucinex.


-3/9: patient developed productive cough with yellow sputum today.


-Mucinex 600mg PO BID.








CAD (coronary artery disease) 


Consulted Cardiology, Dr. Dotson - f/u recs


   -Aware of HR in 140's and SOB after eating and low levels of exertion.


   -unknown graft status.  no evidence of ischemia at present


   -No current angina





History of MI (myocardial infarction)


HOLD Crestor 10mg PO HS (last dose 3/12- due to elevated LFTs)


ROMIs negative x3


EKG paced 


Denies chest pain 





Transaminitis, acute


3/22: Improving. hold crestor


3/21: AST 37 /  - continue to hold crestor.


3/19-3/20: Patient refused blood work


3/18: 51/164 AST / ALT down trending


3/16-3/17: AST / ALT down trending - continue to hold Crestor


3/15: AST 57 / , decreasing. HOLD Crestor 10mg PO HS (last dose 3/12- 

due to elevated LFTs)


3/14: AST 75 /   


3/13: AST 70 /  - increasing -> hold crestor for 2 nights 3/13, 3/14


3/10: AST 90 /  - increasing -> hold Crestor tonight.


- AST 77 / ALT 98 -> previously normal.


- Continue to monitor





Electrolyte Imbalance


3/15-3/22: Hyponatremia, stable


Hyperkalemia - resolved


Hypokalemia, 








Tachycardia


HR WNL, continue to monitor


3/14-3/17: HR WNL


3/7: Patient becomes SOB very easily, desaturating to 82% while ambulating. 

Anytime he eats or gets up, HR climbs into 140's


Continue Digoxin 0.125mg


EKG in ER: sinus tachycardia at 100, paced, incomplete RBBB


Discontinued cardizem drip of 5mg/h started in the ER





Lower extremity edema


LE Duplex - negative. see full report.





Prophylactic measure


Coumadin as above.


protonix 40mg daily


SCD contraindicated due to LE edema


D/C when bed available








<Evgeny Elizabeth Jr. - Last Filed: 03/25/17 12:03>





Objective





- Vital Signs/Intake and Output


Vital Signs (last 24 hours): 


 











Temp Pulse Resp BP Pulse Ox


 


 98 F   80   18   97/59 L  99 


 


 03/25/17 07:30  03/25/17 07:30  03/25/17 07:30  03/25/17 07:30  03/25/17 07:30








Intake and Output: 


 











 03/25/17 03/25/17





 06:59 18:59


 


Intake Total 500 


 


Balance 500 














- Medications


Medications: 


 Current Medications





Albuterol/Ipratropium (Duoneb 3 Mg/0.5 Mg (3 Ml) Ud)  3 ml INH RQ6 [facility]


   Last Admin: 03/25/17 09:14 Dose:  3 ml


Benzocaine/Menthol (Cepacol Sore Throat)  1 keegan MT Q6H PRN


   PRN Reason: Sore Throat


   Last Admin: 03/25/17 00:53 Dose:  1 keegan


Budesonide (Pulmicort Respules)  0.5 mg INH RQ12 [facility]


   Last Admin: 03/25/17 09:14 Dose:  0.5 mg


Digoxin (Lanoxin)  0.125 mg PO DAILY@1800 [facility]


   Last Admin: 03/24/17 17:12 Dose:  0.125 mg


Furosemide (Lasix)  40 mg PO DAILY [facility]


   Last Admin: 03/24/17 09:03 Dose:  40 mg


Guaifenesin (Mucinex La)  600 mg PO BID [facility]


   Last Admin: 03/24/17 17:19 Dose:  600 mg


Prednisone (Prednisone Tab)  40 mg PO DAILY [facility]


   Last Admin: 03/24/17 09:03 Dose:  40 mg


Rosuvastatin Calcium (Crestor)  10 mg PO HS [facility]


   Last Admin: 03/12/17 22:03 Dose:  10 mg


Spironolactone (Aldactone)  25 mg PO DAILY [facility]


   Last Admin: 03/24/17 09:03 Dose:  25 mg


Tiotropium Bromide (Spiriva)  18 mcg INH RQ24 [facility]


   Last Admin: 03/25/17 09:12 Dose:  18 mcg











- Labs


Labs: 


 





 03/23/17 17:03 





 03/23/17 17:03 





 











PT  37.8 SECONDS (9.7-12.2)  H* D 03/24/17  11:15    


 


INR  3.3   03/24/17  11:15    


 


APTT  30 SECONDS (21-34)   03/24/17  11:15    














Attending/Attestation





- Attestation


I have personally seen and examined this patient.: Yes


I have fully participated in the care of the patient.: Yes


I have reviewed all pertinent clinical information, including history, physical 

exam and plan: Yes

## 2017-03-23 NOTE — CP.PCM.PN
<Sulaiman Patel - Last Filed: 03/23/17 18:07>





Subjective





- Date & Time of Evaluation


Date of Evaluation: 03/23/17


Time of Evaluation: 08:05





- Subjective


Subjective: 


PGY1 Medicine Note - Dr. Elizabeth





Patient seen and examined at bedside. No overnight events as per nursing. Pt is 

at his baseline overall. Lying in bed, eating and watching tv, wearing NC 

today. SOB with exertion, orthopnea, and some cough persist (non productive). 

Nasal cannula and Bipap help (although sometimes refuses BIPAP). Denies headache

, fever, chills, chest pain, palpitations, nausea, vomiting, diarrhea.





Objective





- Vital Signs/Intake and Output


Vital Signs (last 24 hours): 


 











Temp Pulse Resp BP Pulse Ox


 


 97.6 F   56 L  18   111/68   100 


 


 03/23/17 08:24  03/23/17 08:24  03/23/17 08:24  03/23/17 11:22  03/23/17 08:24








Intake and Output: 


 











 03/23/17 03/23/17





 06:59 18:59


 


Intake Total 840 350


 


Output Total 2475 


 


Balance -1635 350














- Medications


Medications: 


 Current Medications





Albuterol/Ipratropium (Duoneb 3 Mg/0.5 Mg (3 Ml) Ud)  3 ml INH RQ6 Pending sale to [facility]


   Last Admin: 03/23/17 08:00 Dose:  Not Given


Budesonide (Pulmicort Respules)  0.5 mg INH RQ12 Pending sale to [facility]


   Last Admin: 03/23/17 08:00 Dose:  Not Given


Digoxin (Lanoxin)  0.125 mg PO DAILY@1800 Pending sale to [facility]


   Last Admin: 03/22/17 17:21 Dose:  0.125 mg


Furosemide (Lasix)  40 mg PO DAILY Pending sale to [facility]


   Last Admin: 03/23/17 11:22 Dose:  Not Given


Guaifenesin (Mucinex La)  600 mg PO BID Pending sale to [facility]


   Last Admin: 03/23/17 11:21 Dose:  600 mg


Prednisone (Prednisone Tab)  40 mg PO DAILY Pending sale to [facility]


Rosuvastatin Calcium (Crestor)  10 mg PO HS Pending sale to [facility]


   Last Admin: 03/12/17 22:03 Dose:  10 mg


Spironolactone (Aldactone)  25 mg PO DAILY Pending sale to [facility]


   Last Admin: 03/23/17 11:22 Dose:  25 mg


Tiotropium Bromide (Spiriva)  18 mcg INH RQ24 Pending sale to [facility]


   Last Admin: 03/23/17 08:00 Dose:  Not Given


Warfarin Sodium (Coumadin)  3 mg PO 1800 Pending sale to [facility]


   Stop: 03/23/17 18:01


Warfarin Sodium (Coumadin)  4 mg PO 1800 Pending sale to [facility]


   Stop: 03/23/17 18:01











- Labs


Labs: 


 





 03/22/17 14:59 





 03/22/17 14:59 





 











PT  17.0 SECONDS (9.7-12.2)  H  03/22/17  14:59    


 


INR  1.5   03/22/17  14:59    


 


APTT  26 SECONDS (21-34)   03/22/17  14:59    














- Additional Findings


Additional findings: 


- Constitutional


Appears: No Acute Distress





- Head Exam


Head Exam: ATRAUMATIC, NORMOCEPHALIC





- Eye Exam


Eye Exam: EOMI


Pupil Exam: NORMAL ACCOMODATION





- ENT Exam


ENT Exam: Mucous Membranes Moist, Normal Exam





- Neck Exam


Neck Exam: Full ROM, Normal Inspection





- Respiratory Exam


Respiratory Exam: Decreased Breath Sounds, Wheezes (expiratory, diffuse), 

Rhonchi (scattered), Respiratory Distress (with orthopnea).


-desaturates to 82% while walking, 88% with NC 2L





- Cardiovascular Exam


Cardiovascular Exam: +S1, +S2, Murmur





- GI/Abdominal Exam


GI & Abdominal Exam: Soft, Tenderness


Additional comments: 


-Tender to palpation at R sternal border, 2nd rib. Protrusion noted (chronic)


-Waxing/waning tenderness to palpation over site of pacemaker (chronic)





- Extremities Exam


Extremities Exam: Joint Swelling (improved from previous)





- Neurological Exam


Neurological Exam: Alert, Awake





- Psychiatric Exam


Psychiatric exam: Normal Affect, Normal Mood





- Skin


Skin Exam: Intact, Warm





Assessment and Plan





- Assessment and Plan (Free Text)


Assessment: 


72 year old male with past medical history including MI, CAD with history of 

CABG and valve replacement 6-7 years ago.  Reports worstenting SOB and 

dizziness with associated swelling of bilateral LE for past 2-3 days. Admits 

this is the first time he has experienced these symptoms. SOB is worse when he 

lays flat.





Plan:


-f/u PT eval 3/24/17. Required prior to discharge.


-Discharge planning to [facility] once INR in 2.5-3.5 range and SOB is better 

controlled. Patient has [place] insurance, case is working on rehab 

authorization. 


-3/16: Dr. Hanna recommends pulmonary rehab, case is looking into facilities - 

none present locally








COPD (chronic obstructive pulmonary disease)


3/23: Stop Solumedrol IVP;  Start Prednisone 40mg PO daily; Taper on discharge. 

Will require home oxygen.


-3/22: Solumedrol reduced to 40mg IVP Q12H. Prior to discharge, change 

Solumedrol -> Prednisone


-3/13-3/22: Continue BIPAP, patient more compliant (sometimes refuses despite 

being SOB).


-3/10: RAPID called due to SOB. BP 80/50's. Bolused 500cc NS. Ordered BIPAP and 

ABG, however patient refused. Placed back on NC 3L.


-3/8-3/9: Patient complaining of SOB with exertion and orthopnea. Maintain Head 

of bed elevated 30 degrees.


-3/7:  PT session: 98% O2 at 2L at rest, 96% room air at rest sitting, 82% room 

air during ambulation, 88% at 2 liter ambulation.


-3/6: Walked 20 steps today down [address], and patient became dizzy, SOB, and 

almost collapsed.


- Consulted Pulmonology, Dr. Varela, f/u recs


   -planning for home O2


   - Aware of HR in 140's and SOB after eating and low levels of exertion.


   -f/u ABG (not collected)


   -LDH 654H


   -HIV - negative


   -persistent shortness of breath


   - Patient with long history of smoking most likely has underlying COPD.  

Nebulizer treatment.  Inhaled steroids.  Spiriva.  PFT as out pt








Systolic dysfunction with acute on chronic heart failure


- EF 10-15%, mechanical MV- no regurg, tricuspid regurg. see full report


-Chest CT 3/16 - Cardiomegaly. Cardiac valve prosthesis. Left-sided AICD. Dense 

coronary artery calcifications. 


   -Small consolidation (favored to reflect atelectasis rather than pneumonia) 

at the left lung base. 


   -Bibasilar atelectasis. Small airways disease. Mild ground-glass and fine 

nodular opacities within the right upper lobe, possibly infectious or 

inflammatory.    


   -7 mm left upper lobe pulmonary nodule. Guidelines by the Fleischner society 

(radiology 2005; 237:390 5-400) suggests that in patients with low risk for 

lung cancer, with nodules less than or equal to 8 mm in diameter should have 

follow-up in approximately 6-12 months.  In patients with high-risk, including 

smokers, follow-up is recommended 3-6 months.  Patient with a known malignancy 

for risk for metastases should receive 3 month follow-up. 


   -Hepatic capsular calcification. 


   -Numerous low-density lesions throughout the liver, possibly cysts. 

Decompressed gallbladder limits evaluation. Bilateral hypodense renal lesions. 

Right adrenal gland hypertrophy. 12 mm left adrenal gland nodule measures 

approximately 9 Hounsfield units, likely adenoma.


-Consulted Cardiology, Dr. Dotson - f/u recs


   -3/16: Lasix 40mg IVP daily, Spironolactone 25mg daily. CXR - no interval 

pathology change. 


   -3/13: HOLD LASIX FOR 1 TO 2 DAYS AND CONT MUCINEX.  PT PRERENAL AND APPEARS 

DRY.  ON BOTH LASIX AND SPIRONOLACTONE.


   -SOB APPEARS TO BE SECONDARY TO PULM ETIOLOGY.  IMPROVES WITH NEBS.  PT WITH 

RHONCHI B/L.  MAY BENEFIT FROM PULM TOILET.


   -will attempt to obtain company of [facility] for interrogation.


   -likely deconditioned.  will benefit from rehab


-3/10: RAPID called due to SOB. BP 80/50's. Bolused 500cc NS. Ordered BIPAP and 

ABG, however patient refused. Placed back on NC 3L. ProBNP 4070H (less than # 

on admission) and SHEYLA negative. 


-3/10: Decrease to Lasix 40mg PO daily (was BID).


Admit to telemetry, BNP 4560 on admission


Digoxin 0.125mg


Lisinopril 10mg daily


Aldactone 25mg PO Daily


patient with AICD, will check echo to determine EF


CXR 2/25: mild cardiomegaly, mild pulmonary venous congestion.  s/p sternotomy, 

aortic valve replacement, AICD








H/O mitral valve replacement with mechanical valve


Cardiac valve replacement 6-7yrs ago


Consulted Cardiology, Dr. Dotson - f/u recs


   -echocardiogram reviewed.  valve leaflets are opening and functional.  There 

is no signficant gradient across the valve.  


   -recommend INR 2.5 to 3.5.


   STOP Lovenox 3/5


   STOP Heparin Drip - cardiac protocol, with bolus (because patient is 

refusing blood draws)


3/1: INR 1.3


3/2: INR refused, Coumadin 10mg


3/3: INR 1.5, Coumadin 10mg


3/4: INR 2.0, Coumadin 10mg


3/5: INR 2.4, Coumadin 7.5mg; patient received one more dose of Lovenox 80mg SC 

today, now discontinued as bridge complete.


3/6: INR 2.5, Coumadin 7.5mg


3/7: INR 3.1, Coumadin 5mg


3/8: INR 3.7, Coumadin 5mg (stopped by pharmacy)


3/9: INR 2.8, Coumadin 5mg


3/10: INR 2.6, Coumadin 4mg


3/11: INR 2.7, Coumadin 4 mg


3/12: INR 2.3  Coumadin 4mg 


3/13: INR 2.1, Coumadin 6mg


3/14: INR 2.6, Coumadin 5mg


3/15: INR 2.6, Coumadin 5mg


3/16: INR 2.5, Coumadin 6mg


3/17: INR 2.9, Coumadin 6mg, 


3/18: INR 3.6, Coumadin 6mg, f/u INR


3/19: HELD Coumadin, Pt. denied blood work today


3/20: HELD Coumadin, Pt. denied blood work today


3/21: INR 1.3, Coumadin 7mg, f/u


3/22: INR 1.5, Coumadin 7mg, f/u INR


3/23: INR 2.4, Coumadin 7mg, f/u INR





Chronic atrial fibrillation


Consulted Cardiology, Dr. Dotson - f/u recs


   -Rate controlled


   -echocardiogram reviewed.  valve leaflets are opening and functional.  There 

is no signficant gradient across the valve.  


   -recommend INR 2.5 to 3.5.


   -See PT/INR trends above


   Coumadin as above


   STOP Lovenox 3/5








Cough, acute


-3/22: cough intermittent


-3/18 does not complain of cough


-3/13-3/16: Persists. Non-productive. Continue Mucinex.


-3/9: patient developed productive cough with yellow sputum today.


-Mucinex 600mg PO BID.








CAD (coronary artery disease) 


Consulted Cardiology, Dr. Dotson - f/u recs


   -Aware of HR in 140's and SOB after eating and low levels of exertion.


   -unknown graft status.  no evidence of ischemia at present


   -No current angina





History of MI (myocardial infarction)


HOLD Crestor 10mg PO HS (last dose 3/12- due to elevated LFTs)


ROMIs negative x3


EKG paced 


Denies chest pain 





Transaminitis, acute


3/22-3/23: Improving. hold crestor (since 3/12)


3/21: AST 37 /  - continue to hold crestor.


3/19-3/20: Patient refused blood work


3/18: 51/164 AST / ALT down trending


3/16-3/17: AST / ALT down trending - continue to hold Crestor


3/15: AST 57 / , decreasing. HOLD Crestor 10mg PO HS (last dose 3/12- 

due to elevated LFTs)


3/14: AST 75 /   


3/13: AST 70 /  - increasing -> hold crestor for 2 nights 3/13, 3/14


3/10: AST 90 /  - increasing -> hold Crestor tonight.


- AST 77 / ALT 98 -> previously normal.


- Continue to monitor





Electrolyte Imbalance


3/15-3/23: Hyponatremia, stable


Hyperkalemia - resolved


Hypokalemia, 








Tachycardia


HR WNL, continue to monitor


3/14-3/17: HR WNL


3/7: Patient becomes SOB very easily, desaturating to 82% while ambulating. 

Anytime he eats or gets up, HR climbs into 140's


Continue Digoxin 0.125mg


EKG in ER: sinus tachycardia at 100, paced, incomplete RBBB


Discontinued cardizem drip of 5mg/h started in the ER





Lower extremity edema


LE Duplex - negative. see full report.





Prophylactic measure


Coumadin as above.


protonix 40mg daily


SCD contraindicated due to LE edema


D/C when bed available





<Evgeny Elizabeth Jr. - Last Filed: 03/25/17 12:06>





Objective





- Vital Signs/Intake and Output


Vital Signs (last 24 hours): 


 











Temp Pulse Resp BP Pulse Ox


 


 98 F   80   18   97/59 L  99 


 


 03/25/17 07:30  03/25/17 07:30  03/25/17 07:30  03/25/17 07:30  03/25/17 07:30








Intake and Output: 


 











 03/25/17 03/25/17





 06:59 18:59


 


Intake Total 500 


 


Balance 500 














- Medications


Medications: 


 Current Medications





Albuterol/Ipratropium (Duoneb 3 Mg/0.5 Mg (3 Ml) Ud)  3 ml INH RQ6 Pending sale to [facility]


   Last Admin: 03/25/17 09:14 Dose:  3 ml


Benzocaine/Menthol (Cepacol Sore Throat)  1 keegan MT Q6H PRN


   PRN Reason: Sore Throat


   Last Admin: 03/25/17 00:53 Dose:  1 keegan


Budesonide (Pulmicort Respules)  0.5 mg INH RQ12 Pending sale to [facility]


   Last Admin: 03/25/17 09:14 Dose:  0.5 mg


Digoxin (Lanoxin)  0.125 mg PO DAILY@1800 Pending sale to [facility]


   Last Admin: 03/24/17 17:12 Dose:  0.125 mg


Furosemide (Lasix)  40 mg PO DAILY [facility]


   Last Admin: 03/24/17 09:03 Dose:  40 mg


Guaifenesin (Mucinex La)  600 mg PO BID Pending sale to [facility]


   Last Admin: 03/24/17 17:19 Dose:  600 mg


Prednisone (Prednisone Tab)  40 mg PO DAILY Pending sale to [facility]


   Last Admin: 03/24/17 09:03 Dose:  40 mg


Rosuvastatin Calcium (Crestor)  10 mg PO HS Pending sale to [facility]


   Last Admin: 03/12/17 22:03 Dose:  10 mg


Spironolactone (Aldactone)  25 mg PO DAILY Pending sale to [facility]


   Last Admin: 03/24/17 09:03 Dose:  25 mg


Tiotropium Bromide (Spiriva)  18 mcg INH RQ24 Pending sale to [facility]


   Last Admin: 03/25/17 09:12 Dose:  18 mcg











- Labs


Labs: 


 





 03/23/17 17:03 





 03/23/17 17:03 





 











PT  37.8 SECONDS (9.7-12.2)  H* D 03/24/17  11:15    


 


INR  3.3   03/24/17  11:15    


 


APTT  30 SECONDS (21-34)   03/24/17  11:15    














Attending/Attestation





- Attestation


I have personally seen and examined this patient.: Yes


I have fully participated in the care of the patient.: Yes


I have reviewed all pertinent clinical information, including history, physical 

exam and plan: Yes

## 2017-03-24 NOTE — CP.PCM.PN
<Cyrus Duval - Last Filed: 03/24/17 16:56>





Subjective





- Date & Time of Evaluation


Date of Evaluation: 03/24/17


Time of Evaluation: 07:57





- Subjective


Subjective: 


PGY-1 note for medicine service





Pt seen and examined at bedside. Observed to be sleeping comfortably. Upon 

awakening the pt stated that his throat was bothering him which is consistent 

with overnight staff reports. States his breathing is at baseline. Denies any 

fevers, chills, chest pain, palpitations, increased sob, nausea or vomiting. 





Objective





- Vital Signs/Intake and Output


Vital Signs (last 24 hours): 


 











Temp Pulse Resp BP Pulse Ox


 


 98.7 F   82   20   127/67   97 


 


 03/23/17 23:35  03/24/17 00:15  03/23/17 23:35  03/23/17 23:35  03/23/17 23:35








Intake and Output: 


 











 03/24/17 03/24/17





 06:59 18:59


 


Output Total 900 


 


Balance -900 














- Medications


Medications: 


 Current Medications





Albuterol/Ipratropium (Duoneb 3 Mg/0.5 Mg (3 Ml) Ud)  3 ml INH RQ6 [facility]


   Last Admin: 03/24/17 07:46 Dose:  3 ml


Benzocaine/Menthol (Cepacol Sore Throat)  1 keegan MT Q6H PRN


   PRN Reason: Sore Throat


   Last Admin: 03/24/17 04:15 Dose:  1 keegan


Budesonide (Pulmicort Respules)  0.5 mg INH RQ12 [facility]


   Last Admin: 03/24/17 07:46 Dose:  0.5 mg


Digoxin (Lanoxin)  0.125 mg PO DAILY@1800 [facility]


   Last Admin: 03/23/17 18:05 Dose:  0.125 mg


Furosemide (Lasix)  40 mg PO DAILY [facility]


   Last Admin: 03/23/17 11:22 Dose:  Not Given


Guaifenesin (Mucinex La)  600 mg PO BID [facility]


   Last Admin: 03/23/17 18:05 Dose:  600 mg


Prednisone (Prednisone Tab)  40 mg PO DAILY [facility]


   Last Admin: 03/23/17 21:05 Dose:  40 mg


Rosuvastatin Calcium (Crestor)  10 mg PO HS [facility]


   Last Admin: 03/12/17 22:03 Dose:  10 mg


Spironolactone (Aldactone)  25 mg PO DAILY [facility]


   Last Admin: 03/23/17 11:22 Dose:  25 mg


Tiotropium Bromide (Spiriva)  18 mcg INH RQ24 [facility]


   Last Admin: 03/24/17 07:46 Dose:  18 mcg











- Labs


Labs: 


 





 03/23/17 17:03 





 03/23/17 17:03 





 











PT  27.0 SECONDS (9.7-12.2)  H D 03/23/17  17:03    


 


INR  2.4  D 03/23/17  17:03    


 


APTT  29 SECONDS (21-34)   03/23/17  17:03    














- Constitutional


Appears: Non-toxic, Chronically Ill





- Head Exam


Head Exam: ATRAUMATIC, NORMOCEPHALIC





- ENT Exam


ENT Exam: Mucous Membranes Moist





- Respiratory Exam


Respiratory Exam: Decreased Breath Sounds, Rhonchi (mild), Wheezes (diffuse, 

expiratory), NORMAL BREATHING PATTERN





- Cardiovascular Exam


Cardiovascular Exam: +S1, +S2





- GI/Abdominal Exam


GI & Abdominal Exam: Soft, Normal Bowel Sounds.  absent: Tenderness





- Neurological Exam


Neurological Exam: Alert, Awake





- Skin


Skin Exam: Dry, Warm





Assessment and Plan





- Assessment and Plan (Free Text)


Assessment: 


-f/u PT eval 3/24/17. Required prior to discharge.


-Discharge planning to [facility] once INR in 2.5-3.5 range and SOB is better 

controlled. Patient has [place] insurance, case is working on rehab 

authorization. 


-3/16: Dr. Hanna recommends pulmonary rehab, case is looking into facilities - 

none present locally








COPD (chronic obstructive pulmonary disease)


3/23: Stop Solumedrol IVP;  Start Prednisone 40mg PO daily; Taper on discharge. 

Will require home oxygen.


-3/22: Solumedrol reduced to 40mg IVP Q12H. Prior to discharge, change 

Solumedrol -> Prednisone


-3/13-3/22: Continue BIPAP, patient more compliant (sometimes refuses despite 

being SOB).


-3/10: RAPID called due to SOB. BP 80/50's. Bolused 500cc NS. Ordered BIPAP and 

ABG, however patient refused. Placed back on NC 3L.


-3/8-3/9: Patient complaining of SOB with exertion and orthopnea. Maintain Head 

of bed elevated 30 degrees.


-3/7:  PT session: 98% O2 at 2L at rest, 96% room air at rest sitting, 82% room 

air during ambulation, 88% at 2 liter ambulation.


-3/6: Walked 20 steps today down [address], and patient became dizzy, SOB, and 

almost collapsed.


- Consulted Pulmonology, Dr. Varela, f/u recs


   -planning for home O2


   - Aware of HR in 140's and SOB after eating and low levels of exertion.


   -f/u ABG (not collected)


   -LDH 654H


   -HIV - negative


   -persistent shortness of breath


   - Patient with long history of smoking most likely has underlying COPD.  

Nebulizer treatment.  Inhaled steroids.  Spiriva.  PFT as out pt








Systolic dysfunction with acute on chronic heart failure


- EF 10-15%, mechanical MV- no regurg, tricuspid regurg. see full report


-Chest CT 3/16 - Cardiomegaly. Cardiac valve prosthesis. Left-sided AICD. Dense 

coronary artery calcifications. 


   -Small consolidation (favored to reflect atelectasis rather than pneumonia) 

at the left lung base. 


   -Bibasilar atelectasis. Small airways disease. Mild ground-glass and fine 

nodular opacities within the right upper lobe, possibly infectious or 

inflammatory.    


   -7 mm left upper lobe pulmonary nodule. Guidelines by the Fleischner society 

(radiology 2005; 237:390 5-400) suggests that in patients with low risk for 

lung cancer, with nodules less than or equal to 8 mm in diameter should have 

follow-up in approximately 6-12 months.  In patients with high-risk, including 

smokers, follow-up is recommended 3-6 months.  Patient with a known malignancy 

for risk for metastases should receive 3 month follow-up. 


   -Hepatic capsular calcification. 


   -Numerous low-density lesions throughout the liver, possibly cysts. 

Decompressed gallbladder limits evaluation. Bilateral hypodense renal lesions. 

Right adrenal gland hypertrophy. 12 mm left adrenal gland nodule measures 

approximately 9 Hounsfield units, likely adenoma.


-Consulted Cardiology, Dr. Dotson - f/u recs


   -3/16: Lasix 40mg IVP daily, Spironolactone 25mg daily. CXR - no interval 

pathology change. 


   -3/13: HOLD LASIX FOR 1 TO 2 DAYS AND CONT MUCINEX.  PT PRERENAL AND APPEARS 

DRY.  ON BOTH LASIX AND SPIRONOLACTONE.


   -SOB APPEARS TO BE SECONDARY TO PULM ETIOLOGY.  IMPROVES WITH NEBS.  PT WITH 

[facility]ONCHI B/L.  MAY BENEFIT FROM PULM TOILET.


   -will attempt to obtain company of AICD for interrogation.


   -likely deconditioned.  will benefit from rehab


-3/10: RAPID called due to SOB. BP 80/50's. Bolused 500cc NS. Ordered BIPAP and 

ABG, however patient refused. Placed back on NC 3L. ProBNP 4070H (less than # 

on admission) and SHEYLA negative. 


-3/10: Decrease to Lasix 40mg PO daily (was BID).


Admit to telemetry, BNP 4560 on admission


Digoxin 0.125mg


Lisinopril 10mg daily


Aldactone 25mg PO Daily


patient with AICD, will check echo to determine EF


CXR 2/25: mild cardiomegaly, mild pulmonary venous congestion.  s/p sternotomy, 

aortic valve replacement, AICD








H/O mitral valve replacement with mechanical valve


Cardiac valve replacement 6-7yrs ago


Consulted Cardiology, Dr. Dotson - f/u recs


   -echocardiogram reviewed.  valve leaflets are opening and functional.  There 

is no signficant gradient across the valve.  


   -recommend INR 2.5 to 3.5.


   STOP Lovenox 3/5


   STOP Heparin Drip - cardiac protocol, with bolus (because patient is 

refusing blood draws)


3/1: INR 1.3


3/2: INR refused, Coumadin 10mg


3/3: INR 1.5, Coumadin 10mg


3/4: INR 2.0, Coumadin 10mg


3/5: INR 2.4, Coumadin 7.5mg; patient received one more dose of Lovenox 80mg SC 

today, now discontinued as bridge complete.


3/6: INR 2.5, Coumadin 7.5mg


3/7: INR 3.1, Coumadin 5mg


3/8: INR 3.7, Coumadin 5mg (stopped by pharmacy)


3/9: INR 2.8, Coumadin 5mg


3/10: INR 2.6, Coumadin 4mg


3/11: INR 2.7, Coumadin 4 mg


3/12: INR 2.3  Coumadin 4mg 


3/13: INR 2.1, Coumadin 6mg


3/14: INR 2.6, Coumadin 5mg


3/15: INR 2.6, Coumadin 5mg


3/16: INR 2.5, Coumadin 6mg


3/17: INR 2.9, Coumadin 6mg, 


3/18: INR 3.6, Coumadin 6mg, f/u INR


3/19: HELD Coumadin, Pt. denied blood work today


3/20: HELD Coumadin, Pt. denied blood work today


3/21: INR 1.3, Coumadin 7mg, f/u


3/22: INR 1.5, Coumadin 7mg, f/u INR


3/23: INR 2.4, Coumadin 7mg, f/u INR


3/24: INR 3.3, Coumadin 6mg, f/u INR





Chronic atrial fibrillation


Consulted Cardiology, Dr. Dotson - f/u recs


   -Rate controlled


   -echocardiogram reviewed.  valve leaflets are opening and functional.  There 

is no signficant gradient across the valve.  


   -recommend INR 2.5 to 3.5.


   -See PT/INR trends above


   Coumadin as above


   STOP Lovenox 3/5








Cough, acute


-3/22: cough intermittent


-3/18 does not complain of cough


-3/13-3/16: Persists. Non-productive. Continue Mucinex.


-3/9: patient developed productive cough with yellow sputum today.


-Mucinex 600mg PO BID.








CAD (coronary artery disease) 


Consulted Cardiology, Dr. Dotson - f/u recs


   -Aware of HR in 140's and SOB after eating and low levels of exertion.


   -unknown graft status.  no evidence of ischemia at present


   -No current angina





History of MI (myocardial infarction)


HOLD Crestor 10mg PO HS (last dose 3/12- due to elevated LFTs)


ROMIs negative x3


EKG paced 


Denies chest pain 





Transaminitis, acute


3/22-3/23: Improving. hold crestor (since 3/12)


3/21: AST 37 /  - continue to hold crestor.


3/19-3/20: Patient refused blood work


3/18: 51/164 AST / ALT down trending


3/16-3/17: AST / ALT down trending - continue to hold Crestor


3/15: AST 57 / , decreasing. HOLD Crestor 10mg PO HS (last dose 3/12- 

due to elevated LFTs)


3/14: AST 75 /   


3/13: AST 70 /  - increasing -> hold crestor for 2 nights 3/13, 3/14


3/10: AST 90 /  - increasing -> hold Crestor tonight.


- AST 77 / ALT 98 -> previously normal.


- Continue to monitor





Electrolyte Imbalance


3/15-3/23: Hyponatremia, stable


Hyperkalemia - resolved


Hypokalemia, 








Tachycardia


HR WNL, continue to monitor


3/14-3/17: HR WNL


3/7: Patient becomes SOB very easily, desaturating to 82% while ambulating. 

Anytime he eats or gets up, HR climbs into 140's


Continue Digoxin 0.125mg


EKG in ER: sinus tachycardia at 100, paced, incomplete RBBB


Discontinued cardizem drip of 5mg/h started in the ER





Lower extremity edema


LE Duplex - negative. see full report.





Prophylactic measure


Coumadin as above.


protonix 40mg daily


SCD contraindicated due to LE edema


D/C when bed available








<Evgeny Elizabeth Jr. - Last Filed: 03/25/17 12:06>





Objective





- Vital Signs/Intake and Output


Vital Signs (last 24 hours): 


 











Temp Pulse Resp BP Pulse Ox


 


 98 F   80   18   97/59 L  99 


 


 03/25/17 07:30  03/25/17 07:30  03/25/17 07:30  03/25/17 07:30  03/25/17 07:30








Intake and Output: 


 











 03/25/17 03/25/17





 06:59 18:59


 


Intake Total 500 


 


Balance 500 














- Medications


Medications: 


 Current Medications





Albuterol/Ipratropium (Duoneb 3 Mg/0.5 Mg (3 Ml) Ud)  3 ml INH RQ6 [facility]


   Last Admin: 03/25/17 09:14 Dose:  3 ml


Benzocaine/Menthol (Cepacol Sore Throat)  1 keegan MT Q6H PRN


   PRN Reason: Sore Throat


   Last Admin: 03/25/17 00:53 Dose:  1 keegan


Budesonide (Pulmicort Respules)  0.5 mg INH RQ12 [facility]


   Last Admin: 03/25/17 09:14 Dose:  0.5 mg


Digoxin (Lanoxin)  0.125 mg PO DAILY@1800 [facility]


   Last Admin: 03/24/17 17:12 Dose:  0.125 mg


Furosemide (Lasix)  40 mg PO DAILY [facility]


   Last Admin: 03/24/17 09:03 Dose:  40 mg


Guaifenesin (Mucinex La)  600 mg PO BID [facility]


   Last Admin: 03/24/17 17:19 Dose:  600 mg


Prednisone (Prednisone Tab)  40 mg PO DAILY [facility]


   Last Admin: 03/24/17 09:03 Dose:  40 mg


Rosuvastatin Calcium (Crestor)  10 mg PO HS [facility]


   Last Admin: 03/12/17 22:03 Dose:  10 mg


Spironolactone (Aldactone)  25 mg PO DAILY [facility]


   Last Admin: 03/24/17 09:03 Dose:  25 mg


Tiotropium Bromide (Spiriva)  18 mcg INH RQ24 [facility]


   Last Admin: 03/25/17 09:12 Dose:  18 mcg











- Labs


Labs: 


 





 03/23/17 17:03 





 03/23/17 17:03 





 











PT  37.8 SECONDS (9.7-12.2)  H* D 03/24/17  11:15    


 


INR  3.3   03/24/17  11:15    


 


APTT  30 SECONDS (21-34)   03/24/17  11:15    














Attending/Attestation





- Attestation


I have personally seen and examined this patient.: Yes


I have fully participated in the care of the patient.: Yes


I have reviewed all pertinent clinical information, including history, physical 

exam and plan: Yes

## 2017-03-25 NOTE — CP.PCM.PN
<MelcurtisSulaiman - Last Filed: 03/25/17 08:01>





Subjective





- Date & Time of Evaluation


Date of Evaluation: 03/25/17


Time of Evaluation: 01:00





- Subjective


Subjective: 


PGY-1 note for medicine service





Pt seen and examined at bedside. No acute events per nursing. Resting 

comfortably with nasal canula in place. Continued throat irritation. His 

breathing is at baseline, will require home oxygen, however patient is 

homeless. Denies any fevers, chills, chest pain, palpitations, increased sob, 

nausea or vomiting. 








Objective





- Vital Signs/Intake and Output


Vital Signs (last 24 hours): 


 











Temp Pulse Resp BP Pulse Ox


 


 98.0 F   90   20   94/60 L  97 


 


 03/25/17 00:46  03/25/17 00:46  03/25/17 00:46  03/25/17 00:46  03/25/17 00:46








Intake and Output: 


 











 03/24/17 03/25/17





 18:59 06:59


 


Intake Total 480 500


 


Balance 480 500














- Medications


Medications: 


 Current Medications





Albuterol/Ipratropium (Duoneb 3 Mg/0.5 Mg (3 Ml) Ud)  3 ml INH RQ6 [facility]


   Last Admin: 03/24/17 19:47 Dose:  3 ml


Benzocaine/Menthol (Cepacol Sore Throat)  1 keegan MT Q6H PRN


   PRN Reason: Sore Throat


   Last Admin: 03/25/17 00:53 Dose:  1 keegan


Budesonide (Pulmicort Respules)  0.5 mg INH RQ12 [facility]


   Last Admin: 03/24/17 19:47 Dose:  0.5 mg


Digoxin (Lanoxin)  0.125 mg PO DAILY@1800 [facility]


   Last Admin: 03/24/17 17:12 Dose:  0.125 mg


Furosemide (Lasix)  40 mg PO DAILY [facility]


   Last Admin: 03/24/17 09:03 Dose:  40 mg


Guaifenesin (Mucinex La)  600 mg PO BID [facility]


   Last Admin: 03/24/17 17:19 Dose:  600 mg


Prednisone (Prednisone Tab)  40 mg PO DAILY [facility]


   Last Admin: 03/24/17 09:03 Dose:  40 mg


Rosuvastatin Calcium (Crestor)  10 mg PO HS [facility]


   Last Admin: 03/12/17 22:03 Dose:  10 mg


Spironolactone (Aldactone)  25 mg PO DAILY [facility]


   Last Admin: 03/24/17 09:03 Dose:  25 mg


Tiotropium Bromide (Spiriva)  18 mcg INH RQ24 [facility]


   Last Admin: 03/24/17 07:46 Dose:  18 mcg











- Labs


Labs: 


 





 03/23/17 17:03 





 03/23/17 17:03 





 











PT  37.8 SECONDS (9.7-12.2)  H* D 03/24/17  11:15    


 


INR  3.3   03/24/17  11:15    


 


APTT  30 SECONDS (21-34)   03/24/17  11:15    














- Additional Findings


Additional findings: 


- Constitutional


Appears: No Acute Distress





- Head Exam


Head Exam: ATRAUMATIC, NORMOCEPHALIC





- Eye Exam


Eye Exam: EOMI


Pupil Exam: NORMAL ACCOMODATION





- ENT Exam


ENT Exam: Mucous Membranes Moist, Normal Exam


-L throat tender to palpation





- Neck Exam


Neck Exam: Full ROM, Normal Inspection





- Respiratory Exam


Respiratory Exam: Decreased Breath Sounds, Wheezes (expiratory, diffuse), 

Rhonchi (scattered), Respiratory Distress (with orthopnea).


-desaturates to 82% while walking, 88% with NC 2L





- Cardiovascular Exam


Cardiovascular Exam: +S1, +S2, Murmur





- GI/Abdominal Exam


GI & Abdominal Exam: Soft, Tenderness


Additional comments: 


-Tender to palpation at R sternal border, 2nd rib. Protrusion noted (chronic)


-Waxing/waning tenderness to palpation over site of pacemaker (chronic)





- Extremities Exam


Extremities Exam: Joint Swelling (improved from previous)





- Neurological Exam


Neurological Exam: Alert, Awake





- Psychiatric Exam


Psychiatric exam: Normal Affect, Normal Mood





- Skin


Skin Exam: Intact, Warm





Assessment and Plan





- Assessment and Plan (Free Text)


Assessment: 


-f/u PT eval 3/24/17. Required prior to discharge.


-Discharge planning to [facility] once INR in 2.5-3.5 range and SOB is better 

controlled. Patient has [place] insurance, case is working on rehab 

authorization. 


-3/16: Dr. Hanna recommends pulmonary rehab, case is looking into facilities - 

none present locally








COPD (chronic obstructive pulmonary disease)


3/25: Prednisone 40mg PO daily; Taper on discharge. Will require home oxygen.


3/23: Stop Solumedrol IVP;  Start Prednisone 40mg PO daily; Taper on discharge. 

Will require home oxygen.


-3/22: Solumedrol reduced to 40mg IVP Q12H. Prior to discharge, change 

Solumedrol -> Prednisone


-3/13-3/22: Continue BIPAP, patient more compliant (sometimes refuses despite 

being SOB).


-3/10: RAPID called due to SOB. BP 80/50's. Bolused 500cc NS. Ordered BIPAP and 

ABG, however patient refused. Placed back on NC 3L.


-3/8-3/9: Patient complaining of SOB with exertion and orthopnea. Maintain Head 

of bed elevated 30 degrees.


-3/7:  PT session: 98% O2 at 2L at rest, 96% room air at rest sitting, 82% room 

air during ambulation, 88% at 2 liter ambulation.


-3/6: Walked 20 steps today down [address], and patient became dizzy, SOB, and 

almost collapsed.


- Consulted Pulmonology, Dr. Varela, f/u recs


   -planning for home O2


   - Aware of HR in 140's and SOB after eating and low levels of exertion.


   -f/u ABG (not collected)


   -LDH 654H


   -HIV - negative


   -persistent shortness of breath


   - Patient with long history of smoking most likely has underlying COPD.  

Nebulizer treatment.  Inhaled steroids.  Spiriva.  PFT as out pt








Systolic dysfunction with acute on chronic heart failure


- EF 10-15%, mechanical MV- no regurg, tricuspid regurg. see full report


-Chest CT 3/16 - Cardiomegaly. Cardiac valve prosthesis. Left-sided AICD. Dense 

coronary artery calcifications. 


   -Small consolidation (favored to reflect atelectasis rather than pneumonia) 

at the left lung base. 


   -Bibasilar atelectasis. Small airways disease. Mild ground-glass and fine 

nodular opacities within the right upper lobe, possibly infectious or 

inflammatory.    


   -7 mm left upper lobe pulmonary nodule. Guidelines by the Fleischner society 

(radiology 2005; 237:390 5-400) suggests that in patients with low risk for 

lung cancer, with nodules less than or equal to 8 mm in diameter should have 

follow-up in approximately 6-12 months.  In patients with high-risk, including 

smokers, follow-up is recommended 3-6 months.  Patient with a known malignancy 

for risk for metastases should receive 3 month follow-up. 


   -Hepatic capsular calcification. 


   -Numerous low-density lesions throughout the liver, possibly cysts. 

Decompressed gallbladder limits evaluation. Bilateral hypodense renal lesions. 

Right adrenal gland hypertrophy. 12 mm left adrenal gland nodule measures 

approximately 9 Hounsfield units, likely adenoma.


-Consulted Cardiology, Dr. Dotson - f/u recs


   -3/16: Lasix 40mg IVP daily, Spironolactone 25mg daily. CXR - no interval 

pathology change. 


   -3/13: HOLD LASIX FOR 1 TO 2 DAYS AND CONT MUCINEX.  PT PRERENAL AND APPEARS 

DRY.  ON BOTH LASIX AND SPIRONOLACTONE.


   -SOB APPEARS TO BE SECONDARY TO PULM ETIOLOGY.  IMPROVES WITH NEBS.  PT WITH 

RHONCHI B/L.  MAY BENEFIT FROM PULM TOILET.


   -will attempt to obtain company of [facility] for interrogation.


   -likely deconditioned.  will benefit from rehab


-3/10: RAPID called due to SOB. BP 80/50's. Bolused 500cc NS. Ordered BIPAP and 

ABG, however patient refused. Placed back on NC 3L. ProBNP 4070H (less than # 

on admission) and SHEYLA negative. 


-3/10: Decrease to Lasix 40mg PO daily (was BID).


Admit to telemetry, BNP 4560 on admission


Digoxin 0.125mg


Lisinopril 10mg daily


Aldactone 25mg PO Daily


patient with AICD, will check echo to determine EF


CXR 2/25: mild cardiomegaly, mild pulmonary venous congestion.  s/p sternotomy, 

aortic valve replacement, AICD








H/O mitral valve replacement with mechanical valve


Cardiac valve replacement 6-7yrs ago


Consulted Cardiology, Dr. Dotson - f/u recs


   -echocardiogram reviewed.  valve leaflets are opening and functional.  There 

is no signficant gradient across the valve.  


   -recommend INR 2.5 to 3.5.


   STOP Lovenox 3/5


   STOP Heparin Drip - cardiac protocol, with bolus (because patient is 

refusing blood draws)


3/1: INR 1.3


3/2: INR refused, Coumadin 10mg


3/3: INR 1.5, Coumadin 10mg


3/4: INR 2.0, Coumadin 10mg


3/5: INR 2.4, Coumadin 7.5mg; patient received one more dose of Lovenox 80mg SC 

today, now discontinued as bridge complete.


3/6: INR 2.5, Coumadin 7.5mg


3/7: INR 3.1, Coumadin 5mg


3/8: INR 3.7, Coumadin 5mg (stopped by pharmacy)


3/9: INR 2.8, Coumadin 5mg


3/10: INR 2.6, Coumadin 4mg


3/11: INR 2.7, Coumadin 4 mg


3/12: INR 2.3  Coumadin 4mg 


3/13: INR 2.1, Coumadin 6mg


3/14: INR 2.6, Coumadin 5mg


3/15: INR 2.6, Coumadin 5mg


3/16: INR 2.5, Coumadin 6mg


3/17: INR 2.9, Coumadin 6mg, 


3/18: INR 3.6, Coumadin 6mg, f/u INR


3/19: HELD Coumadin, Pt. denied blood work today


3/20: HELD Coumadin, Pt. denied blood work today


3/21: INR 1.3, Coumadin 7mg, f/u


3/22: INR 1.5, Coumadin 7mg, f/u INR


3/23: INR 2.4, Coumadin 7mg, f/u INR


3/24: INR 3.3, Coumadin 6mg, f/u INR


3/25: f/u INR - administer Coumadin





Chronic atrial fibrillation


Consulted Cardiology, Dr. Dotson - f/u recs


   -Rate controlled


   -echocardiogram reviewed.  valve leaflets are opening and functional.  There 

is no signficant gradient across the valve.  


   -recommend INR 2.5 to 3.5.


   -See PT/INR trends above


   Coumadin as above


   STOP Lovenox 3/5








Cough, acute


-3/22: cough intermittent


-3/18 does not complain of cough


-3/13-3/16: Persists. Non-productive. Continue Mucinex.


-3/9: patient developed productive cough with yellow sputum today.


-Mucinex 600mg PO BID.








CAD (coronary artery disease) 


Consulted Cardiology, Dr. Dotson - f/u recs


   -Aware of HR in 140's and SOB after eating and low levels of exertion.


   -unknown graft status.  no evidence of ischemia at present


   -No current angina





History of MI (myocardial infarction)


HOLD Crestor 10mg PO HS (last dose 3/12- due to elevated LFTs)


ROMIs negative x3


EKG paced 


Denies chest pain 





Transaminitis, acute


3/24-3/25: patient refusing labs. will re-attempt


3/22-3/23: Improving. hold crestor (since 3/12)


3/21: AST 37 /  - continue to hold crestor.


3/19-3/20: Patient refused blood work


3/18: 51/164 AST / ALT down trending


3/16-3/17: AST / ALT down trending - continue to hold Crestor


3/15: AST 57 / , decreasing. HOLD Crestor 10mg PO HS (last dose 3/12- 

due to elevated LFTs)


3/14: AST 75 /   


3/13: AST 70 /  - increasing -> hold crestor for 2 nights 3/13, 3/14


3/10: AST 90 /  - increasing -> hold Crestor tonight.


- AST 77 / ALT 98 -> previously normal.


- Continue to monitor





Electrolyte Imbalance


3/24-3/25: patient refusing labs, will re-attempt


3/15-3/23: Hyponatremia, stable


Hyperkalemia - resolved


Hypokalemia, 








Tachycardia


HR grossly WNL, continue to monitor


3/14-3/17: HR WNL


3/7: Patient becomes SOB very easily, desaturating to 82% while ambulating. 

Anytime he eats or gets up, HR climbs into 140's


Continue Digoxin 0.125mg


EKG in ER: sinus tachycardia at 100, paced, incomplete RBBB


Discontinued cardizem drip of 5mg/h started in the ER





Lower extremity edema


LE Duplex - negative. see full report.





Prophylactic measure


Coumadin as above.


protonix 40mg daily


SCD contraindicated due to LE edema


D/C when bed available





<Evgeny Elizabeth Jr. - Last Filed: 03/25/17 12:09>





Objective





- Vital Signs/Intake and Output


Vital Signs (last 24 hours): 


 











Temp Pulse Resp BP Pulse Ox


 


 98 F   80   18   97/59 L  99 


 


 03/25/17 07:30  03/25/17 07:30  03/25/17 07:30  03/25/17 07:30  03/25/17 07:30








Intake and Output: 


 











 03/25/17 03/25/17





 06:59 18:59


 


Intake Total 500 


 


Balance 500 














- Medications


Medications: 


 Current Medications





Albuterol/Ipratropium (Duoneb 3 Mg/0.5 Mg (3 Ml) Ud)  3 ml INH RQ6 [facility]


   Last Admin: 03/25/17 09:14 Dose:  3 ml


Benzocaine/Menthol (Cepacol Sore Throat)  1 keegan MT Q6H PRN


   PRN Reason: Sore Throat


   Last Admin: 03/25/17 00:53 Dose:  1 keegan


Budesonide (Pulmicort Respules)  0.5 mg INH RQ12 [facility]


   Last Admin: 03/25/17 09:14 Dose:  0.5 mg


Digoxin (Lanoxin)  0.125 mg PO DAILY@1800 [facility]


   Last Admin: 03/24/17 17:12 Dose:  0.125 mg


Furosemide (Lasix)  40 mg PO DAILY [facility]


   Last Admin: 03/24/17 09:03 Dose:  40 mg


Guaifenesin (Mucinex La)  600 mg PO BID [facility]


   Last Admin: 03/24/17 17:19 Dose:  600 mg


Prednisone (Prednisone Tab)  40 mg PO DAILY [facility]


   Last Admin: 03/24/17 09:03 Dose:  40 mg


Rosuvastatin Calcium (Crestor)  10 mg PO HS [facility]


   Last Admin: 03/12/17 22:03 Dose:  10 mg


Spironolactone (Aldactone)  25 mg PO DAILY [facility]


   Last Admin: 03/24/17 09:03 Dose:  25 mg


Tiotropium Bromide (Spiriva)  18 mcg INH RQ24 [facility]


   Last Admin: 03/25/17 09:12 Dose:  18 mcg











- Labs


Labs: 


 





 03/23/17 17:03 





 03/23/17 17:03 





 











PT  37.8 SECONDS (9.7-12.2)  H* D 03/24/17  11:15    


 


INR  3.3   03/24/17  11:15    


 


APTT  30 SECONDS (21-34)   03/24/17  11:15    














Attending/Attestation





- Attestation


I have personally seen and examined this patient.: Yes


I have fully participated in the care of the patient.: Yes


I have reviewed all pertinent clinical information, including history, physical 

exam and plan: Yes

## 2017-03-25 NOTE — CARD
--------------- APPROVED REPORT --------------





EKG Measurement

Heart [id number]

[id number]

[id number]

[id number]



<Conclusion>

Electronic ventricular pacemaker

## 2017-03-26 NOTE — CP.PCM.PN
Subjective





- Date & Time of Evaluation


Date of Evaluation: 03/26/17


Time of Evaluation: 00:40





- Subjective


Subjective: 


PGY-1 note for medicine service





Pt seen and examined at bedside. No acute events per nursing. Resting 

comfortably with nasal canula in place. Reports throat feels better today. 

Breathing is at baseline, c/o orthopnea and SOB with ambulation, will require 

home oxygen (however patient is homeless). Denies any fevers, chills, chest pain

, palpitations, abdominal pain, nausea, vomiting, or any additional complaints.








Objective





- Vital Signs/Intake and Output


Vital Signs (last 24 hours): 


 











Temp Pulse Resp BP Pulse Ox


 


 97.8 F   85   20   118/82   100 


 


 03/25/17 16:13  03/25/17 16:13  03/25/17 16:13  03/25/17 16:13  03/25/17 16:13











- Medications


Medications: 


 Current Medications





Albuterol/Ipratropium (Duoneb 3 Mg/0.5 Mg (3 Ml) Ud)  3 ml INH RQ6 [facility]


   Last Admin: 03/25/17 19:55 Dose:  3 ml


Benzocaine/Menthol (Cepacol Sore Throat)  1 keegan MT Q6H PRN


   PRN Reason: Sore Throat


   Last Admin: 03/25/17 00:53 Dose:  1 keegan


Budesonide (Pulmicort Respules)  0.5 mg INH RQ12 [facility]


   Last Admin: 03/25/17 19:55 Dose:  0.5 mg


Digoxin (Lanoxin)  0.125 mg PO DAILY@1800 MOISES


   Last Admin: 03/25/17 17:30 Dose:  0.125 mg


Furosemide (Lasix)  40 mg PO DAILY [facility]


   Last Admin: 03/24/17 09:03 Dose:  40 mg


Guaifenesin (Mucinex La)  600 mg PO BID [facility]


   Last Admin: 03/25/17 18:00 Dose:  600 mg


Prednisone (Prednisone Tab)  40 mg PO DAILY [facility]


   Last Admin: 03/24/17 09:03 Dose:  40 mg


Rosuvastatin Calcium (Crestor)  10 mg PO HS [facility]


   Last Admin: 03/12/17 22:03 Dose:  10 mg


Spironolactone (Aldactone)  25 mg PO DAILY [facility]


   Last Admin: 03/24/17 09:03 Dose:  25 mg


Tiotropium Bromide (Spiriva)  18 mcg INH RQ24 [facility]


   Last Admin: 03/25/17 09:12 Dose:  18 mcg











- Labs


Labs: 


 





 03/25/17 20:26 





 03/25/17 20:26 





 











PT  47.8 SECONDS (9.7-12.2)  H* D 03/25/17  20:26    


 


INR  4.1   03/25/17  20:26    


 


APTT  38 SECONDS (21-34)  H D 03/25/17  20:26    














- Additional Findings


Additional findings: 


- Constitutional


Appears: No Acute Distress





- Head Exam


Head Exam: ATRAUMATIC, NORMOCEPHALIC





- Eye Exam


Eye Exam: EOMI


Pupil Exam: NORMAL ACCOMODATION





- ENT Exam


ENT Exam: Mucous Membranes Moist, Normal Exam


-L throat tender to palpation (mildly improved)





- Neck Exam


Neck Exam: Full ROM, Normal Inspection





- Respiratory Exam


Respiratory Exam: Decreased Breath Sounds, Wheezes (expiratory, diffuse), 

Rhonchi (scattered), Respiratory Distress (with orthopnea).


-desaturates to 82% while walking, 88% with NC 2L





- Cardiovascular Exam


Cardiovascular Exam: +S1, +S2, Murmur





- GI/Abdominal Exam


GI & Abdominal Exam: Soft, Tenderness


Additional comments: 


-Tender to palpation at R sternal border, 2nd rib. Protrusion noted (chronic)


-Waxing/waning tenderness to palpation over site of pacemaker (chronic)





- Extremities Exam


Extremities Exam: Joint Swelling (improved from previous)





- Neurological Exam


Neurological Exam: Alert, Awake





- Psychiatric Exam


Psychiatric exam: Normal Affect, Normal Mood





- Skin


Skin Exam: Intact, Warm





Assessment and Plan





- Assessment and Plan (Free Text)


Assessment: 


-f/u PT eval 3/24/17. Required prior to discharge.


-Discharge planning to [facility] once INR in 2.5-3.5 range and SOB is better 

controlled. Patient has [place] insurance, case is working on rehab 

authorization. 


-3/16: Dr. Hanna recommends pulmonary rehab, case is looking into facilities - 

none present locally








COPD (chronic obstructive pulmonary disease)


3/26: Prednisone 40mg PO daily; Taper on discharge. Will require home oxygen.


3/23: Stop Solumedrol IVP;  Start Prednisone 40mg PO daily; Taper on discharge. 

Will require home oxygen.


-3/22: Solumedrol reduced to 40mg IVP Q12H. Prior to discharge, change 

Solumedrol -> Prednisone


-3/13-3/22: Continue BIPAP, patient more compliant (sometimes refuses despite 

being SOB).


-3/10: RAPID called due to SOB. BP 80/50's. Bolused 500cc NS. Ordered BIPAP and 

ABG, however patient refused. Placed back on NC 3L.


-3/8-3/9: Patient complaining of SOB with exertion and orthopnea. Maintain Head 

of bed elevated 30 degrees.


-3/7:  PT session: 98% O2 at 2L at rest, 96% room air at rest sitting, 82% room 

air during ambulation, 88% at 2 liter ambulation.


-3/6: Walked 20 steps today down [address], and patient became dizzy, SOB, and 

almost collapsed.


- Consulted Pulmonology, Dr. Varela, f/u recs


   -planning for home O2


   - Aware of HR in 140's and SOB after eating and low levels of exertion.


   -f/u ABG (not collected)


   -LDH 654H


   -HIV - negative


   -persistent shortness of breath


   - Patient with long history of smoking most likely has underlying COPD.  

Nebulizer treatment.  Inhaled steroids.  Spiriva.  PFT as out pt








Systolic dysfunction with acute on chronic heart failure


3/26: pt at baseline


- EF 10-15%, mechanical MV- no regurg, tricuspid regurg. see full report


-Chest CT 3/16 - Cardiomegaly. Cardiac valve prosthesis. Left-sided AICD. Dense 

coronary artery calcifications. 


   -Small consolidation (favored to reflect atelectasis rather than pneumonia) 

at the left lung base. 


   -Bibasilar atelectasis. Small airways disease. Mild ground-glass and fine 

nodular opacities within the right upper lobe, possibly infectious or 

inflammatory.    


   -7 mm left upper lobe pulmonary nodule. Guidelines by the Fleischner society 

(radiology 2005; 237:390 5-400) suggests that in patients with low risk for 

lung cancer, with nodules less than or equal to 8 mm in diameter should have 

follow-up in approximately 6-12 months.  In patients with high-risk, including 

smokers, follow-up is recommended 3-6 months.  Patient with a known malignancy 

for risk for metastases should receive 3 month follow-up. 


   -Hepatic capsular calcification. 


   -Numerous low-density lesions throughout the liver, possibly cysts. 

Decompressed gallbladder limits evaluation. Bilateral hypodense renal lesions. 

Right adrenal gland hypertrophy. 12 mm left adrenal gland nodule measures 

approximately 9 Hounsfield units, likely adenoma.


-Consulted Cardiology, Dr. Dotson - f/u recs


   -3/16: Lasix 40mg IVP daily, Spironolactone 25mg daily. CXR - no interval 

pathology change. 


   -3/13: HOLD LASIX FOR 1 TO 2 DAYS AND CONT MUCINEX.  PT PRERENAL AND APPEARS 

DRY.  ON BOTH LASIX AND SPIRONOLACTONE.


   -SOB APPEARS TO BE SECONDARY TO PULM ETIOLOGY.  IMPROVES WITH NEBS.  PT WITH 

RHONCHI B/L.  MAY BENEFIT FROM PULM TOILET.


   -will attempt to obtain company of [facility] for interrogation.


   -likely deconditioned.  will benefit from rehab


-3/10: RAPID called due to SOB. BP 80/50's. Bolused 500cc NS. Ordered BIPAP and 

ABG, however patient refused. Placed back on NC 3L. ProBNP 4070H (less than # 

on admission) and SHEYLA negative. 


-3/10: Decrease to Lasix 40mg PO daily (was BID).


Admit to telemetry, BNP 4560 on admission


Digoxin 0.125mg


Lisinopril 10mg daily


Aldactone 25mg PO Daily


patient with AICD, will check echo to determine EF


CXR 2/25: mild cardiomegaly, mild pulmonary venous congestion.  s/p sternotomy, 

aortic valve replacement, AICD








H/O mitral valve replacement with mechanical valve


Cardiac valve replacement 6-7yrs ago


Consulted Cardiology, Dr. Dotson - f/u recs


   -echocardiogram reviewed.  valve leaflets are opening and functional.  There 

is no signficant gradient across the valve.  


   -recommend INR 2.5 to 3.5.


   STOP Lovenox 3/5


   STOP Heparin Drip - cardiac protocol, with bolus (because patient is 

refusing blood draws)


3/1: INR 1.3


3/2: INR refused, Coumadin 10mg


3/3: INR 1.5, Coumadin 10mg


3/4: INR 2.0, Coumadin 10mg


3/5: INR 2.4, Coumadin 7.5mg; patient received one more dose of Lovenox 80mg SC 

today, now discontinued as bridge complete.


3/6: INR 2.5, Coumadin 7.5mg


3/7: INR 3.1, Coumadin 5mg


3/8: INR 3.7, Coumadin 5mg (stopped by pharmacy)


3/9: INR 2.8, Coumadin 5mg


3/10: INR 2.6, Coumadin 4mg


3/11: INR 2.7, Coumadin 4 mg


3/12: INR 2.3  Coumadin 4mg 


3/13: INR 2.1, Coumadin 6mg


3/14: INR 2.6, Coumadin 5mg


3/15: INR 2.6, Coumadin 5mg


3/16: INR 2.5, Coumadin 6mg


3/17: INR 2.9, Coumadin 6mg, 


3/18: INR 3.6, Coumadin 6mg, f/u INR


3/19: HELD Coumadin, Pt. denied blood work today


3/20: HELD Coumadin, Pt. denied blood work today


3/21: INR 1.3, Coumadin 7mg, f/u


3/22: INR 1.5, Coumadin 7mg, f/u INR


3/23: INR 2.4, Coumadin 7mg, f/u INR


3/24: INR 3.3, Coumadin 6mg, f/u INR


3/25: INR 4.1 Hold Coumadin, f/u INR


3/26: f/u INR and administer Coumadin





Chronic atrial fibrillation


Consulted Cardiology, Dr. Dotson - f/u recs


   -Rate controlled


   -echocardiogram reviewed.  valve leaflets are opening and functional.  There 

is no signficant gradient across the valve.  


   -recommend INR 2.5 to 3.5.


   -See PT/INR trends above


   Coumadin as above


   STOP Lovenox 3/5








Cough, acute


-Mucinex 600mg PO BID


-3/22: cough intermittent


-3/18 does not complain of cough


-3/13-3/16: Persists. Non-productive. Continue Mucinex.


-3/9: patient developed productive cough with yellow sputum today.








CAD (coronary artery disease) 


Consulted Cardiology, Dr. Dotson - f/u recs


   -Aware of HR in 140's and SOB after eating and low levels of exertion.


   -unknown graft status.  no evidence of ischemia at present


   -No current angina





History of MI (myocardial infarction)


HOLD Crestor 10mg PO HS (last dose 3/12- due to elevated LFTs)


ROMIs negative x3


EKG paced 


Denies chest pain 





Transaminitis, acute


3/25: AST/ALT normalized; consider resuming crestor.


3/24  patient refusing labs. will re-attempt


3/22-3/23: Improving. hold crestor (since 3/12)


3/21: AST 37 /  - continue to hold crestor.


3/19-3/20: Patient refused blood work


3/18: 51/164 AST / ALT down trending


3/16-3/17: AST / ALT down trending - continue to hold Crestor


3/15: AST 57 / , decreasing. HOLD Crestor 10mg PO HS (last dose 3/12- 

due to elevated LFTs)


3/14: AST 75 /   


3/13: AST 70 /  - increasing -> hold crestor for 2 nights 3/13, 3/14


3/10: AST 90 /  - increasing -> hold Crestor tonight.


- AST 77 / ALT 98 -> previously normal.


- Continue to monitor





Electrolyte Imbalance


3/24 patient refusing labs, will re-attempt


3/15-3/23: Hyponatremia, stable


Hyperkalemia - resolved


Hypokalemia, 








Tachycardia


HR grossly WNL, continue to monitor


3/14-3/17: HR WNL


3/7: Patient becomes SOB very easily, desaturating to 82% while ambulating. 

Anytime he eats or gets up, HR climbs into 140's


Continue Digoxin 0.125mg


EKG in ER: sinus tachycardia at 100, paced, incomplete RBBB


Discontinued cardizem drip of 5mg/h started in the ER





Lower extremity edema


LE Duplex - negative. see full report.





Prophylactic measure


Coumadin as above.


protonix 40mg daily


SCD contraindicated due to LE edema


D/C when bed available

## 2017-03-27 NOTE — CP.PCM.PN
<Chantal Duvalew - Last Filed: 03/27/17 10:58>





Subjective





- Date & Time of Evaluation


Date of Evaluation: 03/27/17


Time of Evaluation: 10:59





- Subjective


Subjective: 


PGY-1 note for Dr Elizabeth service





Pt seen and examined at bedside. No acute events per nursing. Observed sitting 

up in bed. Continues to complain of some throat pain. Breathing is at baseline, 

c/o orthopnea and SOB with ambulation. Denies any fevers, chills, chest pain, 

palpitations, abdominal pain, nausea, vomiting, or any additional complaints.





Objective





- Vital Signs/Intake and Output


Vital Signs (last 24 hours): 


 











Temp Pulse Resp BP Pulse Ox


 


 97.8 F   79   20   103/60   99 


 


 03/27/17 07:25  03/27/17 07:25  03/27/17 07:25  03/27/17 09:38  03/27/17 07:25








Intake and Output: 


 











 03/27/17 03/27/17





 06:59 18:59


 


Intake Total 360 


 


Output Total 975 


 


Balance -615 














- Medications


Medications: 


 Current Medications





Albuterol/Ipratropium (Duoneb 3 Mg/0.5 Mg (3 Ml) Ud)  3 ml INH RQ6 [facility]


   Last Admin: 03/27/17 07:36 Dose:  Not Given


Benzocaine/Menthol (Cepacol Sore Throat)  1 keegan MT Q6H PRN


   PRN Reason: Sore Throat


   Last Admin: 03/27/17 03:51 Dose:  1 keegan


Budesonide (Pulmicort Respules)  0.5 mg INH RQ12 [facility]


   Last Admin: 03/27/17 07:36 Dose:  Not Given


Digoxin (Lanoxin)  0.125 mg PO DAILY@1800 [facility]


   Last Admin: 03/26/17 17:23 Dose:  0.125 mg


Famotidine (Pepcid)  20 mg IVP DAILY [facility]


   Last Admin: 03/27/17 09:38 Dose:  20 mg


Furosemide (Lasix)  40 mg PO DAILY [facility]


   Last Admin: 03/27/17 09:38 Dose:  40 mg


Guaifenesin (Mucinex La)  600 mg PO BID [facility]


   Last Admin: 03/27/17 09:38 Dose:  600 mg


Prednisone (Prednisone Tab)  40 mg PO DAILY [facility]


   Last Admin: 03/27/17 09:37 Dose:  40 mg


Rosuvastatin Calcium (Crestor)  10 mg PO HS [facility]


   Last Admin: 03/12/17 22:03 Dose:  10 mg


Spironolactone (Aldactone)  25 mg PO DAILY [facility]


   Last Admin: 03/27/17 09:38 Dose:  25 mg


Tiotropium Bromide (Spiriva)  18 mcg INH RQ24 [facility]


   Last Admin: 03/27/17 07:36 Dose:  Not Given











- Labs


Labs: 


 





 03/26/17 17:07 





 03/26/17 17:07 





 











PT  31.8 SECONDS (9.7-12.2)  H* D 03/26/17  17:07    


 


INR  2.8  D 03/26/17  17:07    


 


APTT  33 SECONDS (21-34)  D 03/26/17  17:07    














- Constitutional


Appears: Chronically Ill





- Head Exam


Head Exam: ATRAUMATIC, NORMOCEPHALIC





- Eye Exam


Eye Exam: Normal appearance


Pupil Exam: PERRL





- ENT Exam


ENT Exam: Mucous Membranes Moist





- Respiratory Exam


Respiratory Exam: Rhonchi, Wheezes, NORMAL BREATHING PATTERN


Additional comments: 


desaturates to 82% while walking, 88% with NC 2L





- Cardiovascular Exam


Cardiovascular Exam: +S1, +S2





- GI/Abdominal Exam


GI & Abdominal Exam: Soft, Normal Bowel Sounds





- Neurological Exam


Neurological Exam: Alert, Awake





- Skin


Skin Exam: Dry, Warm





Assessment and Plan





- Assessment and Plan (Free Text)


Assessment: 


-Discharge planning to [facility] once INR in 2.5-3.5 range and SOB is better 

controlled. Patient has [place] insurance, case is working on rehab 

authorization - pending











COPD (chronic obstructive pulmonary disease)


3/26: Prednisone 40mg PO daily; Taper on discharge. Will require home oxygen.


3/23: Stop Solumedrol IVP;  Start Prednisone 40mg PO daily; Taper on discharge. 

Will require home oxygen.


-3/22: Solumedrol reduced to 40mg IVP Q12H. Prior to discharge, change 

Solumedrol -> Prednisone


-3/13-3/22: Continue BIPAP, patient more compliant (sometimes refuses despite 

being SOB).


-3/10: RAPID called due to SOB. BP 80/50's. Bolused 500cc NS. Ordered BIPAP and 

ABG, however patient refused. Placed back on NC 3L.


-3/8-3/9: Patient complaining of SOB with exertion and orthopnea. Maintain Head 

of bed elevated 30 degrees.


-3/7:  PT session: 98% O2 at 2L at rest, 96% room air at rest sitting, 82% room 

air during ambulation, 88% at 2 liter ambulation.


-3/6: Walked 20 steps today down [address], and patient became dizzy, SOB, and 

almost collapsed.


- Consulted Pulmonology, Dr. Varela, f/u recs


   -planning for home O2


   - Aware of HR in 140's and SOB after eating and low levels of exertion.


   -f/u ABG (not collected)


   -LDH 654H


   -HIV - negative


   -persistent shortness of breath


   - Patient with long history of smoking most likely has underlying COPD.  

Nebulizer treatment.  Inhaled steroids.  Spiriva.  PFT as out pt








Systolic dysfunction with acute on chronic heart failure


3/26: pt at baseline


- EF 10-15%, mechanical MV- no regurg, tricuspid regurg. see full report


-Chest CT 3/16 - Cardiomegaly. Cardiac valve prosthesis. Left-sided AICD. Dense 

coronary artery calcifications. 


   -Small consolidation (favored to reflect atelectasis rather than pneumonia) 

at the left lung base. 


   -Bibasilar atelectasis. Small airways disease. Mild ground-glass and fine 

nodular opacities within the right upper lobe, possibly infectious or 

inflammatory.    


   -7 mm left upper lobe pulmonary nodule. Guidelines by the Fleischner society 

(radiology 2005; 237:390 5-400) suggests that in patients with low risk for 

lung cancer, with nodules less than or equal to 8 mm in diameter should have 

follow-up in approximately 6-12 months.  In patients with high-risk, including 

smokers, follow-up is recommended 3-6 months.  Patient with a known malignancy 

for risk for metastases should receive 3 month follow-up. 


   -Hepatic capsular calcification. 


   -Numerous low-density lesions throughout the liver, possibly cysts. 

Decompressed gallbladder limits evaluation. Bilateral hypodense renal lesions. 

Right adrenal gland hypertrophy. 12 mm left adrenal gland nodule measures 

approximately 9 Hounsfield units, likely adenoma.


-Consulted Cardiology, Dr. Dotson - f/u recs


   -3/16: Lasix 40mg IVP daily, Spironolactone 25mg daily. CXR - no interval 

pathology change. 


   -3/13: HOLD LASIX FOR 1 TO 2 DAYS AND CONT MUCINEX.  PT PRERENAL AND APPEARS 

DRY.  ON BOTH LASIX AND SPIRONOLACTONE.


   -SOB APPEARS TO BE SECONDARY TO PULM ETIOLOGY.  IMPROVES WITH NEBS.  PT WITH 

RHONCHI B/L.  MAY BENEFIT FROM PULM TOILET.


   -will attempt to obtain company of AICD for interrogation.


   -likely deconditioned.  will benefit from rehab


-3/10: RAPID called due to SOB. BP 80/50's. Bolused 500cc NS. Ordered BIPAP and 

ABG, however patient refused. Placed back on NC 3L. ProBNP 4070H (less than # 

on admission) and SHEYLA negative. 


-3/10: Decrease to Lasix 40mg PO daily (was BID).


Admit to telemetry, BNP 4560 on admission


Digoxin 0.125mg


Lisinopril 10mg daily


Aldactone 25mg PO Daily


patient with AICD, will check echo to determine EF


CXR 2/25: mild cardiomegaly, mild pulmonary venous congestion.  s/p sternotomy, 

aortic valve replacement, AICD








H/O mitral valve replacement with mechanical valve


Cardiac valve replacement 6-7yrs ago


Consulted Cardiology, Dr. Dotson - f/u recs


   -echocardiogram reviewed.  valve leaflets are opening and functional.  There 

is no signficant gradient across the valve.  


   -recommend INR 2.5 to 3.5.


   STOP Lovenox 3/5


   STOP Heparin Drip - cardiac protocol, with bolus (because patient is 

refusing blood draws)


3/1: INR 1.3


3/2: INR refused, Coumadin 10mg


3/3: INR 1.5, Coumadin 10mg


3/4: INR 2.0, Coumadin 10mg


3/5: INR 2.4, Coumadin 7.5mg; patient received one more dose of Lovenox 80mg SC 

today, now discontinued as bridge complete.


3/6: INR 2.5, Coumadin 7.5mg


3/7: INR 3.1, Coumadin 5mg


3/8: INR 3.7, Coumadin 5mg (stopped by pharmacy)


3/9: INR 2.8, Coumadin 5mg


3/10: INR 2.6, Coumadin 4mg


3/11: INR 2.7, Coumadin 4 mg


3/12: INR 2.3  Coumadin 4mg 


3/13: INR 2.1, Coumadin 6mg


3/14: INR 2.6, Coumadin 5mg


3/15: INR 2.6, Coumadin 5mg


3/16: INR 2.5, Coumadin 6mg


3/17: INR 2.9, Coumadin 6mg, 


3/18: INR 3.6, Coumadin 6mg, f/u INR


3/19: HELD Coumadin, Pt. denied blood work today


3/20: HELD Coumadin, Pt. denied blood work today


3/21: INR 1.3, Coumadin 7mg, f/u


3/22: INR 1.5, Coumadin 7mg, f/u INR


3/23: INR 2.4, Coumadin 7mg, f/u INR


3/24: INR 3.3, Coumadin 6mg, f/u INR


3/25: INR 4.1 Hold Coumadin, f/u INR


3/26: INR 2.8, Coumadin 5mg, f/u INR


3/27: INR pending





Chronic atrial fibrillation


Consulted Cardiology, Dr. Dotson - f/u recs


   -Rate controlled


   -echocardiogram reviewed.  valve leaflets are opening and functional.  There 

is no signficant gradient across the valve.  


   -recommend INR 2.5 to 3.5.


   -See PT/INR trends above


   Coumadin as above


   STOP Lovenox 3/5








Cough, acute


-Mucinex 600mg PO BID


-3/22: cough intermittent


-3/18 does not complain of cough


-3/13-3/16: Persists. Non-productive. Continue Mucinex.


-3/9: patient developed productive cough with yellow sputum today.








CAD (coronary artery disease) 


Consulted Cardiology, Dr. Dotson - f/u recs


   -Aware of HR in 140's and SOB after eating and low levels of exertion.


   -unknown graft status.  no evidence of ischemia at present


   -No current angina





History of MI (myocardial infarction)


HOLD Crestor 10mg PO HS (last dose 3/12- due to elevated LFTs)


ROMIs negative x3


EKG paced 


Denies chest pain 





Transaminitis, acute


3/25: AST/ALT normalized; consider resuming crestor.


3/24  patient refusing labs. will re-attempt


3/22-3/23: Improving. hold crestor (since 3/12)


3/21: AST 37 /  - continue to hold crestor.


3/19-3/20: Patient refused blood work


3/18: 51/164 AST / ALT down trending


3/16-3/17: AST / ALT down trending - continue to hold Crestor


3/15: AST 57 / , decreasing. HOLD Crestor 10mg PO HS (last dose 3/12- 

due to elevated LFTs)


3/14: AST 75 /   


3/13: AST 70 /  - increasing -> hold crestor for 2 nights 3/13, 3/14


3/10: AST 90 /  - increasing -> hold Crestor tonight.


- AST 77 / ALT 98 -> previously normal.


- Continue to monitor





Electrolyte Imbalance


3/24 patient refusing labs, will re-attempt


3/15-3/23: Hyponatremia, stable


Hyperkalemia - resolved


Hypokalemia, 








Tachycardia


HR grossly WNL, continue to monitor


3/14-3/17: HR WNL


3/7: Patient becomes SOB very easily, desaturating to 82% while ambulating. 

Anytime he eats or gets up, HR climbs into 140's


Continue Digoxin 0.125mg


EKG in ER: sinus tachycardia at 100, paced, incomplete RBBB


Discontinued cardizem drip of 5mg/h started in the ER





Lower extremity edema


LE Duplex - negative. see full report.





Prophylactic measure


Coumadin as above.


protonix 40mg daily


SCD contraindicated due to LE edema


D/C when bed available











<Evgeny Elizabeth Jr. - Last Filed: 03/29/17 16:19>





Objective





- Vital Signs/Intake and Output


Vital Signs (last 24 hours): 


 











Temp Pulse Resp BP Pulse Ox


 


 97.8 F   94 H  20   107/73   98 


 


 03/29/17 15:58  03/29/17 15:58  03/29/17 15:58  03/29/17 15:58  03/29/17 15:58








Intake and Output: 


 











 03/29/17 03/29/17





 06:59 18:59


 


Intake Total 240 


 


Output Total 800 


 


Balance -560 














- Medications


Medications: 


 Current Medications





Albuterol/Ipratropium (Duoneb 3 Mg/0.5 Mg (3 Ml) Ud)  3 ml INH RQ6 [facility]


   Last Admin: 03/29/17 13:27 Dose:  3 ml


Benzocaine/Menthol (Cepacol Sore Throat)  1 keegan MT Q6H PRN


   PRN Reason: Sore Throat


   Last Admin: 03/29/17 10:01 Dose:  1 keegan


Budesonide (Pulmicort Respules)  0.5 mg INH RQ12 [facility]


   Last Admin: 03/29/17 07:25 Dose:  Not Given


Digoxin (Lanoxin)  0.125 mg PO DAILY@1800 [facility]


   Last Admin: 03/28/17 17:22 Dose:  0.125 mg


Famotidine (Pepcid)  20 mg IVP DAILY [facility]


   Last Admin: 03/29/17 09:57 Dose:  20 mg


Furosemide (Lasix)  40 mg PO DAILY [facility]


   Last Admin: 03/29/17 09:56 Dose:  40 mg


Guaifenesin (Mucinex La)  600 mg PO BID [facility]


   Last Admin: 03/29/17 09:56 Dose:  600 mg


Prednisone (Prednisone Tab)  40 mg PO DAILY [facility]


   Last Admin: 03/29/17 09:57 Dose:  40 mg


Rosuvastatin Calcium (Crestor)  10 mg PO HS [facility]


   Last Admin: 03/28/17 21:38 Dose:  10 mg


Spironolactone (Aldactone)  25 mg PO DAILY [facility]


   Last Admin: 03/29/17 09:56 Dose:  25 mg


Tiotropium Bromide (Spiriva)  18 mcg INH RQ24 [facility]


   Last Admin: 03/29/17 07:25 Dose:  Not Given


Warfarin Sodium (Coumadin)  5 mg PO 1800 [facility]


   Stop: 03/29/17 18:01











- Labs


Labs: 


 





 03/28/17 13:50 





 03/28/17 13:50 





 











PT  27.8 SECONDS (9.7-12.2)  H  03/28/17  13:50    


 


INR  2.4   03/28/17  13:50    


 


APTT  37 SECONDS (21-34)  H  03/28/17  13:50    














Attending/Attestation





- Attestation


I have personally seen and examined this patient.: Yes


I have fully participated in the care of the patient.: Yes


I have reviewed all pertinent clinical information, including history, physical 

exam and plan: Yes


Notes (Text): 





03/29/17 16:19


Patient seen and examined with resident. Reviewed resident note in plan of 

care. Agree with findings and plan of care discussed

## 2017-03-28 NOTE — CP.PCM.PN
<Cyrus Duval - Last Filed: 03/28/17 16:50>





Subjective





- Date & Time of Evaluation


Date of Evaluation: 03/28/17


Time of Evaluation: 16:50





- Subjective


Subjective: 


PGY-1 note for Dr Elizabeth's service





Pt seen and examined at bedside. No new complaints. Denies any fevers, chills, 

chest pain, sob, nausea, vomiting. 





Objective





- Vital Signs/Intake and Output


Vital Signs (last 24 hours): 


 











Temp Pulse Resp BP Pulse Ox


 


 97.8 F   103 H  20   104/55 L  99 


 


 03/28/17 15:43  03/28/17 16:45  03/28/17 15:43  03/28/17 16:45  03/28/17 15:43








Intake and Output: 


 











 03/28/17 03/28/17





 06:59 18:59


 


Intake Total 860 350


 


Output Total 2500 


 


Balance -1640 350














- Medications


Medications: 


 Current Medications





Albuterol/Ipratropium (Duoneb 3 Mg/0.5 Mg (3 Ml) Ud)  3 ml INH RQ6 [facility]


   Last Admin: 03/28/17 13:16 Dose:  3 ml


Benzocaine/Menthol (Cepacol Sore Throat)  1 keegan MT Q6H PRN


   PRN Reason: Sore Throat


   Last Admin: 03/27/17 19:18 Dose:  1 keegan


Budesonide (Pulmicort Respules)  0.5 mg INH RQ12 [facility]


   Last Admin: 03/28/17 07:38 Dose:  0.5 mg


Digoxin (Lanoxin)  0.125 mg PO DAILY@1800 [facility]


Famotidine (Pepcid)  20 mg IVP DAILY [facility]


   Last Admin: 03/28/17 11:01 Dose:  20 mg


Furosemide (Lasix)  40 mg PO DAILY [facility]


   Last Admin: 03/28/17 11:02 Dose:  Not Given


Guaifenesin (Mucinex La)  600 mg PO BID [facility]


   Last Admin: 03/28/17 11:01 Dose:  600 mg


Prednisone (Prednisone Tab)  40 mg PO DAILY [facility]


   Last Admin: 03/28/17 11:01 Dose:  40 mg


Rosuvastatin Calcium (Crestor)  10 mg PO HS [facility]


   Last Admin: 03/27/17 21:44 Dose:  10 mg


Spironolactone (Aldactone)  25 mg PO DAILY [facility]


   Last Admin: 03/28/17 11:02 Dose:  Not Given


Tiotropium Bromide (Spiriva)  18 mcg INH RQ24 [facility]


   Last Admin: 03/28/17 07:39 Dose:  18 mcg


Warfarin Sodium (Coumadin)  6 mg PO 1800 [facility]


   Stop: 03/28/17 18:01











- Labs


Labs: 


 





 03/28/17 13:50 





 03/28/17 13:50 





 











PT  27.8 SECONDS (9.7-12.2)  H  03/28/17  13:50    


 


INR  2.4   03/28/17  13:50    


 


APTT  37 SECONDS (21-34)  H  03/28/17  13:50    














- Constitutional


Appears: Non-toxic, No Acute Distress





- Head Exam


Head Exam: ATRAUMATIC, NORMOCEPHALIC





- ENT Exam


ENT Exam: Mucous Membranes Moist





- Respiratory Exam


Respiratory Exam: Rhonchi, NORMAL BREATHING PATTERN





- Cardiovascular Exam


Cardiovascular Exam: +S1, +S2





- GI/Abdominal Exam


GI & Abdominal Exam: Soft, Normal Bowel Sounds





- Neurological Exam


Neurological Exam: Alert, Awake





- Skin


Skin Exam: Dry, Warm





Assessment and Plan





- Assessment and Plan (Free Text)


Assessment: 


-Discharge planning to [facility] once INR in 2.5-3.5 range and SOB is better 

controlled. Patient has [place] insurance, case is working on rehab 

authorization - pending











COPD (chronic obstructive pulmonary disease)


3/26: Prednisone 40mg PO daily; Taper on discharge. Will require home oxygen.


3/23: Stop Solumedrol IVP;  Start Prednisone 40mg PO daily; Taper on discharge. 

Will require home oxygen.


-3/22: Solumedrol reduced to 40mg IVP Q12H. Prior to discharge, change 

Solumedrol -> Prednisone


-3/13-3/22: Continue BIPAP, patient more compliant (sometimes refuses despite 

being SOB).


-3/10: RAPID called due to SOB. BP 80/50's. Bolused 500cc NS. Ordered BIPAP and 

ABG, however patient refused. Placed back on NC 3L.


-3/8-3/9: Patient complaining of SOB with exertion and orthopnea. Maintain Head 

of bed elevated 30 degrees.


-3/7:  PT session: 98% O2 at 2L at rest, 96% room air at rest sitting, 82% room 

air during ambulation, 88% at 2 liter ambulation.


-3/6: Walked 20 steps today down [address], and patient became dizzy, SOB, and 

almost collapsed.


- Consulted Pulmonology, Dr. Varela, f/u recs


   -planning for home O2


   - Aware of HR in 140's and SOB after eating and low levels of exertion.


   -f/u ABG (not collected)


   -LDH 654H


   -HIV - negative


   -persistent shortness of breath


   - Patient with long history of smoking most likely has underlying COPD.  

Nebulizer treatment.  Inhaled steroids.  Spiriva.  PFT as out pt








Systolic dysfunction with acute on chronic heart failure


3/26: pt at baseline


- EF 10-15%, mechanical MV- no regurg, tricuspid regurg. see full report


-Chest CT 3/16 - Cardiomegaly. Cardiac valve prosthesis. Left-sided AICD. Dense 

coronary artery calcifications. 


   -Small consolidation (favored to reflect atelectasis rather than pneumonia) 

at the left lung base. 


   -Bibasilar atelectasis. Small airways disease. Mild ground-glass and fine 

nodular opacities within the right upper lobe, possibly infectious or 

inflammatory.    


   -7 mm left upper lobe pulmonary nodule. Guidelines by the Fleischner society 

(radiology 2005; 237:390 5-400) suggests that in patients with low risk for 

lung cancer, with nodules less than or equal to 8 mm in diameter should have 

follow-up in approximately 6-12 months.  In patients with high-risk, including 

smokers, follow-up is recommended 3-6 months.  Patient with a known malignancy 

for risk for metastases should receive 3 month follow-up. 


   -Hepatic capsular calcification. 


   -Numerous low-density lesions throughout the liver, possibly cysts. 

Decompressed gallbladder limits evaluation. Bilateral hypodense renal lesions. 

Right adrenal gland hypertrophy. 12 mm left adrenal gland nodule measures 

approximately 9 Hounsfield units, likely adenoma.


-Consulted Cardiology, Dr. Dotson - f/u recs


   -3/16: Lasix 40mg IVP daily, Spironolactone 25mg daily. CXR - no interval 

pathology change. 


   -3/13: HOLD LASIX FOR 1 TO 2 DAYS AND CONT MUCINEX.  PT PRERENAL AND APPEARS 

DRY.  ON BOTH LASIX AND SPIRONOLACTONE.


   -SOB APPEARS TO BE SECONDARY TO PULM ETIOLOGY.  IMPROVES WITH NEBS.  PT WITH 

RHONCHI B/L.  MAY BENEFIT FROM PULM TOILET.


   -will attempt to obtain company of [facility] for interrogation.


   -likely deconditioned.  will benefit from rehab


-3/10: RAPID called due to SOB. BP 80/50's. Bolused 500cc NS. Ordered BIPAP and 

ABG, however patient refused. Placed back on NC 3L. ProBNP 4070H (less than # 

on admission) and SHEYLA negative. 


-3/10: Decrease to Lasix 40mg PO daily (was BID).


Admit to telemetry, BNP 4560 on admission


Digoxin 0.125mg


Lisinopril 10mg daily


Aldactone 25mg PO Daily


patient with AICD, will check echo to determine EF


CXR 2/25: mild cardiomegaly, mild pulmonary venous congestion.  s/p sternotomy, 

aortic valve replacement, AICD








H/O mitral valve replacement with mechanical valve


Cardiac valve replacement 6-7yrs ago


Consulted Cardiology, Dr. Dotson - f/u recs


   -echocardiogram reviewed.  valve leaflets are opening and functional.  There 

is no signficant gradient across the valve.  


   -recommend INR 2.5 to 3.5.


   STOP Lovenox 3/5


   STOP Heparin Drip - cardiac protocol, with bolus (because patient is 

refusing blood draws)


3/1: INR 1.3


3/2: INR refused, Coumadin 10mg


3/3: INR 1.5, Coumadin 10mg


3/4: INR 2.0, Coumadin 10mg


3/5: INR 2.4, Coumadin 7.5mg; patient received one more dose of Lovenox 80mg SC 

today, now discontinued as bridge complete.


3/6: INR 2.5, Coumadin 7.5mg


3/7: INR 3.1, Coumadin 5mg


3/8: INR 3.7, Coumadin 5mg (stopped by pharmacy)


3/9: INR 2.8, Coumadin 5mg


3/10: INR 2.6, Coumadin 4mg


3/11: INR 2.7, Coumadin 4 mg


3/12: INR 2.3  Coumadin 4mg 


3/13: INR 2.1, Coumadin 6mg


3/14: INR 2.6, Coumadin 5mg


3/15: INR 2.6, Coumadin 5mg


3/16: INR 2.5, Coumadin 6mg


3/17: INR 2.9, Coumadin 6mg, 


3/18: INR 3.6, Coumadin 6mg, f/u INR


3/19: HELD Coumadin, Pt. denied blood work today


3/20: HELD Coumadin, Pt. denied blood work today


3/21: INR 1.3, Coumadin 7mg, f/u


3/22: INR 1.5, Coumadin 7mg, f/u INR


3/23: INR 2.4, Coumadin 7mg, f/u INR


3/24: INR 3.3, Coumadin 6mg, f/u INR


3/25: INR 4.1 Hold Coumadin, f/u INR


3/26: INR 2.8, Coumadin 5mg, f/u INR


3/28: INR 2.4, Coumadin 6mg, f/u INR





Chronic atrial fibrillation


Consulted Cardiology, Dr. Dotson - f/u recs


   -Rate controlled


   -echocardiogram reviewed.  valve leaflets are opening and functional.  There 

is no signficant gradient across the valve.  


   -recommend INR 2.5 to 3.5.


   -See PT/INR trends above


   Coumadin as above


   STOP Lovenox 3/5








Cough, acute


-Mucinex 600mg PO BID


-3/22: cough intermittent


-3/18 does not complain of cough


-3/13-3/16: Persists. Non-productive. Continue Mucinex.


-3/9: patient developed productive cough with yellow sputum today.








CAD (coronary artery disease) 


Consulted Cardiology, Dr. Dotson - f/u recs


   -Aware of HR in 140's and SOB after eating and low levels of exertion.


   -unknown graft status.  no evidence of ischemia at present


   -No current angina





History of MI (myocardial infarction)


HOLD Crestor 10mg PO HS (last dose 3/12- due to elevated LFTs)


ROMIs negative x3


EKG paced 


Denies chest pain 





Transaminitis, acute


3/25: AST/ALT normalized; consider resuming crestor.


3/24  patient refusing labs. will re-attempt


3/22-3/23: Improving. hold crestor (since 3/12)


3/21: AST 37 /  - continue to hold crestor.


3/19-3/20: Patient refused blood work


3/18: 51/164 AST / ALT down trending


3/16-3/17: AST / ALT down trending - continue to hold Crestor


3/15: AST 57 / , decreasing. HOLD Crestor 10mg PO HS (last dose 3/12- 

due to elevated LFTs)


3/14: AST 75 /   


3/13: AST 70 /  - increasing -> hold crestor for 2 nights 3/13, 3/14


3/10: AST 90 /  - increasing -> hold Crestor tonight.


- AST 77 / ALT 98 -> previously normal.


- Continue to monitor





Electrolyte Imbalance


3/24 patient refusing labs, will re-attempt


3/15-3/23: Hyponatremia, stable


Hyperkalemia - resolved


Hypokalemia, 








Tachycardia


HR grossly WNL, continue to monitor


3/14-3/17: HR WNL


3/7: Patient becomes SOB very easily, desaturating to 82% while ambulating. 

Anytime he eats or gets up, HR climbs into 140's


Continue Digoxin 0.125mg


EKG in ER: sinus tachycardia at 100, paced, incomplete RBBB


Discontinued cardizem drip of 5mg/h started in the ER





Lower extremity edema


LE Duplex - negative. see full report.





Prophylactic measure


Coumadin as above.


protonix 40mg daily


SCD contraindicated due to LE edema


D/C when bed available














<Evgeny Elizabeth Jr. - Last Filed: 03/29/17 16:28>





Objective





- Vital Signs/Intake and Output


Vital Signs (last 24 hours): 


 











Temp Pulse Resp BP Pulse Ox


 


 97.8 F   94 H  20   107/73   98 


 


 03/29/17 15:58  03/29/17 15:58  03/29/17 15:58  03/29/17 15:58  03/29/17 15:58








Intake and Output: 


 











 03/29/17 03/29/17





 06:59 18:59


 


Intake Total 240 


 


Output Total 800 


 


Balance -560 














- Medications


Medications: 


 Current Medications





Albuterol/Ipratropium (Duoneb 3 Mg/0.5 Mg (3 Ml) Ud)  3 ml INH RQ6 [facility]


   Last Admin: 03/29/17 13:27 Dose:  3 ml


Benzocaine/Menthol (Cepacol Sore Throat)  1 keegan MT Q6H PRN


   PRN Reason: Sore Throat


   Last Admin: 03/29/17 10:01 Dose:  1 keegan


Budesonide (Pulmicort Respules)  0.5 mg INH RQ12 [facility]


   Last Admin: 03/29/17 07:25 Dose:  Not Given


Digoxin (Lanoxin)  0.125 mg PO DAILY@1800 [facility]


   Last Admin: 03/28/17 17:22 Dose:  0.125 mg


Famotidine (Pepcid)  20 mg IVP DAILY [facility]


   Last Admin: 03/29/17 09:57 Dose:  20 mg


Furosemide (Lasix)  40 mg PO DAILY [facility]


   Last Admin: 03/29/17 09:56 Dose:  40 mg


Guaifenesin (Mucinex La)  600 mg PO BID [facility]


   Last Admin: 03/29/17 09:56 Dose:  600 mg


Prednisone (Prednisone Tab)  40 mg PO DAILY [facility]


   Last Admin: 03/29/17 09:57 Dose:  40 mg


Rosuvastatin Calcium (Crestor)  10 mg PO HS [facility]


   Last Admin: 03/28/17 21:38 Dose:  10 mg


Spironolactone (Aldactone)  25 mg PO DAILY [facility]


   Last Admin: 03/29/17 09:56 Dose:  25 mg


Tiotropium Bromide (Spiriva)  18 mcg INH RQ24 [facility]


   Last Admin: 03/29/17 07:25 Dose:  Not Given


Warfarin Sodium (Coumadin)  5 mg PO 1800 [facility]


   Stop: 03/29/17 18:01











- Labs


Labs: 


 





 03/28/17 13:50 





 03/28/17 13:50 





 











PT  27.8 SECONDS (9.7-12.2)  H  03/28/17  13:50    


 


INR  2.4   03/28/17  13:50    


 


APTT  37 SECONDS (21-34)  H  03/28/17  13:50    














Attending/Attestation





- Attestation


I have personally seen and examined this patient.: Yes


I have fully participated in the care of the patient.: Yes


I have reviewed all pertinent clinical information, including history, physical 

exam and plan: Yes


Notes (Text): 





03/29/17 16:28


Patient seen and examined with the resident. Reviewed resident notes and 

findings. Agree with resident's findings and plan of care as discussed

## 2017-03-28 NOTE — CARD
--------------- APPROVED REPORT --------------





EKG Measurement

Heart [id number]

[id number]

[id number]

[id number]



<Conclusion>

Electronic ventricular pacemaker

## 2017-03-29 NOTE — CP.PCM.PN
<Cyrus Duval - Last Filed: 03/29/17 15:41>





Subjective





- Date & Time of Evaluation


Date of Evaluation: 03/29/17


Time of Evaluation: 10:28





- Subjective


Subjective: 


PGY-1 note for Dr Elizabeth service





Pt seen and examined at bedside. Pt has no new complaints. Denies any fevers, 

chills, chest pain, nausea or vomiting. Pt does have some sob, which is not 

new. 





Objective





- Vital Signs/Intake and Output


Vital Signs (last 24 hours): 


 











Temp Pulse Resp BP Pulse Ox


 


 98.6 F   80   18   106/69   100 


 


 03/29/17 08:41  03/29/17 08:41  03/29/17 08:41  03/29/17 09:56  03/29/17 08:41








Intake and Output: 


 











 03/29/17 03/29/17





 06:59 18:59


 


Intake Total 240 


 


Output Total 800 


 


Balance -560 














- Medications


Medications: 


 Current Medications





Albuterol/Ipratropium (Duoneb 3 Mg/0.5 Mg (3 Ml) Ud)  3 ml INH RQ6 [facility]


   Last Admin: 03/29/17 07:25 Dose:  Not Given


Benzocaine/Menthol (Cepacol Sore Throat)  1 keegan MT Q6H PRN


   PRN Reason: Sore Throat


   Last Admin: 03/29/17 10:01 Dose:  1 keegan


Budesonide (Pulmicort Respules)  0.5 mg INH RQ12 [facility]


   Last Admin: 03/29/17 07:25 Dose:  Not Given


Digoxin (Lanoxin)  0.125 mg PO DAILY@1800 [facility]


   Last Admin: 03/28/17 17:22 Dose:  0.125 mg


Famotidine (Pepcid)  20 mg IVP DAILY [facility]


   Last Admin: 03/29/17 09:57 Dose:  20 mg


Furosemide (Lasix)  40 mg PO DAILY [facility]


   Last Admin: 03/29/17 09:56 Dose:  40 mg


Guaifenesin (Mucinex La)  600 mg PO BID [facility]


   Last Admin: 03/29/17 09:56 Dose:  600 mg


Prednisone (Prednisone Tab)  40 mg PO DAILY [facility]


   Last Admin: 03/29/17 09:57 Dose:  40 mg


Rosuvastatin Calcium (Crestor)  10 mg PO HS [facility]


   Last Admin: 03/28/17 21:38 Dose:  10 mg


Spironolactone (Aldactone)  25 mg PO DAILY [facility]


   Last Admin: 03/29/17 09:56 Dose:  25 mg


Tiotropium Bromide (Spiriva)  18 mcg INH RQ24 [facility]


   Last Admin: 03/29/17 07:25 Dose:  Not Given











- Labs


Labs: 


 





 03/28/17 13:50 





 03/28/17 13:50 





 











PT  27.8 SECONDS (9.7-12.2)  H  03/28/17  13:50    


 


INR  2.4   03/28/17  13:50    


 


APTT  37 SECONDS (21-34)  H  03/28/17  13:50    














- Constitutional


Appears: No Acute Distress, Chronically Ill





- Head Exam


Head Exam: ATRAUMATIC, NORMOCEPHALIC





- Eye Exam


Eye Exam: Normal appearance





- ENT Exam


ENT Exam: Mucous Membranes Moist





- Respiratory Exam


Respiratory Exam: Rhonchi, NORMAL BREATHING PATTERN.  absent: Respiratory 

Distress





- Cardiovascular Exam


Cardiovascular Exam: +S1, +S2





- GI/Abdominal Exam


GI & Abdominal Exam: Soft, Normal Bowel Sounds





- Neurological Exam


Neurological Exam: Alert, Awake





- Skin


Skin Exam: Dry, Warm





Assessment and Plan





- Assessment and Plan (Free Text)


Assessment: 


- [facility] will accept pt, waiting on pt's wife to confirm address that pt will 

be discharged home to. 








COPD (chronic obstructive pulmonary disease)


3/26: Prednisone 40mg PO daily; Taper on discharge. Will require home oxygen.


3/23: Stop Solumedrol IVP;  Start Prednisone 40mg PO daily; Taper on discharge. 

Will require home oxygen.


-3/22: Solumedrol reduced to 40mg IVP Q12H. Prior to discharge, change 

Solumedrol -> Prednisone


-3/13-3/22: Continue BIPAP, patient more compliant (sometimes refuses despite 

being SOB).


-3/10: RAPID called due to SOB. BP 80/50's. Bolused 500cc NS. Ordered BIPAP and 

ABG, however patient refused. Placed back on NC 3L.


-3/8-3/9: Patient complaining of SOB with exertion and orthopnea. Maintain Head 

of bed elevated 30 degrees.


-3/7:  PT session: 98% O2 at 2L at rest, 96% room air at rest sitting, 82% room 

air during ambulation, 88% at 2 liter ambulation.


-3/6: Walked 20 steps today down [address], and patient became dizzy, SOB, and 

almost collapsed.


- Consulted Pulmonology, Dr. Varela, f/u recs


   -planning for home O2


   - Aware of HR in 140's and SOB after eating and low levels of exertion.


   -f/u ABG (not collected)


   -LDH 654H


   -HIV - negative


   -persistent shortness of breath


   - Patient with long history of smoking most likely has underlying COPD.  

Nebulizer treatment.  Inhaled steroids.  Spiriva.  PFT as out pt








Systolic dysfunction with acute on chronic heart failure


3/26: pt at baseline


- EF 10-15%, mechanical MV- no regurg, tricuspid regurg. see full report


-Chest CT 3/16 - Cardiomegaly. Cardiac valve prosthesis. Left-sided AICD. Dense 

coronary artery calcifications. 


   -Small consolidation (favored to reflect atelectasis rather than pneumonia) 

at the left lung base. 


   -Bibasilar atelectasis. Small airways disease. Mild ground-glass and fine 

nodular opacities within the right upper lobe, possibly infectious or 

inflammatory.    


   -7 mm left upper lobe pulmonary nodule. Guidelines by the Fleischner society 

(radiology 2005; 237:390 5-400) suggests that in patients with low risk for 

lung cancer, with nodules less than or equal to 8 mm in diameter should have 

follow-up in approximately 6-12 months.  In patients with high-risk, including 

smokers, follow-up is recommended 3-6 months.  Patient with a known malignancy 

for risk for metastases should receive 3 month follow-up. 


   -Hepatic capsular calcification. 


   -Numerous low-density lesions throughout the liver, possibly cysts. 

Decompressed gallbladder limits evaluation. Bilateral hypodense renal lesions. 

Right adrenal gland hypertrophy. 12 mm left adrenal gland nodule measures 

approximately 9 Hounsfield units, likely adenoma.


-Consulted Cardiology, Dr. Dotson - f/u recs


   -3/16: Lasix 40mg IVP daily, Spironolactone 25mg daily. CXR - no interval 

pathology change. 


   -3/13: HOLD LASIX FOR 1 TO 2 DAYS AND CONT MUCINEX.  PT PRERENAL AND APPEARS 

DRY.  ON BOTH LASIX AND SPIRONOLACTONE.


   -SOB APPEARS TO BE SECONDARY TO PULM ETIOLOGY.  IMPROVES WITH NEBS.  PT WITH 

RHONCHI B/L.  MAY BENEFIT FROM PULM TOILET.


   -[facility].


   -likely deconditioned.  will benefit from rehab


-3/10: RAPID called due to SOB. BP 80/50's. Bolused 500cc NS. Ordered BIPAP and 

ABG, however patient refused. Placed back on NC 3L. ProBNP 4070H (less than # 

on admission) and SHEYLA negative. 


-3/10: Decrease to Lasix 40mg PO daily (was BID).


Admit to telemetry, BNP 4560 on admission


Digoxin 0.125mg


Lisinopril 10mg daily


Aldactone 25mg PO Daily


patient with AICD, will check echo to determine EF


CXR 2/25: mild cardiomegaly, mild pulmonary venous congestion.  s/p sternotomy, 

aortic valve replacement, AICD








H/O mitral valve replacement with mechanical valve


Cardiac valve replacement 6-7yrs ago


Consulted Cardiology, Dr. Dotson - f/u recs


   -echocardiogram reviewed.  valve leaflets are opening and functional.  There 

is no signficant gradient across the valve.  


   -recommend INR 2.5 to 3.5.


   STOP Lovenox 3/5


   STOP Heparin Drip - cardiac protocol, with bolus (because patient is 

refusing blood draws)


3/1: INR 1.3


3/2: INR refused, Coumadin 10mg


3/3: INR 1.5, Coumadin 10mg


3/4: INR 2.0, Coumadin 10mg


3/5: INR 2.4, Coumadin 7.5mg; patient received one more dose of Lovenox 80mg SC 

today, now discontinued as bridge complete.


3/6: INR 2.5, Coumadin 7.5mg


3/7: INR 3.1, Coumadin 5mg


3/8: INR 3.7, Coumadin 5mg (stopped by pharmacy)


3/9: INR 2.8, Coumadin 5mg


3/10: INR 2.6, Coumadin 4mg


3/11: INR 2.7, Coumadin 4 mg


3/12: INR 2.3  Coumadin 4mg 


3/13: INR 2.1, Coumadin 6mg


3/14: INR 2.6, Coumadin 5mg


3/15: INR 2.6, Coumadin 5mg


3/16: INR 2.5, Coumadin 6mg


3/17: INR 2.9, Coumadin 6mg, 


3/18: INR 3.6, Coumadin 6mg, f/u INR


3/19: HELD Coumadin, Pt. denied blood work today


3/20: HELD Coumadin, Pt. denied blood work today


3/21: INR 1.3, Coumadin 7mg, f/u


3/22: INR 1.5, Coumadin 7mg, f/u INR


3/23: INR 2.4, Coumadin 7mg, f/u INR


3/24: INR 3.3, Coumadin 6mg, f/u INR


3/25: INR 4.1 Hold Coumadin, f/u INR


3/26: INR 2.8, Coumadin 5mg, f/u INR


3/28: INR 2.4, Coumadin 6mg, f/u INR


3/29: INR pending, Coumadin 5mg





Chronic atrial fibrillation


Consulted Cardiology, Dr. Dotson - f/u recs


   -Rate controlled


   -echocardiogram reviewed.  valve leaflets are opening and functional.  There 

is no signficant gradient across the valve.  


   -recommend INR 2.5 to 3.5.


   -See PT/INR trends above


   Coumadin as above


   STOP Lovenox 3/5








Cough, acute


-Mucinex 600mg PO BID


-3/22: cough intermittent


-3/18 does not complain of cough


-3/13-3/16: Persists. Non-productive. Continue Mucinex.


-3/9: patient developed productive cough with yellow sputum today.








CAD (coronary artery disease) 


Consulted Cardiology, Dr. Dotson - f/u recs


   -Aware of HR in 140's and SOB after eating and low levels of exertion.


   -unknown graft status.  no evidence of ischemia at present


   -No current angina





History of MI (myocardial infarction)


HOLD Crestor 10mg PO HS (last dose 3/12- due to elevated LFTs)


ROMIs negative x3


EKG paced 


Denies chest pain 





Transaminitis, acute


3/25: AST/ALT normalized; consider resuming crestor.


3/24  patient refusing labs. will re-attempt


3/22-3/23: Improving. hold crestor (since 3/12)


3/21: AST 37 /  - continue to hold crestor.


3/19-3/20: Patient refused blood work


3/18: 51/164 AST / ALT down trending


3/16-3/17: AST / ALT down trending - continue to hold Crestor


3/15: AST 57 / , decreasing. HOLD Crestor 10mg PO HS (last dose 3/12- 

due to elevated LFTs)


3/14: AST 75 /   


3/13: AST 70 /  - increasing -> hold crestor for 2 nights 3/13, 3/14


3/10: AST 90 /  - increasing -> hold Crestor tonight.


- AST 77 / ALT 98 -> previously normal.


- Continue to monitor





Electrolyte Imbalance


3/24 patient refusing labs, will re-attempt


3/15-3/23: Hyponatremia, stable


Hyperkalemia - resolved


Hypokalemia, 








Tachycardia


HR grossly WNL, continue to monitor


3/14-3/17: HR WNL


3/7: Patient becomes SOB very easily, desaturating to 82% while ambulating. 

Anytime he eats or gets up, HR climbs into 140's


Continue Digoxin 0.125mg


EKG in ER: sinus tachycardia at 100, paced, incomplete RBBB


Discontinued cardizem drip of 5mg/h started in the ER





Lower extremity edema


LE Duplex - negative. see full report.





Prophylactic measure


Coumadin as above.


protonix 40mg daily


SCD contraindicated due to LE edema


D/C when bed available





<Evgeny Elizabeth Jr. - Last Filed: 03/29/17 16:29>





Objective





- Vital Signs/Intake and Output


Vital Signs (last 24 hours): 


 











Temp Pulse Resp BP Pulse Ox


 


 97.8 F   94 H  20   107/73   98 


 


 03/29/17 15:58  03/29/17 15:58  03/29/17 15:58  03/29/17 15:58  03/29/17 15:58








Intake and Output: 


 











 03/29/17 03/29/17





 06:59 18:59


 


Intake Total 240 


 


Output Total 800 


 


Balance -560 














- Medications


Medications: 


 Current Medications





Albuterol/Ipratropium (Duoneb 3 Mg/0.5 Mg (3 Ml) Ud)  3 ml INH RQ6 [facility]


   Last Admin: 03/29/17 13:27 Dose:  3 ml


Benzocaine/Menthol (Cepacol Sore Throat)  1 keegan MT Q6H PRN


   PRN Reason: Sore Throat


   Last Admin: 03/29/17 10:01 Dose:  1 keegan


Budesonide (Pulmicort Respules)  0.5 mg INH RQ12 [facility]


   Last Admin: 03/29/17 07:25 Dose:  Not Given


Digoxin (Lanoxin)  0.125 mg PO DAILY@1800 [facility]


   Last Admin: 03/28/17 17:22 Dose:  0.125 mg


Famotidine (Pepcid)  20 mg IVP DAILY [facility]


   Last Admin: 03/29/17 09:57 Dose:  20 mg


Furosemide (Lasix)  40 mg PO DAILY [facility]


   Last Admin: 03/29/17 09:56 Dose:  40 mg


Guaifenesin (Mucinex La)  600 mg PO BID [facility]


   Last Admin: 03/29/17 09:56 Dose:  600 mg


Prednisone (Prednisone Tab)  40 mg PO DAILY [facility]


   Last Admin: 03/29/17 09:57 Dose:  40 mg


Rosuvastatin Calcium (Crestor)  10 mg PO HS [facility]


   Last Admin: 03/28/17 21:38 Dose:  10 mg


Spironolactone (Aldactone)  25 mg PO DAILY [facility]


   Last Admin: 03/29/17 09:56 Dose:  25 mg


Tiotropium Bromide (Spiriva)  18 mcg INH RQ24 [facility]


   Last Admin: 03/29/17 07:25 Dose:  Not Given


Warfarin Sodium (Coumadin)  5 mg PO 1800 [facility]


   Stop: 03/29/17 18:01











- Labs


Labs: 


 





 03/28/17 13:50 





 03/28/17 13:50 





 











PT  27.8 SECONDS (9.7-12.2)  H  03/28/17  13:50    


 


INR  2.4   03/28/17  13:50    


 


APTT  37 SECONDS (21-34)  H  03/28/17  13:50    














Attending/Attestation





- Attestation


I have personally seen and examined this patient.: Yes


I have fully participated in the care of the patient.: Yes


I have reviewed all pertinent clinical information, including history, physical 

exam and plan: Yes


Notes (Text): 





03/29/17 16:29


Examined patient with resident. Reviewed resident notes and findings. Agree 

with resident's findings and plan of care as discussed.

## 2017-03-29 NOTE — CP.PCM.PN
Subjective





- Date & Time of Evaluation


Date of Evaluation: 03/29/17


Time of Evaluation: 10:20





- Subjective


Subjective: 


Pt seen and examined at bedside. Pt lying in bed, appears comfortable, no 

labored breathing, not using nasal cannula at this time.





Pt continues to state similar complaints of SOB which are improved with nasal 

cannula use, and dry cough sporadically throughout the day. Pt continues to 

deny subjective fever, productivity from cough, and congestion. Pt also 

continues to state that he is able to walk to the bathroom without feeling SOB, 

but if he were to exert himself more, he fears he would become SOB. 








Objective





- Vital Signs/Intake and Output


Vital Signs (last 24 hours): 


 











Temp Pulse Resp BP Pulse Ox


 


 98.6 F   80   18   106/69   100 


 


 03/29/17 08:41  03/29/17 08:41  03/29/17 08:41  03/29/17 09:56  03/29/17 08:41








Intake and Output: 


 











 03/29/17 03/29/17





 06:59 18:59


 


Intake Total 240 


 


Output Total 800 


 


Balance -560 














- Medications


Medications: 


 Current Medications





Albuterol/Ipratropium (Duoneb 3 Mg/0.5 Mg (3 Ml) Ud)  3 ml INH RQ6 [facility]


   Last Admin: 03/29/17 07:25 Dose:  Not Given


Benzocaine/Menthol (Cepacol Sore Throat)  1 keegan MT Q6H PRN


   PRN Reason: Sore Throat


   Last Admin: 03/29/17 10:01 Dose:  1 keegan


Budesonide (Pulmicort Respules)  0.5 mg INH RQ12 [facility]


   Last Admin: 03/29/17 07:25 Dose:  Not Given


Digoxin (Lanoxin)  0.125 mg PO DAILY@1800 [facility]


   Last Admin: 03/28/17 17:22 Dose:  0.125 mg


Famotidine (Pepcid)  20 mg IVP DAILY [facility]


   Last Admin: 03/29/17 09:57 Dose:  20 mg


Furosemide (Lasix)  40 mg PO DAILY [facility]


   Last Admin: 03/29/17 09:56 Dose:  40 mg


Guaifenesin (Mucinex La)  600 mg PO BID [facility]


   Last Admin: 03/29/17 09:56 Dose:  600 mg


Prednisone (Prednisone Tab)  40 mg PO DAILY [facility]


   Last Admin: 03/29/17 09:57 Dose:  40 mg


Rosuvastatin Calcium (Crestor)  10 mg PO HS [facility]


   Last Admin: 03/28/17 21:38 Dose:  10 mg


Spironolactone (Aldactone)  25 mg PO DAILY [facility]


   Last Admin: 03/29/17 09:56 Dose:  25 mg


Tiotropium Bromide (Spiriva)  18 mcg INH RQ24 [facility]


   Last Admin: 03/29/17 07:25 Dose:  Not Given











- Labs


Labs: 


 





 03/28/17 13:50 





 03/28/17 13:50 





 











PT  27.8 SECONDS (9.7-12.2)  H  03/28/17  13:50    


 


INR  2.4   03/28/17  13:50    


 


APTT  37 SECONDS (21-34)  H  03/28/17  13:50    














- Constitutional


Appears: Non-toxic, No Acute Distress





- Head Exam


Head Exam: NORMAL INSPECTION





- Eye Exam


Eye Exam: Normal appearance, PERRL





- ENT Exam


ENT Exam: Mucous Membranes Moist





- Respiratory Exam


Respiratory Exam: Rhonchi (Minimal, improving ), Wheezes (Minimal, improving ), 

NORMAL BREATHING PATTERN.  absent: Rales, Respiratory Distress, Stridor





- Cardiovascular Exam


Cardiovascular Exam: REGULAR RHYTHM, +S1, +S2





- Neurological Exam


Neurological Exam: Alert, Awake, Oriented x3





- Psychiatric Exam


Psychiatric exam: Anxious (Pt rests comfortably, but upon interaction with 

medical team seems to become anxious )





- Skin


Skin Exam: Dry, Intact, Normal Color, Warm





Assessment and Plan


(1) COPD (chronic obstructive pulmonary disease)


Assessment & Plan: 


Pt continues to improve from pulmonary stand point. 





Continue Prednisone, nebulizer, oxygen, and BiPAP as needed. 





Pt currently not using BiPAP as he is stable on the nasal cannula alone 





Continue to monitor for increased SOB, productive cough, and fever. 





Pt is pending placement. Pt will need home oxygen upon discharge. 


Status: Acute





(2) Acute exacerbation of CHF (congestive heart failure)


Status: Acute





(3) Chronic atrial fibrillation


Status: Chronic

## 2017-03-30 NOTE — CP.PCM.PN
<MukundCyrus - Last Filed: 03/30/17 18:14>





Subjective





- Date & Time of Evaluation


Date of Evaluation: 03/30/17


Time of Evaluation: 13:49





- Subjective


Subjective: 


PGY-1 note for Dr Elizabeth's service





Pt seen and examined at bedside. Pt still complains of sob with exertion. Pt 

has not been able to talk to family concerning [facility]. 

Denies any fevers, chills, chest pain, nausea or vomiting. 





Objective





- Vital Signs/Intake and Output


Vital Signs (last 24 hours): 


 











Temp Pulse Resp BP Pulse Ox


 


 97.5 F L  78   20   91/64 L  100 


 


 03/30/17 07:30  03/30/17 07:30  03/30/17 07:30  03/30/17 10:26  03/30/17 07:30








Intake and Output: 


 











 03/30/17 03/30/17





 06:59 18:59


 


Intake Total 450 


 


Output Total 2250 


 


Balance -1800 














- Medications


Medications: 


 Current Medications





Albuterol/Ipratropium (Duoneb 3 Mg/0.5 Mg (3 Ml) Ud)  3 ml INH RQ6 [facility]


   Last Admin: 03/30/17 08:28 Dose:  3 ml


Benzocaine/Menthol (Cepacol Sore Throat)  1 keegan MT Q6H PRN


   PRN Reason: Sore Throat


   Last Admin: 03/30/17 12:33 Dose:  1 keegan


Budesonide (Pulmicort Respules)  0.5 mg INH RQ12 [facility]


   Last Admin: 03/30/17 08:28 Dose:  0.5 mg


Digoxin (Lanoxin)  0.125 mg PO DAILY@1800 [facility]


   Last Admin: 03/29/17 18:05 Dose:  0.125 mg


Famotidine (Pepcid)  20 mg IVP DAILY [facility]


   Last Admin: 03/30/17 10:22 Dose:  20 mg


Furosemide (Lasix)  40 mg PO DAILY [facility]


   Last Admin: 03/30/17 10:26 Dose:  Not Given


Guaifenesin (Mucinex La)  600 mg PO BID [facility]


   Last Admin: 03/30/17 12:33 Dose:  600 mg


Prednisone (Prednisone Tab)  40 mg PO DAILY [facility]


   Last Admin: 03/30/17 10:21 Dose:  40 mg


Rosuvastatin Calcium (Crestor)  10 mg PO HS [facility]


   Last Admin: 03/29/17 22:22 Dose:  10 mg


Spironolactone (Aldactone)  25 mg PO DAILY [facility]


   Last Admin: 03/30/17 10:21 Dose:  25 mg


Tiotropium Bromide (Spiriva)  18 mcg INH RQ24 [facility]


   Last Admin: 03/30/17 08:28 Dose:  18 mcg


Warfarin Sodium (Coumadin)  6 mg PO 1800 [facility]


   Stop: 03/30/17 18:01











- Labs


Labs: 


 





 03/29/17 16:46 





 03/29/17 16:46 





 











PT  34.7 SECONDS (9.7-12.2)  H*  03/30/17  11:04    


 


INR  2.9   03/30/17  11:04    


 


APTT  37 SECONDS (21-34)  H  03/28/17  13:50    














- Constitutional


Appears: Non-toxic, No Acute Distress, Chronically Ill





- Head Exam


Head Exam: ATRAUMATIC, NORMOCEPHALIC





- Eye Exam


Eye Exam: Normal appearance





- ENT Exam


ENT Exam: Mucous Membranes Moist





- Respiratory Exam


Respiratory Exam: Rhonchi, NORMAL BREATHING PATTERN





- Cardiovascular Exam


Cardiovascular Exam: +S1, +S2





- GI/Abdominal Exam


GI & Abdominal Exam: Soft, Normal Bowel Sounds





- Neurological Exam


Neurological Exam: Alert, Awake





- Skin


Skin Exam: Dry, Warm





Assessment and Plan





- Assessment and Plan (Free Text)


Assessment: 


- [facility] will accept pt, waiting on pt's wife to confirm address that pt will 

be discharged home to. 


- Pt not a safe discharge to home due to functional status





Numbness and tingling - left sided


- CT head - f/u





COPD (chronic obstructive pulmonary disease)


3/30: CT chest with high resolution - r/o interstitial disease - f/u


3/26: Prednisone 40mg PO daily; Taper on discharge. Will require home oxygen.


3/23: Stop Solumedrol IVP;  Start Prednisone 40mg PO daily; Taper on discharge. 

Will require home oxygen.


-3/22: Solumedrol reduced to 40mg IVP Q12H. Prior to discharge, change 

Solumedrol -> Prednisone


-3/13-3/22: Continue BIPAP, patient more compliant (sometimes refuses despite 

being SOB).


-3/10: RAPID called due to SOB. BP 80/50's. Bolused 500cc NS. Ordered BIPAP and 

ABG, however patient refused. Placed back on NC 3L.


-3/8-3/9: Patient complaining of SOB with exertion and orthopnea. Maintain Head 

of bed elevated 30 degrees.


-3/7:  PT session: 98% O2 at 2L at rest, 96% room air at rest sitting, 82% room 

air during ambulation, 88% at 2 liter ambulation.


-3/6: Walked 20 steps today down [address], and patient became dizzy, SOB, and 

almost collapsed.


- Consulted Pulmonology, Dr. Varela, f/u recs


   -planning for home O2


   - Aware of HR in 140's and SOB after eating and low levels of exertion.


   -f/u ABG (not collected)


   -LDH 654H


   -HIV - negative


   -persistent shortness of breath


   - Patient with long history of smoking most likely has underlying COPD.  

Nebulizer treatment.  Inhaled steroids.  Spiriva.  PFT as out pt








Systolic dysfunction with acute on chronic heart failure


3/26: pt at baseline


- EF 10-15%, mechanical MV- no regurg, tricuspid regurg. see full report


-Chest CT 3/16 - Cardiomegaly. Cardiac valve prosthesis. Left-sided AICD. Dense 

coronary artery calcifications. 


   -Small consolidation (favored to reflect atelectasis rather than pneumonia) 

at the left lung base. 


   -Bibasilar atelectasis. Small airways disease. Mild ground-glass and fine 

nodular opacities within the right upper lobe, possibly infectious or 

inflammatory.    


   -7 mm left upper lobe pulmonary nodule. Guidelines by the Fleischner society 

(radiology 2005; 237:390 5-400) suggests that in patients with low risk for 

lung cancer, with nodules less than or equal to 8 mm in diameter should have 

follow-up in approximately 6-12 months.  In patients with high-risk, including 

smokers, follow-up is recommended 3-6 months.  Patient with a known malignancy 

for risk for metastases should receive 3 month follow-up. 


   -Hepatic capsular calcification. 


   -Numerous low-density lesions throughout the liver, possibly cysts. 

Decompressed gallbladder limits evaluation. Bilateral hypodense renal lesions. 

Right adrenal gland hypertrophy. 12 mm left adrenal gland nodule measures 

approximately 9 Hounsfield units, likely adenoma.


-Consulted Cardiology, Dr. Dotson - f/u recs


   -3/16: Lasix 40mg IVP daily, Spironolactone 25mg daily. CXR - no interval 

pathology change. 


   -3/13: HOLD LASIX FOR 1 TO 2 DAYS AND CONT MUCINEX.  PT PRERENAL AND APPEARS 

DRY.  ON BOTH LASIX AND SPIRONOLACTONE.


   -SOB APPEARS TO BE SECONDARY TO PULM ETIOLOGY.  IMPROVES WITH NEBS.  PT WITH 

RHONCHI B/L.  MAY BENEFIT FROM PULM TOILET.


   -[facility].


   -likely deconditioned.  will benefit from rehab


-3/10: RAPID called due to SOB. BP 80/50's. Bolused 500cc NS. Ordered BIPAP and 

ABG, however patient refused. Placed back on NC 3L. ProBNP 4070H (less than # 

on admission) and SHEYLA negative. 


-3/10: Decrease to Lasix 40mg PO daily (was BID).


Admit to telemetry, BNP 4560 on admission


Digoxin 0.125mg


Lisinopril 10mg daily


Aldactone 25mg PO Daily


patient with AICD, will check echo to determine EF


CXR 2/25: mild cardiomegaly, mild pulmonary venous congestion.  s/p sternotomy, 

aortic valve replacement, AICD








H/O mitral valve replacement with mechanical valve


Cardiac valve replacement 6-7yrs ago


Consulted Cardiology, Dr. Dotson - f/u recs


   -echocardiogram reviewed.  valve leaflets are opening and functional.  There 

is no signficant gradient across the valve.  


   -recommend INR 2.5 to 3.5.


   STOP Lovenox 3/5


   STOP Heparin Drip - cardiac protocol, with bolus (because patient is 

refusing blood draws)


3/1: INR 1.3


3/2: INR refused, Coumadin 10mg


3/3: INR 1.5, Coumadin 10mg


3/4: INR 2.0, Coumadin 10mg


3/5: INR 2.4, Coumadin 7.5mg; patient received one more dose of Lovenox 80mg SC 

today, now discontinued as bridge complete.


3/6: INR 2.5, Coumadin 7.5mg


3/7: INR 3.1, Coumadin 5mg


3/8: INR 3.7, Coumadin 5mg (stopped by pharmacy)


3/9: INR 2.8, Coumadin 5mg


3/10: INR 2.6, Coumadin 4mg


3/11: INR 2.7, Coumadin 4 mg


3/12: INR 2.3  Coumadin 4mg 


3/13: INR 2.1, Coumadin 6mg


3/14: INR 2.6, Coumadin 5mg


3/15: INR 2.6, Coumadin 5mg


3/16: INR 2.5, Coumadin 6mg


3/17: INR 2.9, Coumadin 6mg, 


3/18: INR 3.6, Coumadin 6mg, f/u INR


3/19: HELD Coumadin, Pt. denied blood work today


3/20: HELD Coumadin, Pt. denied blood work today


3/21: INR 1.3, Coumadin 7mg, f/u


3/22: INR 1.5, Coumadin 7mg, f/u INR


3/23: INR 2.4, Coumadin 7mg, f/u INR


3/24: INR 3.3, Coumadin 6mg, f/u INR


3/25: INR 4.1 Hold Coumadin, f/u INR


3/26: INR 2.8, Coumadin 5mg, f/u INR


3/28: INR 2.4, Coumadin 6mg, f/u INR


3/29: INR 3.1, Coumadin 5mg, f/u INR


3/20: INR 2.9, Coumadin 6mg, f/u INR





Chronic atrial fibrillation


Consulted Cardiology, Dr. Dotson - f/u recs


   -Rate controlled


   -echocardiogram reviewed.  valve leaflets are opening and functional.  There 

is no signficant gradient across the valve.  


   -recommend INR 2.5 to 3.5.


   -See PT/INR trends above


   Coumadin as above


   STOP Lovenox 3/5








Cough, acute


-Mucinex 600mg PO BID


-3/22: cough intermittent


-3/18 does not complain of cough


-3/13-3/16: Persists. Non-productive. Continue Mucinex.


-3/9: patient developed productive cough with yellow sputum today.








CAD (coronary artery disease) 


Consulted Cardiology, Dr. Dotson - f/u recs


   -Aware of HR in 140's and SOB after eating and low levels of exertion.


   -unknown graft status.  no evidence of ischemia at present


   -No current angina





History of MI (myocardial infarction)


HOLD Crestor 10mg PO HS (last dose 3/12- due to elevated LFTs)


ROMIs negative x3


EKG paced 


Denies chest pain 





Transaminitis, acute


3/25: AST/ALT normalized; consider resuming crestor.


3/24  patient refusing labs. will re-attempt


3/22-3/23: Improving. hold crestor (since 3/12)


3/21: AST 37 /  - continue to hold crestor.


3/19-3/20: Patient refused blood work


3/18: 51/164 AST / ALT down trending


3/16-3/17: AST / ALT down trending - continue to hold Crestor


3/15: AST 57 / , decreasing. HOLD Crestor 10mg PO HS (last dose 3/12- 

due to elevated LFTs)


3/14: AST 75 /   


3/13: AST 70 /  - increasing -> hold crestor for 2 nights 3/13, 3/14


3/10: AST 90 /  - increasing -> hold Crestor tonight.


- AST 77 / ALT 98 -> previously normal.


- Continue to monitor





Electrolyte Imbalance


3/24 patient refusing labs, will re-attempt


3/15-3/23: Hyponatremia, stable


Hyperkalemia - resolved


Hypokalemia, 








Tachycardia


HR grossly WNL, continue to monitor


3/14-3/17: HR WNL


3/7: Patient becomes SOB very easily, desaturating to 82% while ambulating. 

Anytime he eats or gets up, HR climbs into 140's


Continue Digoxin 0.125mg


EKG in ER: sinus tachycardia at 100, paced, incomplete RBBB


Discontinued cardizem drip of 5mg/h started in the ER





Lower extremity edema


LE Duplex - negative. see full report.





Prophylactic measure


Coumadin as above.


protonix 40mg daily


SCD contraindicated due to LE edema


D/C when bed available





<Evgeny Elizabeth Jr. - Last Filed: 03/31/17 17:21>





Objective





- Vital Signs/Intake and Output


Vital Signs (last 24 hours): 


 











Temp Pulse Resp BP Pulse Ox


 


 97.2 F L  94 H  18   94/58 L  99 


 


 03/31/17 16:00  03/31/17 16:00  03/31/17 16:00  03/31/17 16:00  03/31/17 16:00








Intake and Output: 


 











 03/31/17 03/31/17





 06:59 18:59


 


Intake Total 650 


 


Output Total 700 


 


Balance -50 














- Medications


Medications: 


 Current Medications





Albuterol/Ipratropium (Duoneb 3 Mg/0.5 Mg (3 Ml) Ud)  3 ml INH RQ6 [facility]


   Last Admin: 03/31/17 13:11 Dose:  3 ml


Benzocaine/Menthol (Cepacol Sore Throat)  1 keegan MT Q6H PRN


   PRN Reason: Sore Throat


   Last Admin: 03/31/17 10:19 Dose:  1 keegan


Budesonide (Pulmicort Respules)  0.5 mg INH RQ12 [facility]


   Last Admin: 03/31/17 08:37 Dose:  0.5 mg


Digoxin (Lanoxin)  0.125 mg PO DAILY@1800 [facility]


   Last Admin: 03/30/17 18:23 Dose:  0.125 mg


Famotidine (Pepcid)  20 mg IVP DAILY [facility]


   Last Admin: 03/31/17 12:51 Dose:  20 mg


Furosemide (Lasix)  40 mg PO DAILY [facility]


   Last Admin: 03/31/17 10:22 Dose:  Not Given


Guaifenesin (Mucinex La)  600 mg PO BID [facility]


   Last Admin: 03/31/17 12:51 Dose:  600 mg


Prednisone (Prednisone Tab)  40 mg PO DAILY [facility]


   Last Admin: 03/31/17 10:19 Dose:  40 mg


Rosuvastatin Calcium (Crestor)  10 mg PO HS [facility]


   Last Admin: 03/30/17 22:09 Dose:  10 mg


Spironolactone (Aldactone)  25 mg PO DAILY [facility]


   Last Admin: 03/31/17 10:21 Dose:  25 mg


Tiotropium Bromide (Spiriva)  18 mcg INH RQ24 [facility]


   Last Admin: 03/31/17 08:37 Dose:  18 mcg


Warfarin Sodium (Coumadin)  2.5 mg PO 1800 [facility]


   Stop: 03/31/17 18:01


Warfarin Sodium (Coumadin)  3 mg PO 1800 [facility]


   Stop: 03/31/17 18:01











- Labs


Labs: 


 





 03/29/17 16:46 





 03/29/17 16:46 





 











PT  41.2 SECONDS (9.7-12.2)  H* D 03/31/17  10:45    


 


INR  3.4   03/31/17  10:45    


 


APTT  37 SECONDS (21-34)  H  03/28/17  13:50    














Attending/Attestation





- Attestation


I have personally seen and examined this patient.: Yes


I have fully participated in the care of the patient.: Yes


I have reviewed all pertinent clinical information, including history, physical 

exam and plan: Yes


Notes (Text): 





03/31/17 17:21


Patient seen and examined with the resident. Agree with findings and resident 

note. Plan of care discussed

## 2017-03-30 NOTE — CT
PROCEDURE:  CT HEAD WITHOUT CONTRAST.



HISTORY:

numbness and tingling on left side



COMPARISON:

None available. 



TECHNIQUE:

Axial computed tomography images were obtained through the head/brain 

without intravenous contrast.  



Radiation dose:



Total exam DLP = 937 mGy-cm.



FINDINGS:



HEMORRHAGE:

No intracranial hemorrhage. 



BRAIN:

No mass effect or edema.  No atrophy or chronic microvascular 

ischemic changes.



VENTRICLES:

Unremarkable. No hydrocephalus. 



CALVARIUM:

Unremarkable.



PARANASAL SINUSES:

Unremarkable as visualized. No significant inflammatory changes.



MASTOID AIR CELLS:

Unremarkable as visualized. No inflammatory changes.



OTHER FINDINGS:

None.



IMPRESSION:

No acute findings

## 2017-03-31 NOTE — CT
PROCEDURE:  CT Chest without contrast



HISTORY:

r/o interstitial disease



COMPARISON:

None.



TECHNIQUE:

Contiguous axial images were obtained through the chest without 

intravenous contrast enhancement. Sagittal and coronal 

reconstructions were performed.



Thin cuts high-resolution images were also obtained. 



This CT exam was performed using one or more of the following dose 

reduction techniques: Automated exposure control, adjustment of the 

mA and/or kV according to patient size, and/or use of iterative 

reconstruction technique. 







Radiation dose (DLP): 653.6 mGy-cm. 



FINDINGS:



LUNGS:

Interval appearance of new nodular opacities and small foci of 

consolidation specially at the right upper lobe and left lower lobe 

since the previous exam. Persistent patchy ground-glass opacities in 

the mid and lower portion of the lungs likely related to pulmonary 

congestion. No CT evidence of interstitial lung disease. Small 

reticular opacities likely represent interstitial septal thickening 

seen more prominent at the posterior and lower portion of the lungs 

that also likely related to pulmonary congestion. 



MEDIASTINUM:

The thoracic aorta is mildly ectatic. The heart is mildly to 

moderately enlarged Main pulmonary artery unremarkable. No vascular 

congestion. Mildly enlarged precarinal lymph node is seen measures 2 

centimeter.



PLEURA:

Small right pleural effusion. There is also trace left pleural 

effusion. 



BONES:

No fracture. No destructive lesion. 



UPPER ABDOMEN:

Multiple low-attenuation lesions are seen in the liver and kidneys.  

There is linear calcification around the liver likely a sequela of 

prior infection or inflammatory process.



OTHER FINDINGS:

None.



IMPRESSION:

No definite CT evidence of interstitial lung disease.



Cardiomegaly and mild-to-moderate pulmonary vascular congestion.



Interval appearance of new round opacities/ nodules in the right 

upper lobe and left lower lobe since the previous study. The 

differential diagnosis includes multifocal pneumonia. The possibility 

of neoplasm is not totally excluded. Follow-up reassessment is 

recommended. 



Otherwise no significant interval change since the previous study 

dated 03/16/2017.

## 2017-03-31 NOTE — CP.PCM.PN
<Cyrus Duval - Last Filed: 03/31/17 17:13>





Subjective





- Date & Time of Evaluation


Date of Evaluation: 03/31/17


Time of Evaluation: 11:25





- Subjective


Subjective: 


PGY-1 note for Dr Elizabeth's service





Pt seen and examined at bedside. He has no new complaints today. He is still 

sob with minimal exertion. Denies fevers, chills, chest pain, nausea or 

vomiting. 





Objective





- Vital Signs/Intake and Output


Vital Signs (last 24 hours): 


 











Temp Pulse Resp BP Pulse Ox


 


 97.8 F   68   20   90/61 L  95 


 


 03/31/17 07:25  03/31/17 07:25  03/31/17 07:25  03/31/17 10:22  03/31/17 07:25








Intake and Output: 


 











 03/31/17 03/31/17





 06:59 18:59


 


Intake Total 650 


 


Output Total 700 


 


Balance -50 














- Medications


Medications: 


 Current Medications





Albuterol/Ipratropium (Duoneb 3 Mg/0.5 Mg (3 Ml) Ud)  3 ml INH RQ6 [facility]


   Last Admin: 03/31/17 08:37 Dose:  3 ml


Benzocaine/Menthol (Cepacol Sore Throat)  1 keegan MT Q6H PRN


   PRN Reason: Sore Throat


   Last Admin: 03/31/17 10:19 Dose:  1 keegan


Budesonide (Pulmicort Respules)  0.5 mg INH RQ12 [facility]


   Last Admin: 03/31/17 08:37 Dose:  0.5 mg


Digoxin (Lanoxin)  0.125 mg PO DAILY@1800 [facility]


   Last Admin: 03/30/17 18:23 Dose:  0.125 mg


Famotidine (Pepcid)  20 mg IVP DAILY [facility]


   Last Admin: 03/30/17 10:22 Dose:  20 mg


Furosemide (Lasix)  40 mg PO DAILY [facility]


   Last Admin: 03/31/17 10:22 Dose:  Not Given


Guaifenesin (Mucinex La)  600 mg PO BID [facility]


   Last Admin: 03/30/17 18:23 Dose:  600 mg


Prednisone (Prednisone Tab)  40 mg PO DAILY [facility]


   Last Admin: 03/31/17 10:19 Dose:  40 mg


Rosuvastatin Calcium (Crestor)  10 mg PO HS [facility]


   Last Admin: 03/30/17 22:09 Dose:  10 mg


Spironolactone (Aldactone)  25 mg PO DAILY [facility]


   Last Admin: 03/31/17 10:21 Dose:  25 mg


Tiotropium Bromide (Spiriva)  18 mcg INH RQ24 [facility]


   Last Admin: 03/31/17 08:37 Dose:  18 mcg


Warfarin Sodium (Coumadin)  5.5 mg PO 1800 [facility]


   Stop: 03/31/17 18:01











- Labs


Labs: 


 





 03/29/17 16:46 





 03/29/17 16:46 





 











PT  41.2 SECONDS (9.7-12.2)  H* D 03/31/17  10:45    


 


INR  3.4   03/31/17  10:45    


 


APTT  37 SECONDS (21-34)  H  03/28/17  13:50    














- Constitutional


Appears: Non-toxic, No Acute Distress, Chronically Ill





- Head Exam


Head Exam: ATRAUMATIC, NORMOCEPHALIC





- ENT Exam


ENT Exam: Mucous Membranes Moist





- Respiratory Exam


Respiratory Exam: Rhonchi, NORMAL BREATHING PATTERN





- Cardiovascular Exam


Cardiovascular Exam: +S1, +S2





- GI/Abdominal Exam


GI & Abdominal Exam: Soft, Normal Bowel Sounds





- Neurological Exam


Neurological Exam: Alert, Awake





- Skin


Skin Exam: Dry, Warm





Assessment and Plan





- Assessment and Plan (Free Text)


Assessment: 


Assessment: 


- [facility] will accept pt, waiting on pt's wife to confirm address that pt will 

be discharged home to. 


- Pt not a safe discharge to home due to functional status





Numbness and tingling - left sided


- CT head - normal head CT





Shortness of breath


- secondary to chronic COPD vs pneumonia vs malignancy


- CT Chest HR - No definite CT evidence of interstitial lung disease. 

Cardiomegaly and mild-to-moderate pulmonary vascular congestion. Interval 

appearance of new round opacities/ nodules in the right upper lobe and left 

lower lobe since the previous study. The differential diagnosis includes 

multifocal pneumonia. The possibility of neoplasm is not totally excluded. 

Follow-up reassessment is recommended. Otherwise no significant interval change 

since the previous study dated 03/16/2017.








COPD (chronic obstructive pulmonary disease)


3/30: CT chest with high resolution - see above


3/26: Prednisone 40mg PO daily; Taper on discharge. Will require home oxygen.


3/23: Stop Solumedrol IVP;  Start Prednisone 40mg PO daily; Taper on discharge. 

Will require home oxygen.


-3/22: Solumedrol reduced to 40mg IVP Q12H. Prior to discharge, change 

Solumedrol -> Prednisone


-3/13-3/22: Continue BIPAP, patient more compliant (sometimes refuses despite 

being SOB).


-3/10: RAPID called due to SOB. BP 80/50's. Bolused 500cc NS. Ordered BIPAP and 

ABG, however patient refused. Placed back on NC 3L.


-3/8-3/9: Patient complaining of SOB with exertion and orthopnea. Maintain Head 

of bed elevated 30 degrees.


-3/7:  PT session: 98% O2 at 2L at rest, 96% room air at rest sitting, 82% room 

air during ambulation, 88% at 2 liter ambulation.


-3/6: Walked 20 steps today down [address], and patient became dizzy, SOB, and 

almost collapsed.


- Consulted Pulmonology, Dr. Varela, f/u recs


   -planning for home O2


   - Aware of HR in 140's and SOB after eating and low levels of exertion.


   -f/u ABG (not collected)


   -LDH 654H


   -HIV - negative


   -persistent shortness of breath


   - Patient with long history of smoking most likely has underlying COPD.  

Nebulizer treatment.  Inhaled steroids.  Spiriva.  PFT as out pt








Systolic dysfunction with acute on chronic heart failure


3/26: pt at baseline


- EF 10-15%, mechanical MV- no regurg, tricuspid regurg. see full report


-Chest CT 3/16 - Cardiomegaly. Cardiac valve prosthesis. Left-sided AICD. Dense 

coronary artery calcifications. 


   -Small consolidation (favored to reflect atelectasis rather than pneumonia) 

at the left lung base. 


   -Bibasilar atelectasis. Small airways disease. Mild ground-glass and fine 

nodular opacities within the right upper lobe, possibly infectious or 

inflammatory.    


   -7 mm left upper lobe pulmonary nodule. Guidelines by the Fleischner society 

(radiology 2005; 237:390 5-400) suggests that in patients with low risk for 

lung cancer, with nodules less than or equal to 8 mm in diameter should have 

follow-up in approximately 6-12 months.  In patients with high-risk, including 

smokers, follow-up is recommended 3-6 months.  Patient with a known malignancy 

for risk for metastases should receive 3 month follow-up. 


   -Hepatic capsular calcification. 


   -Numerous low-density lesions throughout the liver, possibly cysts. 

Decompressed gallbladder limits evaluation. Bilateral hypodense renal lesions. 

Right adrenal gland hypertrophy. 12 mm left adrenal gland nodule measures 

approximately 9 Hounsfield units, likely adenoma.


-Consulted Cardiology, Dr. Dotson - f/u recs


   -3/16: Lasix 40mg IVP daily, Spironolactone 25mg daily. CXR - no interval 

pathology change. 


   -3/13: HOLD LASIX FOR 1 TO 2 DAYS AND CONT MUCINEX.  PT PRERENAL AND APPEARS 

DRY.  ON BOTH LASIX AND SPIRONOLACTONE.


   -SOB APPEARS TO BE SECONDARY TO PULM ETIOLOGY.  IMPROVES WITH NEBS.  PT WITH 

RHONCHI B/L.  MAY BENEFIT FROM PULM TOILET.


   -will attempt to obtain company of [facility] for interrogation.


   -likely deconditioned.  will benefit from rehab


-3/10: RAPID called due to SOB. BP 80/50's. Bolused 500cc NS. Ordered BIPAP and 

ABG, however patient refused. Placed back on NC 3L. ProBNP 4070H (less than # 

on admission) and SHEYLA negative. 


-3/10: Decrease to Lasix 40mg PO daily (was BID).


Admit to telemetry, BNP 4560 on admission


Digoxin 0.125mg


Lisinopril 10mg daily


Aldactone 25mg PO Daily


patient with AICD, will check echo to determine EF


CXR 2/25: mild cardiomegaly, mild pulmonary venous congestion.  s/p sternotomy, 

aortic valve replacement, AICD








H/O mitral valve replacement with mechanical valve


Cardiac valve replacement 6-7yrs ago


Consulted Cardiology, Dr. Dotson - f/u recs


   -echocardiogram reviewed.  valve leaflets are opening and functional.  There 

is no signficant gradient across the valve.  


   -recommend INR 2.5 to 3.5.


   STOP Lovenox 3/5


   STOP Heparin Drip - cardiac protocol, with bolus (because patient is 

refusing blood draws)


3/1: INR 1.3


3/2: INR refused, Coumadin 10mg


3/3: INR 1.5, Coumadin 10mg


3/4: INR 2.0, Coumadin 10mg


3/5: INR 2.4, Coumadin 7.5mg; patient received one more dose of Lovenox 80mg SC 

today, now discontinued as bridge complete.


3/6: INR 2.5, Coumadin 7.5mg


3/7: INR 3.1, Coumadin 5mg


3/8: INR 3.7, Coumadin 5mg (stopped by pharmacy)


3/9: INR 2.8, Coumadin 5mg


3/10: INR 2.6, Coumadin 4mg


3/11: INR 2.7, Coumadin 4 mg


3/12: INR 2.3  Coumadin 4mg 


3/13: INR 2.1, Coumadin 6mg


3/14: INR 2.6, Coumadin 5mg


3/15: INR 2.6, Coumadin 5mg


3/16: INR 2.5, Coumadin 6mg


3/17: INR 2.9, Coumadin 6mg, 


3/18: INR 3.6, Coumadin 6mg, f/u INR


3/19: HELD Coumadin, Pt. denied blood work today


3/20: HELD Coumadin, Pt. denied blood work today


3/21: INR 1.3, Coumadin 7mg, f/u


3/22: INR 1.5, Coumadin 7mg, f/u INR


3/23: INR 2.4, Coumadin 7mg, f/u INR


3/24: INR 3.3, Coumadin 6mg, f/u INR


3/25: INR 4.1 Hold Coumadin, f/u INR


3/26: INR 2.8, Coumadin 5mg, f/u INR


3/28: INR 2.4, Coumadin 6mg, f/u INR


3/29: INR 3.1, Coumadin 5mg, f/u INR


3/30: INR 2.9, Coumadin 6mg, f/u INR


3/31: INR 3.4, Coumadin 5.5mg





Chronic atrial fibrillation


Consulted Cardiology, Dr. Dotson - f/u recs


   -Rate controlled


   -echocardiogram reviewed.  valve leaflets are opening and functional.  There 

is no signficant gradient across the valve.  


   -recommend INR 2.5 to 3.5.


   -See PT/INR trends above


   Coumadin as above


   STOP Lovenox 3/5








Cough, acute


-Mucinex 600mg PO BID


-3/22: cough intermittent


-3/18 does not complain of cough


-3/13-3/16: Persists. Non-productive. Continue Mucinex.


-3/9: patient developed productive cough with yellow sputum today.








CAD (coronary artery disease) 


Consulted Cardiology, Dr. Dotson - f/u recs


   -Aware of HR in 140's and SOB after eating and low levels of exertion.


   -unknown graft status.  no evidence of ischemia at present


   -No current angina





History of MI (myocardial infarction)


HOLD Crestor 10mg PO HS (last dose 3/12- due to elevated LFTs)


ROMIs negative x3


EKG paced 


Denies chest pain 





Transaminitis, acute


3/25: AST/ALT normalized; consider resuming crestor.


3/24  patient refusing labs. will re-attempt


3/22-3/23: Improving. hold crestor (since 3/12)


3/21: AST 37 /  - continue to hold crestor.


3/19-3/20: Patient refused blood work


3/18: 51/164 AST / ALT down trending


3/16-3/17: AST / ALT down trending - continue to hold Crestor


3/15: AST 57 / , decreasing. HOLD Crestor 10mg PO HS (last dose 3/12- 

due to elevated LFTs)


3/14: AST 75 /   


3/13: AST 70 /  - increasing -> hold crestor for 2 nights 3/13, 3/14


3/10: AST 90 /  - increasing -> hold Crestor tonight.


- AST 77 / ALT 98 -> previously normal.


- Continue to monitor





Electrolyte Imbalance


3/24 patient refusing labs, will re-attempt


3/15-3/23: Hyponatremia, stable


Hyperkalemia - resolved


Hypokalemia, 








Tachycardia


HR grossly WNL, continue to monitor


3/14-3/17: HR WNL


3/7: Patient becomes SOB very easily, desaturating to 82% while ambulating. 

Anytime he eats or gets up, HR climbs into 140's


Continue Digoxin 0.125mg


EKG in ER: sinus tachycardia at 100, paced, incomplete RBBB


Discontinued cardizem drip of 5mg/h started in the ER





Lower extremity edema


LE Duplex - negative. see full report.





Prophylactic measure


Coumadin as above.


protonix 40mg daily


SCD contraindicated due to LE edema


D/C when bed available








<Evgeny Elizabeth Jr. - Last Filed: 03/31/17 17:25>





Objective





- Vital Signs/Intake and Output


Vital Signs (last 24 hours): 


 











Temp Pulse Resp BP Pulse Ox


 


 97.2 F L  94 H  18   94/58 L  99 


 


 03/31/17 16:00  03/31/17 16:00  03/31/17 16:00  03/31/17 16:00  03/31/17 16:00








Intake and Output: 


 











 03/31/17 03/31/17





 06:59 18:59


 


Intake Total 650 


 


Output Total 700 


 


Balance -50 














- Medications


Medications: 


 Current Medications





Albuterol/Ipratropium (Duoneb 3 Mg/0.5 Mg (3 Ml) Ud)  3 ml INH RQ6 [facility]


   Last Admin: 03/31/17 13:11 Dose:  3 ml


Benzocaine/Menthol (Cepacol Sore Throat)  1 keegan MT Q6H PRN


   PRN Reason: Sore Throat


   Last Admin: 03/31/17 10:19 Dose:  1 keegan


Budesonide (Pulmicort Respules)  0.5 mg INH RQ12 [facility]


   Last Admin: 03/31/17 08:37 Dose:  0.5 mg


Digoxin (Lanoxin)  0.125 mg PO DAILY@1800 [facility]


   Last Admin: 03/30/17 18:23 Dose:  0.125 mg


Famotidine (Pepcid)  20 mg IVP DAILY [facility]


   Last Admin: 03/31/17 12:51 Dose:  20 mg


Furosemide (Lasix)  40 mg PO DAILY [facility]


   Last Admin: 03/31/17 10:22 Dose:  Not Given


Guaifenesin (Mucinex La)  600 mg PO BID [facility]


   Last Admin: 03/31/17 12:51 Dose:  600 mg


Prednisone (Prednisone Tab)  40 mg PO DAILY [facility]


   Last Admin: 03/31/17 10:19 Dose:  40 mg


Rosuvastatin Calcium (Crestor)  10 mg PO HS [facility]


   Last Admin: 03/30/17 22:09 Dose:  10 mg


Spironolactone (Aldactone)  25 mg PO DAILY [facility]


   Last Admin: 03/31/17 10:21 Dose:  25 mg


Tiotropium Bromide (Spiriva)  18 mcg INH RQ24 [facility]


   Last Admin: 03/31/17 08:37 Dose:  18 mcg


Warfarin Sodium (Coumadin)  2.5 mg PO 1800 [facility]


   Stop: 03/31/17 18:01


Warfarin Sodium (Coumadin)  3 mg PO 1800 [facility]


   Stop: 03/31/17 18:01











- Labs


Labs: 


 





 03/29/17 16:46 





 03/29/17 16:46 





 











PT  41.2 SECONDS (9.7-12.2)  H* D 03/31/17  10:45    


 


INR  3.4   03/31/17  10:45    


 


APTT  37 SECONDS (21-34)  H  03/28/17  13:50    














Attending/Attestation





- Attestation


I have personally seen and examined this patient.: Yes


I have fully participated in the care of the patient.: Yes


I have reviewed all pertinent clinical information, including history, physical 

exam and plan: Yes


Notes (Text): 





03/31/17 17:24


Patient seen and examined with the resident. Reviewed resident note and plan of 

care.





Agree with resident Findings and resident note and plan of care is discussed

## 2017-04-01 NOTE — CP.PCM.PN
<AlexJoni MAYKEL - Last Filed: 04/01/17 20:10>





Subjective





- Date & Time of Evaluation


Date of Evaluation: 04/01/17


Time of Evaluation: 14:00





- Subjective


Subjective: 


Dr. Elizabeth service:





Patient seen and examined in room.  He just came out of the bathroom.  He 

complains of chronic exertional dyspnea.  He says he gets very easily short of 

breath.  He denies fever chills, nausea, vomiting, palpitations, chest pain, 

diarrhea, lower leg swelling or pain. 





Objective





- Vital Signs/Intake and Output


Vital Signs (last 24 hours): 


 











Temp Pulse Resp BP Pulse Ox


 


 97.9 F   60   24   91/55 L  98 


 


 04/01/17 16:00  04/01/17 16:00  04/01/17 16:00  04/01/17 16:00  04/01/17 16:00








Intake and Output: 


 











 04/01/17 04/02/17





 18:59 06:59


 


Intake Total 800 


 


Balance 800 














- Medications


Medications: 


 Current Medications





Albuterol/Ipratropium (Duoneb 3 Mg/0.5 Mg (3 Ml) Ud)  3 ml INH RQ6 [facility]


   Last Admin: 04/01/17 19:15 Dose:  3 ml


Benzocaine/Menthol (Cepacol Sore Throat)  1 keegan MT Q6H PRN


   PRN Reason: Sore Throat


   Last Admin: 04/01/17 01:23 Dose:  1 keegan


Budesonide (Pulmicort Respules)  0.5 mg INH RQ12 [facility]


   Last Admin: 04/01/17 19:16 Dose:  0.5 mg


Digoxin (Lanoxin)  0.125 mg PO DAILY@1800 [facility]


   Last Admin: 04/01/17 18:01 Dose:  0.125 mg


Famotidine (Pepcid)  20 mg IVP DAILY [facility]


   Last Admin: 04/01/17 10:30 Dose:  20 mg


Furosemide (Lasix)  40 mg PO DAILY [facility]


   Last Admin: 04/01/17 10:30 Dose:  40 mg


Guaifenesin (Mucinex La)  600 mg PO BID [facility]


   Last Admin: 04/01/17 18:01 Dose:  600 mg


Prednisone (Prednisone Tab)  40 mg PO DAILY [facility]


   Last Admin: 04/01/17 10:30 Dose:  40 mg


Rosuvastatin Calcium (Crestor)  10 mg PO HS [facility]


   Last Admin: 03/31/17 22:26 Dose:  10 mg


Spironolactone (Aldactone)  25 mg PO DAILY [facility]


   Last Admin: 04/01/17 10:30 Dose:  25 mg


Tiotropium Bromide (Spiriva)  18 mcg INH RQ24 [facility]


   Last Admin: 04/01/17 08:18 Dose:  18 mcg


Warfarin Sodium (Coumadin)  5 mg PO ONCE ONE


   Stop: 04/01/17 20:01











- Labs


Labs: 


 





 03/29/17 16:46 





 04/01/17 17:11 





 











PT  41.2 SECONDS (9.7-12.2)  H* D 03/31/17  10:45    


 


INR  3.4   03/31/17  10:45    


 


APTT  37 SECONDS (21-34)  H  03/28/17  13:50    














- Constitutional


Appears: Non-toxic, No Acute Distress





- Head Exam


Head Exam: NORMAL INSPECTION





- Eye Exam


Eye Exam: Normal appearance


Pupil Exam: NORMAL ACCOMODATION





- Respiratory Exam


Respiratory Exam: Clear to Ausculation Bilateral.  absent: Rales, Rhonchi, 

Wheezes





- Cardiovascular Exam


Cardiovascular Exam: Irregular Rhythm, REGULAR RHYTHM, +S1, +S2, Murmur.  absent

: Gallop, RRR, Rubs





- GI/Abdominal Exam


GI & Abdominal Exam: Soft, Normal Bowel Sounds.  absent: Tenderness





Assessment and Plan





- Assessment and Plan (Free Text)


Assessment: 


COPD (chronic obstructive pulmonary disease)


3/30: CT chest with high resolution - see above


3/26: Prednisone 40mg PO daily; Taper on discharge. Will require home oxygen.


3/23: Stop Solumedrol IVP;  Start Prednisone 40mg PO daily; Taper on discharge. 

Will require home oxygen.


-3/22: Solumedrol reduced to 40mg IVP Q12H. Prior to discharge, change 

Solumedrol -> Prednisone


-3/13-3/22: Continue BIPAP, patient more compliant (sometimes refuses despite 

being SOB).


-3/10: RAPID called due to SOB. BP 80/50's. Bolused 500cc NS. Ordered BIPAP and 

ABG, however patient refused. Placed back on NC 3L.


-3/8-3/9: Patient complaining of SOB with exertion and orthopnea. Maintain Head 

of bed elevated 30 degrees.


-3/7:  PT session: 98% O2 at 2L at rest, 96% room air at rest sitting, 82% room 

air during ambulation, 88% at 2 liter ambulation.


-3/6: Walked 20 steps today down [address], and patient became dizzy, SOB, and 

almost collapsed.


- Consulted Pulmonology, Dr. Varela, f/u recs


   -planning for home O2


   - Aware of HR in 140's and SOB after eating and low levels of exertion.


   -f/u ABG (not collected)


   -LDH 654H


   -HIV - negative


   -persistent shortness of breath


   - Patient with long history of smoking most likely has underlying COPD.  

Nebulizer treatment.  Inhaled steroids.  Spiriva.  PFT as out pt








Systolic dysfunction with acute on chronic heart failure


3/26: pt at baseline


- EF 10-15%, mechanical MV- no regurg, tricuspid regurg. see full report


-Chest CT 3/16 - Cardiomegaly. Cardiac valve prosthesis. Left-sided AICD. Dense 

coronary artery calcifications. 


   -Small consolidation (favored to reflect atelectasis rather than pneumonia) 

at the left lung base. 


   -Bibasilar atelectasis. Small airways disease. Mild ground-glass and fine 

nodular opacities within the right upper lobe, possibly infectious or 

inflammatory.    


   -7 mm left upper lobe pulmonary nodule. Guidelines by the Fleischner society 

(radiology 2005; 237:390 5-400) suggests that in patients with low risk for 

lung cancer, with nodules less than or equal to 8 mm in diameter should have 

follow-up in approximately 6-12 months.  In patients with high-risk, including 

smokers, follow-up is recommended 3-6 months.  Patient with a known malignancy 

for risk for metastases should receive 3 month follow-up. 


   -Hepatic capsular calcification. 


   -Numerous low-density lesions throughout the liver, possibly cysts. 

Decompressed gallbladder limits evaluation. Bilateral hypodense renal lesions. 

Right adrenal gland hypertrophy. 12 mm left adrenal gland nodule measures 

approximately 9 Hounsfield units, likely adenoma.


-Consulted Cardiology, Dr. Dotson - f/u recs


   -3/16: Lasix 40mg IVP daily, Spironolactone 25mg daily. CXR - no interval 

pathology change. 


   -3/13: HOLD LASIX FOR 1 TO 2 DAYS AND CONT MUCINEX.  PT PRERENAL AND APPEARS 

DRY.  ON BOTH LASIX AND SPIRONOLACTONE.


   -SOB APPEARS TO BE SECONDARY TO PULM ETIOLOGY.  IMPROVES WITH NEBS.  PT WITH 

RHONCHI B/L.  MAY BENEFIT FROM PULM TOILET.


   -will attempt to obtain company of [facility] for interrogation.


   -likely deconditioned.  will benefit from rehab


-3/10: RAPID called due to SOB. BP 80/50's. Bolused 500cc NS. Ordered BIPAP and 

ABG, however patient refused. Placed back on NC 3L. ProBNP 4070H (less than # 

on admission) and SHEYLA negative. 


-3/10: Decrease to Lasix 40mg PO daily (was BID).


Admit to telemetry, BNP 4560 on admission


Digoxin 0.125mg


Lisinopril 10mg daily


Aldactone 25mg PO Daily


patient with AICD, will check echo to determine EF


CXR 2/25: mild cardiomegaly, mild pulmonary venous congestion.  s/p sternotomy, 

aortic valve replacement, AICD








H/O mitral valve replacement with mechanical valve


4/1: INR 3.4, coninue Coumadin and monitor INR.  Patient will be discharged 

when able. 





Previous note


Cardiac valve replacement 6-7yrs ago


Consulted Cardiology, Dr. Dotson - f/u recs


   -echocardiogram reviewed.  valve leaflets are opening and functional.  There 

is no signficant gradient across the valve.  


   -recommend INR 2.5 to 3.5.


   STOP Lovenox 3/5


   STOP Heparin Drip - cardiac protocol, with bolus (because patient is 

refusing blood draws)


3/1: INR 1.3


3/2: INR refused, Coumadin 10mg


3/3: INR 1.5, Coumadin 10mg


3/4: INR 2.0, Coumadin 10mg


3/5: INR 2.4, Coumadin 7.5mg; patient received one more dose of Lovenox 80mg SC 

today, now discontinued as bridge complete.


3/6: INR 2.5, Coumadin 7.5mg


3/7: INR 3.1, Coumadin 5mg


3/8: INR 3.7, Coumadin 5mg (stopped by pharmacy)


3/9: INR 2.8, Coumadin 5mg


3/10: INR 2.6, Coumadin 4mg


3/11: INR 2.7, Coumadin 4 mg


3/12: INR 2.3  Coumadin 4mg 


3/13: INR 2.1, Coumadin 6mg


3/14: INR 2.6, Coumadin 5mg


3/15: INR 2.6, Coumadin 5mg


3/16: INR 2.5, Coumadin 6mg


3/17: INR 2.9, Coumadin 6mg, 


3/18: INR 3.6, Coumadin 6mg, f/u INR


3/19: HELD Coumadin, Pt. denied blood work today


3/20: HELD Coumadin, Pt. denied blood work today


3/21: INR 1.3, Coumadin 7mg, f/u


3/22: INR 1.5, Coumadin 7mg, f/u INR


3/23: INR 2.4, Coumadin 7mg, f/u INR


3/24: INR 3.3, Coumadin 6mg, f/u INR


3/25: INR 4.1 Hold Coumadin, f/u INR


3/26: INR 2.8, Coumadin 5mg, f/u INR


3/28: INR 2.4, Coumadin 6mg, f/u INR


3/29: INR 3.1, Coumadin 5mg, f/u INR


3/30: INR 2.9, Coumadin 6mg, f/u INR


3/31: INR 3.4, Coumadin 5.5mg





Chronic atrial fibrillation


Consulted Cardiology, Dr. Dotson - f/u recs


   -Rate controlled


   -echocardiogram reviewed.  valve leaflets are opening and functional.  There 

is no signficant gradient across the valve.  


   -recommend INR 2.5 to 3.5.


   -See PT/INR trends above


   Coumadin as above


   STOP Lovenox 3/5








Cough, acute


-Mucinex 600mg PO BID


-3/22: cough intermittent


-3/18 does not complain of cough


-3/13-3/16: Persists. Non-productive. Continue Mucinex.


-3/9: patient developed productive cough with yellow sputum today.








CAD (coronary artery disease) 


Consulted Cardiology, Dr. Dotson - f/u recs


   -Aware of HR in 140's and SOB after eating and low levels of exertion.


   -unknown graft status.  no evidence of ischemia at present


   -No current angina





History of MI (myocardial infarction)


HOLD Crestor 10mg PO HS (last dose 3/12- due to elevated LFTs)


ROMIs negative x3


EKG paced 


Denies chest pain 





Transaminitis, acute


3/25: AST/ALT normalized; consider resuming crestor.


3/24  patient refusing labs. will re-attempt


3/22-3/23: Improving. hold crestor (since 3/12)


3/21: AST 37 /  - continue to hold crestor.


3/19-3/20: Patient refused blood work


3/18: 51/164 AST / ALT down trending


3/16-3/17: AST / ALT down trending - continue to hold Crestor


3/15: AST 57 / , decreasing. HOLD Crestor 10mg PO HS (last dose 3/12- 

due to elevated LFTs)


3/14: AST 75 /   


3/13: AST 70 /  - increasing -> hold crestor for 2 nights 3/13, 3/14


3/10: AST 90 /  - increasing -> hold Crestor tonight.


- AST 77 / ALT 98 -> previously normal.


- Continue to monitor





Electrolyte Imbalance


3/24 patient refusing labs, will re-attempt


3/15-3/23: Hyponatremia, stable


Hyperkalemia - resolved


Hypokalemia, 








Tachycardia


HR grossly WNL, continue to monitor


3/14-3/17: HR WNL


3/7: Patient becomes SOB very easily, desaturating to 82% while ambulating. 

Anytime he eats or gets up, HR climbs into 140's


Continue Digoxin 0.125mg


EKG in ER: sinus tachycardia at 100, paced, incomplete RBBB


Discontinued cardizem drip of 5mg/h started in the ER





Lower extremity edema


LE Duplex - negative. see full report.





Prophylactic measure


Coumadin as above.


protonix 40mg daily


SCD contraindicated due to LE edema


D/C when bed available





<Evgeny Elizabeth Jr. - Last Filed: 04/02/17 15:09>





Objective





- Vital Signs/Intake and Output


Vital Signs (last 24 hours): 


 











Temp Pulse Resp BP Pulse Ox


 


 97.8 F   72   20   123/76   96 


 


 04/02/17 07:20  04/02/17 08:00  04/02/17 07:20  04/02/17 09:52  04/02/17 07:20








Intake and Output: 


 











 04/02/17 04/02/17





 06:59 18:59


 


Intake Total 640 


 


Output Total 500 


 


Balance 140 














- Medications


Medications: 


 Current Medications





Albuterol/Ipratropium (Duoneb 3 Mg/0.5 Mg (3 Ml) Ud)  3 ml INH RQ6 [facility]


   Last Admin: 04/02/17 13:13 Dose:  3 ml


Benzocaine/Menthol (Cepacol Sore Throat)  1 keegan MT Q6H PRN


   PRN Reason: Sore Throat


   Last Admin: 04/01/17 01:23 Dose:  1 keegan


Budesonide (Pulmicort Respules)  0.5 mg INH RQ12 [facility]


   Last Admin: 04/02/17 07:42 Dose:  0.5 mg


Digoxin (Lanoxin)  0.125 mg PO DAILY@1800 [facility]


   Last Admin: 04/01/17 18:01 Dose:  0.125 mg


Famotidine (Pepcid)  20 mg IVP DAILY [facility]


   Last Admin: 04/02/17 09:53 Dose:  20 mg


Furosemide (Lasix)  40 mg PO DAILY [facility]


   Last Admin: 04/02/17 09:52 Dose:  40 mg


Guaifenesin (Mucinex La)  600 mg PO BID [facility]


   Last Admin: 04/02/17 09:53 Dose:  600 mg


Prednisone (Prednisone Tab)  40 mg PO DAILY [facility]


   Last Admin: 04/02/17 09:53 Dose:  40 mg


Rosuvastatin Calcium (Crestor)  10 mg PO HS [facility]


   Last Admin: 04/01/17 22:04 Dose:  10 mg


Spironolactone (Aldactone)  25 mg PO DAILY [facility]


   Last Admin: 04/02/17 09:52 Dose:  25 mg


Tiotropium Bromide (Spiriva)  18 mcg INH RQ24 [facility]


   Last Admin: 04/02/17 07:42 Dose:  18 mcg











- Labs


Labs: 


 





 03/29/17 16:46 





 04/01/17 17:11 





 











PT  41.2 SECONDS (9.7-12.2)  H* D 03/31/17  10:45    


 


INR  3.4   03/31/17  10:45    


 


APTT  37 SECONDS (21-34)  H  03/28/17  13:50    














Attending/Attestation





- Attestation


I have personally seen and examined this patient.: Yes


I have fully participated in the care of the patient.: Yes


I have reviewed all pertinent clinical information, including history, physical 

exam and plan: Yes


Notes (Text): 





04/02/17 15:09


Patient seen and examined. Agree with resident note and plan of care

## 2017-04-03 NOTE — CP.PCM.PN
<Cyrus Duval - Last Filed: 04/03/17 11:24>





Subjective





- Date & Time of Evaluation


Date of Evaluation: 04/03/17


Time of Evaluation: 11:24





- Subjective


Subjective: 


PGY-1 note for Dr Elizabeth





Pt seen and examined at bedside. There was no overnight events per staff. Pt 

continues to have sob and cough. Denies any fevers, chills, chest pain, nausea 

or vomiting. 





Objective





- Vital Signs/Intake and Output


Vital Signs (last 24 hours): 


 











Temp Pulse Resp BP Pulse Ox


 


 98 F   71   20   123/56 L  99 


 


 04/03/17 07:25  04/03/17 07:25  04/03/17 07:25  04/03/17 10:11  04/03/17 07:25








Intake and Output: 


 











 04/03/17 04/03/17





 06:59 18:59


 


Intake Total 400 


 


Output Total 550 


 


Balance -150 














- Medications


Medications: 


 Current Medications





Albuterol/Ipratropium (Duoneb 3 Mg/0.5 Mg (3 Ml) Ud)  3 ml INH RQ6 [facility]


   Last Admin: 04/03/17 07:58 Dose:  3 ml


Benzocaine/Menthol (Cepacol Sore Throat)  1 keegan MT Q6H PRN


   PRN Reason: Sore Throat


   Last Admin: 04/01/17 01:23 Dose:  1 keegan


Budesonide (Pulmicort Respules)  0.5 mg INH RQ12 [facility]


   Last Admin: 04/03/17 07:58 Dose:  0.5 mg


Digoxin (Lanoxin)  0.125 mg PO DAILY@1800 [facility]


   Last Admin: 04/02/17 17:59 Dose:  0.125 mg


Famotidine (Pepcid)  20 mg IVP DAILY [facility]


   Last Admin: 04/03/17 10:11 Dose:  20 mg


Furosemide (Lasix)  40 mg PO DAILY [facility]


   Last Admin: 04/03/17 10:11 Dose:  40 mg


Guaifenesin (Mucinex La)  600 mg PO BID [facility]


   Last Admin: 04/03/17 10:11 Dose:  600 mg


Prednisone (Prednisone Tab)  40 mg PO DAILY [facility]


   Last Admin: 04/03/17 10:11 Dose:  40 mg


Rosuvastatin Calcium (Crestor)  10 mg PO HS [facility]


   Last Admin: 04/02/17 22:34 Dose:  10 mg


Spironolactone (Aldactone)  25 mg PO DAILY [facility]


   Last Admin: 04/03/17 10:11 Dose:  25 mg


Tiotropium Bromide (Spiriva)  18 mcg INH RQ24 [facility]


   Last Admin: 04/03/17 07:58 Dose:  18 mcg











- Labs


Labs: 


 





 03/29/17 16:46 





 04/01/17 17:11 





 











PT  41.2 SECONDS (9.7-12.2)  H* D 03/31/17  10:45    


 


INR  3.4   03/31/17  10:45    


 


APTT  37 SECONDS (21-34)  H  03/28/17  13:50    














- Constitutional


Appears: No Acute Distress, Chronically Ill





- Head Exam


Head Exam: ATRAUMATIC, NORMOCEPHALIC





- ENT Exam


ENT Exam: Mucous Membranes Moist





- Respiratory Exam


Respiratory Exam: Rhonchi, NORMAL BREATHING PATTERN





- Cardiovascular Exam


Cardiovascular Exam: +S1, +S2





- GI/Abdominal Exam


GI & Abdominal Exam: Soft, Normal Bowel Sounds





- Neurological Exam


Neurological Exam: Alert, Awake





- Skin


Skin Exam: Dry, Warm





Assessment and Plan





- Assessment and Plan (Free Text)


Assessment: 


- [facility] will accept pt, waiting on pt's wife to confirm address that pt will 

be discharged home to. 


- Pt not a safe discharge to home due to functional status





Numbness and tingling - left sided


- resolved


- CT head - normal head CT





Shortness of breath


- secondary to chronic COPD vs pneumonia vs malignancy


- CT Chest HR - No definite CT evidence of interstitial lung disease. 

Cardiomegaly and mild-to-moderate pulmonary vascular congestion. Interval 

appearance of new round opacities/ nodules in the right upper lobe and left 

lower lobe since the previous study. The differential diagnosis includes 

multifocal pneumonia. The possibility of neoplasm is not totally excluded. 

Follow-up reassessment is recommended. Otherwise no significant interval change 

since the previous study dated 03/16/2017.





COPD (chronic obstructive pulmonary disease)


3/30: CT chest with high resolution - see above


3/26: Prednisone 40mg PO daily; Taper on discharge. Will require home oxygen.


3/23: Stop Solumedrol IVP;  Start Prednisone 40mg PO daily; Taper on discharge. 

Will require home oxygen.


-3/22: Solumedrol reduced to 40mg IVP Q12H. Prior to discharge, change 

Solumedrol -> Prednisone


-3/13-3/22: Continue BIPAP, patient more compliant (sometimes refuses despite 

being SOB).


-3/10: RAPID called due to SOB. BP 80/50's. Bolused 500cc NS. Ordered BIPAP and 

ABG, however patient refused. Placed back on NC 3L.


-3/8-3/9: Patient complaining of SOB with exertion and orthopnea. Maintain Head 

of bed elevated 30 degrees.


-3/7:  PT session: 98% O2 at 2L at rest, 96% room air at rest sitting, 82% room 

air during ambulation, 88% at 2 liter ambulation.


-3/6: Walked 20 steps today down [address], and patient became dizzy, SOB, and 

almost collapsed.


- Consulted Pulmonology, Dr. Varela, f/u recs


   -planning for home O2


   - Aware of HR in 140's and SOB after eating and low levels of exertion.


   -f/u ABG (not collected)


   -LDH 654H


   -HIV - negative


   -persistent shortness of breath


   - Patient with long history of smoking most likely has underlying COPD.  

Nebulizer treatment.  Inhaled steroids.  Spiriva.  PFT as out pt








Systolic dysfunction with acute on chronic heart failure


3/26: pt at baseline


- EF 10-15%, mechanical MV- no regurg, tricuspid regurg. see full report


-Chest CT 3/16 - Cardiomegaly. Cardiac valve prosthesis. Left-sided AICD. Dense 

coronary artery calcifications. 


   -Small consolidation (favored to reflect atelectasis rather than pneumonia) 

at the left lung base. 


   -Bibasilar atelectasis. Small airways disease. Mild ground-glass and fine 

nodular opacities within the right upper lobe, possibly infectious or 

inflammatory.    


   -7 mm left upper lobe pulmonary nodule. Guidelines by the Fleischner society 

(radiology 2005; 237:390 5-400) suggests that in patients with low risk for 

lung cancer, with nodules less than or equal to 8 mm in diameter should have 

follow-up in approximately 6-12 months.  In patients with high-risk, including 

smokers, follow-up is recommended 3-6 months.  Patient with a known malignancy 

for risk for metastases should receive 3 month follow-up. 


   -Hepatic capsular calcification. 


   -Numerous low-density lesions throughout the liver, possibly cysts. 

Decompressed gallbladder limits evaluation. Bilateral hypodense renal lesions. 

Right adrenal gland hypertrophy. 12 mm left adrenal gland nodule measures 

approximately 9 Hounsfield units, likely adenoma.


-Consulted Cardiology, Dr. Dotson - f/u recs


   -3/16: Lasix 40mg IVP daily, Spironolactone 25mg daily. CXR - no interval 

pathology change. 


   -3/13: HOLD LASIX FOR 1 TO 2 DAYS AND CONT MUCINEX.  PT PRERENAL AND APPEARS 

DRY.  ON BOTH LASIX AND SPIRONOLACTONE.


   -SOB APPEARS TO BE SECONDARY TO PULM ETIOLOGY.  IMPROVES WITH NEBS.  PT WITH 

RHONCHI B/L.  MAY BENEFIT FROM PULM TOILET.


   -will attempt to obtain company of [facility] for interrogation.


   -likely deconditioned.  will benefit from rehab


-3/10: RAPID called due to SOB. BP 80/50's. Bolused 500cc NS. Ordered BIPAP and 

ABG, however patient refused. Placed back on NC 3L. ProBNP 4070H (less than # 

on admission) and SHEYLA negative. 


-3/10: Decrease to Lasix 40mg PO daily (was BID).


Admit to telemetry, BNP 4560 on admission


Digoxin 0.125mg


Lisinopril 10mg daily


Aldactone 25mg PO Daily


patient with AICD, will check echo to determine EF


CXR 2/25: mild cardiomegaly, mild pulmonary venous congestion.  s/p sternotomy, 

aortic valve replacement, AICD








H/O mitral valve replacement with mechanical valve


4/3: INR pending, coninue Coumadin and monitor INR.  Patient will be discharged 

when able. 





Previous note


Cardiac valve replacement 6-7yrs ago


Consulted Cardiology, Dr. Dotson - f/u recs


   -echocardiogram reviewed.  valve leaflets are opening and functional.  There 

is no signficant gradient across the valve.  


   -recommend INR 2.5 to 3.5.


   STOP Lovenox 3/5


   STOP Heparin Drip - cardiac protocol, with bolus (because patient is 

refusing blood draws)








Chronic atrial fibrillation


Consulted Cardiology, Dr. Dotson - f/u recs


   -Rate controlled


   -echocardiogram reviewed.  valve leaflets are opening and functional.  There 

is no signficant gradient across the valve.  


   -recommend INR 2.5 to 3.5.


   -See PT/INR trends above


   Coumadin as above


   STOP Lovenox 3/5








Cough, acute


-Mucinex 600mg PO BID


-3/22: cough intermittent


-3/18 does not complain of cough


-3/13-3/16: Persists. Non-productive. Continue Mucinex.


-3/9: patient developed productive cough with yellow sputum today.








CAD (coronary artery disease) 


Consulted Cardiology, Dr. Dotson - f/u recs


   -Aware of HR in 140's and SOB after eating and low levels of exertion.


   -unknown graft status.  no evidence of ischemia at present


   -No current angina





History of MI (myocardial infarction)


HOLD Crestor 10mg PO HS (last dose 3/12- due to elevated LFTs)


ROMIs negative x3


EKG paced 


Denies chest pain 





Transaminitis, acute


3/25: AST/ALT normalized; consider resuming crestor.


3/24  patient refusing labs. will re-attempt


3/22-3/23: Improving. hold crestor (since 3/12)


3/21: AST 37 /  - continue to hold crestor.


3/19-3/20: Patient refused blood work


3/18: 51/164 AST / ALT down trending


3/16-3/17: AST / ALT down trending - continue to hold Crestor


3/15: AST 57 / , decreasing. HOLD Crestor 10mg PO HS (last dose 3/12- 

due to elevated LFTs)


3/14: AST 75 /   


3/13: AST 70 /  - increasing -> hold crestor for 2 nights 3/13, 3/14


3/10: AST 90 /  - increasing -> hold Crestor tonight.


- AST 77 / ALT 98 -> previously normal.


- Continue to monitor





Electrolyte Imbalance


3/24 patient refusing labs, will re-attempt


3/15-3/23: Hyponatremia, stable


Hyperkalemia - resolved


Hypokalemia, 








Tachycardia


HR grossly WNL, continue to monitor


3/14-3/17: HR WNL


3/7: Patient becomes SOB very easily, desaturating to 82% while ambulating. 

Anytime he eats or gets up, HR climbs into 140's


Continue Digoxin 0.125mg


EKG in ER: sinus tachycardia at 100, paced, incomplete RBBB


Discontinued cardizem drip of 5mg/h started in the ER





Lower extremity edema


LE Duplex - negative. see full report.





Prophylactic measure


Coumadin as above.


protonix 40mg daily


SCD contraindicated due to LE edema


D/C when bed available








<Evgeny Elizabeth Jr. - Last Filed: 04/04/17 11:11>





Objective





- Vital Signs/Intake and Output


Vital Signs (last 24 hours): 


 











Temp Pulse Resp BP Pulse Ox


 


 97.3 F L  107 H  20   105/59 L  100 


 


 04/04/17 07:30  04/04/17 08:00  04/04/17 07:30  04/04/17 09:59  04/04/17 07:30








Intake and Output: 


 











 04/04/17 04/04/17





 06:59 18:59


 


Intake Total 1150 


 


Output Total 900 


 


Balance 250 














- Medications


Medications: 


 Current Medications





Albuterol/Ipratropium (Duoneb 3 Mg/0.5 Mg (3 Ml) Ud)  3 ml INH RQ6 [facility]


   Last Admin: 04/04/17 08:14 Dose:  3 ml


Benzocaine/Menthol (Cepacol Sore Throat)  1 keegan MT Q6H PRN


   PRN Reason: Sore Throat


   Last Admin: 04/01/17 01:23 Dose:  1 keegan


Budesonide (Pulmicort Respules)  0.5 mg INH RQ12 [facility]


   Last Admin: 04/04/17 08:14 Dose:  0.5 mg


Digoxin (Lanoxin)  0.125 mg PO DAILY@1800 [facility]


   Last Admin: 04/03/17 17:37 Dose:  0.125 mg


Famotidine (Pepcid)  20 mg IVP DAILY [facility]


   Last Admin: 04/04/17 10:21 Dose:  20 mg


Furosemide (Lasix)  40 mg PO DAILY [facility]


   Last Admin: 04/04/17 09:59 Dose:  40 mg


Guaifenesin (Mucinex La)  600 mg PO BID [facility]


   Last Admin: 04/04/17 09:58 Dose:  600 mg


Prednisone (Prednisone Tab)  40 mg PO DAILY [facility]


   Last Admin: 04/04/17 09:59 Dose:  40 mg


Rosuvastatin Calcium (Crestor)  10 mg PO HS [facility]


   Last Admin: 04/03/17 21:56 Dose:  10 mg


Spironolactone (Aldactone)  25 mg PO DAILY [facility]


   Last Admin: 04/04/17 10:20 Dose:  Not Given


Tiotropium Bromide (Spiriva)  18 mcg INH RQ24 [facility]


   Last Admin: 04/04/17 08:14 Dose:  18 mcg











- Labs


Labs: 


 





 04/03/17 11:20 





 04/03/17 12:48 





 











PT  37.0 SECONDS (9.7-12.2)  H*  04/03/17  11:17    


 


INR  3.1   04/03/17  11:17    


 


APTT  37 SECONDS (21-34)  H  03/28/17  13:50    














Attending/Attestation





- Attestation


I have personally seen and examined this patient.: Yes


I have fully participated in the care of the patient.: Yes


I have reviewed all pertinent clinical information, including history, physical 

exam and plan: Yes


Notes (Text): 





04/04/17 11:11


Patient seen and examined ,agree with resident note in plan of care.

## 2017-04-04 NOTE — CP.PCM.PN
<Cyrus Duval - Last Filed: 04/04/17 11:18>





Subjective





- Date & Time of Evaluation


Date of Evaluation: 04/04/17


Time of Evaluation: 07:44





- Subjective


Subjective: 


PGY-1 note for Dr Elizabeth's service





Pt seen and examined at bedside. Pt has no new complaints. Still has shortness 

of breath and cough. Denies any fevers, chills, chest pain, nausea or vomiting. 





Objective





- Vital Signs/Intake and Output


Vital Signs (last 24 hours): 


 











Temp Pulse Resp BP Pulse Ox


 


 98 F   78   20   97/63 L  99 


 


 04/03/17 23:30  04/04/17 04:00  04/03/17 23:30  04/03/17 23:30  04/03/17 23:30








Intake and Output: 


 











 04/04/17 04/04/17





 06:59 18:59


 


Intake Total 1150 


 


Output Total 900 


 


Balance 250 














- Medications


Medications: 


 Current Medications





Albuterol/Ipratropium (Duoneb 3 Mg/0.5 Mg (3 Ml) Ud)  3 ml INH RQ6 [facility]


   Last Admin: 04/04/17 01:36 Dose:  Not Given


Benzocaine/Menthol (Cepacol Sore Throat)  1 keegan MT Q6H PRN


   PRN Reason: Sore Throat


   Last Admin: 04/01/17 01:23 Dose:  1 keegan


Budesonide (Pulmicort Respules)  0.5 mg INH RQ12 [facility]


   Last Admin: 04/03/17 19:48 Dose:  0.5 mg


Digoxin (Lanoxin)  0.125 mg PO DAILY@1800 [facility]


   Last Admin: 04/03/17 17:37 Dose:  0.125 mg


Famotidine (Pepcid)  20 mg IVP DAILY [facility]


   Last Admin: 04/03/17 10:11 Dose:  20 mg


Furosemide (Lasix)  40 mg PO DAILY [facility]


   Last Admin: 04/03/17 10:11 Dose:  40 mg


Guaifenesin (Mucinex La)  600 mg PO BID [facility]


   Last Admin: 04/03/17 17:37 Dose:  600 mg


Prednisone (Prednisone Tab)  40 mg PO DAILY [facility]


   Last Admin: 04/03/17 10:11 Dose:  40 mg


Rosuvastatin Calcium (Crestor)  10 mg PO HS [facility]


   Last Admin: 04/03/17 21:56 Dose:  10 mg


Spironolactone (Aldactone)  25 mg PO DAILY [facility]


   Last Admin: 04/03/17 10:11 Dose:  25 mg


Tiotropium Bromide (Spiriva)  18 mcg INH RQ24 [facility]


   Last Admin: 04/03/17 07:58 Dose:  18 mcg











- Labs


Labs: 


 





 04/03/17 11:20 





 04/03/17 12:48 





 











PT  37.0 SECONDS (9.7-12.2)  H*  04/03/17  11:17    


 


INR  3.1   04/03/17  11:17    


 


APTT  37 SECONDS (21-34)  H  03/28/17  13:50    














- Constitutional


Appears: Non-toxic, No Acute Distress, Chronically Ill





- Head Exam


Head Exam: ATRAUMATIC, NORMOCEPHALIC





- ENT Exam


ENT Exam: Mucous Membranes Moist





- Respiratory Exam


Respiratory Exam: Rhonchi, NORMAL BREATHING PATTERN





- Cardiovascular Exam


Cardiovascular Exam: +S1, +S2





- GI/Abdominal Exam


GI & Abdominal Exam: Soft, Normal Bowel Sounds





- Neurological Exam


Neurological Exam: Alert, Awake





- Skin


Skin Exam: Dry, Warm





Assessment and Plan





- Assessment and Plan (Free Text)


Assessment: 


- [facility] will accept pt, waiting on pt's wife to confirm address that pt will 

be discharged home to. 


- Pt not a safe discharge to home due to functional status





Numbness and tingling - left sided


- resolved


- CT head - normal head CT





Shortness of breath


- secondary to chronic COPD vs pneumonia vs malignancy


- CT Chest HR - No definite CT evidence of interstitial lung disease. 

Cardiomegaly and mild-to-moderate pulmonary vascular congestion. Interval 

appearance of new round opacities/ nodules in the right upper lobe and left 

lower lobe since the previous study. The differential diagnosis includes 

multifocal pneumonia. The possibility of neoplasm is not totally excluded. 

Follow-up reassessment is recommended. Otherwise no significant interval change 

since the previous study dated 03/16/2017.





COPD (chronic obstructive pulmonary disease)


3/30: CT chest with high resolution - see above


3/26: Prednisone 40mg PO daily; Taper on discharge. Will require home oxygen.


3/23: Stop Solumedrol IVP;  Start Prednisone 40mg PO daily; Taper on discharge. 

Will require home oxygen.


-3/22: Solumedrol reduced to 40mg IVP Q12H. Prior to discharge, change 

Solumedrol -> Prednisone


-3/13-3/22: Continue BIPAP, patient more compliant (sometimes refuses despite 

being SOB).


-3/10: RAPID called due to SOB. BP 80/50's. Bolused 500cc NS. Ordered BIPAP and 

ABG, however patient refused. Placed back on NC 3L.


-3/8-3/9: Patient complaining of SOB with exertion and orthopnea. Maintain Head 

of bed elevated 30 degrees.


-3/7:  PT session: 98% O2 at 2L at rest, 96% room air at rest sitting, 82% room 

air during ambulation, 88% at 2 liter ambulation.


-3/6: Walked 20 steps today down [address], and patient became dizzy, SOB, and 

almost collapsed.


- Consulted Pulmonology, Dr. Varela, f/u recs


   -planning for home O2


   - Aware of HR in 140's and SOB after eating and low levels of exertion.


   -f/u ABG (not collected)


   -LDH 654H


   -HIV - negative


   -persistent shortness of breath


   - Patient with long history of smoking most likely has underlying COPD.  

Nebulizer treatment.  Inhaled steroids.  Spiriva.  PFT as out pt








Systolic dysfunction with acute on chronic heart failure


3/26: pt at baseline


- EF 10-15%, mechanical MV- no regurg, tricuspid regurg. see full report


-Chest CT 3/16 - Cardiomegaly. Cardiac valve prosthesis. Left-sided AICD. Dense 

coronary artery calcifications. 


   -Small consolidation (favored to reflect atelectasis rather than pneumonia) 

at the left lung base. 


   -Bibasilar atelectasis. Small airways disease. Mild ground-glass and fine 

nodular opacities within the right upper lobe, possibly infectious or 

inflammatory.    


   -7 mm left upper lobe pulmonary nodule. Guidelines by the Fleischner society 

(radiology 2005; 237:390 5-400) suggests that in patients with low risk for 

lung cancer, with nodules less than or equal to 8 mm in diameter should have 

follow-up in approximately 6-12 months.  In patients with high-risk, including 

smokers, follow-up is recommended 3-6 months.  Patient with a known malignancy 

for risk for metastases should receive 3 month follow-up. 


   -Hepatic capsular calcification. 


   -Numerous low-density lesions throughout the liver, possibly cysts. 

Decompressed gallbladder limits evaluation. Bilateral hypodense renal lesions. 

Right adrenal gland hypertrophy. 12 mm left adrenal gland nodule measures 

approximately 9 Hounsfield units, likely adenoma.


-Consulted Cardiology, Dr. Dotson - f/u recs


   -3/16: Lasix 40mg IVP daily, Spironolactone 25mg daily. CXR - no interval 

pathology change. 


   -3/13: HOLD LASIX FOR 1 TO 2 DAYS AND CONT MUCINEX.  PT PRERENAL AND APPEARS 

DRY.  ON BOTH LASIX AND SPIRONOLACTONE.


   -SOB APPEARS TO BE SECONDARY TO PULM ETIOLOGY.  IMPROVES WITH NEBS.  PT WITH 

RHONCHI B/L.  MAY BENEFIT FROM PULM TOILET.


   -will attempt to obtain company of [facility] for interrogation.


   -likely deconditioned.  will benefit from rehab


-3/10: RAPID called due to SOB. BP 80/50's. Bolused 500cc NS. Ordered BIPAP and 

ABG, however patient refused. Placed back on NC 3L. ProBNP 4070H (less than # 

on admission) and SHEYLA negative. 


-3/10: Decrease to Lasix 40mg PO daily (was BID).


Admit to telemetry, BNP 4560 on admission


Digoxin 0.125mg


Lisinopril 10mg daily


Aldactone 25mg PO Daily


patient with AICD, will check echo to determine EF


CXR 2/25: mild cardiomegaly, mild pulmonary venous congestion.  s/p sternotomy, 

aortic valve replacement, AICD








H/O mitral valve replacement with mechanical valve


4/3: INR pending, coninue Coumadin and monitor INR.  Patient will be discharged 

when able. 





Previous note


Cardiac valve replacement 6-7yrs ago


Consulted Cardiology, Dr. Dotson - f/u recs


   -echocardiogram reviewed.  valve leaflets are opening and functional.  There 

is no signficant gradient across the valve.  


   -recommend INR 2.5 to 3.5.


   STOP Lovenox 3/5


   STOP Heparin Drip - cardiac protocol, with bolus (because patient is 

refusing blood draws)








Chronic atrial fibrillation


Consulted Cardiology, Dr. Dotson - f/u recs


   -Rate controlled


   -echocardiogram reviewed.  valve leaflets are opening and functional.  There 

is no signficant gradient across the valve.  


   -recommend INR 2.5 to 3.5.


   -See PT/INR trends above


   Coumadin as above


   STOP Lovenox 3/5








Cough, acute


-Mucinex 600mg PO BID


-3/22: cough intermittent


-3/18 does not complain of cough


-3/13-3/16: Persists. Non-productive. Continue Mucinex.


-3/9: patient developed productive cough with yellow sputum today.








CAD (coronary artery disease) 


Consulted Cardiology, Dr. Dotson - f/u recs


   -Aware of HR in 140's and SOB after eating and low levels of exertion.


   -unknown graft status.  no evidence of ischemia at present


   -No current angina





History of MI (myocardial infarction)


HOLD Crestor 10mg PO HS (last dose 3/12- due to elevated LFTs)


ROMIs negative x3


EKG paced 


Denies chest pain 





Transaminitis, acute


3/25: AST/ALT normalized; consider resuming crestor.


3/24  patient refusing labs. will re-attempt


3/22-3/23: Improving. hold crestor (since 3/12)


3/21: AST 37 /  - continue to hold crestor.


3/19-3/20: Patient refused blood work


3/18: 51/164 AST / ALT down trending


3/16-3/17: AST / ALT down trending - continue to hold Crestor


3/15: AST 57 / , decreasing. HOLD Crestor 10mg PO HS (last dose 3/12- 

due to elevated LFTs)


3/14: AST 75 /   


3/13: AST 70 /  - increasing -> hold crestor for 2 nights 3/13, 3/14


3/10: AST 90 /  - increasing -> hold Crestor tonight.


- AST 77 / ALT 98 -> previously normal.


- Continue to monitor





Electrolyte Imbalance


3/24 patient refusing labs, will re-attempt


3/15-3/23: Hyponatremia, stable


Hyperkalemia - resolved


Hypokalemia, 








Tachycardia


HR grossly WNL, continue to monitor


3/14-3/17: HR WNL


3/7: Patient becomes SOB very easily, desaturating to 82% while ambulating. 

Anytime he eats or gets up, HR climbs into 140's


Continue Digoxin 0.125mg


EKG in ER: sinus tachycardia at 100, paced, incomplete RBBB


Discontinued cardizem drip of 5mg/h started in the ER





Lower extremity edema


LE Duplex - negative. see full report.





Prophylactic measure


Coumadin as above.


protonix 40mg daily


SCD contraindicated due to LE edema


D/C when bed available





<Evgeny Elizabeth Jr. - Last Filed: 04/14/17 16:35>





Objective





- Vital Signs/Intake and Output


Vital Signs (last 24 hours): 


 











Temp Pulse Resp BP Pulse Ox


 


 97.5 F L  74   20   99/61 L  99 


 


 04/14/17 15:41  04/14/17 15:41  04/14/17 15:41  04/14/17 15:41  04/14/17 15:41








Intake and Output: 


 











 04/14/17 04/14/17





 06:59 18:59


 


Intake Total 760 


 


Output Total 1100 


 


Balance -340 














- Medications


Medications: 


 Current Medications





Acetylcysteine (Acetylcysteine 20%)  4 ml INH RQ4 [facility]


   Last Admin: 04/14/17 16:17 Dose:  4 ml


Albuterol Sulfate (Albuterol 0.042% Inhal Sol (1.25mg/3ml) Ud)  1.25 mg INH RQ4 

[facility]


   Last Admin: 04/14/17 16:17 Dose:  1.25 mg


Benzocaine/Menthol (Cepacol Sore Throat)  1 keegan MT Q6H PRN


   PRN Reason: Sore Throat


   Last Admin: 04/09/17 09:32 Dose:  1 keegan


Budesonide (Pulmicort Respules)  0.5 mg INH RQ12 [facility]


   Last Admin: 04/07/17 07:34 Dose:  0.5 mg


Cefpodoxime Proxetil (Vantin)  200 mg PO Q12H [facility]


   Last Admin: 04/14/17 14:44 Dose:  200 mg


Digoxin (Lanoxin)  0.25 mg PO DAILY@1800 [facility]


   Last Admin: 04/13/17 17:32 Dose:  0.25 mg


Famotidine (Pepcid)  20 mg PO DAILY [facility]


   Last Admin: 04/14/17 11:19 Dose:  20 mg


Guaifenesin (Mucinex La)  600 mg PO BID [facility]


   Last Admin: 04/14/17 11:19 Dose:  600 mg


Ipratropium Bromide (Atrovent)  0.5 mg IH RQ4 [facility]


   Last Admin: 04/14/17 16:17 Dose:  0.5 mg


Lisinopril (Zestril)  2.5 mg PO DAILY [facility]


   Last Admin: 04/14/17 11:19 Dose:  2.5 mg


Prednisone (Prednisone Tab)  20 mg PO DAILY [facility]


   Last Admin: 04/14/17 11:19 Dose:  20 mg


Spironolactone (Aldactone)  25 mg PO DAILY [facility]


   Last Admin: 04/14/17 11:19 Dose:  25 mg











- Labs


Labs: 


 





 04/14/17 11:24 





 04/14/17 11:24 





 











PT  19.9 SECONDS (9.7-12.2)  H  04/14/17  11:24    


 


INR  1.7   04/14/17  11:24    


 


APTT  41 SECONDS (21-34)  H  04/10/17  13:23    














Attending/Attestation





- Attestation


I have personally seen and examined this patient.: Yes


I have fully participated in the care of the patient.: Yes


I have reviewed all pertinent clinical information, including history, physical 

exam and plan: Yes


Notes (Text): 





04/14/17 16:34


patient seen and examined. Reviewed resident note in plan of care

## 2017-04-05 NOTE — RAD
HISTORY:

sob  



COMPARISON:

3/16/2017 



FINDINGS:



LUNGS:

There is new opacity at the right lung base.  There is questionable 

opacity at the left lung base.



PLEURA:

No significant pleural effusion identified, no pneumothorax apparent.



CARDIOVASCULAR:

Mitral valvular replacement.  AICD.  Mild cardiomegaly.  Mild 

congestive change.



OSSEOUS STRUCTURES:

No significant abnormalities.



VISUALIZED UPPER ABDOMEN:

Normal.



OTHER FINDINGS:

None.



IMPRESSION:

New opacity at right lung base suspicious for pneumonia. Questionable 

opacity at left base. Mild congestive change.

## 2017-04-06 NOTE — CP.PCM.PN
<MukundCyrus - Last Filed: 04/06/17 13:28>





Subjective





- Date & Time of Evaluation


Date of Evaluation: 04/06/17


Time of Evaluation: 10:05





- Subjective


Subjective: 


PGY-1 note for Dr Elizabeth's service





Pt seen and examined at bedside. Pt without any overnight events. Pt has cough 

and sob. He reports not being up and walking around very much, stating that he 

becomes sob. Denies fevers, chills, chest pain, nausea or vomiting. 





Objective





- Vital Signs/Intake and Output


Vital Signs (last 24 hours): 


 











Temp Pulse Resp BP Pulse Ox


 


 97.6 F   89   20   123/79   97 


 


 04/06/17 07:00  04/06/17 08:00  04/06/17 07:00  04/06/17 07:00  04/06/17 07:00








Intake and Output: 


 











 04/06/17 04/06/17





 06:59 18:59


 


Output Total 600 


 


Balance -600 














- Medications


Medications: 


 Current Medications





Acetylcysteine (Acetylcysteine 20%)  4 ml INH RQ6 [facility]


   Last Admin: 04/06/17 08:41 Dose:  4 ml


Albuterol/Ipratropium (Duoneb 3 Mg/0.5 Mg (3 Ml) Ud)  3 ml INH RQ6 [facility]


   Last Admin: 04/06/17 08:41 Dose:  3 ml


Benzocaine/Menthol (Cepacol Sore Throat)  1 keegan MT Q6H PRN


   PRN Reason: Sore Throat


   Last Admin: 04/01/17 01:23 Dose:  1 keegan


Budesonide (Pulmicort Respules)  0.5 mg INH RQ12 [facility]


   Last Admin: 04/06/17 08:41 Dose:  0.5 mg


Digoxin (Lanoxin)  0.25 mg PO DAILY@1800 [facility]


   Last Admin: 04/05/17 17:30 Dose:  0.25 mg


Famotidine (Pepcid)  20 mg PO DAILY [facility]


   Last Admin: 04/05/17 09:36 Dose:  20 mg


Furosemide (Lasix)  40 mg PO DAILY [facility]


   Last Admin: 04/05/17 09:32 Dose:  40 mg


Guaifenesin (Mucinex La)  600 mg PO BID [facility]


   Last Admin: 04/05/17 17:30 Dose:  600 mg


Prednisone (Prednisone Tab)  40 mg PO DAILY [facility]


   Last Admin: 04/05/17 09:32 Dose:  40 mg


Rosuvastatin Calcium (Crestor)  10 mg PO HS [facility]


   Last Admin: 04/04/17 21:06 Dose:  10 mg


Spironolactone (Aldactone)  25 mg PO DAILY [facility]


   Last Admin: 04/05/17 11:40 Dose:  Not Given


Tiotropium Bromide (Spiriva)  18 mcg INH RQ24 [facility]


   Last Admin: 04/06/17 08:41 Dose:  18 mcg











- Labs


Labs: 


 





 04/05/17 10:59 





 04/05/17 10:59 





 











PT  47.3 SECONDS (9.7-12.2)  H* D 04/05/17  10:59    


 


INR  3.9   04/05/17  10:59    


 


APTT  37 SECONDS (21-34)  H  03/28/17  13:50    














- Constitutional


Appears: No Acute Distress, Chronically Ill





- Head Exam


Head Exam: ATRAUMATIC, NORMOCEPHALIC





- ENT Exam


ENT Exam: Mucous Membranes Moist





- Respiratory Exam


Respiratory Exam: Rhonchi, NORMAL BREATHING PATTERN





- Cardiovascular Exam


Cardiovascular Exam: +S1, +S2





- GI/Abdominal Exam


GI & Abdominal Exam: Soft, Normal Bowel Sounds





- Neurological Exam


Neurological Exam: Alert, Awake





- Skin


Skin Exam: Dry, Warm





Assessment and Plan





- Assessment and Plan (Free Text)


Assessment: 


Assessment: 


- [facility] will accept pt, waiting on pt's wife to confirm address that pt will 

be discharged home to. 


- Pt not a safe discharge to home due to functional status





Numbness and tingling - left sided


- resolved


- CT head - normal head CT





Shortness of breath


- secondary to chronic COPD vs pneumonia vs malignancy


- CXR 4/5 - New opacity at right lung base suspicious for pneumonia. 

Questionable opacity at left base. Mild congestive change.


- CT Chest HR 3/30 - No definite CT evidence of interstitial lung disease. 

Cardiomegaly and mild-to-moderate pulmonary vascular congestion. Interval 

appearance of new round opacities/ nodules in the right upper lobe and left 

lower lobe since the previous study. The differential diagnosis includes 

multifocal pneumonia. The possibility of neoplasm is not totally excluded. 

Follow-up reassessment is recommended. Otherwise no significant interval change 

since the previous study dated 03/16/2017.





COPD (chronic obstructive pulmonary disease)


4/5: Added mucomyst to breathing treatments. Will get repeat CXR


3/30: CT chest with high resolution - see above


3/26: Prednisone 40mg PO daily; Taper on discharge. Will require home oxygen.


3/23: Stop Solumedrol IVP;  Start Prednisone 40mg PO daily; Taper on discharge. 

Will require home oxygen.


-3/22: Solumedrol reduced to 40mg IVP Q12H. Prior to discharge, change 

Solumedrol -> Prednisone


-3/13-3/22: Continue BIPAP, patient more compliant (sometimes refuses despite 

being SOB).


-3/10: RAPID called due to SOB. BP 80/50's. Bolused 500cc NS. Ordered BIPAP and 

ABG, however patient refused. Placed back on NC 3L.


-3/8-3/9: Patient complaining of SOB with exertion and orthopnea. Maintain Head 

of bed elevated 30 degrees.


-3/7:  PT session: 98% O2 at 2L at rest, 96% room air at rest sitting, 82% room 

air during ambulation, 88% at 2 liter ambulation.


-3/6: Walked 20 steps today down [address], and patient became dizzy, SOB, and 

almost collapsed.


- Consulted Pulmonology, Dr. Varela, f/u recs


   -planning for home O2


   - Aware of HR in 140's and SOB after eating and low levels of exertion.


   -LDH 654H


   -HIV - negative


   -persistent shortness of breath


   - Patient with long history of smoking most likely has underlying COPD.  

Nebulizer treatment.  Inhaled steroids.  Spiriva.  PFT as out pt








Systolic dysfunction with acute on chronic heart failure


- 4/5: dig level low, increase to 0.25


- EF 10-15%, mechanical MV- no regurg, tricuspid regurg. see full report


-Chest CT 3/16 - Cardiomegaly. Cardiac valve prosthesis. Left-sided AICD. Dense 

coronary artery calcifications. 


   -Small consolidation (favored to reflect atelectasis rather than pneumonia) 

at the left lung base. 


   -Bibasilar atelectasis. Small airways disease. Mild ground-glass and fine 

nodular opacities within the right upper lobe, possibly infectious or 

inflammatory.    


   -7 mm left upper lobe pulmonary nodule. Guidelines by the Fleischner society 

(radiology 2005; 237:390 5-400) suggests that in patients with low risk for 

lung cancer, with nodules less than or equal to 8 mm in diameter should have 

follow-up in approximately 6-12 months.  In patients with high-risk, including 

smokers, follow-up is recommended 3-6 months.  Patient with a known malignancy 

for risk for metastases should receive 3 month follow-up. 


   -Hepatic capsular calcification. 


   -Numerous low-density lesions throughout the liver, possibly cysts. 

Decompressed gallbladder limits evaluation. Bilateral hypodense renal lesions. 

Right adrenal gland hypertrophy. 12 mm left adrenal gland nodule measures 

approximately 9 Hounsfield units, likely adenoma.


-Consulted Cardiology, Dr. Dotson - f/u recs


   -3/16: Lasix 40mg IVP daily, Spironolactone 25mg daily. CXR - no interval 

pathology change. 


   -3/13: HOLD LASIX FOR 1 TO 2 DAYS AND CONT MUCINEX.  PT PRERENAL AND APPEARS 

DRY.  ON BOTH LASIX AND SPIRONOLACTONE.


   -SOB APPEARS TO BE SECONDARY TO PULM ETIOLOGY.  IMPROVES WITH NEBS.  PT WITH 

RHONCHI B/L.  MAY BENEFIT FROM PULM TOILET.


   -will attempt to obtain company of [facility] for interrogation.


   -likely deconditioned.  will benefit from rehab


-3/10: RAPID called due to SOB. BP 80/50's. Bolused 500cc NS. Ordered BIPAP and 

ABG, however patient refused. Placed back on NC 3L. ProBNP 4070H (less than # 

on admission) and SHEYLA negative. 


-3/10: Decrease to Lasix 40mg PO daily (was BID).


Admit to telemetry, BNP 4560 on admission


Lisinopril 10mg daily


Aldactone 25mg PO Daily


patient with AICD, will check echo to determine EF


CXR 2/25: mild cardiomegaly, mild pulmonary venous congestion.  s/p sternotomy, 

aortic valve replacement, AICD








H/O mitral valve replacement with mechanical valve


Monitor INR and adjust Coumadin as necessary


Cardiac valve replacement 6-7yrs ago


Consulted Cardiology, Dr. Dotson - f/u recs


   -echocardiogram reviewed.  valve leaflets are opening and functional.  There 

is no signficant gradient across the valve.  


   -recommend INR 2.5 to 3.5.


   STOP Lovenox 3/5


   STOP Heparin Drip - cardiac protocol, with bolus (because patient is 

refusing blood draws)








Chronic atrial fibrillation


Consulted Cardiology, Dr. Mauir Last f/u recs


   -Rate controlled


   -echocardiogram reviewed.  valve leaflets are opening and functional.  There 

is no signficant gradient across the valve.  


   -recommend INR 2.5 to 3.5.


   -See PT/INR trends above


   Coumadin as above


   STOP Lovenox 3/5








Cough, acute


- will add mucocyst


-Mucinex 600mg PO BID


-3/22: cough intermittent


-3/18 does not complain of cough


-3/13-3/16: Persists. Non-productive. Continue Mucinex.


-3/9: patient developed productive cough with yellow sputum today.








CAD (coronary artery disease) 


Consulted Cardiology, Dr. Dotson - f/u recs


   -Aware of HR in 140's and SOB after eating and low levels of exertion.


   -unknown graft status.  no evidence of ischemia at present


   -No current angina





History of MI (myocardial infarction)


HOLD Crestor 10mg PO HS (last dose 3/12- due to elevated LFTs)


ROMIs negative x3


EKG paced 


Denies chest pain 





Transaminitis, acute


3/25: AST/ALT normalized; consider resuming crestor.


3/24  patient refusing labs. will re-attempt


3/22-3/23: Improving. hold crestor (since 3/12)


3/21: AST 37 /  - continue to hold crestor.


3/19-3/20: Patient refused blood work


3/18: 51/164 AST / ALT down trending


3/16-3/17: AST / ALT down trending - continue to hold Crestor


3/15: AST 57 / , decreasing. HOLD Crestor 10mg PO HS (last dose 3/12- 

due to elevated LFTs)


3/14: AST 75 /   


3/13: AST 70 /  - increasing -> hold crestor for 2 nights 3/13, 3/14


3/10: AST 90 /  - increasing -> hold Crestor tonight.


- AST 77 / ALT 98 -> previously normal.


- Continue to monitor





Electrolyte Imbalance


3/24 patient refusing labs, will re-attempt


3/15-3/23: Hyponatremia, stable


Hyperkalemia - resolved


Hypokalemia, 








Tachycardia


HR grossly WNL, continue to monitor


3/14-3/17: HR WNL


3/7: Patient becomes SOB very easily, desaturating to 82% while ambulating. 

Anytime he eats or gets up, HR climbs into 140's


EKG in ER: sinus tachycardia at 100, paced, incomplete RBBB


Discontinued cardizem drip of 5mg/h started in the ER





Lower extremity edema


LE Duplex - negative. see full report.





Prophylactic measure


Coumadin as above.


protonix 40mg daily


SCD contraindicated due to LE edema


D/C when bed available











<Pillo Wang - Last Filed: 04/06/17 16:19>





Objective





- Vital Signs/Intake and Output


Vital Signs (last 24 hours): 


 











Temp Pulse Resp BP Pulse Ox


 


 98 F   87   20   100/69   99 


 


 04/06/17 15:49  04/06/17 15:49  04/06/17 15:49  04/06/17 15:49  04/06/17 15:49








Intake and Output: 


 











 04/06/17 04/06/17





 06:59 18:59


 


Output Total 600 


 


Balance -600 














- Medications


Medications: 


 Current Medications





Acetylcysteine (Acetylcysteine 20%)  4 ml INH RQ6 [facility]


   Last Admin: 04/06/17 13:25 Dose:  4 ml


Albuterol/Ipratropium (Duoneb 3 Mg/0.5 Mg (3 Ml) Ud)  3 ml INH RQ6 [facility]


   Last Admin: 04/06/17 13:25 Dose:  3 ml


Benzocaine/Menthol (Cepacol Sore Throat)  1 keegan MT Q6H PRN


   PRN Reason: Sore Throat


   Last Admin: 04/01/17 01:23 Dose:  1 keegan


Budesonide (Pulmicort Respules)  0.5 mg INH RQ12 [facility]


   Last Admin: 04/06/17 08:41 Dose:  0.5 mg


Digoxin (Lanoxin)  0.25 mg PO DAILY@1800 [facility]


   Last Admin: 04/05/17 17:30 Dose:  0.25 mg


Famotidine (Pepcid)  20 mg PO DAILY [facility]


   Last Admin: 04/06/17 10:21 Dose:  20 mg


Furosemide (Lasix)  40 mg PO DAILY [facility]


   Last Admin: 04/06/17 10:23 Dose:  Not Given


Guaifenesin (Mucinex La)  600 mg PO BID [facility]


   Last Admin: 04/06/17 10:22 Dose:  600 mg


Prednisone (Prednisone Tab)  40 mg PO DAILY [facility]


   Last Admin: 04/06/17 10:21 Dose:  40 mg


Rosuvastatin Calcium (Crestor)  10 mg PO HS [facility]


   Last Admin: 04/04/17 21:06 Dose:  10 mg


Spironolactone (Aldactone)  25 mg PO DAILY [facility]


   Last Admin: 04/06/17 10:23 Dose:  Not Given


Tiotropium Bromide (Spiriva)  18 mcg INH RQ24 [facility]


   Last Admin: 04/06/17 08:41 Dose:  18 mcg











- Labs


Labs: 


 





 04/05/17 10:59 





 04/05/17 10:59 





 











PT  42.7 SECONDS (9.7-12.2)  H*  04/06/17  11:44    


 


INR  3.6   04/06/17  11:44    


 


APTT  37 SECONDS (21-34)  H  03/28/17  13:50    














Attending/Attestation





- Attestation


I have personally seen and examined this patient.: Yes


I have fully participated in the care of the patient.: Yes


I have reviewed all pertinent clinical information, including history, physical 

exam and plan: Yes


Notes (Text): 





04/06/17 16:17


Patient seen and examined at bedside with the resident


Patient's symptoms complains of cough for with expectoration


We will refer to pulmonary for starting antibiotics if needed


Patient's INR is 3.6 today. However patient received to 4 mg of Coumadin 

yesterday with INR of 3.9 and his INR dropped to 3.6 today.


Patient's INR is difficult to maintain with the therapeutic range therefore we 

will give another 4 mg tonight and check INR tomorrow.


Discussed the plan of care with the resident and agree with the above history 

and physical and assessment/plan by the resident


Discharge planning is in progress

## 2017-04-07 NOTE — CON
DATE: 04/07/2017



He has been here since February.  I was called because he had a new infiltrate on his x-ray.  He has 
a history which was done on 02/2.  



A 72-year-old with history of MI, coronary artery disease, CABG, history of valve replacement, came i
n.  He was complaining of shortness of breath and dizziness, and was getting worse.  He also had pres
sure, and he has been worked up.  He has history of coronary artery disease, MI, arrhythmias.  He was
 traveling from [place], and he is basically homeless.  He has been here with respiratory symptoms as
 well as cardiac symptoms, and has been taking care of.  He has received antibiotics before, and now 
the antibiotics have been restarted as there is a new infiltrate, and I am asked to evaluate it.



PAST MEDICAL HISTORY:  Significant for coronary artery disease, MI, arrhythmias.  He had had valve re
placement, it seems aortic valve, CABG with vessel harvest from right leg.



FAMILY HISTORY:  Negative.



SOCIAL HISTORY:  He smokes 2-3 cigarettes for 60 years.  Denies alcohol, drug abuse.  He is homeless.
  He used to live in a car, and he was traveling from [place].



He denies any fever or chills at this time.  He is coughing up yellowish phlegm he says.



He remains afebrile.  Temperature is 98.1, pulse is 73, blood pressure is 103/66, respirations are 20
.  That is noted.



On admission, he came in with lethargy.  He has history of COPD, and CABG.



He has no known allergies.



On examination I find his vitals I already discussed.  

HEAD:  Atraumatic, normocephalic.  

He is elderly male.  

NECK:  Supple.

EYES:  Unremarkable.  

Mucous membrane is moist.

LUNGS:  Have bilateral expiratory wheezing and rales. 

HEART:  S1, S2 is regular.  No murmurs appreciated.

ABDOMEN:  Soft, nontender, no guarding, no rigidity present.

EXTREMITIES:  Did have ankle edema bilaterally.

SKIN:  Warm to touch.  



He has no fever, however, he says he is producing yellowish sputum.



White count is 5.1, hemoglobin 12.8, hematocrit 41, platelet count is 195, and potassium was 5.7.  Hi
s liver enzymes are:  AST 65, ALT is 124, albumin is 3.4.  



Chest x-ray showed new opacity at the right lung base suspicious for pneumonia, questionable opacity 
at left lung.  Mild congestive changes.  



So, at this time, he is started on vancomycin.  He is started on Maxipime, as well as Cipro.  Will re
peat the labs tomorrow.  Also of interest would be to do a BNP tomorrow, as he does have a history of
 severe cardiac history with valve replacement, and will follow.  To do vancomycin peak and trough on
 the 3rd dose, and monitor the x-ray, and to continue his respiratory treatments as advised by the 
lmonary.





__________________________________________

Hillary Guo MD







cc:



DD: 04/07/2017 19:29:15  [id number]

TT: 04/07/2017 22:07:34

Confirmation # [id number]

Dictation # [id number]

jn

## 2017-04-07 NOTE — CP.PCM.PN
<Keira Crews - Last Filed: 04/07/17 10:36>





Subjective





- Date & Time of Evaluation


Date of Evaluation: 04/07/17


Time of Evaluation: 07:35





- Subjective


Subjective: 


PGY-1 note for Dr. Peter (covering for DR. Elizabeth):





Pt seen and examined at bedside. Pt without any overnight events. Pt has cough 

and sob. He reports not being up and walking around very much, stating that he 

becomes sob. Denies fevers, chills, chest pain, nausea or vomiting. He was 

again refusing blood work this morning. Other than that he is tolerating his 

diet, having bowel movement, and denies urinary complaints.





Objective





- Vital Signs/Intake and Output


Vital Signs (last 24 hours): 


 











Temp Pulse Resp BP Pulse Ox


 


 97.4 F L  75   18   103/65   100 


 


 04/07/17 07:20  04/07/17 07:20  04/07/17 07:20  04/07/17 10:05  04/07/17 07:20








Intake and Output: 


 











 04/07/17 04/07/17





 06:59 18:59


 


Intake Total 300 


 


Output Total 500 


 


Balance -200 














- Medications


Medications: 


 Current Medications





Acetylcysteine (Acetylcysteine 20%)  4 ml INH RQ6 [facility]


   Last Admin: 04/07/17 07:35 Dose:  4 ml


Benzocaine/Menthol (Cepacol Sore Throat)  1 keegan MT Q6H PRN


   PRN Reason: Sore Throat


   Last Admin: 04/01/17 01:23 Dose:  1 keegan


Budesonide (Pulmicort Respules)  0.5 mg INH RQ12 [facility]


   Last Admin: 04/07/17 07:34 Dose:  0.5 mg


Digoxin (Lanoxin)  0.25 mg PO DAILY@1800 [facility]


Famotidine (Pepcid)  20 mg PO DAILY [facility]


   Last Admin: 04/07/17 10:05 Dose:  20 mg


Furosemide (Lasix)  40 mg PO DAILY [facility]


   Last Admin: 04/07/17 10:05 Dose:  40 mg


Guaifenesin (Mucinex La)  600 mg PO BID [facility]


   Last Admin: 04/07/17 10:05 Dose:  600 mg


Lisinopril (Zestril)  2.5 mg PO DAILY [facility]


   Last Admin: 04/07/17 10:06 Dose:  Not Given


Prednisone (Prednisone Tab)  40 mg PO DAILY [facility]


   Last Admin: 04/07/17 10:05 Dose:  40 mg


Rosuvastatin Calcium (Crestor)  10 mg PO HS [facility]


   Last Admin: 04/06/17 21:32 Dose:  10 mg


Spironolactone (Aldactone)  25 mg PO DAILY [facility]


   Last Admin: 04/07/17 10:06 Dose:  Not Given


Tiotropium Bromide (Spiriva)  18 mcg INH RQ24 [facility]


   Last Admin: 04/07/17 07:35 Dose:  18 mcg











- Labs


Labs: 


 





 04/05/17 10:59 





 04/05/17 10:59 





 











PT  42.7 SECONDS (9.7-12.2)  H*  04/06/17  11:44    


 


INR  3.6   04/06/17  11:44    


 


APTT  37 SECONDS (21-34)  H  03/28/17  13:50    














- Constitutional


Appears: Non-toxic, No Acute Distress, Chronically Ill





- Head Exam


Head Exam: ATRAUMATIC, NORMAL INSPECTION





- Eye Exam


Eye Exam: EOMI, Normal appearance, PERRL


Pupil Exam: NORMAL ACCOMODATION





- ENT Exam


ENT Exam: Mucous Membranes Moist





- Respiratory Exam


Respiratory Exam: Clear to Ausculation Bilateral, Wheezes, NORMAL BREATHING 

PATTERN.  absent: Accessory Muscle Use, Decreased Breath Sounds, Respiratory 

Distress





- Cardiovascular Exam


Cardiovascular Exam: REGULAR RHYTHM, +S1, +S2





- GI/Abdominal Exam


GI & Abdominal Exam: Soft, Diminished Bowel Sounds, Normal Bowel Sounds.  absent

: Distended, Firm, Guarding, Tenderness





- Extremities Exam


Extremities Exam: Normal Inspection.  absent: Calf Tenderness, Pedal Edema





- Back Exam


Back Exam: NORMAL INSPECTION.  absent: CVA tenderness (L), CVA tenderness (R), 

paraspinal tenderness





- Neurological Exam


Neurological Exam: Alert, Awake, CN II-XII Intact, Oriented x3


Neuro motor strength exam: Left Upper Extremity: 5, Right Upper Extremity: 5, 

Left Lower Extremity: 5, Right Lower Extremity: 5





- Psychiatric Exam


Psychiatric exam: Normal Affect, Normal Mood





- Skin


Skin Exam: Dry, Intact, Normal Color, Warm





Assessment and Plan





- Assessment and Plan (Free Text)


Assessment: 


Assessment: 


- [facility] will accept pt, waiting on pt's wife to confirm address that pt will 

be discharged home to. 


- Pt not a safe discharge to home due to functional status





Numbness and tingling - left sided


- resolved


- CT head - normal head CT





Shortness of breath


- secondary to chronic COPD vs pneumonia vs malignancy


- CXR 4/5 - New opacity at right lung base suspicious for pneumonia. 

Questionable opacity at left base. Mild congestive change.


- CT Chest HR 3/30 - No definite CT evidence of interstitial lung disease. 

Cardiomegaly and mild-to-moderate pulmonary vascular congestion. Interval 

appearance of new round opacities/ nodules in the right upper lobe and left 

lower lobe since the previous study. The differential diagnosis includes 

multifocal pneumonia. The possibility of neoplasm is not totally excluded. 

Follow-up reassessment is recommended. Otherwise no significant interval change 

since the previous study dated 03/16/2017.





COPD (chronic obstructive pulmonary disease)


4/7: Duonebs every 4 hours for SOB


4/5: Added mucomyst to breathing treatments. Will get repeat CXR


3/30: CT chest with high resolution - see above


3/26: Prednisone 40mg PO daily; Taper on discharge. Will require home oxygen.


3/23: Stop Solumedrol IVP;  Start Prednisone 40mg PO daily; Taper on discharge. 

Will require home oxygen.


-3/22: Solumedrol reduced to 40mg IVP Q12H. Prior to discharge, change 

Solumedrol -> Prednisone


-3/13-3/22: Continue BIPAP, patient more compliant (sometimes refuses despite 

being SOB).


-3/10: RAPID called due to SOB. BP 80/50's. Bolused 500cc NS. Ordered BIPAP and 

ABG, however patient refused. Placed back on NC 3L.


-3/8-3/9: Patient complaining of SOB with exertion and orthopnea. Maintain Head 

of bed elevated 30 degrees.


-3/7:  PT session: 98% O2 at 2L at rest, 96% room air at rest sitting, 82% room 

air during ambulation, 88% at 2 liter ambulation.


-3/6: Walked 20 steps today down [place], and patient became dizzy, SOB, and 

almost collapsed.


- Consulted Pulmonology, Dr. Varela, f/u recs


   -planning for home O2


   - Aware of HR in 140's and SOB after eating and low levels of exertion.


   -LDH 654H


   -HIV - negative


   -persistent shortness of breath


   - Patient with long history of smoking most likely has underlying COPD.  

Nebulizer treatment.  Inhaled steroids.  Spiriva.  PFT as out pt








Systolic dysfunction with acute on chronic heart failure


- 4/5: dig level low, increase to 0.25


- EF 10-15%, mechanical MV- no regurg, tricuspid regurg. see full report


-Chest CT 3/16 - Cardiomegaly. Cardiac valve prosthesis. Left-sided AICD. Dense 

coronary artery calcifications. 


   -Small consolidation (favored to reflect atelectasis rather than pneumonia) 

at the left lung base. 


   -Bibasilar atelectasis. Small airways disease. Mild ground-glass and fine 

nodular opacities within the right upper lobe, possibly infectious or 

inflammatory.    


   -7 mm left upper lobe pulmonary nodule. Guidelines by the Fleischner society 

(radiology 2005; 237:390 5-400) suggests that in patients with low risk for 

lung cancer, with nodules less than or equal to 8 mm in diameter should have 

follow-up in approximately 6-12 months.  In patients with high-risk, including 

smokers, follow-up is recommended 3-6 months.  Patient with a known malignancy 

for risk for metastases should receive 3 month follow-up. 


   -Hepatic capsular calcification. 


   -Numerous low-density lesions throughout the liver, possibly cysts. 

Decompressed gallbladder limits evaluation. Bilateral hypodense renal lesions. 

Right adrenal gland hypertrophy. 12 mm left adrenal gland nodule measures 

approximately 9 Hounsfield units, likely adenoma.


-Consulted Cardiology, Dr. Dotson - f/u recs


   -3/16: Lasix 40mg IVP daily, Spironolactone 25mg daily. CXR - no interval 

pathology change. 


   -3/13: HOLD LASIX FOR 1 TO 2 DAYS AND CONT MUCINEX.  PT PRERENAL AND APPEARS 

DRY.  ON BOTH LASIX AND SPIRONOLACTONE.


   -SOB APPEARS TO BE SECONDARY TO PULM ETIOLOGY.  IMPROVES WITH NEBS.  PT WITH 

RHONCHI B/L.  MAY BENEFIT FROM PULM TOILET.


   -will attempt to obtain company of [facility] for interrogation.


   -likely deconditioned.  will benefit from rehab


-3/10: RAPID called due to SOB. BP 80/50's. Bolused 500cc NS. Ordered BIPAP and 

ABG, however patient refused. Placed back on NC 3L. ProBNP 4070H (less than # 

on admission) and SHEYLA negative. 


-3/10: Decrease to Lasix 40mg PO daily (was BID).


Admit to telemetry, BNP 4560 on admission


Lisinopril 10mg daily


Aldactone 25mg PO Daily


patient with AICD, will check echo to determine EF


CXR 2/25: mild cardiomegaly, mild pulmonary venous congestion.  s/p sternotomy, 

aortic valve replacement, AICD








H/O mitral valve replacement with mechanical valve


Monitor INR and adjust Coumadin as necessary


Cardiac valve replacement 6-7yrs ago


Consulted Cardiology, Dr. Dotson - f/u recs


   -echocardiogram reviewed.  valve leaflets are opening and functional.  There 

is no signficant gradient across the valve.  


   -recommend INR 2.5 to 3.5.


   STOP Lovenox 3/5


   STOP Heparin Drip - cardiac protocol, with bolus (because patient is 

refusing blood draws)








Chronic atrial fibrillation


Consulted Cardiology, Dr. Doston - f/u recs


   -Rate controlled


   -echocardiogram reviewed.  valve leaflets are opening and functional.  There 

is no significant gradient across the valve.  


   -recommend INR 2.5 to 3.5.


   -See PT/INR trends above


   Coumadin as above


   - Patient's INR is difficult to maintain with the therapeutic range 

therefore we will give another 4 mg tonight and check INR tomorrow. Will try to 

      convince the patient to get bloodwork done today.


   STOP Lovenox 3/5








Cough, acute


- will add mucocyst


-Mucinex 600mg PO BID


-3/22: cough intermittent


-3/18 does not complain of cough


-3/13-3/16: Persists. Non-productive. Continue Mucinex.


-3/9: patient developed productive cough with yellow sputum today.








CAD (coronary artery disease) 


Consulted Cardiology, Dr. Dotson - f/u recs


   -Aware of HR in 140's and SOB after eating and low levels of exertion.


   -unknown graft status.  no evidence of ischemia at present


   -No current angina





History of MI (myocardial infarction)


Crestor 10mg PO HS resumed


HOLD Crestor 10mg PO HS (last dose 3/12- due to elevated LFTs)


ROMIs negative x3


EKG paced 


Denies chest pain 





Transaminitis, acute


Crestor resumed


3/25: AST/ALT normalized; consider resuming crestor.


3/24  patient refusing labs. will re-attempt


3/22-3/23: Improving. hold crestor (since 3/12)


3/21: AST 37 /  - continue to hold crestor.


3/19-3/20: Patient refused blood work


3/18: 51/164 AST / ALT down trending


3/16-3/17: AST / ALT down trending - continue to hold Crestor


3/15: AST 57 / , decreasing. HOLD Crestor 10mg PO HS (last dose 3/12- 

due to elevated LFTs)


3/14: AST 75 /   


3/13: AST 70 /  - increasing -> hold crestor for 2 nights 3/13, 3/14


3/10: AST 90 /  - increasing -> hold Crestor tonight.


- AST 77 / ALT 98 -> previously normal.


- Continue to monitor





Electrolyte Imbalance


3/24 patient refusing labs, will re-attempt


3/15-3/23: Hyponatremia, stable


Hyperkalemia - resolved


Hypokalemia, 








Tachycardia


HR grossly WNL, continue to monitor


3/14-3/17: HR WNL


3/7: Patient becomes SOB very easily, desaturating to 82% while ambulating. 

Anytime he eats or gets up, HR climbs into 140's


EKG in ER: sinus tachycardia at 100, paced, incomplete RBBB


Discontinued cardizem drip of 5mg/h started in the ER





Lower extremity edema


LE Duplex - negative. see full report.





Prophylactic measure


Coumadin as above.


protonix 40mg daily


SCD contraindicated due to LE edema


D/C when bed available





<Sulaiman Peter - Last Filed: 04/07/17 15:34>





Objective





- Vital Signs/Intake and Output


Vital Signs (last 24 hours): 


 











Temp Pulse Resp BP Pulse Ox


 


 97.4 F L  85   18   103/65   99 


 


 04/07/17 07:20  04/07/17 10:10  04/07/17 10:10  04/07/17 10:10  04/07/17 10:10








Intake and Output: 


 











 04/07/17 04/07/17





 06:59 18:59


 


Intake Total 300 


 


Output Total 500 


 


Balance -200 














- Medications


Medications: 


 Current Medications





Acetylcysteine (Acetylcysteine 20%)  4 ml INH RQ6 [facility]


   Last Admin: 04/07/17 13:15 Dose:  4 ml


Benzocaine/Menthol (Cepacol Sore Throat)  1 keegan MT Q6H PRN


   PRN Reason: Sore Throat


   Last Admin: 04/01/17 01:23 Dose:  1 keegan


Budesonide (Pulmicort Respules)  0.5 mg INH RQ12 [facility]


   Last Admin: 04/07/17 07:34 Dose:  0.5 mg


Digoxin (Lanoxin)  0.25 mg PO DAILY@1800 [facility]


Famotidine (Pepcid)  20 mg PO DAILY [facility]


   Last Admin: 04/07/17 10:05 Dose:  20 mg


Furosemide (Lasix)  40 mg PO DAILY [facility]


   Last Admin: 04/07/17 10:05 Dose:  40 mg


Guaifenesin (Mucinex La)  600 mg PO BID [facility]


   Last Admin: 04/07/17 10:05 Dose:  600 mg


Ciprofloxacin (Cipro 400mg/200ml Dsw)  200 mls @ 133.333 mls/hr IVPB Q8H [facility]


   Last Admin: 04/07/17 14:49 Dose:  133.333 mls/hr


Cefepime HCl (Maxipime Iv 2 Gm Premix)  100 mls @ 100 mls/hr IVPB Q12 [facility]


   Stop: 04/12/17 22:01


Vancomycin/Sodium Chloride (Vancocin)  200 mls @ 100 mls/hr IVPB DAILY [facility]


Ipratropium Bromide (Atrovent Hfa)  2 puff IH RQ4 [facility]


Lisinopril (Zestril)  2.5 mg PO DAILY [facility]


   Last Admin: 04/07/17 10:06 Dose:  Not Given


Prednisone (Prednisone Tab)  20 mg PO DAILY [facility]


Rosuvastatin Calcium (Crestor)  10 mg PO HS [facility]


   Last Admin: 04/06/17 21:32 Dose:  10 mg


Spironolactone (Aldactone)  25 mg PO DAILY [facility]


   Last Admin: 04/07/17 10:06 Dose:  Not Given


Tiotropium Bromide (Spiriva)  18 mcg INH RQ24 [facility]


   Last Admin: 04/07/17 07:35 Dose:  18 mcg


Warfarin Sodium (Coumadin)  4 mg PO 1800 [facility]


   Stop: 04/07/17 18:01











- Labs


Labs: 


 





 04/07/17 14:55 





 04/07/17 14:55 





 











PT  44.9 SECONDS (9.7-12.2)  H*  04/07/17  14:35    


 


INR  3.7   04/07/17  14:35    


 


APTT  37 SECONDS (21-34)  H  03/28/17  13:50    














Attending/Attestation





- Attestation


I have personally seen and examined this patient.: Yes


I have fully participated in the care of the patient.: Yes


I have reviewed all pertinent clinical information, including history, physical 

exam and plan: Yes


Notes (Text): 





04/07/17 15:32


Medical attending: Patient was seen and examined by me, agrees the above note 

by medical resident.





At this time the patient does not have any acute changes however review of her 

most recent chest x-ray suggests that he does have a pneumonia going on. At 

this time the resident has started IV antibiotics, I agree with this -my 

concern is that because the patient has been here for a very prolonged period 

of time that this is very likely hospital-acquired pneumonia/healthcare 

acquired pneumonia.








Continue to monitor the patient's white blood cell count, temperature fevers, 

cultures





Thank you,


Sulaiman Peter

## 2017-04-07 NOTE — CP.PCM.CON
History of Present Illness





- History of Present Illness


History of Present Illness: 


dictated





Past Patient History





- Infectious Disease


Hx of Infectious Diseases: None





- Past Medical History & Family History


Past Medical History?: Yes





- Past Social History


Smoking Status: Light Smoker < 10 Cigarettes Daily





- CARDIAC


Hx Cardiac Disorders:  (A-fib)


Hx Congestive Heart Failure: Yes


Hx Hypertension: Yes





- PULMONARY


Hx Chronic Obstructive Pulmonary Disease (COPD): Yes





- MUSCULOSKELETAL/RHEUMATOLOGICAL


Hx Falls: No





- PSYCHIATRIC


Hx Substance Use: No





- SURGICAL HISTORY


Hx Coronary Artery Bypass Graft: Yes (4 yrs ago)





- ANESTHESIA


Hx Anesthesia: Yes


Hx Anesthesia Reactions: No


Hx Malignant Hyperthermia: No





Meds


Home Medications: 


 Home Medication List











 Medication  Instructions  Recorded  Confirmed  Type


 


Albuterol/Ipratropium [Duoneb 3 3 ml INH RQ6  neb 03/23/17  Rx





mg/0.5 mg (3 ml) UD]    


 


Budesonide [Pulmicort Respules] 0.5 mg INH RQ12  neb 03/23/17  Rx


 


Digoxin [Lanoxin] 0.125 mg PO DAILY@1800  tab 03/23/17  Rx


 


Furosemide [Lasix] 40 mg PO DAILY  tab 03/23/17  Rx


 


Rosuvastatin Calcium [Crestor] 10 mg PO HS  tab 03/23/17  Rx


 


Spironolactone [Aldactone] 25 mg PO DAILY  tab 03/23/17  Rx


 


Tiotropium [Spiriva] 18 mcg INH RQ24  cap 03/23/17  Rx


 


Warfarin [Coumadin] 3 mg PO 1800  tab 03/23/17  Rx


 


Warfarin [Coumadin] 4 mg PO 1800  tab 03/23/17  Rx


 


guaiFENesin [Mucinex LA] 600 mg PO BID  tab 03/23/17  Rx


 


predniSONE [predniSONE Tab] 40 mg PO DAILY  tab 03/23/17  Rx











Allergies/Adverse Reactions: 


 Allergies











Allergy/AdvReac Type Severity Reaction Status Date / Time


 


No Known Allergies Allergy   Verified 02/25/17 12:47














- Medications


Medications: 


 Current Medications





Acetylcysteine (Acetylcysteine 20%)  4 ml INH RQ6 [facility]


   Last Admin: 04/07/17 13:15 Dose:  4 ml


Benzocaine/Menthol (Cepacol Sore Throat)  1 keegan MT Q6H PRN


   PRN Reason: Sore Throat


   Last Admin: 04/01/17 01:23 Dose:  1 keegan


Budesonide (Pulmicort Respules)  0.5 mg INH RQ12 [facility]


   Last Admin: 04/07/17 07:34 Dose:  0.5 mg


Digoxin (Lanoxin)  0.25 mg PO DAILY@1800 [facility]


Famotidine (Pepcid)  20 mg PO DAILY [facility]


   Last Admin: 04/07/17 10:05 Dose:  20 mg


Furosemide (Lasix)  40 mg PO DAILY [facility]


   Last Admin: 04/07/17 10:05 Dose:  40 mg


Guaifenesin (Mucinex La)  600 mg PO BID [facility]


   Last Admin: 04/07/17 10:05 Dose:  600 mg


Ciprofloxacin (Cipro 400mg/200ml Dsw)  200 mls @ 133.333 mls/hr IVPB Q8H [facility]


   Last Admin: 04/07/17 14:49 Dose:  133.333 mls/hr


Cefepime HCl (Maxipime Iv 2 Gm Premix)  100 mls @ 100 mls/hr IVPB Q12 [facility]


   Stop: 04/12/17 22:01


Vancomycin/Sodium Chloride (Vancocin)  200 mls @ 100 mls/hr IVPB DAILY [facility]


Ipratropium Bromide (Atrovent Hfa)  2 puff IH RQ4 [facility]


Lisinopril (Zestril)  2.5 mg PO DAILY [facility]


   Last Admin: 04/07/17 10:06 Dose:  Not Given


Prednisone (Prednisone Tab)  20 mg PO DAILY [facility]


Rosuvastatin Calcium (Crestor)  10 mg PO HS [facility]


   Last Admin: 04/06/17 21:32 Dose:  10 mg


Spironolactone (Aldactone)  25 mg PO DAILY [facility]


   Last Admin: 04/07/17 10:06 Dose:  Not Given


Tiotropium Bromide (Spiriva)  18 mcg INH RQ24 [facility]


   Last Admin: 04/07/17 07:35 Dose:  18 mcg


Warfarin Sodium (Coumadin)  4 mg PO 1800 [facility]


   Stop: 04/07/17 18:01











Results





- Vital Signs


Recent Vital Signs: 


 Last Vital Signs











Temp  98.1 F   04/07/17 15:41


 


Pulse  73   04/07/17 15:41


 


Resp  20   04/07/17 15:41


 


BP  103/66   04/07/17 15:41


 


Pulse Ox  99   04/07/17 15:41














- Labs


Result Diagrams: 


 04/07/17 14:55





 04/07/17 14:55


Labs: 


 Laboratory Results - last 24 hr











  04/07/17 04/07/17





  14:35 14:55


 


WBC   5.1


 


RBC   5.02


 


Hgb   12.8


 


Hct   41.0


 


MCV   81.8


 


MCH   25.6 L


 


MCHC   31.3 L


 


RDW   17.8 H


 


Plt Count   195


 


MPV   7.7


 


Neut % (Auto)   79.7 H


 


Lymph % (Auto)   10.2 L


 


Mono % (Auto)   9.3


 


Eos % (Auto)   0.5


 


Baso % (Auto)   0.3


 


Neut #   4.1


 


Lymph #   0.5 L


 


Mono #   0.5


 


Eos #   0.0


 


Baso #   0.0


 


PT  44.9 H* 


 


INR  3.7 


 


Sodium   133


 


Potassium   5.7 H


 


Chloride   93 L


 


Carbon Dioxide   29


 


Anion Gap   17


 


BUN   24 H


 


Creatinine   0.9


 


Est GFR ( Amer)   > 60


 


Est GFR (Non-Af Amer)   > 60


 


Random Glucose   173 H


 


Calcium   9.3


 


Total Bilirubin   0.4


 


AST   65 H D


 


ALT   124 H D


 


Alkaline Phosphatase   71


 


Total Protein   6.6


 


Albumin   3.4 L


 


Globulin   3.3


 


Albumin/Globulin Ratio   1.0

## 2017-04-08 NOTE — RAD
HISTORY:

shortness of breath  



COMPARISON:

04/05/2017 



FINDINGS:



LUNGS:

Moderate venous congestion.  Right hilar prominence.



PLEURA:

No significant pleural effusion identified, no pneumothorax apparent.



CARDIOVASCULAR:

Cardiomegaly.  Left-sided pacemaker. Valve replacement.



OSSEOUS STRUCTURES:

No significant abnormalities.



VISUALIZED UPPER ABDOMEN:

Normal.



OTHER FINDINGS:

None.



IMPRESSION:





Moderate venous congestion.  Right hilar prominence.

## 2017-04-08 NOTE — CP.PCM.PN
Subjective





- Date & Time of Evaluation


Date of Evaluation: 04/08/17


Time of Evaluation: 19:30





- Subjective


Subjective: 


patient seen and examined.


Complaining of cough and shortness of breath


chest x-ray consistent with  venous congestion








Objective





- Vital Signs/Intake and Output


Vital Signs (last 24 hours): 


 











Temp Pulse Resp BP Pulse Ox


 


 97.8 F   86   22   96/64 L  96 


 


 04/08/17 16:00  04/08/17 16:00  04/08/17 16:00  04/08/17 16:00  04/08/17 16:00








Intake and Output: 


 











 04/08/17 04/09/17





 18:59 06:59


 


Intake Total 740 


 


Output Total 2200 


 


Balance -1460 














- Medications


Medications: 


 Current Medications





Acetylcysteine (Acetylcysteine 20%)  4 ml INH RQ6 [facility]


   Last Admin: 04/08/17 19:30 Dose:  4 ml


Albuterol Sulfate (Albuterol 0.042% Inhal Sol (1.25mg/3ml) Ud)  1.25 mg INH RQ4 

[facility]


   Last Admin: 04/08/17 19:30 Dose:  1.25 mg


Benzocaine/Menthol (Cepacol Sore Throat)  1 keegan MT Q6H PRN


   PRN Reason: Sore Throat


   Last Admin: 04/08/17 09:39 Dose:  1 keegan


Budesonide (Pulmicort Respules)  0.5 mg INH RQ12 [facility]


   Last Admin: 04/07/17 07:34 Dose:  0.5 mg


Digoxin (Lanoxin)  0.25 mg PO DAILY@1800 [facility]


   Last Admin: 04/08/17 18:23 Dose:  0.25 mg


Famotidine (Pepcid)  20 mg PO DAILY [facility]


   Last Admin: 04/08/17 09:40 Dose:  20 mg


Guaifenesin (Mucinex La)  600 mg PO BID [facility]


   Last Admin: 04/08/17 18:22 Dose:  600 mg


Ciprofloxacin (Cipro 400mg/200ml Dsw)  200 mls @ 133.333 mls/hr IVPB Q8H [facility]


   Last Admin: 04/08/17 14:10 Dose:  133.333 mls/hr


Cefepime HCl (Maxipime Iv 2 Gm Premix)  100 mls @ 100 mls/hr IVPB Q12 [facility]


   Stop: 04/12/17 22:01


   Last Admin: 04/08/17 09:39 Dose:  100 mls/hr


Vancomycin/Sodium Chloride (Vancocin)  200 mls @ 100 mls/hr IVPB DAILY [facility]


   Last Admin: 04/08/17 11:42 Dose:  100 mls/hr


Ipratropium Bromide (Atrovent)  0.5 mg IH RQ4 [facility]


   Last Admin: 04/08/17 19:30 Dose:  0.5 mg


Lisinopril (Zestril)  2.5 mg PO DAILY [facility]


   Last Admin: 04/08/17 09:41 Dose:  2.5 mg


Prednisone (Prednisone Tab)  20 mg PO DAILY [facility]


   Last Admin: 04/08/17 09:40 Dose:  20 mg


Rosuvastatin Calcium (Crestor)  10 mg PO HS [facility]


   Last Admin: 04/07/17 22:15 Dose:  10 mg


Spironolactone (Aldactone)  25 mg PO DAILY [facility]


   Last Admin: 04/08/17 09:39 Dose:  25 mg


Tiotropium Bromide (Spiriva)  18 mcg INH RQ24 [facility]


   Last Admin: 04/08/17 08:00 Dose:  18 mcg











- Labs


Labs: 


 





 04/08/17 10:45 





 04/07/17 14:55 





 











PT  44.9 SECONDS (9.7-12.2)  H*  04/07/17  14:35    


 


INR  3.7   04/07/17  14:35    


 


APTT  37 SECONDS (21-34)  H  03/28/17  13:50    














- Head Exam


Head Exam: ATRAUMATIC, NORMOCEPHALIC





- Eye Exam


Eye Exam: Normal appearance





- ENT Exam


ENT Exam: Mucous Membranes Moist





- Neck Exam


Neck Exam: Normal Inspection





- Respiratory Exam


Respiratory Exam: Rales, Rhonchi





- Cardiovascular Exam


Cardiovascular Exam: REGULAR RHYTHM





- GI/Abdominal Exam


GI & Abdominal Exam: Soft, Normal Bowel Sounds





- Extremities Exam


Extremities Exam: Normal Inspection





Assessment and Plan


(1) COPD (chronic obstructive pulmonary disease)


Assessment & Plan: 


Antibiotics 


Continue bronchodilators and steroids


Status: Acute





(2) Acute exacerbation of CHF (congestive heart failure)


Status: Acute





(3) Chronic atrial fibrillation


Status: Chronic

## 2017-04-08 NOTE — CP.PCM.PN
<Cyrus Duval - Last Filed: 04/08/17 01:22>





Subjective





- Date & Time of Evaluation


Date of Evaluation: 04/08/17


Time of Evaluation: 01:22





- Subjective


Subjective: 


PGY-1 note for Dr Schulte service (hospitalist covering)





Pt seen and examined at bedside. Pt states that the breathing treatments help 

minimally. He continues to have cough and sob with any ambulation. Denies any 

fevers, chills, chest pain, nausea or vomiting. 





Objective





- Vital Signs/Intake and Output


Vital Signs (last 24 hours): 


 











Temp Pulse Resp BP Pulse Ox


 


 98.1 F   73   20   103/66   99 


 


 04/07/17 15:41  04/07/17 16:00  04/07/17 15:41  04/07/17 15:41  04/07/17 15:41








Intake and Output: 


 











 04/07/17 04/08/17





 18:59 06:59


 


Intake Total 420 250


 


Output Total  400


 


Balance 420 -150














- Medications


Medications: 


 Current Medications





Acetylcysteine (Acetylcysteine 20%)  4 ml INH RQ6 [facility]


   Last Admin: 04/07/17 19:32 Dose:  4 ml


Albuterol Sulfate (Albuterol 0.042% Inhal Sol (1.25mg/3ml) Ud)  1.25 mg INH RQ4 

[facility]


   Last Admin: 04/07/17 23:52 Dose:  Not Given


Benzocaine/Menthol (Cepacol Sore Throat)  1 keegan MT Q6H PRN


   PRN Reason: Sore Throat


   Last Admin: 04/01/17 01:23 Dose:  1 keegan


Budesonide (Pulmicort Respules)  0.5 mg INH RQ12 [facility]


   Last Admin: 04/07/17 07:34 Dose:  0.5 mg


Digoxin (Lanoxin)  0.25 mg PO DAILY@1800 [facility]


   Last Admin: 04/07/17 17:50 Dose:  0.25 mg


Famotidine (Pepcid)  20 mg PO DAILY [facility]


   Last Admin: 04/07/17 10:05 Dose:  20 mg


Furosemide (Lasix)  40 mg PO DAILY [facility]


   Last Admin: 04/07/17 10:05 Dose:  40 mg


Guaifenesin (Mucinex La)  600 mg PO BID [facility]


   Last Admin: 04/07/17 17:50 Dose:  600 mg


Ciprofloxacin (Cipro 400mg/200ml Dsw)  200 mls @ 133.333 mls/hr IVPB Q8H [facility]


   Last Admin: 04/07/17 22:16 Dose:  133.333 mls/hr


Cefepime HCl (Maxipime Iv 2 Gm Premix)  100 mls @ 100 mls/hr IVPB Q12 [facility]


   Stop: 04/12/17 22:01


   Last Admin: 04/07/17 22:15 Dose:  100 mls/hr


Vancomycin/Sodium Chloride (Vancocin)  200 mls @ 100 mls/hr IVPB DAILY [facility]


   Last Admin: 04/07/17 16:47 Dose:  100 mls/hr


Ipratropium Bromide (Atrovent)  0.5 mg IH RQ4 [facility]


   Last Admin: 04/07/17 19:32 Dose:  0.5 mg


Lisinopril (Zestril)  2.5 mg PO DAILY [facility]


   Last Admin: 04/07/17 10:06 Dose:  Not Given


Prednisone (Prednisone Tab)  20 mg PO DAILY [facility]


Rosuvastatin Calcium (Crestor)  10 mg PO HS [facility]


   Last Admin: 04/07/17 22:15 Dose:  10 mg


Spironolactone (Aldactone)  25 mg PO DAILY [facility]


   Last Admin: 04/07/17 10:06 Dose:  Not Given


Tiotropium Bromide (Spiriva)  18 mcg INH RQ24 [facility]


   Last Admin: 04/07/17 07:35 Dose:  18 mcg











- Labs


Labs: 


 





 04/07/17 14:55 





 04/07/17 14:55 





 











PT  44.9 SECONDS (9.7-12.2)  H*  04/07/17  14:35    


 


INR  3.7   04/07/17  14:35    


 


APTT  37 SECONDS (21-34)  H  03/28/17  13:50    














- Constitutional


Appears: Non-toxic, No Acute Distress





- Head Exam


Head Exam: ATRAUMATIC, NORMOCEPHALIC





- Eye Exam


Eye Exam: Normal appearance





- ENT Exam


ENT Exam: Mucous Membranes Moist





- Respiratory Exam


Respiratory Exam: Rhonchi, NORMAL BREATHING PATTERN





- Cardiovascular Exam


Cardiovascular Exam: +S1, +S2





- GI/Abdominal Exam


GI & Abdominal Exam: Soft, Normal Bowel Sounds





- Neurological Exam


Neurological Exam: Alert, Awake





- Skin


Skin Exam: Dry, Warm





Assessment and Plan





- Assessment and Plan (Free Text)


Assessment: 


- [facility] will accept pt, waiting on pt's wife to confirm address that pt will 

be discharged home to. 


- Pt not a safe discharge to home due to functional status





Numbness and tingling - left sided


- resolved


- CT head - normal head CT





Shortness of breath


- secondary to chronic COPD vs pneumonia 


- Cefepime, Vanc and Cipro started (4/7)


- ID consulted (Sari)  help appreciated


   - f/u recs


- Blood and sputum Cxs (4/7)  f/u


- CXR 4/5 - New opacity at right lung base suspicious for pneumonia. 

Questionable opacity at left base. Mild congestive change.


- CT Chest HR 3/30 - No definite CT evidence of interstitial lung disease. 

Cardiomegaly and mild-to-moderate pulmonary vascular congestion. Interval 

appearance of new round opacities/ nodules in the right upper lobe and left 

lower lobe since the previous study. The differential diagnosis includes 

multifocal pneumonia. The possibility of neoplasm is not totally excluded. 

Follow-up reassessment is recommended. Otherwise no significant interval change 

since the previous study dated 03/16/2017.





COPD (chronic obstructive pulmonary disease)


4/7: Duonebs every 4 hours for SOB


4/5: Added mucomyst to breathing treatments. Will get repeat CXR


3/30: CT chest with high resolution - see above


3/26: Prednisone 40mg PO daily; Taper on discharge. Will require home oxygen.


3/23: Stop Solumedrol IVP;  Start Prednisone 40mg PO daily; Taper on discharge. 

Will require home oxygen.


-3/22: Solumedrol reduced to 40mg IVP Q12H. Prior to discharge, change 

Solumedrol -> Prednisone


-3/13-3/22: Continue BIPAP, patient more compliant (sometimes refuses despite 

being SOB).


-3/10: RAPID called due to SOB. BP 80/50's. Bolused 500cc NS. Ordered BIPAP and 

ABG, however patient refused. Placed back on NC 3L.


-3/8-3/9: Patient complaining of SOB with exertion and orthopnea. Maintain Head 

of bed elevated 30 degrees.


-3/7:  PT session: 98% O2 at 2L at rest, 96% room air at rest sitting, 82% room 

air during ambulation, 88% at 2 liter ambulation.


-3/6: Walked 20 steps today down [address], and patient became dizzy, SOB, and 

almost collapsed.


- Consulted Pulmonology, Dr. Varela, f/u recs


   -planning for home O2


   - Aware of HR in 140's and SOB after eating and low levels of exertion.


   -LDH 654H


   -HIV - negative


   -persistent shortness of breath


   - Patient with long history of smoking most likely has underlying COPD.  

Nebulizer treatment.  Inhaled steroids.  Spiriva.  PFT as out pt








Systolic dysfunction with acute on chronic heart failure


- 4/5: dig level low, increase to 0.25


- EF 10-15%, mechanical MV- no regurg, tricuspid regurg. see full report


-Chest CT 3/16 - Cardiomegaly. Cardiac valve prosthesis. Left-sided AICD. Dense 

coronary artery calcifications. 


   -Small consolidation (favored to reflect atelectasis rather than pneumonia) 

at the left lung base. 


   -Bibasilar atelectasis. Small airways disease. Mild ground-glass and fine 

nodular opacities within the right upper lobe, possibly infectious or 

inflammatory.    


   -7 mm left upper lobe pulmonary nodule. Guidelines by the Fleischner society 

(radiology 2005; 237:390 5-400) suggests that in patients with low risk for 

lung cancer, with nodules less than or equal to 8 mm in diameter should have 

follow-up in approximately 6-12 months.  In patients with high-risk, including 

smokers, follow-up is recommended 3-6 months.  Patient with a known malignancy 

for risk for metastases should receive 3 month follow-up. 


   -Hepatic capsular calcification. 


   -Numerous low-density lesions throughout the liver, possibly cysts. 

Decompressed gallbladder limits evaluation. Bilateral hypodense renal lesions. 

Right adrenal gland hypertrophy. 12 mm left adrenal gland nodule measures 

approximately 9 Hounsfield units, likely adenoma.


-Consulted Cardiology, Dr. Dotson - f/u recs


   -3/16: Lasix 40mg IVP daily, Spironolactone 25mg daily. CXR - no interval 

pathology change. 


   -3/13: HOLD LASIX FOR 1 TO 2 DAYS AND CONT MUCINEX.  PT PRERENAL AND APPEARS 

DRY.  ON BOTH LASIX AND SPIRONOLACTONE.


   -SOB APPEARS TO BE SECONDARY TO PULM ETIOLOGY.  IMPROVES WITH NEBS.  PT WITH 

RHONCHI B/L.  MAY BENEFIT FROM PULM TOILET.


   -will attempt to obtain company of [facility] for interrogation.


   -likely deconditioned.  will benefit from rehab


-3/10: RAPID called due to SOB. BP 80/50's. Bolused 500cc NS. Ordered BIPAP and 

ABG, however patient refused. Placed back on NC 3L. ProBNP 4070H (less than # 

on admission) and SHEYLA negative. 


-3/10: Decrease to Lasix 40mg PO daily (was BID).


Admit to telemetry, BNP 4560 on admission


Lisinopril 10mg daily


Aldactone 25mg PO Daily


patient with AICD, will check echo to determine EF


CXR 2/25: mild cardiomegaly, mild pulmonary venous congestion.  s/p sternotomy, 

aortic valve replacement, AICD








H/O mitral valve replacement with mechanical valve


Monitor INR and adjust Coumadin as necessary


Cardiac valve replacement 6-7yrs ago


Consulted Cardiology, Dr. Dotson - f/u recs


   -echocardiogram reviewed.  valve leaflets are opening and functional.  There 

is no signficant gradient across the valve.  


   -recommend INR 2.5 to 3.5.


   STOP Lovenox 3/5


   STOP Heparin Drip - cardiac protocol, with bolus (because patient is 

refusing blood draws)








Chronic atrial fibrillation


Consulted Cardiology, Dr. Dotson - f/u recs


   -Rate controlled


   -echocardiogram reviewed.  valve leaflets are opening and functional.  There 

is no significant gradient across the valve.  


   -recommend INR 2.5 to 3.5.


   -See PT/INR trends above


   Coumadin as above


   - Patient's INR is difficult to maintain with the therapeutic range 

therefore we will give another 4 mg tonight and check INR tomorrow. Will try to 

      convince the patient to get bloodwork done today.


   STOP Lovenox 3/5








Cough, acute


- will add mucocyst


-Mucinex 600mg PO BID


-3/22: cough intermittent


-3/18 does not complain of cough


-3/13-3/16: Persists. Non-productive. Continue Mucinex.


-3/9: patient developed productive cough with yellow sputum today.








CAD (coronary artery disease) 


Consulted Cardiology, Dr. Dotson - f/u recs


   -Aware of HR in 140's and SOB after eating and low levels of exertion.


   -unknown graft status.  no evidence of ischemia at present


   -No current angina





History of MI (myocardial infarction)


Crestor 10mg PO HS resumed


HOLD Crestor 10mg PO HS (last dose 3/12- due to elevated LFTs)


ROMIs negative x3


EKG paced 


Denies chest pain 





Transaminitis, acute


Crestor resumed


3/25: AST/ALT normalized; consider resuming crestor.


3/24  patient refusing labs. will re-attempt


3/22-3/23: Improving. hold crestor (since 3/12)


3/21: AST 37 /  - continue to hold crestor.


3/19-3/20: Patient refused blood work


3/18: 51/164 AST / ALT down trending


3/16-3/17: AST / ALT down trending - continue to hold Crestor


3/15: AST 57 / , decreasing. HOLD Crestor 10mg PO HS (last dose 3/12- 

due to elevated LFTs)


3/14: AST 75 /   


3/13: AST 70 /  - increasing -> hold crestor for 2 nights 3/13, 3/14


3/10: AST 90 /  - increasing -> hold Crestor tonight.


- AST 77 / ALT 98 -> previously normal.


- Continue to monitor





Electrolyte Imbalance


3/24 patient refusing labs, will re-attempt


3/15-3/23: Hyponatremia, stable


Hyperkalemia - resolved


Hypokalemia, 








Tachycardia


HR grossly WNL, continue to monitor


3/14-3/17: HR WNL


3/7: Patient becomes SOB very easily, desaturating to 82% while ambulating. 

Anytime he eats or gets up, HR climbs into 140's


EKG in ER: sinus tachycardia at 100, paced, incomplete RBBB


Discontinued cardizem drip of 5mg/h started in the ER





Lower extremity edema


LE Duplex - negative. see full report.





Prophylactic measure


Coumadin as above.


protonix 40mg daily


SCD contraindicated due to LE edema


D/C when bed available








<Sulaiman Peter H - Last Filed: 04/08/17 10:41>





Objective





- Vital Signs/Intake and Output


Vital Signs (last 24 hours): 


 











Temp Pulse Resp BP Pulse Ox


 


 97.7 F   83   18   110/63   100 


 


 04/08/17 07:15  04/08/17 09:46  04/08/17 07:15  04/08/17 09:46  04/08/17 07:15








Intake and Output: 


 











 04/08/17 04/08/17





 06:59 18:59


 


Intake Total 250 


 


Output Total 975 


 


Balance -725 














- Medications


Medications: 


 Current Medications





Acetylcysteine (Acetylcysteine 20%)  4 ml INH RQ6 [facility]


   Last Admin: 04/08/17 07:15 Dose:  4 ml


Albuterol Sulfate (Albuterol 0.042% Inhal Sol (1.25mg/3ml) Ud)  1.25 mg INH RQ4 

[facility]


   Last Admin: 04/08/17 07:15 Dose:  1.25 mg


Benzocaine/Menthol (Cepacol Sore Throat)  1 keegan MT Q6H PRN


   PRN Reason: Sore Throat


   Last Admin: 04/08/17 09:39 Dose:  1 keegan


Budesonide (Pulmicort Respules)  0.5 mg INH RQ12 [facility]


   Last Admin: 04/07/17 07:34 Dose:  0.5 mg


Digoxin (Lanoxin)  0.25 mg PO DAILY@1800 [facility]


   Last Admin: 04/07/17 17:50 Dose:  0.25 mg


Famotidine (Pepcid)  20 mg PO DAILY [facility]


   Last Admin: 04/08/17 09:40 Dose:  20 mg


Furosemide (Lasix)  40 mg PO DAILY [facility]


   Last Admin: 04/07/17 10:05 Dose:  40 mg


Guaifenesin (Mucinex La)  600 mg PO BID [facility]


   Last Admin: 04/08/17 09:40 Dose:  600 mg


Ciprofloxacin (Cipro 400mg/200ml Dsw)  200 mls @ 133.333 mls/hr IVPB Q8H [facility]


   Last Admin: 04/08/17 06:54 Dose:  133.333 mls/hr


Cefepime HCl (Maxipime Iv 2 Gm Premix)  100 mls @ 100 mls/hr IVPB Q12 [facility]


   Stop: 04/12/17 22:01


   Last Admin: 04/08/17 09:39 Dose:  100 mls/hr


Vancomycin/Sodium Chloride (Vancocin)  200 mls @ 100 mls/hr IVPB DAILY [facility]


   Last Admin: 04/07/17 16:47 Dose:  100 mls/hr


Ipratropium Bromide (Atrovent)  0.5 mg IH RQ4 [facility]


   Last Admin: 04/08/17 07:15 Dose:  0.5 mg


Lisinopril (Zestril)  2.5 mg PO DAILY [facility]


   Last Admin: 04/08/17 09:41 Dose:  2.5 mg


Prednisone (Prednisone Tab)  20 mg PO DAILY [facility]


   Last Admin: 04/08/17 09:40 Dose:  20 mg


Rosuvastatin Calcium (Crestor)  10 mg PO HS [facility]


   Last Admin: 04/07/17 22:15 Dose:  10 mg


Spironolactone (Aldactone)  25 mg PO DAILY [facility]


   Last Admin: 04/08/17 09:39 Dose:  25 mg


Tiotropium Bromide (Spiriva)  18 mcg INH RQ24 [facility]


   Last Admin: 04/08/17 08:00 Dose:  18 mcg











- Labs


Labs: 


 





 04/07/17 14:55 





 04/07/17 14:55 





 











PT  44.9 SECONDS (9.7-12.2)  H*  04/07/17  14:35    


 


INR  3.7   04/07/17  14:35    


 


APTT  37 SECONDS (21-34)  H  03/28/17  13:50    














Attending/Attestation





- Attestation


I have personally seen and examined this patient.: Yes


I have fully participated in the care of the patient.: Yes


I have reviewed all pertinent clinical information, including history, physical 

exam and plan: Yes


Notes (Text): 


Medical attending: Patient was seen and examined by me. Agree with the above 

note by the resident. 





This is a patient who has been at [facility] for quite some time now. A 

recent chest XRAY shows he does have suspicious new findings for pneumonia and 

he currently is started on three abx. Cultures taken, will repeat the CXRAY, he 

may benefit from some lasix as well in case the new CXRAY suggest fluid / 

congestion as he does have a history of CHF and a low EF as well as history of 

valvular disease

## 2017-04-09 NOTE — CP.PCM.PN
Subjective





- Date & Time of Evaluation


Date of Evaluation: 04/09/17


Time of Evaluation: 01:00





- Subjective


Subjective: 


PGY-1 note for Dr Franca desouza (hospitalist covering)





Pt seen and examined at bedside. Pt states that the breathing treatments help 

minimally. He states he is getting them only once a day but he is actually 

getting them more often. He continues to have cough and sob with any 

ambulation. He also is refusing blood work. Denies any fevers, chills, chest 

pain, nausea or vomiting. 








Objective





- Vital Signs/Intake and Output


Vital Signs (last 24 hours): 


 











Temp Pulse Resp BP Pulse Ox


 


 97.8 F   86   22   96/64 L  96 


 


 04/08/17 16:00  04/08/17 16:00  04/08/17 16:00  04/08/17 16:00  04/08/17 16:00








Intake and Output: 


 











 04/08/17 04/09/17





 18:59 06:59


 


Intake Total 740 250


 


Output Total 2200 500


 


Balance -1460 -250














- Medications


Medications: 


 Current Medications





Acetylcysteine (Acetylcysteine 20%)  4 ml INH RQ6 [facility]


   Last Admin: 04/08/17 19:30 Dose:  4 ml


Albuterol Sulfate (Albuterol 0.042% Inhal Sol (1.25mg/3ml) Ud)  1.25 mg INH RQ4 

[facility]


   Last Admin: 04/08/17 19:30 Dose:  1.25 mg


Benzocaine/Menthol (Cepacol Sore Throat)  1 keegan MT Q6H PRN


   PRN Reason: Sore Throat


   Last Admin: 04/08/17 09:39 Dose:  1 keegan


Budesonide (Pulmicort Respules)  0.5 mg INH RQ12 [facility]


   Last Admin: 04/07/17 07:34 Dose:  0.5 mg


Digoxin (Lanoxin)  0.25 mg PO DAILY@1800 [facility]


   Last Admin: 04/08/17 18:23 Dose:  0.25 mg


Famotidine (Pepcid)  20 mg PO DAILY [facility]


   Last Admin: 04/08/17 09:40 Dose:  20 mg


Guaifenesin (Mucinex La)  600 mg PO BID [facility]


   Last Admin: 04/08/17 18:22 Dose:  600 mg


Ciprofloxacin (Cipro 400mg/200ml Dsw)  200 mls @ 133.333 mls/hr IVPB Q8H [facility]


   Last Admin: 04/08/17 22:23 Dose:  133.333 mls/hr


Cefepime HCl (Maxipime Iv 2 Gm Premix)  100 mls @ 100 mls/hr IVPB Q12 [facility]


   Stop: 04/12/17 22:01


   Last Admin: 04/08/17 22:00 Dose:  100 mls/hr


Vancomycin/Sodium Chloride (Vancocin)  200 mls @ 100 mls/hr IVPB DAILY [facility]


   Last Admin: 04/08/17 11:42 Dose:  100 mls/hr


Ipratropium Bromide (Atrovent)  0.5 mg IH RQ4 [facility]


   Last Admin: 04/08/17 19:30 Dose:  0.5 mg


Lisinopril (Zestril)  2.5 mg PO DAILY [facility]


   Last Admin: 04/08/17 09:41 Dose:  2.5 mg


Prednisone (Prednisone Tab)  20 mg PO DAILY [facility]


   Last Admin: 04/08/17 09:40 Dose:  20 mg


Rosuvastatin Calcium (Crestor)  10 mg PO HS [facility]


   Last Admin: 04/08/17 22:23 Dose:  10 mg


Spironolactone (Aldactone)  25 mg PO DAILY [facility]


   Last Admin: 04/08/17 09:39 Dose:  25 mg


Tiotropium Bromide (Spiriva)  18 mcg INH RQ24 [facility]


   Last Admin: 04/08/17 08:00 Dose:  18 mcg











- Labs


Labs: 


 





 04/08/17 10:45 





 04/07/17 14:55 





 











PT  44.9 SECONDS (9.7-12.2)  H*  04/07/17  14:35    


 


INR  3.7   04/07/17  14:35    


 


APTT  37 SECONDS (21-34)  H  03/28/17  13:50    














- Constitutional


Appears: Non-toxic, No Acute Distress, Chronically Ill





- Head Exam


Head Exam: ATRAUMATIC, NORMAL INSPECTION





- Eye Exam


Eye Exam: EOMI, Normal appearance, PERRL


Pupil Exam: NORMAL ACCOMODATION





- ENT Exam


ENT Exam: Mucous Membranes Moist





- Respiratory Exam


Respiratory Exam: Rales, Wheezes.  absent: Accessory Muscle Use, Chest Wall 

Tenderness, Respiratory Distress, NORMAL BREATHING PATTERN





- Cardiovascular Exam


Cardiovascular Exam: REGULAR RHYTHM, +S1, +S2





- GI/Abdominal Exam


GI & Abdominal Exam: Soft, Normal Bowel Sounds.  absent: Distended, Firm, 

Guarding, Tenderness





- Extremities Exam


Extremities Exam: Normal Inspection, Pedal Edema.  absent: Calf Tenderness





- Back Exam


Back Exam: NORMAL INSPECTION.  absent: CVA tenderness (L), CVA tenderness (R), 

paraspinal tenderness





- Neurological Exam


Neurological Exam: Alert, Awake, Oriented x3





- Psychiatric Exam


Psychiatric exam: Normal Affect, Normal Mood





- Skin


Skin Exam: Intact, Normal Color, Warm





Assessment and Plan





- Assessment and Plan (Free Text)


Assessment: 


Assessment: 


- [facility] will accept pt, waiting on pt's wife to confirm address that pt will 

be discharged home to. 


- Pt not a safe discharge to home due to functional status





Numbness and tingling - left sided


- resolved


- CT head - normal head CT





Shortness of breath


- secondary to chronic COPD vs pneumonia (hospital acquired)


- Cefepime, Vanc and Cipro started (4/7)


   - Have not been able to get vanc trough because patient refusing blood work


- ID consulted (Sari)  help appreciated


   - f/u recs


- Blood and sputum Cxs (4/7)  f/u


- CXR 4/5 - New opacity at right lung base suspicious for pneumonia. 

Questionable opacity at left base. Mild congestive change.


- CT Chest HR 3/30 - No definite CT evidence of interstitial lung disease. 

Cardiomegaly and mild-to-moderate pulmonary vascular congestion. Interval 

appearance of new round opacities/ nodules in the right upper lobe and left 

lower lobe since the previous study. The differential diagnosis includes 

multifocal pneumonia. The possibility of neoplasm is not totally excluded. 

Follow-up reassessment is recommended. Otherwise no significant interval change 

since the previous study dated 03/16/2017.





COPD (chronic obstructive pulmonary disease)


4/7: Duonebs every 4 hours for SOB


4/5: Added mucomyst to breathing treatments. Will get repeat CXR


3/30: CT chest with high resolution - see above


3/26: Prednisone 40mg PO daily; Taper on discharge. Will require home oxygen.


3/23: Stop Solumedrol IVP;  Start Prednisone 40mg PO daily; Taper on discharge. 

Will require home oxygen.


-3/22: Solumedrol reduced to 40mg IVP Q12H. Prior to discharge, change 

Solumedrol -> Prednisone


-3/13-3/22: Continue BIPAP, patient more compliant (sometimes refuses despite 

being SOB).


-3/10: RAPID called due to SOB. BP 80/50's. Bolused 500cc NS. Ordered BIPAP and 

ABG, however patient refused. Placed back on NC 3L.


-3/8-3/9: Patient complaining of SOB with exertion and orthopnea. Maintain Head 

of bed elevated 30 degrees.


-3/7:  PT session: 98% O2 at 2L at rest, 96% room air at rest sitting, 82% room 

air during ambulation, 88% at 2 liter ambulation.


-3/6: Walked 20 steps today down [address], and patient became dizzy, SOB, and 

almost collapsed.


- Consulted Pulmonology, Dr. Varela, f/u recs


   -planning for home O2


   - Aware of HR in 140's and SOB after eating and low levels of exertion.


   -LDH 654H


   -HIV - negative


   -persistent shortness of breath


   - Patient with long history of smoking most likely has underlying COPD.  

Nebulizer treatment.  Inhaled steroids.  Spiriva.  PFT as out pt








Systolic dysfunction with acute on chronic heart failure


- 4/5: dig level low, increase to 0.25


- EF 10-15%, mechanical MV- no regurg, tricuspid regurg. see full report


-Chest CT 3/16 - Cardiomegaly. Cardiac valve prosthesis. Left-sided AICD. Dense 

coronary artery calcifications. 


   -Small consolidation (favored to reflect atelectasis rather than pneumonia) 

at the left lung base. 


   -Bibasilar atelectasis. Small airways disease. Mild ground-glass and fine 

nodular opacities within the right upper lobe, possibly infectious or 

inflammatory.    


   -7 mm left upper lobe pulmonary nodule. Guidelines by the Fleischner society 

(radiology 2005; 237:390 5-400) suggests that in patients with low risk for 

lung cancer, with nodules less than or equal to 8 mm in diameter should have 

follow-up in approximately 6-12 months.  In patients with high-risk, including 

smokers, follow-up is recommended 3-6 months.  Patient with a known malignancy 

for risk for metastases should receive 3 month follow-up. 


   -Hepatic capsular calcification. 


   -Numerous low-density lesions throughout the liver, possibly cysts. 

Decompressed gallbladder limits evaluation. Bilateral hypodense renal lesions. 

Right adrenal gland hypertrophy. 12 mm left adrenal gland nodule measures 

approximately 9 Hounsfield units, likely adenoma.


-Consulted Cardiology, Dr. Dotson - f/u recs


   -3/16: Lasix 40mg IVP daily, Spironolactone 25mg daily. CXR - no interval 

pathology change. 


   -3/13: HOLD LASIX FOR 1 TO 2 DAYS AND CONT MUCINEX.  PT PRERENAL AND APPEARS 

DRY.  ON BOTH LASIX AND SPIRONOLACTONE.


   -SOB APPEARS TO BE SECONDARY TO PULM ETIOLOGY.  IMPROVES WITH NEBS.  PT WITH 

RHONCHI B/L.  MAY BENEFIT FROM PULM TOILET.


   -will attempt to obtain company of [facility] for interrogation.


   -likely deconditioned.  will benefit from rehab


-3/10: RAPID called due to SOB. BP 80/50's. Bolused 500cc NS. Ordered BIPAP and 

ABG, however patient refused. Placed back on NC 3L. ProBNP 4070H (less than # 

on admission) and SHEYLA negative. 


-3/10: Decrease to Lasix 40mg PO daily (was BID).


Admit to telemetry, BNP 4560 on admission


Lisinopril 10mg daily


Aldactone 25mg PO Daily


patient with AICD, will check echo to determine EF


CXR 2/25: mild cardiomegaly, mild pulmonary venous congestion.  s/p sternotomy, 

aortic valve replacement, AICD








H/O mitral valve replacement with mechanical valve


Monitor INR and adjust Coumadin as necessary


Cardiac valve replacement 6-7yrs ago


Consulted Cardiology, Dr. Dotson - f/u recs


   -echocardiogram reviewed.  valve leaflets are opening and functional.  There 

is no signficant gradient across the valve.  


   -recommend INR 2.5 to 3.5.


   STOP Lovenox 3/5


   STOP Heparin Drip - cardiac protocol, with bolus (because patient is 

refusing blood draws)








Chronic atrial fibrillation


Consulted Cardiology, Dr. Dotson - f/u recs


   -Rate controlled


   -echocardiogram reviewed.  valve leaflets are opening and functional.  There 

is no significant gradient across the valve.  


   -recommend INR 2.5 to 3.5.


   -See PT/INR trends above


   Coumadin as above


   - Patient's INR is difficult to maintain with the therapeutic range 

therefore we will give another 3 mg tonight and check INR tomorrow. Will try to 

      convince the patient to get bloodwork done today.


   STOP Lovenox 3/5








Cough, acute


- will add mucocyst


-Mucinex 600mg PO BID


-3/22: cough intermittent


-3/18 does not complain of cough


-3/13-3/16: Persists. Non-productive. Continue Mucinex.


-3/9: patient developed productive cough with yellow sputum today.








CAD (coronary artery disease) 


Consulted Cardiology, Dr. Dotson - f/u recs


   -Aware of HR in 140's and SOB after eating and low levels of exertion.


   -unknown graft status.  no evidence of ischemia at present


   -No current angina





History of MI (myocardial infarction)


Crestor 10mg PO HS resumed


HOLD Crestor 10mg PO HS (last dose 3/12- due to elevated LFTs)


ROMIs negative x3


EKG paced 


Denies chest pain 





Transaminitis, acute


Crestor resumed


3/25: AST/ALT normalized; consider resuming crestor.


3/24  patient refusing labs. will re-attempt


3/22-3/23: Improving. hold crestor (since 3/12)


3/21: AST 37 /  - continue to hold crestor.


3/19-3/20: Patient refused blood work


3/18: 51/164 AST / ALT down trending


3/16-3/17: AST / ALT down trending - continue to hold Crestor


3/15: AST 57 / , decreasing. HOLD Crestor 10mg PO HS (last dose 3/12- 

due to elevated LFTs)


3/14: AST 75 /   


3/13: AST 70 /  - increasing -> hold crestor for 2 nights 3/13, 3/14


3/10: AST 90 /  - increasing -> hold Crestor tonight.


- AST 77 / ALT 98 -> previously normal.


- Continue to monitor





Electrolyte Imbalance


3/24 patient refusing labs, will re-attempt


3/15-3/23: Hyponatremia, stable


Hyperkalemia - resolved


Hypokalemia, 








Tachycardia


HR grossly WNL, continue to monitor


3/14-3/17: HR WNL


3/7: Patient becomes SOB very easily, desaturating to 82% while ambulating. 

Anytime he eats or gets up, HR climbs into 140's


EKG in ER: sinus tachycardia at 100, paced, incomplete RBBB


Discontinued cardizem drip of 5mg/h started in the ER





Lower extremity edema


LE Duplex - negative. see full report.





Prophylactic measure


Coumadin as above.


protonix 40mg daily


SCD contraindicated due to LE edema


D/C when bed available

## 2017-04-10 NOTE — CP.PCM.PN
<Justin Avila - Last Filed: 04/10/17 17:05>





Subjective





- Date & Time of Evaluation


Date of Evaluation: 04/10/17


Time of Evaluation: 10:14





- Subjective


Subjective: 


Pt seen and examined. Pt reports that he has difficulty breathing and is 

coughing. He reports pain in his chest when he coughs. Pt denies fever, chills, 

resting chest pain, nausea, vomiting, diarrhea. 





Objective





- Vital Signs/Intake and Output


Vital Signs (last 24 hours): 


 











Temp Pulse Resp BP Pulse Ox


 


 98.7 F   80   18   98/68 L  99 


 


 04/10/17 07:35  04/10/17 07:35  04/10/17 07:35  04/10/17 07:35  04/10/17 07:35








Intake and Output: 


 











 04/10/17 04/10/17





 06:59 18:59


 


Intake Total 690 350


 


Output Total 1800 


 


Balance -1110 350














- Medications


Medications: 


 Current Medications





Acetylcysteine (Acetylcysteine 20%)  4 ml INH RQ6 [facility]


   Last Admin: 04/10/17 08:40 Dose:  4 ml


Albuterol Sulfate (Albuterol 0.042% Inhal Sol (1.25mg/3ml) Ud)  1.25 mg INH RQ4 

[facility]


   Last Admin: 04/10/17 16:02 Dose:  1.25 mg


Benzocaine/Menthol (Cepacol Sore Throat)  1 keegan MT Q6H PRN


   PRN Reason: Sore Throat


   Last Admin: 04/09/17 09:32 Dose:  1 keegan


Budesonide (Pulmicort Respules)  0.5 mg INH RQ12 [facility]


   Last Admin: 04/07/17 07:34 Dose:  0.5 mg


Digoxin (Lanoxin)  0.25 mg PO DAILY@1800 [facility]


   Last Admin: 04/09/17 17:50 Dose:  0.25 mg


Famotidine (Pepcid)  20 mg PO DAILY [facility]


   Last Admin: 04/10/17 10:46 Dose:  20 mg


Guaifenesin (Mucinex La)  600 mg PO BID [facility]


   Last Admin: 04/10/17 10:46 Dose:  600 mg


Cefepime HCl (Maxipime Iv 2 Gm Premix)  100 mls @ 100 mls/hr IVPB Q12 [facility]


   Stop: 04/12/17 22:01


   Last Admin: 04/09/17 22:13 Dose:  100 mls/hr


Vancomycin/Sodium Chloride (Vancocin)  200 mls @ 100 mls/hr IVPB DAILY [facility]


   Last Admin: 04/09/17 10:56 Dose:  100 mls/hr


Ipratropium Bromide (Atrovent)  0.5 mg IH RQ4 [facility]


   Last Admin: 04/10/17 16:02 Dose:  0.5 mg


Lisinopril (Zestril)  2.5 mg PO DAILY [facility]


   Last Admin: 04/10/17 10:46 Dose:  2.5 mg


Prednisone (Prednisone Tab)  20 mg PO DAILY [facility]


   Last Admin: 04/10/17 10:46 Dose:  20 mg


Rosuvastatin Calcium (Crestor)  10 mg PO HS [facility]


   Last Admin: 04/09/17 22:13 Dose:  10 mg


Spironolactone (Aldactone)  25 mg PO DAILY [facility]


   Last Admin: 04/10/17 10:46 Dose:  25 mg


Tiotropium Bromide (Spiriva)  18 mcg INH RQ24 [facility]


   Last Admin: 04/10/17 08:42 Dose:  Not Given











- Labs


Labs: 


 





 04/10/17 13:23 





 04/10/17 13:23 





 











PT  36.3 SECONDS (9.7-12.2)  H* D 04/10/17  13:23    


 


INR  3.0   04/10/17  13:23    


 


APTT  41 SECONDS (21-34)  H  04/10/17  13:23    














- Constitutional


Appears: No Acute Distress, Chronically Ill





- Head Exam


Head Exam: ATRAUMATIC, NORMOCEPHALIC





- Eye Exam


Eye Exam: EOMI, PERRL





- ENT Exam


ENT Exam: Mucous Membranes Moist.  absent: Mucous Membranes Dry





- Neck Exam


Neck Exam: Full ROM.  absent: Lymphadenopathy





- Respiratory Exam


Respiratory Exam: Rhonchi





- Cardiovascular Exam


Cardiovascular Exam: Gallop, +S1, +S2





- GI/Abdominal Exam


GI & Abdominal Exam: Soft.  absent: Tenderness





- Extremities Exam


Extremities Exam: Full ROM.  absent: Pedal Edema





- Neurological Exam


Neurological Exam: Alert, Awake, Oriented x3





- Psychiatric Exam


Psychiatric exam: Normal Affect, Normal Mood





- Skin


Skin Exam: Normal Color, Warm





Assessment and Plan





- Assessment and Plan (Free Text)


Assessment: 


-Pt pending discharge to [facility] once wife confirms pt's address and 

permanent residence. Pt not clear for discharge until wife is contacted. [facility] 

has been unable to contact wife thus far. Pt is medically stable for discharge 

to [facility]. 








Numbness and tingling - left sided


- resolved


- CT head - normal head CT





Shortness of breath


- secondary to chronic COPD vs pneumonia (hospital acquired)


- Cefepime, Vanc and Cipro started (4/7)


   - Have not been able to get vanc trough because patient refusing blood work


- ID consulted (Sari)  help appreciated


   - f/u recs


- Blood and sputum Cxs (4/7)  f/u


- CXR 4/5 - New opacity at right lung base suspicious for pneumonia. 

Questionable opacity at left base. Mild congestive change.


- CT Chest HR 3/30 - No definite CT evidence of interstitial lung disease. 

Cardiomegaly and mild-to-moderate pulmonary vascular congestion. Interval 

appearance of new round opacities/ nodules in the right upper lobe and left 

lower lobe since the previous study. The differential diagnosis includes 

multifocal pneumonia. The possibility of neoplasm is not totally excluded. 

Follow-up reassessment is recommended. Otherwise no significant interval change 

since the previous study dated 03/16/2017.





COPD (chronic obstructive pulmonary disease)


4/7: Duonebs every 4 hours for SOB


4/5: Added mucomyst to breathing treatments. Will get repeat CXR


3/30: CT chest with high resolution - see above


3/26: Prednisone 40mg PO daily; Taper on discharge. Will require home oxygen.


3/23: Stop Solumedrol IVP;  Start Prednisone 40mg PO daily; Taper on discharge. 

Will require home oxygen.


-3/22: Solumedrol reduced to 40mg IVP Q12H. Prior to discharge, change 

Solumedrol -> Prednisone


-3/13-3/22: Continue BIPAP, patient more compliant (sometimes refuses despite 

being SOB).


-3/10: RAPID called due to SOB. BP 80/50's. Bolused 500cc NS. Ordered BIPAP and 

ABG, however patient refused. Placed back on NC 3L.


-3/8-3/9: Patient complaining of SOB with exertion and orthopnea. Maintain Head 

of bed elevated 30 degrees.


-3/7:  PT session: 98% O2 at 2L at rest, 96% room air at rest sitting, 82% room 

air during ambulation, 88% at 2 liter ambulation.


-3/6: Walked 20 steps today down [address], and patient became dizzy, SOB, and 

almost collapsed.


- Consulted Pulmonology, Dr. Varela, f/u recs


   -planning for home O2


   - Aware of HR in 140's and SOB after eating and low levels of exertion.


   -LDH 654H


   -HIV - negative


   -persistent shortness of breath


   - Patient with long history of smoking most likely has underlying COPD.  

Nebulizer treatment.  Inhaled steroids.  Spiriva.  PFT as out pt








Systolic dysfunction with acute on chronic heart failure


- 4/5: dig level low, increase to 0.25


- EF 10-15%, mechanical MV- no regurg, tricuspid regurg. see full report


-Chest CT 3/16 - Cardiomegaly. Cardiac valve prosthesis. Left-sided AICD. Dense 

coronary artery calcifications. 


   -Small consolidation (favored to reflect atelectasis rather than pneumonia) 

at the left lung base. 


   -Bibasilar atelectasis. Small airways disease. Mild ground-glass and fine 

nodular opacities within the right upper lobe, possibly infectious or 

inflammatory.    


   -7 mm left upper lobe pulmonary nodule. Guidelines by the Fleischner society 

(radiology 2005; 237:390 5-400) suggests that in patients with low risk for 

lung cancer, with nodules less than or equal to 8 mm in diameter should have 

follow-up in approximately 6-12 months.  In patients with high-risk, including 

smokers, follow-up is recommended 3-6 months.  Patient with a known malignancy 

for risk for metastases should receive 3 month follow-up. 


   -Hepatic capsular calcification. 


   -Numerous low-density lesions throughout the liver, possibly cysts. 

Decompressed gallbladder limits evaluation. Bilateral hypodense renal lesions. 

Right adrenal gland hypertrophy. 12 mm left adrenal gland nodule measures 

approximately 9 Hounsfield units, likely adenoma.


-Consulted Cardiology, Dr. Dotson - f/u recs


   -3/16: Lasix 40mg IVP daily, Spironolactone 25mg daily. CXR - no interval 

pathology change. 


   -3/13: HOLD LASIX FOR 1 TO 2 DAYS AND CONT MUCINEX.  PT PRERENAL AND APPEARS 

DRY.  ON BOTH LASIX AND SPIRONOLACTONE.


   -SOB APPEARS TO BE SECONDARY TO PULM ETIOLOGY.  IMPROVES WITH NEBS.  PT WITH 

RHONCHI B/L.  MAY BENEFIT FROM PULM TOILET.


   -will attempt to obtain company of AICD for interrogation.


   -likely deconditioned.  will benefit from rehab


-3/10: RAPID called due to SOB. BP 80/50's. Bolused 500cc NS. Ordered BIPAP and 

ABG, however patient refused. Placed back on NC 3L. ProBNP 4070H (less than # 

on admission) and SHEYLA negative. 


-3/10: Decrease to Lasix 40mg PO daily (was BID).


Admit to telemetry, BNP 4560 on admission


Lisinopril 10mg daily


Aldactone 25mg PO Daily


patient with AICD, will check echo to determine EF


CXR 2/25: mild cardiomegaly, mild pulmonary venous congestion.  s/p sternotomy, 

aortic valve replacement, AICD








H/O mitral valve replacement with mechanical valve


Monitor INR and adjust Coumadin as necessary


Cardiac valve replacement 6-7yrs ago


Consulted Cardiology, Dr. Dotson - f/u recs


   -echocardiogram reviewed.  valve leaflets are opening and functional.  There 

is no signficant gradient across the valve.  


   -recommend INR 2.5 to 3.5.


   STOP Lovenox 3/5


   STOP Heparin Drip - cardiac protocol, with bolus (because patient is 

refusing blood draws)








Chronic atrial fibrillation


Consulted Cardiology, Dr. Dotson - f/u recs


   -Rate controlled


   -echocardiogram reviewed.  valve leaflets are opening and functional.  There 

is no significant gradient across the valve.  


   -recommend INR 2.5 to 3.5.


   -See PT/INR trends above


   Coumadin as above


   - Patient's INR is difficult to maintain with the therapeutic range 

therefore we will give another 3 mg tonight and check INR tomorrow. Will try to 

      convince the patient to get bloodwork done today.


   STOP Lovenox 3/5








Cough, acute


- will add mucocyst


-Mucinex 600mg PO BID


-3/22: cough intermittent


-3/18 does not complain of cough


-3/13-3/16: Persists. Non-productive. Continue Mucinex.


-3/9: patient developed productive cough with yellow sputum today.








CAD (coronary artery disease) 


Consulted Cardiology, Dr. Dotson - f/u recs


   -Aware of HR in 140's and SOB after eating and low levels of exertion.


   -unknown graft status.  no evidence of ischemia at present


   -No current angina





History of MI (myocardial infarction)


Crestor 10mg PO HS resumed


HOLD Crestor 10mg PO HS (last dose 3/12- due to elevated LFTs)


ROMIs negative x3


EKG paced 


Denies chest pain 





Transaminitis, acute


Crestor resumed


3/25: AST/ALT normalized; consider resuming crestor.


3/24  patient refusing labs. will re-attempt


3/22-3/23: Improving. hold crestor (since 3/12)


3/21: AST 37 /  - continue to hold crestor.


3/19-3/20: Patient refused blood work


3/18: 51/164 AST / ALT down trending


3/16-3/17: AST / ALT down trending - continue to hold Crestor


3/15: AST 57 / , decreasing. HOLD Crestor 10mg PO HS (last dose 3/12- 

due to elevated LFTs)


3/14: AST 75 /   


3/13: AST 70 /  - increasing -> hold crestor for 2 nights 3/13, 3/14


3/10: AST 90 /  - increasing -> hold Crestor tonight.


- AST 77 / ALT 98 -> previously normal.


- Continue to monitor





Electrolyte Imbalance


3/24 patient refusing labs, will re-attempt


3/15-3/23: Hyponatremia, stable


Hyperkalemia - resolved


Hypokalemia, 








Tachycardia


HR grossly WNL, continue to monitor


3/14-3/17: HR WNL


3/7: Patient becomes SOB very easily, desaturating to 82% while ambulating. 

Anytime he eats or gets up, HR climbs into 140's


EKG in ER: sinus tachycardia at 100, paced, incomplete RBBB


Discontinued cardizem drip of 5mg/h started in the ER





Lower extremity edema


LE Duplex - negative. see full report.





Prophylactic measure


Coumadin as above.


protonix 40mg daily


SCD contraindicated due to LE edema


D/C when bed available











<Evgeny Elizabeth Jr. - Last Filed: 04/14/17 16:43>





Objective





- Vital Signs/Intake and Output


Vital Signs (last 24 hours): 


 











Temp Pulse Resp BP Pulse Ox


 


 97.5 F L  74   20   99/61 L  99 


 


 04/14/17 15:41  04/14/17 15:41  04/14/17 15:41  04/14/17 15:41  04/14/17 15:41








Intake and Output: 


 











 04/14/17 04/14/17





 06:59 18:59


 


Intake Total 760 


 


Output Total 1100 


 


Balance -340 














- Medications


Medications: 


 Current Medications





Acetylcysteine (Acetylcysteine 20%)  4 ml INH RQ4 [facility]


   Last Admin: 04/14/17 16:17 Dose:  4 ml


Albuterol Sulfate (Albuterol 0.042% Inhal Sol (1.25mg/3ml) Ud)  1.25 mg INH RQ4 

[facility]


   Last Admin: 04/14/17 16:17 Dose:  1.25 mg


Benzocaine/Menthol (Cepacol Sore Throat)  1 keegan MT Q6H PRN


   PRN Reason: Sore Throat


   Last Admin: 04/09/17 09:32 Dose:  1 keegan


Budesonide (Pulmicort Respules)  0.5 mg INH RQ12 [facility]


   Last Admin: 04/07/17 07:34 Dose:  0.5 mg


Cefpodoxime Proxetil (Vantin)  200 mg PO Q12H [facility]


   Last Admin: 04/14/17 14:44 Dose:  200 mg


Digoxin (Lanoxin)  0.25 mg PO DAILY@1800 [facility]


   Last Admin: 04/13/17 17:32 Dose:  0.25 mg


Famotidine (Pepcid)  20 mg PO DAILY [facility]


   Last Admin: 04/14/17 11:19 Dose:  20 mg


Guaifenesin (Mucinex La)  600 mg PO BID [facility]


   Last Admin: 04/14/17 11:19 Dose:  600 mg


Ipratropium Bromide (Atrovent)  0.5 mg IH RQ4 [facility]


   Last Admin: 04/14/17 16:17 Dose:  0.5 mg


Lisinopril (Zestril)  2.5 mg PO DAILY [facility]


   Last Admin: 04/14/17 11:19 Dose:  2.5 mg


Prednisone (Prednisone Tab)  20 mg PO DAILY [facility]


   Last Admin: 04/14/17 11:19 Dose:  20 mg


Spironolactone (Aldactone)  25 mg PO DAILY [facility]


   Last Admin: 04/14/17 11:19 Dose:  25 mg











- Labs


Labs: 


 





 04/14/17 11:24 





 04/14/17 11:24 





 











PT  19.9 SECONDS (9.7-12.2)  H  04/14/17  11:24    


 


INR  1.7   04/14/17  11:24    


 


APTT  41 SECONDS (21-34)  H  04/10/17  13:23    














Attending/Attestation





- Attestation


I have personally seen and examined this patient.: Yes


I have fully participated in the care of the patient.: Yes


I have reviewed all pertinent clinical information, including history, physical 

exam and plan: Yes


Notes (Text): 





04/14/17 16:42


Patient seen and examined. Agree with resident notes and finding

## 2017-04-11 NOTE — CP.PCM.PN
<Justin Avila - Last Filed: 04/11/17 16:40>





Subjective





- Date & Time of Evaluation


Date of Evaluation: 04/11/17


Time of Evaluation: 09:07





- Subjective


Subjective: 


Pt seen and examined. Pt reports that he is short of breath and coughing. Pt 

denies fever, chills, chest pain, nausea, vomiting.





Objective





- Vital Signs/Intake and Output


Vital Signs (last 24 hours): 


 











Temp Pulse Resp BP Pulse Ox


 


 97.4 F L  82   20   95/64 L  99 


 


 04/11/17 16:25  04/11/17 16:25  04/11/17 16:25  04/11/17 16:25  04/11/17 16:25








Intake and Output: 


 











 04/11/17 04/11/17





 06:59 18:59


 


Intake Total 740 


 


Output Total 2200 


 


Balance -1460 














- Medications


Medications: 


 Current Medications





Acetylcysteine (Acetylcysteine 20%)  4 ml INH RQ6 [facility]


   Last Admin: 04/11/17 13:45 Dose:  Not Given


Albuterol Sulfate (Albuterol 0.042% Inhal Sol (1.25mg/3ml) Ud)  1.25 mg INH RQ4 

[facility]


   Last Admin: 04/11/17 15:56 Dose:  1.25 mg


Benzocaine/Menthol (Cepacol Sore Throat)  1 keegan MT Q6H PRN


   PRN Reason: Sore Throat


   Last Admin: 04/09/17 09:32 Dose:  1 keegan


Budesonide (Pulmicort Respules)  0.5 mg INH RQ12 [facility]


   Last Admin: 04/07/17 07:34 Dose:  0.5 mg


Digoxin (Lanoxin)  0.25 mg PO DAILY@1800 [facility]


   Last Admin: 04/10/17 18:19 Dose:  0.25 mg


Famotidine (Pepcid)  20 mg PO DAILY [facility]


   Last Admin: 04/11/17 11:05 Dose:  20 mg


Guaifenesin (Mucinex La)  600 mg PO BID [facility]


   Last Admin: 04/11/17 11:05 Dose:  600 mg


Cefepime HCl (Maxipime Iv 2 Gm Premix)  100 mls @ 100 mls/hr IVPB Q12 [facility]


   Stop: 04/12/17 22:01


   Last Admin: 04/11/17 10:05 Dose:  Not Given


Vancomycin/Sodium Chloride (Vancocin)  200 mls @ 100 mls/hr IVPB DAILY [facility]


   Last Admin: 04/11/17 11:05 Dose:  Not Given


Ipratropium Bromide (Atrovent)  0.5 mg IH RQ4 [facility]


   Last Admin: 04/11/17 15:56 Dose:  0.5 mg


Lisinopril (Zestril)  2.5 mg PO DAILY [facility]


   Last Admin: 04/11/17 11:05 Dose:  2.5 mg


Prednisone (Prednisone Tab)  20 mg PO DAILY [facility]


   Last Admin: 04/11/17 11:05 Dose:  20 mg


Rosuvastatin Calcium (Crestor)  10 mg PO HS [facility]


   Last Admin: 04/10/17 21:38 Dose:  10 mg


Spironolactone (Aldactone)  25 mg PO DAILY [facility]


   Last Admin: 04/11/17 11:06 Dose:  25 mg


Tiotropium Bromide (Spiriva)  18 mcg INH RQ24 [facility]


   Last Admin: 04/11/17 07:34 Dose:  Not Given











- Labs


Labs: 


 





 04/10/17 13:23 





 04/10/17 13:23 





 











PT  36.3 SECONDS (9.7-12.2)  H* D 04/10/17  13:23    


 


INR  3.0   04/10/17  13:23    


 


APTT  41 SECONDS (21-34)  H  04/10/17  13:23    














- Constitutional


Appears: Toxic





- Head Exam


Head Exam: ATRAUMATIC, NORMOCEPHALIC





- Eye Exam


Eye Exam: EOMI, PERRL





- ENT Exam


ENT Exam: Mucous Membranes Moist.  absent: Mucous Membranes Dry





- Neck Exam


Neck Exam: Full ROM





- Respiratory Exam


Respiratory Exam: Rhonchi, Wheezes





- Cardiovascular Exam


Cardiovascular Exam: Gallop, +S1, +S2





- GI/Abdominal Exam


GI & Abdominal Exam: Soft.  absent: Guarding, Rigid, Tenderness





- Extremities Exam


Extremities Exam: Full ROM.  absent: Pedal Edema





- Neurological Exam


Neurological Exam: Alert, Awake, Oriented x3





- Psychiatric Exam


Psychiatric exam: Normal Affect, Normal Mood





- Skin


Skin Exam: Normal Color, Warm





Assessment and Plan





- Assessment and Plan (Free Text)


Assessment: 


Pt pending discharge to [facility] once wife confirms pt's address and 

permanent residence. Pt not clear for discharge until wife is contacted. [facility] 

has been unable to contact wife thus far. Pt is medically stable for discharge 

to [facility]. 





Shortness of breath


- secondary to chronic COPD vs pneumonia (hospital acquired)


- Cefepime, Vanc and Cipro started (4/7)


   - Have not been able to get vanc trough because patient refusing blood work


- ID consulted (Sari)  help appreciated


   - f/u recs


- Blood and sputum Cxs (4/7)  f/u


- CXR 4/5 - New opacity at right lung base suspicious for pneumonia. 

Questionable opacity at left base. Mild congestive change.


- CT Chest HR 3/30 - No definite CT evidence of interstitial lung disease. 

Cardiomegaly and mild-to-moderate pulmonary vascular congestion. Interval 

appearance of new round opacities/ nodules in the right upper lobe and left 

lower lobe since the previous study. The differential diagnosis includes 

multifocal pneumonia. The possibility of neoplasm is not totally excluded. 

Follow-up reassessment is recommended. Otherwise no significant interval change 

since the previous study dated 03/16/2017.





COPD (chronic obstructive pulmonary disease) Exacerbation


4/7: Duonebs every 4 hours for SOB


4/5: Added mucomyst to breathing treatments. Will get repeat CXR


3/30: CT chest with high resolution - see above


3/26: Prednisone 40mg PO daily; Taper on discharge. Will require home oxygen.


3/23: Stop Solumedrol IVP;  Start Prednisone 40mg PO daily; Taper on discharge. 

Will require home oxygen.


-3/22: Solumedrol reduced to 40mg IVP Q12H. Prior to discharge, change 

Solumedrol -> Prednisone


-3/13-3/22: Continue BIPAP, patient more compliant (sometimes refuses despite 

being SOB).


-3/10: RAPID called due to SOB. BP 80/50's. Bolused 500cc NS. Ordered BIPAP and 

ABG, however patient refused. Placed back on NC 3L.


-3/8-3/9: Patient complaining of SOB with exertion and orthopnea. Maintain Head 

of bed elevated 30 degrees.


-3/7:  PT session: 98% O2 at 2L at rest, 96% room air at rest sitting, 82% room 

air during ambulation, 88% at 2 liter ambulation.


-3/6: Walked 20 steps today down [address], and patient became dizzy, SOB, and 

almost collapsed.


- Consulted Pulmonology, Dr. Varela, f/u recs


   -planning for home O2


   - Aware of HR in 140's and SOB after eating and low levels of exertion.


   -LDH 654H


   -HIV - negative


   -persistent shortness of breath


   - Patient with long history of smoking most likely has underlying COPD.  

Nebulizer treatment.  Inhaled steroids.  Spiriva.  PFT as out pt








Systolic dysfunction with acute on chronic heart failure


- 4/5: dig level low, increase to 0.25


- EF 10-15%, mechanical MV- no regurg, tricuspid regurg. see full report


-Chest CT 3/16 - Cardiomegaly. Cardiac valve prosthesis. Left-sided AICD. Dense 

coronary artery calcifications. 


   -Small consolidation (favored to reflect atelectasis rather than pneumonia) 

at the left lung base. 


   -Bibasilar atelectasis. Small airways disease. Mild ground-glass and fine 

nodular opacities within the right upper lobe, possibly infectious or 

inflammatory.    


   -7 mm left upper lobe pulmonary nodule. Guidelines by the Fleischner society 

(radiology 2005; 237:390 5-400) suggests that in patients with low risk for 

lung cancer, with nodules less than or equal to 8 mm in diameter should have 

follow-up in approximately 6-12 months.  In patients with high-risk, including 

smokers, follow-up is recommended 3-6 months.  Patient with a known malignancy 

for risk for metastases should receive 3 month follow-up. 


   -Hepatic capsular calcification. 


   -Numerous low-density lesions throughout the liver, possibly cysts. 

Decompressed gallbladder limits evaluation. Bilateral hypodense renal lesions. 

Right adrenal gland hypertrophy. 12 mm left adrenal gland nodule measures 

approximately 9 Hounsfield units, likely adenoma.


-Consulted Cardiology, Dr. Dotson - f/u recs


   -3/16: Lasix 40mg IVP daily, Spironolactone 25mg daily. CXR - no interval 

pathology change. 


   -3/13: HOLD LASIX FOR 1 TO 2 DAYS AND CONT MUCINEX.  PT PRERENAL AND APPEARS 

DRY.  ON BOTH LASIX AND SPIRONOLACTONE.


   -SOB APPEARS TO BE SECONDARY TO PULM ETIOLOGY.  IMPROVES WITH NEBS.  PT WITH 

RHONCHI B/L.  MAY BENEFIT FROM PULM TOILET.


   -[facility].


   -likely deconditioned.  will benefit from rehab


-3/10: RAPID called due to SOB. BP 80/50's. Bolused 500cc NS. Ordered BIPAP and 

ABG, however patient refused. Placed back on NC 3L. ProBNP 4070H (less than # 

on admission) and SHEYLA negative. 


-3/10: Decrease to Lasix 40mg PO daily (was BID).


Admit to telemetry, BNP 4560 on admission


Lisinopril 10mg daily


Aldactone 25mg PO Daily


patient with AICD, will check echo to determine EF


CXR 2/25: mild cardiomegaly, mild pulmonary venous congestion.  s/p sternotomy, 

aortic valve replacement, AICD








H/O mitral valve replacement with mechanical valve


Monitor INR and adjust Coumadin as necessary


Cardiac valve replacement 6-7yrs ago


Consulted Cardiology, Dr. Dotson - f/u recs


   -echocardiogram reviewed.  valve leaflets are opening and functional.  There 

is no signficant gradient across the valve.  


   -recommend INR 2.5 to 3.5.


   STOP Lovenox 3/5


   STOP Heparin Drip - cardiac protocol, with bolus (because patient is 

refusing blood draws)








Chronic atrial fibrillation


Consulted Cardiology, Dr. Dotson - f/u recs


   -Rate controlled


   -echocardiogram reviewed.  valve leaflets are opening and functional.  There 

is no significant gradient across the valve.  


   -recommend INR 2.5 to 3.5.


   -See PT/INR trends above


   Coumadin as above


   - Patient's INR is difficult to maintain with the therapeutic range 

therefore we will give another 3 mg tonight and check INR tomorrow. Will try to 

      convince the patient to get bloodwork done today.


   STOP Lovenox 3/5








Cough, acute


- will add mucocyst


-Mucinex 600mg PO BID


-3/22: cough intermittent


-3/18 does not complain of cough


-3/13-3/16: Persists. Non-productive. Continue Mucinex.


-3/9: patient developed productive cough with yellow sputum today.








CAD (coronary artery disease) 


Consulted Cardiology, Dr. Dotson - f/u recs


   -Aware of HR in 140's and SOB after eating and low levels of exertion.


   -unknown graft status.  no evidence of ischemia at present


   -No current angina





History of MI (myocardial infarction)


Crestor 10mg PO HS resumed


HOLD Crestor 10mg PO HS (last dose 3/12- due to elevated LFTs)


ROMIs negative x3


EKG paced 


Denies chest pain 





Transaminitis, acute


Crestor resumed


3/25: AST/ALT normalized; consider resuming crestor.


3/24  patient refusing labs. will re-attempt


3/22-3/23: Improving. hold crestor (since 3/12)


3/21: AST 37 /  - continue to hold crestor.


3/19-3/20: Patient refused blood work


3/18: 51/164 AST / ALT down trending


3/16-3/17: AST / ALT down trending - continue to hold Crestor


3/15: AST 57 / , decreasing. HOLD Crestor 10mg PO HS (last dose 3/12- 

due to elevated LFTs)


3/14: AST 75 /   


3/13: AST 70 /  - increasing -> hold crestor for 2 nights 3/13, 3/14


3/10: AST 90 /  - increasing -> hold Crestor tonight.


- AST 77 / ALT 98 -> previously normal.


- Continue to monitor





Electrolyte Imbalance


3/24 patient refusing labs, will re-attempt


3/15-3/23: Hyponatremia, stable


Hyperkalemia - resolved


Hypokalemia, 








Tachycardia


HR grossly WNL, continue to monitor


3/14-3/17: HR WNL


3/7: Patient becomes SOB very easily, desaturating to 82% while ambulating. 

Anytime he eats or gets up, HR climbs into 140's


EKG in ER: sinus tachycardia at 100, paced, incomplete RBBB


Discontinued cardizem drip of 5mg/h started in the ER





Lower extremity edema


LE Duplex - negative. see full report.





Prophylactic measure


Coumadin as above.


protonix 40mg daily


SCD contraindicated due to LE edema


D/C when bed available











<Evgeny Elizabeth Jr. - Last Filed: 04/14/17 16:46>





Objective





- Vital Signs/Intake and Output


Vital Signs (last 24 hours): 


 











Temp Pulse Resp BP Pulse Ox


 


 97.5 F L  74   20   99/61 L  99 


 


 04/14/17 15:41  04/14/17 15:41  04/14/17 15:41  04/14/17 15:41  04/14/17 15:41








Intake and Output: 


 











 04/14/17 04/14/17





 06:59 18:59


 


Intake Total 760 


 


Output Total 1100 


 


Balance -340 














- Medications


Medications: 


 Current Medications





Acetylcysteine (Acetylcysteine 20%)  4 ml INH RQ4 [facility]


   Last Admin: 04/14/17 16:17 Dose:  4 ml


Albuterol Sulfate (Albuterol 0.042% Inhal Sol (1.25mg/3ml) Ud)  1.25 mg INH RQ4 

[facility]


   Last Admin: 04/14/17 16:17 Dose:  1.25 mg


Benzocaine/Menthol (Cepacol Sore Throat)  1 keegan MT Q6H PRN


   PRN Reason: Sore Throat


   Last Admin: 04/09/17 09:32 Dose:  1 keegan


Budesonide (Pulmicort Respules)  0.5 mg INH RQ12 [facility]


   Last Admin: 04/07/17 07:34 Dose:  0.5 mg


Cefpodoxime Proxetil (Vantin)  200 mg PO Q12H [facility]


   Last Admin: 04/14/17 14:44 Dose:  200 mg


Digoxin (Lanoxin)  0.25 mg PO DAILY@1800 [facility]


   Last Admin: 04/13/17 17:32 Dose:  0.25 mg


Famotidine (Pepcid)  20 mg PO DAILY [facility]


   Last Admin: 04/14/17 11:19 Dose:  20 mg


Guaifenesin (Mucinex La)  600 mg PO BID [facility]


   Last Admin: 04/14/17 11:19 Dose:  600 mg


Ipratropium Bromide (Atrovent)  0.5 mg IH RQ4 [facility]


   Last Admin: 04/14/17 16:17 Dose:  0.5 mg


Lisinopril (Zestril)  2.5 mg PO DAILY [facility]


   Last Admin: 04/14/17 11:19 Dose:  2.5 mg


Prednisone (Prednisone Tab)  20 mg PO DAILY [facility]


   Last Admin: 04/14/17 11:19 Dose:  20 mg


Spironolactone (Aldactone)  25 mg PO DAILY [facility]


   Last Admin: 04/14/17 11:19 Dose:  25 mg











- Labs


Labs: 


 





 04/14/17 11:24 





 04/14/17 11:24 





 











PT  19.9 SECONDS (9.7-12.2)  H  04/14/17  11:24    


 


INR  1.7   04/14/17  11:24    


 


APTT  41 SECONDS (21-34)  H  04/10/17  13:23    














Attending/Attestation





- Attestation


I have personally seen and examined this patient.: Yes


I have fully participated in the care of the patient.: Yes


I have reviewed all pertinent clinical information, including history, physical 

exam and plan: Yes


Notes (Text): 





04/14/17 16:45


Patient seen and examined. Reviewed resident note and plan of care and findings 

as discussed

## 2017-04-11 NOTE — CP.PCM.PN
Subjective





- Date & Time of Evaluation


Date of Evaluation: 04/11/17


Time of Evaluation: 09:30





- Subjective


Subjective: 


Pt seen and examined at bedside in need of O2 support; breathed comfortably for 

exam when O2 NC 3.5L was re-applied.  Pt complained of SOB and cough.  Pt 

denied CP, abdominal pain, sputum or productive cough





PMH: CAD, MI, arrythmias


PSH: CABAG


Social:Smoking 2-3 cigarets/day 60 years





Objective





- Vital Signs/Intake and Output


Vital Signs (last 24 hours): 


 











Temp Pulse Resp BP Pulse Ox


 


 97.9 F   70   100 H  100/63   100 


 


 04/11/17 07:35  04/11/17 07:55  04/11/17 07:35  04/11/17 07:35  04/11/17 07:35








Intake and Output: 


 











 04/11/17 04/11/17





 06:59 18:59


 


Intake Total 740 


 


Output Total 2200 


 


Balance -1460 














- Medications


Medications: 


 Current Medications





Acetylcysteine (Acetylcysteine 20%)  4 ml INH RQ6 [facility]


   Last Admin: 04/11/17 13:45 Dose:  Not Given


Albuterol Sulfate (Albuterol 0.042% Inhal Sol (1.25mg/3ml) Ud)  1.25 mg INH RQ4 

[facility]


   Last Admin: 04/11/17 11:29 Dose:  1.25 mg


Benzocaine/Menthol (Cepacol Sore Throat)  1 keegan MT Q6H PRN


   PRN Reason: Sore Throat


   Last Admin: 04/09/17 09:32 Dose:  1 keegan


Budesonide (Pulmicort Respules)  0.5 mg INH RQ12 [facility]


   Last Admin: 04/07/17 07:34 Dose:  0.5 mg


Digoxin (Lanoxin)  0.25 mg PO DAILY@1800 [facility]


   Last Admin: 04/10/17 18:19 Dose:  0.25 mg


Famotidine (Pepcid)  20 mg PO DAILY [facility]


   Last Admin: 04/11/17 11:05 Dose:  20 mg


Guaifenesin (Mucinex La)  600 mg PO BID [facility]


   Last Admin: 04/11/17 11:05 Dose:  600 mg


Cefepime HCl (Maxipime Iv 2 Gm Premix)  100 mls @ 100 mls/hr IVPB Q12 [facility]


   Stop: 04/12/17 22:01


   Last Admin: 04/11/17 10:05 Dose:  Not Given


Vancomycin/Sodium Chloride (Vancocin)  200 mls @ 100 mls/hr IVPB DAILY [facility]


   Last Admin: 04/11/17 11:05 Dose:  Not Given


Ipratropium Bromide (Atrovent)  0.5 mg IH RQ4 [facility]


   Last Admin: 04/11/17 11:29 Dose:  0.5 mg


Lisinopril (Zestril)  2.5 mg PO DAILY [facility]


   Last Admin: 04/11/17 11:05 Dose:  2.5 mg


Prednisone (Prednisone Tab)  20 mg PO DAILY [facility]


   Last Admin: 04/11/17 11:05 Dose:  20 mg


Rosuvastatin Calcium (Crestor)  10 mg PO HS [facility]


   Last Admin: 04/10/17 21:38 Dose:  10 mg


Spironolactone (Aldactone)  25 mg PO DAILY [facility]


   Last Admin: 04/11/17 11:06 Dose:  25 mg


Tiotropium Bromide (Spiriva)  18 mcg INH RQ24 [facility]


   Last Admin: 04/11/17 07:34 Dose:  Not Given











- Labs


Labs: 


 





 04/10/17 13:23 





 04/10/17 13:23 





 











PT  36.3 SECONDS (9.7-12.2)  H* D 04/10/17  13:23    


 


INR  3.0   04/10/17  13:23    


 


APTT  41 SECONDS (21-34)  H  04/10/17  13:23    














- Constitutional


Appears: Well, No Acute Distress





- Head Exam


Head Exam: ATRAUMATIC, NORMOCEPHALIC





- Respiratory Exam


Respiratory Exam: Rhonchi (SANDY and RLL ant. and RLL and L lobes), NORMAL 

BREATHING PATTERN.  absent: Clear to Ausculation Bilateral





- Cardiovascular Exam


Cardiovascular Exam: Irregular Rhythm, +S1, +S2.  absent: REGULAR RHYTHM, JVD, 

Murmur





- Neurological Exam


Neurological Exam: Alert, Awake, Oriented x3





- Psychiatric Exam


Psychiatric exam: Normal Affect, Normal Mood





- Skin


Skin Exam: Dry, Intact, Normal Color, Warm





Assessment and Plan


(1) COPD (chronic obstructive pulmonary disease)


Assessment & Plan: 


CXR 4/8/17 moderate venous congestion; R hilar prominence 





Antibiotics 


Continue bronchodilators and steroids





Continue current management and follow up if needed


Status: Acute





(2) Acute exacerbation of CHF (congestive heart failure)


Status: Acute





(3) Chronic atrial fibrillation


Status: Chronic

## 2017-04-12 NOTE — CP.PCM.PN
<Justin Avila - Last Filed: 04/12/17 12:59>





Subjective





- Date & Time of Evaluation


Date of Evaluation: 04/12/17


Time of Evaluation: 07:17





- Subjective


Subjective: 


Pt seen and examined. Pt complaining of shortness of breath and cough. Pt 

denies fever, chills, chest pain, nausea, and vomiting.





Objective





- Vital Signs/Intake and Output


Vital Signs (last 24 hours): 


 











Temp Pulse Resp BP Pulse Ox


 


 97.8 F   65   18   93/51 L  96 


 


 04/12/17 07:15  04/12/17 07:15  04/12/17 07:15  04/12/17 07:15  04/12/17 07:15








Intake and Output: 


 











 04/12/17 04/12/17





 06:59 18:59


 


Output Total 900 


 


Balance -900 














- Medications


Medications: 


 Current Medications





Acetylcysteine (Acetylcysteine 20%)  4 ml INH RQ4 [facility]


   Last Admin: 04/12/17 11:18 Dose:  4 ml


Albuterol Sulfate (Albuterol 0.042% Inhal Sol (1.25mg/3ml) Ud)  1.25 mg INH RQ4 

[facility]


   Last Admin: 04/12/17 11:18 Dose:  1.25 mg


Benzocaine/Menthol (Cepacol Sore Throat)  1 keegan MT Q6H PRN


   PRN Reason: Sore Throat


   Last Admin: 04/09/17 09:32 Dose:  1 keegan


Budesonide (Pulmicort Respules)  0.5 mg INH RQ12 [facility]


   Last Admin: 04/07/17 07:34 Dose:  0.5 mg


Digoxin (Lanoxin)  0.25 mg PO DAILY@1800 [facility]


   Last Admin: 04/11/17 17:15 Dose:  0.25 mg


Famotidine (Pepcid)  20 mg PO DAILY [facility]


   Last Admin: 04/12/17 09:41 Dose:  20 mg


Guaifenesin (Mucinex La)  600 mg PO BID [facility]


   Last Admin: 04/12/17 09:40 Dose:  600 mg


Cefepime HCl (Maxipime Iv 2 Gm Premix)  100 mls @ 100 mls/hr IVPB Q12 [facility]


   Stop: 04/12/17 22:01


   Last Admin: 04/12/17 11:18 Dose:  Not Given


Vancomycin/Sodium Chloride (Vancocin)  200 mls @ 100 mls/hr IVPB DAILY [facility]


   Last Admin: 04/12/17 11:20 Dose:  Not Given


Ipratropium Bromide (Atrovent)  0.5 mg IH RQ4 [facility]


   Last Admin: 04/12/17 11:18 Dose:  0.5 mg


Lisinopril (Zestril)  2.5 mg PO DAILY [facility]


   Last Admin: 04/12/17 09:40 Dose:  2.5 mg


Prednisone (Prednisone Tab)  20 mg PO DAILY [facility]


   Last Admin: 04/12/17 09:40 Dose:  20 mg


Spironolactone (Aldactone)  25 mg PO DAILY [facility]


   Last Admin: 04/12/17 09:40 Dose:  25 mg











- Labs


Labs: 


 





 04/10/17 13:23 





 04/10/17 13:23 





 











PT  23.9 SECONDS (9.7-12.2)  H D 04/12/17  11:13    


 


INR  2.0  D 04/12/17  11:13    


 


APTT  41 SECONDS (21-34)  H  04/10/17  13:23    














- Constitutional


Appears: No Acute Distress





- Head Exam


Head Exam: ATRAUMATIC, NORMOCEPHALIC





- Eye Exam


Eye Exam: EOMI, PERRL





- ENT Exam


ENT Exam: Mucous Membranes Moist.  absent: Mucous Membranes Dry





- Respiratory Exam


Respiratory Exam: Rhonchi, Wheezes





- Cardiovascular Exam


Cardiovascular Exam: Gallop, +S1, +S2





- GI/Abdominal Exam


GI & Abdominal Exam: Soft.  absent: Distended, Tenderness





- Extremities Exam


Extremities Exam: Full ROM.  absent: Pedal Edema





- Neurological Exam


Neurological Exam: Alert, Awake, Oriented x3





- Psychiatric Exam


Psychiatric exam: Normal Affect, Normal Mood





- Skin


Skin Exam: Normal Color, Warm





Assessment and Plan





- Assessment and Plan (Free Text)


Assessment: 


Assessment: 


Pt pending discharge to [facility] once wife confirms pt's address and 

permanent residence. Pt not clear for discharge until wife is contacted. [facility] 

has been unable to contact wife thus far. Pt is medically stable for discharge 

to [facility]. Pt receiving daily coumadin. Pt has been refusing blood work daily, but 

agreed to have his INR drawn today. F/U INR.





Shortness of breath


- secondary to chronic COPD vs pneumonia (hospital acquired)


- Cefepime, Vanc and Cipro started (4/7)


   - Have not been able to get vanc trough because patient refusing blood work


- ID consulted (Sari)  help appreciated


   - f/u recs


- Blood and sputum Cxs (4/7)  f/u


- CXR 4/5 - New opacity at right lung base suspicious for pneumonia. 

Questionable opacity at left base. Mild congestive change.


- CT Chest HR 3/30 - No definite CT evidence of interstitial lung disease. 

Cardiomegaly and mild-to-moderate pulmonary vascular congestion. Interval 

appearance of new round opacities/ nodules in the right upper lobe and left 

lower lobe since the previous study. The differential diagnosis includes 

multifocal pneumonia. The possibility of neoplasm is not totally excluded. 

Follow-up reassessment is recommended. Otherwise no significant interval change 

since the previous study dated 03/16/2017.





COPD (chronic obstructive pulmonary disease) Exacerbation


4/7: Duonebs every 4 hours for SOB


4/5: Added mucomyst to breathing treatments. Will get repeat CXR


3/30: CT chest with high resolution - see above


3/26: Prednisone 40mg PO daily; Taper on discharge. Will require home oxygen.


3/23: Stop Solumedrol IVP;  Start Prednisone 40mg PO daily; Taper on discharge. 

Will require home oxygen.


-3/22: Solumedrol reduced to 40mg IVP Q12H. Prior to discharge, change 

Solumedrol -> Prednisone


-3/13-3/22: Continue BIPAP, patient more compliant (sometimes refuses despite 

being SOB).


-3/10: RAPID called due to SOB. BP 80/50's. Bolused 500cc NS. Ordered BIPAP and 

ABG, however patient refused. Placed back on NC 3L.


-3/8-3/9: Patient complaining of SOB with exertion and orthopnea. Maintain Head 

of bed elevated 30 degrees.


-3/7:  PT session: 98% O2 at 2L at rest, 96% room air at rest sitting, 82% room 

air during ambulation, 88% at 2 liter ambulation.


-3/6: Walked 20 steps today down [address], and patient became dizzy, SOB, and 

almost collapsed.


- Consulted Pulmonology, Dr. Varela, f/u recs


   -planning for home O2


   - Aware of HR in 140's and SOB after eating and low levels of exertion.


   -LDH 654H


   -HIV - negative


   -persistent shortness of breath


   - Patient with long history of smoking most likely has underlying COPD.  

Nebulizer treatment.  Inhaled steroids.  Spiriva.  PFT as out pt








Systolic dysfunction with acute on chronic heart failure


- 4/5: dig level low, increase to 0.25


- EF 10-15%, mechanical MV- no regurg, tricuspid regurg. see full report


-Chest CT 3/16 - Cardiomegaly. Cardiac valve prosthesis. Left-sided AICD. Dense 

coronary artery calcifications. 


   -Small consolidation (favored to reflect atelectasis rather than pneumonia) 

at the left lung base. 


   -Bibasilar atelectasis. Small airways disease. Mild ground-glass and fine 

nodular opacities within the right upper lobe, possibly infectious or 

inflammatory.    


   -7 mm left upper lobe pulmonary nodule. Guidelines by the Fleischner society 

(radiology 2005; 237:390 5-400) suggests that in patients with low risk for 

lung cancer, with nodules less than or equal to 8 mm in diameter should have 

follow-up in approximately 6-12 months.  In patients with high-risk, including 

smokers, follow-up is recommended 3-6 months.  Patient with a known malignancy 

for risk for metastases should receive 3 month follow-up. 


   -Hepatic capsular calcification. 


   -Numerous low-density lesions throughout the liver, possibly cysts. 

Decompressed gallbladder limits evaluation. Bilateral hypodense renal lesions. 

Right adrenal gland hypertrophy. 12 mm left adrenal gland nodule measures 

approximately 9 Hounsfield units, likely adenoma.


-Consulted Cardiology, Dr. Dotson - f/u recs


   -3/16: Lasix 40mg IVP daily, Spironolactone 25mg daily. CXR - no interval 

pathology change. 


   -3/13: HOLD LASIX FOR 1 TO 2 DAYS AND CONT MUCINEX.  PT PRERENAL AND APPEARS 

DRY.  ON BOTH LASIX AND SPIRONOLACTONE.


   -SOB APPEARS TO BE SECONDARY TO PULM ETIOLOGY.  IMPROVES WITH NEBS.  PT WITH 

RHONCHI B/L.  MAY BENEFIT FROM PULM TOILET.


   -will attempt to obtain company of [facility] for interrogation.


   -likely deconditioned.  will benefit from rehab


-3/10: RAPID called due to SOB. BP 80/50's. Bolused 500cc NS. Ordered BIPAP and 

ABG, however patient refused. Placed back on NC 3L. ProBNP 4070H (less than # 

on admission) and SHEYLA negative. 


-3/10: Decrease to Lasix 40mg PO daily (was BID).


Admit to telemetry, BNP 4560 on admission


Lisinopril 10mg daily


Aldactone 25mg PO Daily


patient with AICD, will check echo to determine EF


CXR 2/25: mild cardiomegaly, mild pulmonary venous congestion.  s/p sternotomy, 

aortic valve replacement, AICD








H/O mitral valve replacement with mechanical valve


Monitor INR and adjust Coumadin as necessary


Cardiac valve replacement 6-7yrs ago


Consulted Cardiology, Dr. Dotson - f/u recs


   -echocardiogram reviewed.  valve leaflets are opening and functional.  There 

is no signficant gradient across the valve.  


   -recommend INR 2.5 to 3.5.


   STOP Lovenox 3/5


   STOP Heparin Drip - cardiac protocol, with bolus (because patient is 

refusing blood draws)








Chronic atrial fibrillation


Consulted Cardiology, Dr. Dotson - f/u recs


   -Rate controlled


   -echocardiogram reviewed.  valve leaflets are opening and functional.  There 

is no significant gradient across the valve.  


   -recommend INR 2.5 to 3.5.


   -See PT/INR trends above


   Coumadin as above


   - Patient's INR is difficult to maintain with the therapeutic range 

therefore we will give another 3 mg tonight and check INR tomorrow. Will try to 

      convince the patient to get bloodwork done today.


   STOP Lovenox 3/5








Cough, acute


- will add mucocyst


-Mucinex 600mg PO BID


-3/22: cough intermittent


-3/18 does not complain of cough


-3/13-3/16: Persists. Non-productive. Continue Mucinex.


-3/9: patient developed productive cough with yellow sputum today.








CAD (coronary artery disease) 


Consulted Cardiology, Dr. Dotson - f/u recs


   -Aware of HR in 140's and SOB after eating and low levels of exertion.


   -unknown graft status.  no evidence of ischemia at present


   -No current angina





History of MI (myocardial infarction)


Crestor 10mg PO HS resumed


HOLD Crestor 10mg PO HS (last dose 3/12- due to elevated LFTs)


ROMIs negative x3


EKG paced 


Denies chest pain 





Transaminitis, acute


Crestor resumed


3/25: AST/ALT normalized; consider resuming crestor.


3/24  patient refusing labs. will re-attempt


3/22-3/23: Improving. hold crestor (since 3/12)


3/21: AST 37 /  - continue to hold crestor.


3/19-3/20: Patient refused blood work


3/18: 51/164 AST / ALT down trending


3/16-3/17: AST / ALT down trending - continue to hold Crestor


3/15: AST 57 / , decreasing. HOLD Crestor 10mg PO HS (last dose 3/12- 

due to elevated LFTs)


3/14: AST 75 /   


3/13: AST 70 /  - increasing -> hold crestor for 2 nights 3/13, 3/14


3/10: AST 90 /  - increasing -> hold Crestor tonight.


- AST 77 / ALT 98 -> previously normal.


- Continue to monitor





Electrolyte Imbalance


3/24 patient refusing labs, will re-attempt


3/15-3/23: Hyponatremia, stable


Hyperkalemia - resolved


Hypokalemia, 








Tachycardia


HR grossly WNL, continue to monitor


3/14-3/17: HR WNL


3/7: Patient becomes SOB very easily, desaturating to 82% while ambulating. 

Anytime he eats or gets up, HR climbs into 140's


EKG in ER: sinus tachycardia at 100, paced, incomplete RBBB


Discontinued cardizem drip of 5mg/h started in the ER





Lower extremity edema


LE Duplex - negative. see full report.





Prophylactic measure


Coumadin as above.


protonix 40mg daily


SCD contraindicated due to LE edema


D/C when bed available








<Evgeny Elizabeth Jr. - Last Filed: 04/14/17 16:49>





Objective





- Vital Signs/Intake and Output


Vital Signs (last 24 hours): 


 











Temp Pulse Resp BP Pulse Ox


 


 97.5 F L  74   20   99/61 L  99 


 


 04/14/17 15:41  04/14/17 15:41  04/14/17 15:41  04/14/17 15:41  04/14/17 15:41








Intake and Output: 


 











 04/14/17 04/14/17





 06:59 18:59


 


Intake Total 760 


 


Output Total 1100 


 


Balance -340 














- Medications


Medications: 


 Current Medications





Acetylcysteine (Acetylcysteine 20%)  4 ml INH RQ4 [facility]


   Last Admin: 04/14/17 16:17 Dose:  4 ml


Albuterol Sulfate (Albuterol 0.042% Inhal Sol (1.25mg/3ml) Ud)  1.25 mg INH RQ4 

[facility]


   Last Admin: 04/14/17 16:17 Dose:  1.25 mg


Benzocaine/Menthol (Cepacol Sore Throat)  1 keegan MT Q6H PRN


   PRN Reason: Sore Throat


   Last Admin: 04/09/17 09:32 Dose:  1 keegan


Budesonide (Pulmicort Respules)  0.5 mg INH RQ12 [facility]


   Last Admin: 04/07/17 07:34 Dose:  0.5 mg


Cefpodoxime Proxetil (Vantin)  200 mg PO Q12H [facility]


   Last Admin: 04/14/17 14:44 Dose:  200 mg


Digoxin (Lanoxin)  0.25 mg PO DAILY@1800 [facility]


   Last Admin: 04/13/17 17:32 Dose:  0.25 mg


Famotidine (Pepcid)  20 mg PO DAILY [facility]


   Last Admin: 04/14/17 11:19 Dose:  20 mg


Guaifenesin (Mucinex La)  600 mg PO BID [facility]


   Last Admin: 04/14/17 11:19 Dose:  600 mg


Ipratropium Bromide (Atrovent)  0.5 mg IH RQ4 [facility]


   Last Admin: 04/14/17 16:17 Dose:  0.5 mg


Lisinopril (Zestril)  2.5 mg PO DAILY [facility]


   Last Admin: 04/14/17 11:19 Dose:  2.5 mg


Prednisone (Prednisone Tab)  20 mg PO DAILY [facility]


   Last Admin: 04/14/17 11:19 Dose:  20 mg


Spironolactone (Aldactone)  25 mg PO DAILY [facility]


   Last Admin: 04/14/17 11:19 Dose:  25 mg











- Labs


Labs: 


 





 04/14/17 11:24 





 04/14/17 11:24 





 











PT  19.9 SECONDS (9.7-12.2)  H  04/14/17  11:24    


 


INR  1.7   04/14/17  11:24    


 


APTT  41 SECONDS (21-34)  H  04/10/17  13:23    














Attending/Attestation





- Attestation


I have personally seen and examined this patient.: Yes


I have fully participated in the care of the patient.: Yes


I have reviewed all pertinent clinical information, including history, physical 

exam and plan: Yes


Notes (Text): 





04/14/17 16:49


Patient seen and examined. Reviewed resident note and agree with findings and 

plan of care

## 2017-04-12 NOTE — CP.PCM.PN
Subjective





- Date & Time of Evaluation


Date of Evaluation: 04/11/17


Time of Evaluation: 08:35





- Subjective


Subjective: 


patient is sitting in bed.  Has productive cough





Objective





- Vital Signs/Intake and Output


Vital Signs (last 24 hours): 


 











Temp Pulse Resp BP Pulse Ox


 


 98.3 F   83   20   132/66   97 


 


 04/11/17 23:25  04/11/17 23:30  04/11/17 23:25  04/11/17 23:25  04/11/17 23:25











- Medications


Medications: 


 Current Medications





Acetylcysteine (Acetylcysteine 20%)  4 ml INH RQ6 [facility]


   Last Admin: 04/12/17 01:19 Dose:  Not Given


Albuterol Sulfate (Albuterol 0.042% Inhal Sol (1.25mg/3ml) Ud)  1.25 mg INH RQ4 

[facility]


   Last Admin: 04/12/17 00:30 Dose:  Not Given


Benzocaine/Menthol (Cepacol Sore Throat)  1 keegan MT Q6H PRN


   PRN Reason: Sore Throat


   Last Admin: 04/09/17 09:32 Dose:  1 keegan


Budesonide (Pulmicort Respules)  0.5 mg INH RQ12 [facility]


   Last Admin: 04/07/17 07:34 Dose:  0.5 mg


Digoxin (Lanoxin)  0.25 mg PO DAILY@1800 [facility]


   Last Admin: 04/11/17 17:15 Dose:  0.25 mg


Famotidine (Pepcid)  20 mg PO DAILY [facility]


   Last Admin: 04/11/17 11:05 Dose:  20 mg


Guaifenesin (Mucinex La)  600 mg PO BID [facility]


   Last Admin: 04/11/17 17:15 Dose:  600 mg


Cefepime HCl (Maxipime Iv 2 Gm Premix)  100 mls @ 100 mls/hr IVPB Q12 [facility]


   Stop: 04/12/17 22:01


   Last Admin: 04/11/17 21:02 Dose:  Not Given


Vancomycin/Sodium Chloride (Vancocin)  200 mls @ 100 mls/hr IVPB DAILY [facility]


   Last Admin: 04/11/17 11:05 Dose:  Not Given


Ipratropium Bromide (Atrovent)  0.5 mg IH RQ4 [facility]


   Last Admin: 04/12/17 00:30 Dose:  Not Given


Lisinopril (Zestril)  2.5 mg PO DAILY [facility]


   Last Admin: 04/11/17 11:05 Dose:  2.5 mg


Prednisone (Prednisone Tab)  20 mg PO DAILY [facility]


   Last Admin: 04/11/17 11:05 Dose:  20 mg


Spironolactone (Aldactone)  25 mg PO DAILY [facility]


   Last Admin: 04/11/17 11:06 Dose:  25 mg


Tiotropium Bromide (Spiriva)  18 mcg INH RQ24 [facility]


   Last Admin: 04/11/17 07:34 Dose:  Not Given











- Labs


Labs: 


 





 04/10/17 13:23 





 04/10/17 13:23 





 











PT  36.3 SECONDS (9.7-12.2)  H* D 04/10/17  13:23    


 


INR  3.0   04/10/17  13:23    


 


APTT  41 SECONDS (21-34)  H  04/10/17  13:23    














- Constitutional


Appears: Non-toxic





- Head Exam


Head Exam: NORMAL INSPECTION





- Eye Exam


Eye Exam: Normal appearance





- ENT Exam


ENT Exam: Mucous Membranes Moist





- Neck Exam


Neck Exam: Full ROM





- Respiratory Exam


Respiratory Exam: Decreased Breath Sounds, Wheezes





- Cardiovascular Exam


Cardiovascular Exam: REGULAR RHYTHM





- GI/Abdominal Exam


GI & Abdominal Exam: Normal Bowel Sounds





- Rectal Exam


Rectal Exam: Deferred





- Extremities Exam


Extremities Exam: Full ROM





- Back Exam


Back Exam: NORMAL INSPECTION





- Neurological Exam


Neurological Exam: Alert





- Psychiatric Exam


Psychiatric exam: Normal Affect





- Skin


Skin Exam: Normal Color





Assessment and Plan


(1) Systolic dysfunction with acute on chronic heart failure


Assessment & Plan: 


imporving.  current status seems more pulmonary in origin


Status: Acute





(2) Chronic atrial fibrillation


Assessment & Plan: 


coumadin


Status: Chronic





(3) CAD (coronary artery disease)


Status: Chronic





(4) H/O mitral valve replacement with mechanical valve


Assessment & Plan: 


INR 2.5-3.5


Status: Chronic

## 2017-04-13 NOTE — CP.PCM.PN
<Justin Avila - Last Filed: 04/13/17 15:33>





Subjective





- Date & Time of Evaluation


Date of Evaluation: 04/13/17


Time of Evaluation: 09:14





- Subjective


Subjective: 


Pt seen and examined. Pt complaining of shortness of breath and cough. Pt 

denies fever, chills, chest pain, shortness of breath, nausea, and vomiting.





Objective





- Vital Signs/Intake and Output


Vital Signs (last 24 hours): 


 











Temp Pulse Resp BP Pulse Ox


 


 97.6 F   66   20   96/64 L  98 


 


 04/13/17 08:19  04/13/17 08:19  04/13/17 08:19  04/13/17 08:19  04/12/17 23:30








Intake and Output: 


 











 04/13/17 04/13/17





 06:59 18:59


 


Output Total 800 


 


Balance -800 














- Medications


Medications: 


 Current Medications





Acetylcysteine (Acetylcysteine 20%)  4 ml INH RQ4 [facility]


   Last Admin: 04/13/17 11:16 Dose:  4 ml


Albuterol Sulfate (Albuterol 0.042% Inhal Sol (1.25mg/3ml) Ud)  1.25 mg INH RQ4 

[facility]


   Last Admin: 04/13/17 11:16 Dose:  1.25 mg


Benzocaine/Menthol (Cepacol Sore Throat)  1 keegan MT Q6H PRN


   PRN Reason: Sore Throat


   Last Admin: 04/09/17 09:32 Dose:  1 keegan


Budesonide (Pulmicort Respules)  0.5 mg INH RQ12 [facility]


   Last Admin: 04/07/17 07:34 Dose:  0.5 mg


Cefpodoxime Proxetil (Vantin)  200 mg PO Q12H [facility]


Digoxin (Lanoxin)  0.25 mg PO DAILY@1800 [facility]


   Last Admin: 04/12/17 17:20 Dose:  0.25 mg


Famotidine (Pepcid)  20 mg PO DAILY [facility]


   Last Admin: 04/13/17 10:09 Dose:  20 mg


Guaifenesin (Mucinex La)  600 mg PO BID [facility]


   Last Admin: 04/13/17 10:10 Dose:  600 mg


Vancomycin/Sodium Chloride (Vancocin)  200 mls @ 100 mls/hr IVPB DAILY [facility]


   Last Admin: 04/12/17 11:20 Dose:  Not Given


Ipratropium Bromide (Atrovent)  0.5 mg IH RQ4 [facility]


   Last Admin: 04/13/17 11:16 Dose:  0.5 mg


Lisinopril (Zestril)  2.5 mg PO DAILY [facility]


   Last Admin: 04/13/17 10:10 Dose:  2.5 mg


Prednisone (Prednisone Tab)  20 mg PO DAILY [facility]


   Last Admin: 04/13/17 10:09 Dose:  20 mg


Spironolactone (Aldactone)  25 mg PO DAILY [facility]


   Last Admin: 04/13/17 10:09 Dose:  25 mg


Warfarin Sodium (Coumadin)  3 mg PO 1800 [facility]


   Stop: 04/13/17 18:01











- Labs


Labs: 


 





 04/10/17 13:23 





 04/10/17 13:23 





 











PT  19.4 SECONDS (9.7-12.2)  H  04/13/17  10:51    


 


INR  1.7   04/13/17  10:51    


 


APTT  41 SECONDS (21-34)  H  04/10/17  13:23    














- Constitutional


Appears: Toxic, No Acute Distress





- Head Exam


Head Exam: ATRAUMATIC, NORMOCEPHALIC





- Eye Exam


Eye Exam: EOMI, PERRL





- ENT Exam


ENT Exam: Mucous Membranes Moist





- Neck Exam


Neck Exam: Full ROM.  absent: Lymphadenopathy





- Respiratory Exam


Respiratory Exam: Rhonchi, Wheezes





- Cardiovascular Exam


Cardiovascular Exam: Gallop, +S1, +S2





- GI/Abdominal Exam


GI & Abdominal Exam: Soft.  absent: Tenderness





- Extremities Exam


Extremities Exam: Full ROM.  absent: Pedal Edema





- Neurological Exam


Neurological Exam: Alert, Awake, Oriented x3





- Psychiatric Exam


Psychiatric exam: Normal Affect, Normal Mood





- Skin


Skin Exam: Normal Color, Warm





Assessment and Plan





- Assessment and Plan (Free Text)


Assessment: 


Assessment: 


4/13/17: Pt pending discharge to [facility] once wife confirms pt's address 

and permanent residence. Pt not clear for discharge until wife is contacted. 

[facility] has been unable to contact wife thus far. Pt is medically stable for 

discharge to [facility]. Pt receiving daily coumadin. Pt's INR 1.7 today. Will 

continue giving coumadin. Discontinued vancomycin. On Vantin, as per ID, Dr. Guo. 





Shortness of breath


- secondary to chronic COPD vs pneumonia (hospital acquired)


- Cefepime, Vanc and Cipro started (4/7)


   - Have not been able to get vanc trough because patient refusing blood work


- ID consulted (Sari)  help appreciated


   - f/u recs


- Blood and sputum Cxs (4/7)  f/u


- CXR 4/5 - New opacity at right lung base suspicious for pneumonia. 

Questionable opacity at left base. Mild congestive change.


- CT Chest HR 3/30 - No definite CT evidence of interstitial lung disease. 

Cardiomegaly and mild-to-moderate pulmonary vascular congestion. Interval 

appearance of new round opacities/ nodules in the right upper lobe and left 

lower lobe since the previous study. The differential diagnosis includes 

multifocal pneumonia. The possibility of neoplasm is not totally excluded. 

Follow-up reassessment is recommended. Otherwise no significant interval change 

since the previous study dated 03/16/2017.





COPD (chronic obstructive pulmonary disease) Exacerbation


4/7: Duonebs every 4 hours for SOB


4/5: Added mucomyst to breathing treatments. Will get repeat CXR


3/30: CT chest with high resolution - see above


3/26: Prednisone 40mg PO daily; Taper on discharge. Will require home oxygen.


3/23: Stop Solumedrol IVP;  Start Prednisone 40mg PO daily; Taper on discharge. 

Will require home oxygen.


-3/22: Solumedrol reduced to 40mg IVP Q12H. Prior to discharge, change 

Solumedrol -> Prednisone


-3/13-3/22: Continue BIPAP, patient more compliant (sometimes refuses despite 

being SOB).


-3/10: RAPID called due to SOB. BP 80/50's. Bolused 500cc NS. Ordered BIPAP and 

ABG, however patient refused. Placed back on NC 3L.


-3/8-3/9: Patient complaining of SOB with exertion and orthopnea. Maintain Head 

of bed elevated 30 degrees.


-3/7:  PT session: 98% O2 at 2L at rest, 96% room air at rest sitting, 82% room 

air during ambulation, 88% at 2 liter ambulation.


-3/6: Walked 20 steps today down parsons, and patient became dizzy, SOB, and 

almost collapsed.


- Consulted Pulmonology, Dr. Varela, f/u recs


   -planning for home O2


   - Aware of HR in 140's and SOB after eating and low levels of exertion.


   -LDH 654H


   -HIV - negative


   -persistent shortness of breath


   - Patient with long history of smoking most likely has underlying COPD.  

Nebulizer treatment.  Inhaled steroids.  Spiriva.  PFT as out pt








Systolic dysfunction with acute on chronic heart failure


- 4/5: dig level low, increase to 0.25


- EF 10-15%, mechanical MV- no regurg, tricuspid regurg. see full report


-Chest CT 3/16 - Cardiomegaly. Cardiac valve prosthesis. Left-sided AICD. Dense 

coronary artery calcifications. 


   -Small consolidation (favored to reflect atelectasis rather than pneumonia) 

at the left lung base. 


   -Bibasilar atelectasis. Small airways disease. Mild ground-glass and fine 

nodular opacities within the right upper lobe, possibly infectious or 

inflammatory.    


   -7 mm left upper lobe pulmonary nodule. Guidelines by the Fleischner society 

(radiology 2005; 237:390 5-400) suggests that in patients with low risk for 

lung cancer, with nodules less than or equal to 8 mm in diameter should have 

follow-up in approximately 6-12 months.  In patients with high-risk, including 

smokers, follow-up is recommended 3-6 months.  Patient with a known malignancy 

for risk for metastases should receive 3 month follow-up. 


   -Hepatic capsular calcification. 


   -Numerous low-density lesions throughout the liver, possibly cysts. 

Decompressed gallbladder limits evaluation. Bilateral hypodense renal lesions. 

Right adrenal gland hypertrophy. 12 mm left adrenal gland nodule measures 

approximately 9 Hounsfield units, likely adenoma.


-Consulted Cardiology, Dr. Dotson - f/u recs


   -3/16: Lasix 40mg IVP daily, Spironolactone 25mg daily. CXR - no interval 

pathology change. 


   -3/13: HOLD LASIX FOR 1 TO 2 DAYS AND CONT MUCINEX.  PT PRERENAL AND APPEARS 

DRY.  ON BOTH LASIX AND SPIRONOLACTONE.


   -SOB APPEARS TO BE SECONDARY TO PULM ETIOLOGY.  IMPROVES WITH NEBS.  PT WITH 

RHONCHI B/L.  MAY BENEFIT FROM PULM TOILET.


   [facility].


   -likely deconditioned.  will benefit from rehab


-3/10: RAPID called due to SOB. BP 80/50's. Bolused 500cc NS. Ordered BIPAP and 

ABG, however patient refused. Placed back on NC 3L. ProBNP 4070H (less than # 

on admission) and SHEYLA negative. 


-3/10: Decrease to Lasix 40mg PO daily (was BID).


Admit to telemetry, BNP 4560 on admission


Lisinopril 10mg daily


Aldactone 25mg PO Daily


patient with AICD, will check echo to determine EF


CXR 2/25: mild cardiomegaly, mild pulmonary venous congestion.  s/p sternotomy, 

aortic valve replacement, AICD








H/O mitral valve replacement with mechanical valve


Monitor INR and adjust Coumadin as necessary


Cardiac valve replacement 6-7yrs ago


Consulted Cardiology, Dr. Dotson - f/u recs


   -echocardiogram reviewed.  valve leaflets are opening and functional.  There 

is no signficant gradient across the valve.  


   -recommend INR 2.5 to 3.5.


   STOP Lovenox 3/5


   STOP Heparin Drip - cardiac protocol, with bolus (because patient is 

refusing blood draws)








Chronic atrial fibrillation


Consulted Cardiology, Dr. Dotson - f/u recs


   -Rate controlled


   -echocardiogram reviewed.  valve leaflets are opening and functional.  There 

is no significant gradient across the valve.  


   -recommend INR 2.5 to 3.5.


   -See PT/INR trends above


   Coumadin as above


   - Patient's INR is difficult to maintain with the therapeutic range 

therefore we will give another 3 mg tonight and check INR tomorrow. Will try to 

      convince the patient to get bloodwork done today.


   STOP Lovenox 3/5








Cough, acute


mucomyst


-Mucinex 600mg PO BID


-3/22: cough intermittent


-3/18 does not complain of cough


-3/13-3/16: Persists. Non-productive. Continue Mucinex.


-3/9: patient developed productive cough with yellow sputum today.








CAD (coronary artery disease) 


Consulted Cardiology, Dr. Dotson - f/u recs


   -Aware of HR in 140's and SOB after eating and low levels of exertion.


   -unknown graft status.  no evidence of ischemia at present


   -No current angina





History of MI (myocardial infarction)


Crestor 10mg PO HS resumed


HOLD Crestor 10mg PO HS (last dose 3/12- due to elevated LFTs)


ROMIs negative x3


EKG paced 


Denies chest pain 





Lower extremity edema


LE Duplex - negative. see full report.





Prophylactic measure


Coumadin as above.


protonix 40mg daily


SCD contraindicated due to LE edema


D/C when bed available





<Evgeny Elizabeth Jr. - Last Filed: 04/14/17 16:56>





Objective





- Vital Signs/Intake and Output


Vital Signs (last 24 hours): 


 











Temp Pulse Resp BP Pulse Ox


 


 97.5 F L  74   20   99/61 L  99 


 


 04/14/17 15:41  04/14/17 15:41  04/14/17 15:41  04/14/17 15:41  04/14/17 15:41








Intake and Output: 


 











 04/14/17 04/14/17





 06:59 18:59


 


Intake Total 760 


 


Output Total 1100 


 


Balance -340 














- Medications


Medications: 


 Current Medications





Acetylcysteine (Acetylcysteine 20%)  4 ml INH RQ4 [facility]


   Last Admin: 04/14/17 16:17 Dose:  4 ml


Albuterol Sulfate (Albuterol 0.042% Inhal Sol (1.25mg/3ml) Ud)  1.25 mg INH RQ4 

[facility]


   Last Admin: 04/14/17 16:17 Dose:  1.25 mg


Benzocaine/Menthol (Cepacol Sore Throat)  1 keegan MT Q6H PRN


   PRN Reason: Sore Throat


   Last Admin: 04/09/17 09:32 Dose:  1 keegan


Budesonide (Pulmicort Respules)  0.5 mg INH RQ12 [facility]


   Last Admin: 04/07/17 07:34 Dose:  0.5 mg


Cefpodoxime Proxetil (Vantin)  200 mg PO Q12H [facility]


   Last Admin: 04/14/17 14:44 Dose:  200 mg


Digoxin (Lanoxin)  0.25 mg PO DAILY@1800 [facility]


   Last Admin: 04/13/17 17:32 Dose:  0.25 mg


Famotidine (Pepcid)  20 mg PO DAILY [facility]


   Last Admin: 04/14/17 11:19 Dose:  20 mg


Guaifenesin (Mucinex La)  600 mg PO BID [facility]


   Last Admin: 04/14/17 11:19 Dose:  600 mg


Ipratropium Bromide (Atrovent)  0.5 mg IH RQ4 [facility]


   Last Admin: 04/14/17 16:17 Dose:  0.5 mg


Lisinopril (Zestril)  2.5 mg PO DAILY [facility]


   Last Admin: 04/14/17 11:19 Dose:  2.5 mg


Prednisone (Prednisone Tab)  20 mg PO DAILY [facility]


   Last Admin: 04/14/17 11:19 Dose:  20 mg


Spironolactone (Aldactone)  25 mg PO DAILY [facility]


   Last Admin: 04/14/17 11:19 Dose:  25 mg











- Labs


Labs: 


 





 04/14/17 11:24 





 04/14/17 11:24 





 











PT  19.9 SECONDS (9.7-12.2)  H  04/14/17  11:24    


 


INR  1.7   04/14/17  11:24    


 


APTT  41 SECONDS (21-34)  H  04/10/17  13:23    














Attending/Attestation





- Attestation


I have personally seen and examined this patient.: Yes


I have fully participated in the care of the patient.: Yes


I have reviewed all pertinent clinical information, including history, physical 

exam and plan: Yes


Notes (Text): 





04/14/17 16:56


Patient seen and examined. Reviewed resident note and agree with Plan of Care 

and Findings

## 2017-04-14 NOTE — CP.PCM.PN
<Jsutin Avila - Last Filed: 04/14/17 23:11>





Subjective





- Date & Time of Evaluation


Date of Evaluation: 04/14/17


Time of Evaluation: 07:52





- Subjective


Subjective: 


Pt seen and examined. Pt still has complaints of shortness of breath and cough. 

Pt denies fever, chills, chest pain, nausea, and vomiting.





Objective





- Vital Signs/Intake and Output


Vital Signs (last 24 hours): 


 











Temp Pulse Resp BP Pulse Ox


 


 97.5 F L  74   20   99/61 L  99 


 


 04/14/17 15:41  04/14/17 16:00  04/14/17 15:41  04/14/17 15:41  04/14/17 15:41











- Medications


Medications: 


 Current Medications





Acetylcysteine (Acetylcysteine 20%)  4 ml INH RQ4 [facility]


   Last Admin: 04/14/17 19:30 Dose:  4 ml


Albuterol Sulfate (Albuterol 0.042% Inhal Sol (1.25mg/3ml) Ud)  1.25 mg INH RQ4 

[facility]


   Last Admin: 04/14/17 19:30 Dose:  1.25 mg


Benzocaine/Menthol (Cepacol Sore Throat)  1 keegan MT Q6H PRN


   PRN Reason: Sore Throat


   Last Admin: 04/09/17 09:32 Dose:  1 keegan


Budesonide (Pulmicort Respules)  0.5 mg INH RQ12 [facility]


   Last Admin: 04/07/17 07:34 Dose:  0.5 mg


Cefpodoxime Proxetil (Vantin)  200 mg PO Q12H [facility]


   Last Admin: 04/14/17 14:44 Dose:  200 mg


Digoxin (Lanoxin)  0.25 mg PO DAILY@1800 [facility]


   Last Admin: 04/14/17 18:31 Dose:  0.25 mg


Famotidine (Pepcid)  20 mg PO DAILY [facility]


   Last Admin: 04/14/17 11:19 Dose:  20 mg


Guaifenesin (Mucinex La)  600 mg PO BID [facility]


   Last Admin: 04/14/17 18:31 Dose:  600 mg


Ipratropium Bromide (Atrovent)  0.5 mg IH RQ4 [facility]


   Last Admin: 04/14/17 19:30 Dose:  0.5 mg


Lisinopril (Zestril)  2.5 mg PO DAILY [facility]


   Last Admin: 04/14/17 11:19 Dose:  2.5 mg


Prednisone (Prednisone Tab)  20 mg PO DAILY [facility]


   Last Admin: 04/14/17 11:19 Dose:  20 mg


Spironolactone (Aldactone)  25 mg PO DAILY [facility]


   Last Admin: 04/14/17 11:19 Dose:  25 mg











- Labs


Labs: 


 





 04/14/17 11:24 





 04/14/17 11:24 





 











PT  19.9 SECONDS (9.7-12.2)  H  04/14/17  11:24    


 


INR  1.7   04/14/17  11:24    


 


APTT  41 SECONDS (21-34)  H  04/10/17  13:23    














- Constitutional


Appears: No Acute Distress





- Head Exam


Head Exam: ATRAUMATIC, NORMOCEPHALIC





- Eye Exam


Eye Exam: EOMI, PERRL





- ENT Exam


ENT Exam: Mucous Membranes Moist.  absent: Mucous Membranes Dry





- Neck Exam


Neck Exam: Full ROM





- Respiratory Exam


Respiratory Exam: Rhonchi, Wheezes.  absent: Clear to Ausculation Bilateral





- Cardiovascular Exam


Cardiovascular Exam: Gallop, +S1, +S2.  absent: Rubs, Murmur





- GI/Abdominal Exam


GI & Abdominal Exam: Soft, Normal Bowel Sounds.  absent: Distended, Tenderness





- Extremities Exam


Extremities Exam: Full ROM.  absent: Pedal Edema





- Neurological Exam


Neurological Exam: Alert, Awake, Oriented x3





- Psychiatric Exam


Psychiatric exam: Normal Affect, Normal Mood





- Skin


Skin Exam: Normal Color, Warm





Assessment and Plan





- Assessment and Plan (Free Text)


Assessment: 


4/14/17: Pt pending discharge to [facility] once wife confirms pt's address 

and permanent residence. Pt not clear for discharge until wife is contacted. 

[facility] has been unable to contact wife thus far. Pt is medically stable for 

discharge to [facility]. Pt receiving daily coumadin 3 mg. Pt's INR 1.7 today. Will 

continue giving coumadin. On Vantin, as per ID, Dr. Guo. 





Shortness of breath


- secondary to chronic COPD vs pneumonia (hospital acquired)


- Cefepime, Vanc and Cipro started (4/7)


   - Have not been able to get vanc trough because patient refusing blood work


- ID consulted (Sari)  help appreciated


   - f/u recs


- Blood and sputum Cxs (4/7)  f/u


- CXR 4/5 - New opacity at right lung base suspicious for pneumonia. 

Questionable opacity at left base. Mild congestive change.


- CT Chest HR 3/30 - No definite CT evidence of interstitial lung disease. 

Cardiomegaly and mild-to-moderate pulmonary vascular congestion. Interval 

appearance of new round opacities/ nodules in the right upper lobe and left 

lower lobe since the previous study. The differential diagnosis includes 

multifocal pneumonia. The possibility of neoplasm is not totally excluded. 

Follow-up reassessment is recommended. Otherwise no significant interval change 

since the previous study dated 03/16/2017.





COPD (chronic obstructive pulmonary disease) Exacerbation


4/7: Duonebs every 4 hours for SOB


4/5: Added mucomyst to breathing treatments. Will get repeat CXR


3/30: CT chest with high resolution - see above


3/26: Prednisone 40mg PO daily; Taper on discharge. Will require home oxygen.


3/23: Stop Solumedrol IVP;  Start Prednisone 40mg PO daily; Taper on discharge. 

Will require home oxygen.


-3/22: Solumedrol reduced to 40mg IVP Q12H. Prior to discharge, change 

Solumedrol -> Prednisone


-3/13-3/22: Continue BIPAP, patient more compliant (sometimes refuses despite 

being SOB).


-3/10: RAPID called due to SOB. BP 80/50's. Bolused 500cc NS. Ordered BIPAP and 

ABG, however patient refused. Placed back on NC 3L.


-3/8-3/9: Patient complaining of SOB with exertion and orthopnea. Maintain Head 

of bed elevated 30 degrees.


-3/7:  PT session: 98% O2 at 2L at rest, 96% room air at rest sitting, 82% room 

air during ambulation, 88% at 2 liter ambulation.


-3/6: Walked 20 steps today down parsons, and patient became dizzy, SOB, and 

almost collapsed.


- Consulted Pulmonology, Dr. Varela, f/u recs


   -planning for home O2


   - Aware of HR in 140's and SOB after eating and low levels of exertion.


   -LDH 654H


   -HIV - negative


   -persistent shortness of breath


   - Patient with long history of smoking most likely has underlying COPD.  

Nebulizer treatment.  Inhaled steroids.  Spiriva.  PFT as out pt








Systolic dysfunction with acute on chronic heart failure


- 4/5: dig level low, increase to 0.25


- EF 10-15%, mechanical MV- no regurg, tricuspid regurg. see full report


-Chest CT 3/16 - Cardiomegaly. Cardiac valve prosthesis. Left-sided AICD. Dense 

coronary artery calcifications. 


   -Small consolidation (favored to reflect atelectasis rather than pneumonia) 

at the left lung base. 


   -Bibasilar atelectasis. Small airways disease. Mild ground-glass and fine 

nodular opacities within the right upper lobe, possibly infectious or 

inflammatory.    


   -7 mm left upper lobe pulmonary nodule. Guidelines by the Fleischner society 

(radiology 2005; 237:390 5-400) suggests that in patients with low risk for 

lung cancer, with nodules less than or equal to 8 mm in diameter should have 

follow-up in approximately 6-12 months.  In patients with high-risk, including 

smokers, follow-up is recommended 3-6 months.  Patient with a known malignancy 

for risk for metastases should receive 3 month follow-up. 


   -Hepatic capsular calcification. 


   -Numerous low-density lesions throughout the liver, possibly cysts. 

Decompressed gallbladder limits evaluation. Bilateral hypodense renal lesions. 

Right adrenal gland hypertrophy. 12 mm left adrenal gland nodule measures 

approximately 9 Hounsfield units, likely adenoma.


-Consulted Cardiology, Dr. Dotson - f/u recs


   -3/16: Lasix 40mg IVP daily, Spironolactone 25mg daily. CXR - no interval 

pathology change. 


   -3/13: HOLD LASIX FOR 1 TO 2 DAYS AND CONT MUCINEX.  PT PRERENAL AND APPEARS 

DRY.  ON BOTH LASIX AND SPIRONOLACTONE.


   -SOB APPEARS TO BE SECONDARY TO PULM ETIOLOGY.  IMPROVES WITH NEBS.  PT WITH 

RHONCHI B/L.  MAY BENEFIT FROM PULM TOILET.


   [facility].


   -likely deconditioned.  will benefit from rehab


-3/10: RAPID called due to SOB. BP 80/50's. Bolused 500cc NS. Ordered BIPAP and 

ABG, however patient refused. Placed back on NC 3L. ProBNP 4070H (less than # 

on admission) and SHEYLA negative. 


-3/10: Decrease to Lasix 40mg PO daily (was BID).


Admit to telemetry, BNP 4560 on admission


Lisinopril 10mg daily


Aldactone 25mg PO Daily


patient with AICD, will check echo to determine EF


CXR 2/25: mild cardiomegaly, mild pulmonary venous congestion.  s/p sternotomy, 

aortic valve replacement, AICD








H/O mitral valve replacement with mechanical valve


Monitor INR and adjust Coumadin as necessary


Cardiac valve replacement 6-7yrs ago


Consulted Cardiology, Dr. Dotson - f/u recs


   -echocardiogram reviewed.  valve leaflets are opening and functional.  There 

is no signficant gradient across the valve.  


   -recommend INR 2.5 to 3.5.


   STOP Lovenox 3/5


   STOP Heparin Drip - cardiac protocol, with bolus (because patient is 

refusing blood draws)








Chronic atrial fibrillation


Consulted Cardiology, Dr. Dotson - f/u recs


   -Rate controlled


   -echocardiogram reviewed.  valve leaflets are opening and functional.  There 

is no significant gradient across the valve.  


   -recommend INR 2.5 to 3.5.


   -See PT/INR trends above


   Coumadin as above


   - Patient's INR is difficult to maintain with the therapeutic range 

therefore we will give another 3 mg tonight and check INR tomorrow. Will try to 

      convince the patient to get bloodwork done today.


   STOP Lovenox 3/5








Cough, acute


mucomyst


-Mucinex 600mg PO BID


-3/22: cough intermittent


-3/18 does not complain of cough


-3/13-3/16: Persists. Non-productive. Continue Mucinex.


-3/9: patient developed productive cough with yellow sputum today.








CAD (coronary artery disease) 


Consulted Cardiology, Dr. Dotson - f/u recs


   -Aware of HR in 140's and SOB after eating and low levels of exertion.


   -unknown graft status.  no evidence of ischemia at present


   -No current angina





History of MI (myocardial infarction)


Crestor 10mg PO HS resumed


HOLD Crestor 10mg PO HS (last dose 3/12- due to elevated LFTs)


ROMIs negative x3


EKG paced 


Denies chest pain 





Lower extremity edema


LE Duplex - negative. see full report.





Prophylactic measure


Coumadin as above.


protonix 40mg daily


SCD contraindicated due to LE edema


D/C when bed available





<Evgeny Elizabeth Jr. - Last Filed: 04/30/17 13:39>





Objective





- Vital Signs/Intake and Output


Vital Signs (last 24 hours): 


 











Temp Pulse Resp BP Pulse Ox


 


 97.4 F L  97 H  20   100/62   96 


 


 04/20/17 15:22  04/20/17 16:00  04/20/17 15:22  04/20/17 15:22  04/20/17 15:22











- Labs


Labs: 


 





 04/17/17 10:58 





 04/17/17 10:58 





 











PT  12.4 SECONDS (9.7-12.2)  H D 04/19/17  16:50    


 


INR  1.1  D 04/19/17  16:50    


 


APTT  30 SECONDS (21-34)   04/17/17  10:58    














Attending/Attestation





- Attestation


I have personally seen and examined this patient.: Yes


I have fully participated in the care of the patient.: Yes


I have reviewed all pertinent clinical information, including history, physical 

exam and plan: Yes


Notes (Text): 





04/30/17 13:38


Agree with resident note and findings

## 2017-04-15 NOTE — CP.PCM.PN
<NirmalaEly - Last Filed: 04/14/17 23:59>





Subjective





- Date & Time of Evaluation


Date of Evaluation: 04/15/17


Time of Evaluation: 00:00





- Subjective


Subjective: 


Pt seen and examined. Pt complains of cough. Pt denies fever, chills, chest pain

, nausea, and vomiting.





Objective





- Vital Signs/Intake and Output


Vital Signs (last 24 hours): 


 











Temp Pulse Resp BP Pulse Ox


 


 97.5 F L  74   20   99/61 L  99 


 


 04/14/17 15:41  04/14/17 16:00  04/14/17 15:41  04/14/17 15:41  04/14/17 15:41











- Medications


Medications: 


 Current Medications





Acetylcysteine (Acetylcysteine 20%)  4 ml INH RQ4 [facility]


   Last Admin: 04/14/17 23:51 Dose:  Not Given


Albuterol Sulfate (Albuterol 0.042% Inhal Sol (1.25mg/3ml) Ud)  1.25 mg INH RQ4 

[facility]


   Last Admin: 04/14/17 23:51 Dose:  Not Given


Benzocaine/Menthol (Cepacol Sore Throat)  1 keegan MT Q6H PRN


   PRN Reason: Sore Throat


   Last Admin: 04/09/17 09:32 Dose:  1 keegan


Budesonide (Pulmicort Respules)  0.5 mg INH RQ12 [facility]


   Last Admin: 04/07/17 07:34 Dose:  0.5 mg


Cefpodoxime Proxetil (Vantin)  200 mg PO Q12H [facility]


   Last Admin: 04/14/17 14:44 Dose:  200 mg


Digoxin (Lanoxin)  0.25 mg PO DAILY@1800 [facility]


   Last Admin: 04/14/17 18:31 Dose:  0.25 mg


Famotidine (Pepcid)  20 mg PO DAILY [facility]


   Last Admin: 04/14/17 11:19 Dose:  20 mg


Guaifenesin (Mucinex La)  600 mg PO BID [facility]


   Last Admin: 04/14/17 18:31 Dose:  600 mg


Ipratropium Bromide (Atrovent)  0.5 mg IH RQ4 [facility]


   Last Admin: 04/14/17 23:51 Dose:  Not Given


Lisinopril (Zestril)  2.5 mg PO DAILY [facility]


   Last Admin: 04/14/17 11:19 Dose:  2.5 mg


Prednisone (Prednisone Tab)  20 mg PO DAILY [facility]


   Last Admin: 04/14/17 11:19 Dose:  20 mg


Spironolactone (Aldactone)  25 mg PO DAILY [facility]


   Last Admin: 04/14/17 11:19 Dose:  25 mg











- Labs


Labs: 


 





 04/14/17 11:24 





 04/14/17 11:24 





 











PT  19.9 SECONDS (9.7-12.2)  H  04/14/17  11:24    


 


INR  1.7   04/14/17  11:24    


 


APTT  41 SECONDS (21-34)  H  04/10/17  13:23    














- Constitutional


Appears: No Acute Distress





- Head Exam


Head Exam: ATRAUMATIC, NORMAL INSPECTION, NORMOCEPHALIC





- Eye Exam


Eye Exam: EOMI, PERRL





- ENT Exam


ENT Exam: Mucous Membranes Moist





- Neck Exam


Neck Exam: Full ROM





- Respiratory Exam


Respiratory Exam: Rhonchi, Wheezes





- Cardiovascular Exam


Cardiovascular Exam: +S1, +S2.  absent: Murmur





- GI/Abdominal Exam


GI & Abdominal Exam: Soft, Normal Bowel Sounds.  absent: Tenderness





- Extremities Exam


Extremities Exam: Normal Inspection.  absent: Pedal Edema, Tenderness





- Neurological Exam


Neurological Exam: Alert, Awake, Oriented x3





- Psychiatric Exam


Psychiatric exam: Normal Affect, Normal Mood





- Skin


Skin Exam: Intact, Normal Color





Assessment and Plan





- Assessment and Plan (Free Text)


Assessment: 


Shortness of breath


- secondary to chronic COPD vs pneumonia (hospital acquired)


- Cefepime, Vanc and Cipro started (4/7)


   - Have not been able to get vanc trough because patient refusing blood work


- ID consulted (Sari)  help appreciated


   - f/u recs


- Blood and sputum Cxs (4/7)  f/u


- CXR 4/5 - New opacity at right lung base suspicious for pneumonia. 

Questionable opacity at left base. Mild congestive change.


- CT Chest HR 3/30 - No definite CT evidence of interstitial lung disease. 

Cardiomegaly and mild-to-moderate pulmonary vascular congestion. Interval 

appearance of new round opacities/ nodules in the right upper lobe and left 

lower lobe since the previous study. The differential diagnosis includes 

multifocal pneumonia. The possibility of neoplasm is not totally excluded. 

Follow-up reassessment is recommended. Otherwise no significant interval change 

since the previous study dated 03/16/2017.





COPD (chronic obstructive pulmonary disease) Exacerbation


4/7: Duonebs every 4 hours for SOB


4/5: Added mucomyst to breathing treatments. Will get repeat CXR


3/30: CT chest with high resolution - see above


3/26: Prednisone 40mg PO daily; Taper on discharge. Will require home oxygen.


3/23: Stop Solumedrol IVP;  Start Prednisone 40mg PO daily; Taper on discharge. 

Will require home oxygen.


-3/22: Solumedrol reduced to 40mg IVP Q12H. Prior to discharge, change 

Solumedrol -> Prednisone


-3/13-3/22: Continue BIPAP, patient more compliant (sometimes refuses despite 

being SOB).


-3/10: RAPID called due to SOB. BP 80/50's. Bolused 500cc NS. Ordered BIPAP and 

ABG, however patient refused. Placed back on NC 3L.


-3/8-3/9: Patient complaining of SOB with exertion and orthopnea. Maintain Head 

of bed elevated 30 degrees.


-3/7:  PT session: 98% O2 at 2L at rest, 96% room air at rest sitting, 82% room 

air during ambulation, 88% at 2 liter ambulation.


-3/6: Walked 20 steps today down parsons, and patient became dizzy, SOB, and 

almost collapsed.


- Consulted Pulmonology, Dr. Varela, f/u recs


   -planning for home O2


   - Aware of HR in 140's and SOB after eating and low levels of exertion.


   -LDH 654H


   -HIV - negative


   -persistent shortness of breath


   - Patient with long history of smoking most likely has underlying COPD.  

Nebulizer treatment.  Inhaled steroids.  Spiriva.  PFT as out pt








Systolic dysfunction with acute on chronic heart failure


- 4/5: dig level low, increase to 0.25


- EF 10-15%, mechanical MV- no regurg, tricuspid regurg. see full report


-Chest CT 3/16 - Cardiomegaly. Cardiac valve prosthesis. Left-sided AICD. Dense 

coronary artery calcifications. 


   -Small consolidation (favored to reflect atelectasis rather than pneumonia) 

at the left lung base. 


   -Bibasilar atelectasis. Small airways disease. Mild ground-glass and fine 

nodular opacities within the right upper lobe, possibly infectious or 

inflammatory.    


   -7 mm left upper lobe pulmonary nodule. Guidelines by the Fleischner society 

(radiology 2005; 237:390 5-400) suggests that in patients with low risk for 

lung cancer, with nodules less than or equal to 8 mm in diameter should have 

follow-up in approximately 6-12 months.  In patients with high-risk, including 

smokers, follow-up is recommended 3-6 months.  Patient with a known malignancy 

for risk for metastases should receive 3 month follow-up. 


   -Hepatic capsular calcification. 


   -Numerous low-density lesions throughout the liver, possibly cysts. 

Decompressed gallbladder limits evaluation. Bilateral hypodense renal lesions. 

Right adrenal gland hypertrophy. 12 mm left adrenal gland nodule measures 

approximately 9 Hounsfield units, likely adenoma.


-Consulted Cardiology, Dr. Dotson - f/u recs


   -3/16: Lasix 40mg IVP daily, Spironolactone 25mg daily. CXR - no interval 

pathology change. 


   -3/13: HOLD LASIX FOR 1 TO 2 DAYS AND CONT MUCINEX.  PT PRERENAL AND APPEARS 

DRY.  ON BOTH LASIX AND SPIRONOLACTONE.


   -SOB APPEARS TO BE SECONDARY TO PULM ETIOLOGY.  IMPROVES WITH NEBS.  PT WITH 

[facility] B/L.  MAY BENEFIT FROM PULM TOILET.


   -will attempt to obtain company of [facility] for interrogation.


   -likely deconditioned.  will benefit from rehab


-3/10: RAPID called due to SOB. BP 80/50's. Bolused 500cc NS. Ordered BIPAP and 

ABG, however patient refused. Placed back on NC 3L. ProBNP 4070H (less than # 

on admission) and SHEYLA negative. 


-3/10: Decrease to Lasix 40mg PO daily (was BID).


Admit to telemetry, BNP 4560 on admission


Lisinopril 10mg daily


Aldactone 25mg PO Daily


patient with AICD, will check echo to determine EF


CXR 2/25: mild cardiomegaly, mild pulmonary venous congestion.  s/p sternotomy, 

aortic valve replacement, AICD








H/O mitral valve replacement with mechanical valve


Coumadin 3 mg po today. Check INR 


Monitor INR and adjust Coumadin as necessary


Cardiac valve replacement 6-7yrs ago


Consulted Cardiology, Dr. Dotson - f/u recs


   -echocardiogram reviewed.  valve leaflets are opening and functional.  There 

is no signficant gradient across the valve.  


   -recommend INR 2.5 to 3.5.


   STOP Lovenox 3/5


   STOP Heparin Drip - cardiac protocol, with bolus (because patient is 

refusing blood draws)








Chronic atrial fibrillation


Consulted Cardiology, Dr. Dotson - f/u recs


   -Rate controlled


   -echocardiogram reviewed.  valve leaflets are opening and functional.  There 

is no significant gradient across the valve.  


   -recommend INR 2.5 to 3.5.


   -See PT/INR trends above


   Coumadin as above


   - Patient's INR is difficult to maintain with the therapeutic range 

therefore we will give another 3 mg tonight and check INR tomorrow. Will try to 

      convince the patient to get bloodwork done today.


   STOP Lovenox 3/5








Cough, acute


mucomyst


-Mucinex 600mg PO BID


-3/22: cough intermittent


-3/18 does not complain of cough


-3/13-3/16: Persists. Non-productive. Continue Mucinex.


-3/9: patient developed productive cough with yellow sputum today.








CAD (coronary artery disease) 


Consulted Cardiology, Dr. Dotson - f/u recs


   -Aware of HR in 140's and SOB after eating and low levels of exertion.


   -unknown graft status.  no evidence of ischemia at present


   -No current angina





History of MI (myocardial infarction)


Crestor 10mg PO HS resumed


HOLD Crestor 10mg PO HS (last dose 3/12- due to elevated LFTs)


ROMIs negative x3


EKG paced 


Denies chest pain 





Lower extremity edema


LE Duplex - negative. see full report.





Prophylactic measure


Coumadin as above.


protonix 40mg daily


SCD contraindicated due to LE edema


D/C when bed available








<Evgeny Eliazbeth Jr. - Last Filed: 04/30/17 13:40>





Objective





- Vital Signs/Intake and Output


Vital Signs (last 24 hours): 


 











Temp Pulse Resp BP Pulse Ox


 


 97.4 F L  97 H  20   100/62   96 


 


 04/20/17 15:22  04/20/17 16:00  04/20/17 15:22  04/20/17 15:22  04/20/17 15:22











- Labs


Labs: 


 





 04/17/17 10:58 





 04/17/17 10:58 





 











PT  12.4 SECONDS (9.7-12.2)  H D 04/19/17  16:50    


 


INR  1.1  D 04/19/17  16:50    


 


APTT  30 SECONDS (21-34)   04/17/17  10:58    














Attending/Attestation





- Attestation


I have personally seen and examined this patient.: Yes


I have fully participated in the care of the patient.: Yes


I have reviewed all pertinent clinical information, including history, physical 

exam and plan: Yes


Notes (Text): 





04/30/17 13:40


Agree with findings and plan

## 2017-04-16 NOTE — CP.PCM.PN
<Macie Zafar I - Last Filed: 04/16/17 09:11>





Subjective





- Date & Time of Evaluation


Date of Evaluation: 04/16/17


Time of Evaluation: 08:40





- Subjective


Subjective: 


PGY3 on Medicine Service





Patient seen and examined at bedside. States that he feels, "so-so". Denies 

chest pain, worsening SOB, headache, dizziness, fevers, chills, abdominal pain, 

nausea, vomiting or difficulty ambulating or tolerating diet. 





Objective





- Vital Signs/Intake and Output


Vital Signs (last 24 hours): 


 











Temp Pulse Resp BP Pulse Ox


 


 97.5 F L  62   20   134/62   98 


 


 04/16/17 08:39  04/16/17 08:39  04/16/17 08:39  04/15/17 23:35  04/16/17 08:39








Intake and Output: 


 











 04/16/17 04/16/17





 06:59 18:59


 


Output Total 800 


 


Balance -800 














- Medications


Medications: 


 Current Medications





Acetylcysteine (Acetylcysteine 20%)  4 ml INH RQ4 [facility] include [facility] beds at the [facility]


   Last Admin: 04/16/17 03:14 Dose:  Not Given


Albuterol Sulfate (Albuterol 0.042% Inhal Sol (1.25mg/3ml) Ud)  1.25 mg INH RQ4 

Counts include 234 beds at the [facility]


   Last Admin: 04/16/17 03:14 Dose:  Not Given


Benzocaine/Menthol (Cepacol Sore Throat)  1 keegan MT Q6H PRN


   PRN Reason: Sore Throat


   Last Admin: 04/15/17 09:49 Dose:  1 keegan


Budesonide (Pulmicort Respules)  0.5 mg INH RQ12 Counts include 234 beds at the [facility]


   Last Admin: 04/07/17 07:34 Dose:  0.5 mg


Cefpodoxime Proxetil (Vantin)  200 mg PO Q12H Counts include 234 beds at the [facility]


   Last Admin: 04/15/17 14:21 Dose:  200 mg


Digoxin (Lanoxin)  0.25 mg PO DAILY@1800 Counts include 234 beds at the [facility]


   Last Admin: 04/15/17 18:18 Dose:  0.25 mg


Famotidine (Pepcid)  20 mg PO DAILY Counts include 234 beds at the [facility]


   Last Admin: 04/15/17 09:49 Dose:  20 mg


Guaifenesin (Mucinex La)  600 mg PO BID Counts include 234 beds at the [facility]


   Last Admin: 04/15/17 18:20 Dose:  600 mg


Ipratropium Bromide (Atrovent)  0.5 mg IH RQ4 Counts include 234 beds at the [facility]


   Last Admin: 04/16/17 03:15 Dose:  Not Given


Lisinopril (Zestril)  2.5 mg PO DAILY Counts include 234 beds at the [facility]


   Last Admin: 04/15/17 09:49 Dose:  2.5 mg


Prednisone (Prednisone Tab)  20 mg PO DAILY Counts include 234 beds at the [facility]


   Last Admin: 04/15/17 09:49 Dose:  20 mg


Spironolactone (Aldactone)  25 mg PO DAILY Counts include 234 beds at the [facility]


   Last Admin: 04/15/17 09:49 Dose:  25 mg











- Labs


Labs: 


 





 04/14/17 11:24 





 04/14/17 11:24 





 











PT  20.2 SECONDS (9.7-12.2)  H  04/15/17  17:31    


 


INR  1.8   04/15/17  17:31    


 


APTT  41 SECONDS (21-34)  H  04/10/17  13:23    














- Constitutional


Appears: Chronically Ill





- Head Exam


Head Exam: ATRAUMATIC, NORMAL INSPECTION, NORMOCEPHALIC





- Eye Exam


Eye Exam: Normal appearance





- ENT Exam


ENT Exam: Mucous Membranes Moist





- Respiratory Exam


Respiratory Exam: Decreased Breath Sounds, Prolonged Expiratory Phase, Rales.  

absent: Accessory Muscle Use, Chest Wall Tenderness, Respiratory Distress





- Cardiovascular Exam


Cardiovascular Exam: REGULAR RHYTHM, RRR, +S1, +S2





- GI/Abdominal Exam


GI & Abdominal Exam: Soft, Normal Bowel Sounds.  absent: Tenderness





- Extremities Exam


Extremities Exam: Normal Inspection.  absent: Pedal Edema





- Neurological Exam


Neurological Exam: Alert, Oriented x3





- Psychiatric Exam


Psychiatric exam: Normal Affect, Normal Mood





- Skin


Skin Exam: Dry, Normal Color, Warm





Assessment and Plan





- Assessment and Plan (Free Text)


Assessment: 


Shortness of breath


- secondary to chronic COPD vs pneumonia (hospital acquired)


- Cefepime, Vanc and Cipro started (4/7)


   - Have not been able to get vanc trough because patient refusing blood work


- ID consulted (Sari)  help appreciated


   - f/u recs


- Blood and sputum Cxs (4/7)  f/u


- CXR 4/5 - New opacity at right lung base suspicious for pneumonia. 

Questionable opacity at left base. Mild congestive change.


- CT Chest HR 3/30 - No definite CT evidence of interstitial lung disease. 

Cardiomegaly and mild-to-moderate pulmonary vascular congestion. Interval 

appearance of new round opacities/ nodules in the right upper lobe and left 

lower lobe since the previous study. The differential diagnosis includes 

multifocal pneumonia. The possibility of neoplasm is not totally excluded. 

Follow-up reassessment is recommended. Otherwise no significant interval change 

since the previous study dated 03/16/2017.





COPD (chronic obstructive pulmonary disease) Exacerbation


4/7: Duonebs every 4 hours for SOB


4/5: Added mucomyst to breathing treatments. Will get repeat CXR


3/30: CT chest with high resolution - see above


3/26: Prednisone 40mg PO daily; Taper on discharge. Will require home oxygen.


3/23: Stop Solumedrol IVP;  Start Prednisone 40mg PO daily; Taper on discharge. 

Will require home oxygen.


-3/22: Solumedrol reduced to 40mg IVP Q12H. Prior to discharge, change 

Solumedrol -> Prednisone


-3/13-3/22: Continue BIPAP, patient more compliant (sometimes refuses despite 

being SOB).


-3/10: RAPID called due to SOB. BP 80/50's. Bolused 500cc NS. Ordered BIPAP and 

ABG, however patient refused. Placed back on NC 3L.


-3/8-3/9: Patient complaining of SOB with exertion and orthopnea. Maintain Head 

of bed elevated 30 degrees.


-3/7:  PT session: 98% O2 at 2L at rest, 96% room air at rest sitting, 82% room 

air during ambulation, 88% at 2 liter ambulation.


-3/6: Walked 20 steps today down [address], and patient became dizzy, SOB, and 

almost collapsed.


- Consulted Pulmonology, Dr. Varela, f/u recs


   -planning for home O2


   - Aware of HR in 140's and SOB after eating and low levels of exertion.


   -LDH 654H


   -HIV - negative


   -persistent shortness of breath


   - Patient with long history of smoking most likely has underlying COPD.  

Nebulizer treatment.  Inhaled steroids.  Spiriva.  PFT as out pt








Systolic dysfunction with acute on chronic heart failure


- 4/5: dig level low, increase to 0.25


- EF 10-15%, mechanical MV- no regurg, tricuspid regurg. see full report


-Chest CT 3/16 - Cardiomegaly. Cardiac valve prosthesis. Left-sided AICD. Dense 

coronary artery calcifications. 


   -Small consolidation (favored to reflect atelectasis rather than pneumonia) 

at the left lung base. 


   -Bibasilar atelectasis. Small airways disease. Mild ground-glass and fine 

nodular opacities within the right upper lobe, possibly infectious or 

inflammatory.    


   -7 mm left upper lobe pulmonary nodule. Guidelines by the Fleischner society 

(radiology 2005; 237:390 5-400) suggests that in patients with low risk for 

lung cancer, with nodules less than or equal to 8 mm in diameter should have 

follow-up in approximately 6-12 months.  In patients with high-risk, including 

smokers, follow-up is recommended 3-6 months.  Patient with a known malignancy 

for risk for metastases should receive 3 month follow-up. 


   -Hepatic capsular calcification. 


   -Numerous low-density lesions throughout the liver, possibly cysts. 

Decompressed gallbladder limits evaluation. Bilateral hypodense renal lesions. 

Right adrenal gland hypertrophy. 12 mm left adrenal gland nodule measures 

approximately 9 Hounsfield units, likely adenoma.


-Consulted Cardiology, Dr. Dotson - f/u recs


   -3/16: Lasix 40mg IVP daily, Spironolactone 25mg daily. CXR - no interval 

pathology change. 


   -3/13: HOLD LASIX FOR 1 TO 2 DAYS AND CONT MUCINEX.  PT PRERENAL AND APPEARS 

DRY.  ON BOTH LASIX AND SPIRONOLACTONE.


   -SOB APPEARS TO BE SECONDARY TO PULM ETIOLOGY.  IMPROVES WITH NEBS.  PT WITH 

[facility]ON[facility] B/L.  MAY BENEFIT FROM PULM TOILET.


   [facility].


   -likely deconditioned.  will benefit from rehab


-3/10: RAPID called due to SOB. BP 80/50's. Bolused 500cc NS. Ordered BIPAP and 

ABG, however patient refused. Placed back on NC 3L. ProBNP 4070H (less than # 

on admission) and SHEYLA negative. 


-3/10: Decrease to Lasix 40mg PO daily (was BID).


Admit to telemetry, BNP 4560 on admission


Lisinopril 10mg daily


Aldactone 25mg PO Daily


patient with AICD, will check echo to determine EF


CXR 2/25: mild cardiomegaly, mild pulmonary venous congestion.  s/p sternotomy, 

aortic valve replacement, AICD








H/O mitral valve replacement with mechanical valve


Coumadin 3 mg po today. Check INR 


Monitor INR and adjust Coumadin as necessary


Cardiac valve replacement 6-7yrs ago


Consulted Cardiology, Dr. Dotson - f/u recs


   -echocardiogram reviewed.  valve leaflets are opening and functional.  There 

is no signficant gradient across the valve.  


   -recommend INR 2.5 to 3.5.


   STOP Lovenox 3/5


   STOP Heparin Drip - cardiac protocol, with bolus (because patient is 

refusing blood draws)








Chronic atrial fibrillation


Consulted Cardiology, Dr. Dotson - f/u recs


   -Rate controlled


   -echocardiogram reviewed.  valve leaflets are opening and functional.  There 

is no significant gradient across the valve.  


   -recommend INR 2.5 to 3.5.


   -See PT/INR trends above


   Coumadin as above


   - Patient's INR is difficult to maintain with the therapeutic range 

therefore we will give another 3 mg tonight and check INR tomorrow. Will try to 

      convince the patient to get bloodwork done today.


   STOP Lovenox 3/5








Cough, acute


mucomyst


-Mucinex 600mg PO BID


-3/22: cough intermittent


-3/18 does not complain of cough


-3/13-3/16: Persists. Non-productive. Continue Mucinex.


-3/9: patient developed productive cough with yellow sputum today.








CAD (coronary artery disease) 


Consulted Cardiology, Dr. Dotson - f/u recs


   -Aware of HR in 140's and SOB after eating and low levels of exertion.


   -unknown graft status.  no evidence of ischemia at present


   -No current angina





History of MI (myocardial infarction)


Crestor 10mg PO HS resumed


HOLD Crestor 10mg PO HS (last dose 3/12- due to elevated LFTs)


ROMIs negative x3


EKG paced 


Denies chest pain 





Lower extremity edema


LE Duplex - negative. see full report.





Prophylactic measure


Coumadin as above.


protonix 40mg daily


SCD contraindicated due to LE edema


D/C when bed available








<Evgeny Elizabeth Jr. - Last Filed: 04/30/17 13:42>





Objective





- Vital Signs/Intake and Output


Vital Signs (last 24 hours): 


 











Temp Pulse Resp BP Pulse Ox


 


 97.4 F L  97 H  20   100/62   96 


 


 04/20/17 15:22  04/20/17 16:00  04/20/17 15:22  04/20/17 15:22  04/20/17 15:22











- Labs


Labs: 


 





 04/17/17 10:58 





 04/17/17 10:58 





 











PT  12.4 SECONDS (9.7-12.2)  H D 04/19/17  16:50    


 


INR  1.1  D 04/19/17  16:50    


 


APTT  30 SECONDS (21-34)   04/17/17  10:58    














Attending/Attestation





- Attestation


I have personally seen and examined this patient.: Yes


I have fully participated in the care of the patient.: Yes


I have reviewed all pertinent clinical information, including history, physical 

exam and plan: Yes


Notes (Text): 





04/30/17 13:42


Agree with findings and plan

## 2017-04-17 NOTE — CP.PCM.PN
<Justin Avila - Last Filed: 04/17/17 17:30>





Subjective





- Date & Time of Evaluation


Date of Evaluation: 04/17/17


Time of Evaluation: 07:24





- Subjective


Subjective: 


Pt seen and examined. Pt complaining of shortness of breath. Pt reports that he 

gets very short of breath and light headed upon ambulation to the restroom. Pt 

denies fever, chills, chest pain, nausea, and vomiting.





Objective





- Vital Signs/Intake and Output


Vital Signs (last 24 hours): 


 











Temp Pulse Resp BP Pulse Ox


 


 97.8 F   70   20   95/60 L  99 


 


 04/17/17 07:45  04/17/17 08:00  04/17/17 07:45  04/17/17 07:45  04/17/17 07:45








Intake and Output: 


 











 04/17/17 04/17/17





 06:59 18:59


 


Output Total 650 


 


Balance -650 














- Medications


Medications: 


 Current Medications





Acetylcysteine (Acetylcysteine 20%)  4 ml INH RQ4 [facility]


   Last Admin: 04/17/17 15:42 Dose:  4 ml


Benzocaine/Menthol (Cepacol Sore Throat)  1 keegan MT Q6H PRN


   PRN Reason: Sore Throat


   Last Admin: 04/16/17 09:09 Dose:  1 keegan


Budesonide (Pulmicort Respules)  0.5 mg INH RQ12 [facility]


   Last Admin: 04/07/17 07:34 Dose:  0.5 mg


Cefpodoxime Proxetil (Vantin)  200 mg PO Q12H [facility]


   Last Admin: 04/17/17 14:10 Dose:  200 mg


Digoxin (Lanoxin)  0.25 mg PO DAILY@1800 [facility]


   Last Admin: 04/16/17 17:25 Dose:  0.25 mg


Famotidine (Pepcid)  20 mg PO DAILY [facility]


   Last Admin: 04/17/17 09:18 Dose:  20 mg


Guaifenesin (Mucinex La)  600 mg PO BID [facility]


   Last Admin: 04/17/17 09:21 Dose:  600 mg


Ipratropium Bromide (Atrovent)  0.5 mg IH RQ4 [facility]


   Last Admin: 04/17/17 15:42 Dose:  0.5 mg


Lisinopril (Zestril)  2.5 mg PO DAILY [facility]


   Last Admin: 04/17/17 09:19 Dose:  2.5 mg


Prednisone (Prednisone Tab)  20 mg PO DAILY [facility]


   Last Admin: 04/17/17 09:18 Dose:  20 mg


Spironolactone (Aldactone)  25 mg PO DAILY [facility]


   Last Admin: 04/17/17 09:18 Dose:  25 mg











- Labs


Labs: 


 





 04/17/17 10:58 





 04/17/17 10:58 





 











PT  20.3 SECONDS (9.7-12.2)  H  04/16/17  19:56    


 


INR  1.8   04/16/17  19:56    


 


APTT  30 SECONDS (21-34)   04/17/17  10:58    














- Constitutional


Appears: Toxic, No Acute Distress





- Head Exam


Head Exam: ATRAUMATIC, NORMOCEPHALIC





- Eye Exam


Eye Exam: EOMI, PERRL





- ENT Exam


ENT Exam: Mucous Membranes Moist.  absent: Mucous Membranes Dry





- Respiratory Exam


Respiratory Exam: Rhonchi





- Cardiovascular Exam


Cardiovascular Exam: +S1, +S2.  absent: Gallop, Rubs





- GI/Abdominal Exam


GI & Abdominal Exam: Soft.  absent: Tenderness





- Extremities Exam


Extremities Exam: absent: Pedal Edema





- Neurological Exam


Neurological Exam: Alert, Awake, Oriented x3





- Psychiatric Exam


Psychiatric exam: Normal Affect, Normal Mood





- Skin


Skin Exam: Normal Color, Warm





Assessment and Plan





- Assessment and Plan (Free Text)


Assessment: 


Assessment: 


4/17/17: Pt pending discharge to [facility] once wife confirms pt's address 

and permanent residence. Pt not clear for discharge until wife is contacted. 

[facility] has been unable to contact wife thus far. Pt is medically stable for 

discharge to [facility]. Pt receiving daily coumadin 3 mg. Pt's INR 1.7 today. Will 

continue giving coumadin. On Vantin, as per ID, Dr. Guo. 





Shortness of breath


- secondary to chronic COPD vs pneumonia (hospital acquired)


- Cefepime, Vanc and Cipro started (4/7)


   - Have not been able to get vanc trough because patient refusing blood work


- ID consulted (Sari)  help appreciated


   - f/u recs


- Blood and sputum Cxs (4/7)  f/u


- CXR 4/5 - New opacity at right lung base suspicious for pneumonia. 

Questionable opacity at left base. Mild congestive change.


- CT Chest HR 3/30 - No definite CT evidence of interstitial lung disease. 

Cardiomegaly and mild-to-moderate pulmonary vascular congestion. Interval 

appearance of new round opacities/ nodules in the right upper lobe and left 

lower lobe since the previous study. The differential diagnosis includes 

multifocal pneumonia. The possibility of neoplasm is not totally excluded. 

Follow-up reassessment is recommended. Otherwise no significant interval change 

since the previous study dated 03/16/2017.





COPD (chronic obstructive pulmonary disease) Exacerbation


4/7: Duonebs every 4 hours for SOB


4/5: Added mucomyst to breathing treatments. Will get repeat CXR


3/30: CT chest with high resolution - see above


3/26: Prednisone 40mg PO daily; Taper on discharge. Will require home oxygen.


3/23: Stop Solumedrol IVP;  Start Prednisone 40mg PO daily; Taper on discharge. 

Will require home oxygen.


-3/22: Solumedrol reduced to 40mg IVP Q12H. Prior to discharge, change 

Solumedrol -> Prednisone


-3/13-3/22: Continue BIPAP, patient more compliant (sometimes refuses despite 

being SOB).


-3/10: RAPID called due to SOB. BP 80/50's. Bolused 500cc NS. Ordered BIPAP and 

ABG, however patient refused. Placed back on NC 3L.


-3/8-3/9: Patient complaining of SOB with exertion and orthopnea. Maintain Head 

of bed elevated 30 degrees.


-3/7:  PT session: 98% O2 at 2L at rest, 96% room air at rest sitting, 82% room 

air during ambulation, 88% at 2 liter ambulation.


-3/6: Walked 20 steps today down parsons, and patient became dizzy, SOB, and 

almost collapsed.


- Consulted Pulmonology, Dr. Varela, f/u recs


   -planning for home O2


   - Aware of HR in 140's and SOB after eating and low levels of exertion.


   -LDH 654H


   -HIV - negative


   -persistent shortness of breath


   - Patient with long history of smoking most likely has underlying COPD.  

Nebulizer treatment.  Inhaled steroids.  Spiriva.  PFT as out pt








Systolic dysfunction with acute on chronic heart failure


- 4/5: dig level low, increase to 0.25


- EF 10-15%, mechanical MV- no regurg, tricuspid regurg. see full report


-Chest CT 3/16 - Cardiomegaly. Cardiac valve prosthesis. Left-sided AICD. Dense 

coronary artery calcifications. 


   -Small consolidation (favored to reflect atelectasis rather than pneumonia) 

at the left lung base. 


   -Bibasilar atelectasis. Small airways disease. Mild ground-glass and fine 

nodular opacities within the right upper lobe, possibly infectious or 

inflammatory.    


   -7 mm left upper lobe pulmonary nodule. Guidelines by the Fleischner society 

(radiology 2005; 237:390 5-400) suggests that in patients with low risk for 

lung cancer, with nodules less than or equal to 8 mm in diameter should have 

follow-up in approximately 6-12 months.  In patients with high-risk, including 

smokers, follow-up is recommended 3-6 months.  Patient with a known malignancy 

for risk for metastases should receive 3 month follow-up. 


   -Hepatic capsular calcification. 


   -Numerous low-density lesions throughout the liver, possibly cysts. 

Decompressed gallbladder limits evaluation. Bilateral hypodense renal lesions. 

Right adrenal gland hypertrophy. 12 mm left adrenal gland nodule measures 

approximately 9 Hounsfield units, likely adenoma.


-Consulted Cardiology, Dr. Dotson - f/u recs


   -3/16: Lasix 40mg IVP daily, Spironolactone 25mg daily. CXR - no interval 

pathology change. 


   -3/13: HOLD LASIX FOR 1 TO 2 DAYS AND CONT MUCINEX.  PT PRERENAL AND APPEARS 

DRY.  ON BOTH LASIX AND SPIRONOLACTONE.


   -SOB APPEARS TO BE SECONDARY TO PULM ETIOLOGY.  IMPROVES WITH NEBS.  PT WITH 

RHONCHI B/L.  MAY BENEFIT FROM PULM TOILET.


   -will attempt to obtain company of [facility] for interrogation.


   -likely deconditioned.  will benefit from rehab


-3/10: RAPID called due to SOB. BP 80/50's. Bolused 500cc NS. Ordered BIPAP and 

ABG, however patient refused. Placed back on NC 3L. ProBNP 4070H (less than # 

on admission) and SHEYLA negative. 


-3/10: Decrease to Lasix 40mg PO daily (was BID).


Admit to telemetry, BNP 4560 on admission


Lisinopril 10mg daily


Aldactone 25mg PO Daily


patient with AICD, will check echo to determine EF


CXR 2/25: mild cardiomegaly, mild pulmonary venous congestion.  s/p sternotomy, 

aortic valve replacement, AICD








H/O mitral valve replacement with mechanical valve


Monitor INR and adjust Coumadin as necessary


Cardiac valve replacement 6-7yrs ago


Consulted Cardiology, Dr. Dotson - f/u recs


   -echocardiogram reviewed.  valve leaflets are opening and functional.  There 

is no signficant gradient across the valve.  


   -recommend INR 2.5 to 3.5.


   STOP Lovenox 3/5


   STOP Heparin Drip - cardiac protocol, with bolus (because patient is 

refusing blood draws)








Chronic atrial fibrillation


Consulted Cardiology, Dr. Dotson - f/u recs


   -Rate controlled


   -echocardiogram reviewed.  valve leaflets are opening and functional.  There 

is no significant gradient across the valve.  


   -recommend INR 2.5 to 3.5.


   -See PT/INR trends above


   Coumadin as above


   - Patient's INR is difficult to maintain with the therapeutic range 

therefore we will give another 3 mg tonight and check INR tomorrow. Will try to 

      convince the patient to get bloodwork done today.


   STOP Lovenox 3/5








Cough, acute


mucomyst


-Mucinex 600mg PO BID


-3/22: cough intermittent


-3/18 does not complain of cough


-3/13-3/16: Persists. Non-productive. Continue Mucinex.


-3/9: patient developed productive cough with yellow sputum today.








CAD (coronary artery disease) 


Consulted Cardiology, Dr. Dotson - f/u recs


   -Aware of HR in 140's and SOB after eating and low levels of exertion.


   -unknown graft status.  no evidence of ischemia at present


   -No current angina





History of MI (myocardial infarction)


Crestor 10mg PO HS resumed


HOLD Crestor 10mg PO HS (last dose 3/12- due to elevated LFTs)


ROMIs negative x3


EKG paced 


Denies chest pain 





Prophylactic measure


Coumadin as above


protonix 40mg daily








<Evgeny Elizabeth Jr. - Last Filed: 04/30/17 13:44>





Objective





- Vital Signs/Intake and Output


Vital Signs (last 24 hours): 


 











Temp Pulse Resp BP Pulse Ox


 


 97.4 F L  97 H  20   100/62   96 


 


 04/20/17 15:22  04/20/17 16:00  04/20/17 15:22  04/20/17 15:22  04/20/17 15:22











- Labs


Labs: 


 





 04/17/17 10:58 





 04/17/17 10:58 





 











PT  12.4 SECONDS (9.7-12.2)  H D 04/19/17  16:50    


 


INR  1.1  D 04/19/17  16:50    


 


APTT  30 SECONDS (21-34)   04/17/17  10:58    














Attending/Attestation





- Attestation


I have personally seen and examined this patient.: Yes


I have fully participated in the care of the patient.: Yes


I have reviewed all pertinent clinical information, including history, physical 

exam and plan: Yes


Notes (Text): 





04/30/17 13:44


Agree with findings and plan

## 2017-04-18 NOTE — CP.PCM.DIS
Provider





- Provider


Date of Admission: 


02/25/17 15:01





Attending physician: 


Evgeny Elizabeth Jr, MD





Time Spent in preparation of Discharge (in minutes): 57





Hospital Course





- Lab Results


Lab Results: 


 Micro Results





04/07/17 Unknown   Sputum   Gram Stain - Final


04/07/17 Unknown   Sputum   Sputum Culture - Final


                                NORMAL ORAL CHAGO





 Most Recent Lab Values











WBC  7.8 K/uL (4.8-10.8)   04/17/17  10:58    


 


RBC  4.80 Mil/uL (4.40-5.90)   04/17/17  10:58    


 


Hgb  12.5 g/dL (12.0-18.0)   04/17/17  10:58    


 


Hct  38.9 % (35.0-51.0)   04/17/17  10:58    


 


MCV  81.0 fL (80.0-94.0)   04/17/17  10:58    


 


MCH  26.0 pg (27.0-31.0)  L  04/17/17  10:58    


 


MCHC  32.1 g/dL (33.0-37.0)  L  04/17/17  10:58    


 


RDW  18.1 % (11.5-14.5)  H  04/17/17  10:58    


 


Plt Count  210 K/uL (130-400)   04/17/17  10:58    


 


MPV  7.4 fL (7.2-11.7)   04/17/17  10:58    


 


Neut % (Auto)  71.6 % (50.0-75.0)   04/17/17  10:58    


 


Lymph % (Auto)  12.0 % (20.0-40.0)  L  04/17/17  10:58    


 


Mono % (Auto)  15.8 % (0.0-10.0)  H  04/17/17  10:58    


 


Eos % (Auto)  0.2 % (0.0-4.0)   04/17/17  10:58    


 


Baso % (Auto)  0.4 % (0.0-2.0)   04/17/17  10:58    


 


Neut #  5.6 K/uL (1.8-7.0)   04/17/17  10:58    


 


Lymph #  0.9 K/uL (1.0-4.3)  L  04/17/17  10:58    


 


Mono #  1.2 K/uL (0.0-0.8)  H  04/17/17  10:58    


 


Eos #  0.0 K/uL (0.0-0.7)   04/17/17  10:58    


 


Baso #  0.0 K/uL (0.0-0.2)   04/17/17  10:58    


 


Neutrophils % (Manual)  66 % (50-75)   04/14/17  11:24    


 


Band Neutrophils %  8 % (0-2)  H  04/14/17  11:24    


 


Lymphocytes % (Manual)  16 % (20-40)  L  04/14/17  11:24    


 


Reactive Lymphs %  2 % (0-0)  H  03/11/17  10:32    


 


Monocytes % (Manual)  8 % (0-10)   04/14/17  11:24    


 


Eosinophils % (Manual)  1 % (0-4)   03/06/17  11:01    


 


Basophils % (Manual)  1 % (0-2)   03/06/17  11:01    


 


Myelocytes %  2 % (0-0)  H  04/14/17  11:24    


 


Differential Comment  Cancelled   04/14/17  11:24    


 


Toxic Granulation  Present   03/12/17  10:50    


 


Platelet Estimate  Normal  (NORMAL)   04/14/17  11:24    


 


Large Platelets  Present   03/23/17  17:03    


 


RBC Morphology  Normal   03/14/17  17:09    


 


Polychromasia  Slight   03/12/17  10:50    


 


Hypochromasia (manual)  Slight   04/14/17  11:24    


 


Poikilocytosis (manual  Slight   04/14/17  11:24    


 


Anisocytosis (manual)  Slight   04/14/17  11:24    


 


Microcytosis (manual)  Slight   03/23/17  17:03    


 


Macrocytosis (manual)  Slight   03/18/17  11:33    


 


Tear Drop Cells  Slight   03/22/17  14:59    


 


Ovalocytes  Slight   04/14/17  11:24    


 


Pito Cells  Slight   03/22/17  14:59    


 


PT  20.3 SECONDS (9.7-12.2)  H  04/16/17  19:56    


 


INR  1.8   04/16/17  19:56    


 


APTT  30 SECONDS (21-34)   04/17/17  10:58    


 


D-Dimer, Quantitative  < 200 ng/mlDDU (0-243)   02/25/17  16:01    


 


Sodium  133 mmol/L (132-148)   04/17/17  10:58    


 


Potassium  4.5 mmol/L (3.6-5.2)   04/17/17  10:58    


 


Chloride  97 mmol/L ()  L  04/17/17  10:58    


 


Carbon Dioxide  31 mmol/L (22-30)  H  04/17/17  10:58    


 


Anion Gap  10  (10-20)   04/17/17  10:58    


 


BUN  23 mg/dL (9-20)  H  04/17/17  10:58    


 


Creatinine  0.9 MG/DL (0.8-1.5)   04/17/17  10:58    


 


Est GFR ( Amer)  > 60   04/17/17  10:58    


 


Est GFR (Non-Af Amer)  > 60   04/17/17  10:58    


 


POC Glucose (mg/dL)  83 mg/dL ()   04/15/17  11:48    


 


Random Glucose  85 mg/dL ()   04/17/17  10:58    


 


Calcium  9.3 mg/dl (8.6-10.4)   04/17/17  10:58    


 


Phosphorus  2.9 mg/dL (2.5-4.5)   04/14/17  11:24    


 


Magnesium  2.1 mg/dL (1.6-2.3)   04/14/17  11:24    


 


Total Bilirubin  0.5 mg/dL (0.2-1.3)   04/17/17  10:58    


 


AST  49 U/L (17-59)   04/17/17  10:58    


 


ALT  82 U/L (21-72)  H D 04/17/17  10:58    


 


Alkaline Phosphatase  55 U/L ()   04/17/17  10:58    


 


Lactate Dehydrogenase  654 U/L (313-618)  H  03/08/17  13:54    


 


Total Creatine Kinase  37 U/L ()  L  03/10/17  13:03    


 


CK-MB (Mass)  1.57 ng/mL (0.0-3.38)   03/10/17  13:03    


 


Troponin I  0.0360 ng/mL (0.00-0.120)   02/25/17  13:23    


 


Troponin I, Quant  < 0.0120 ng/mL (0.00-0.120)   03/10/17  13:03    


 


NT-Pro-B Natriuret Pep  4070 pg/mL (0-900)  H  03/10/17  13:03    


 


Total Protein  5.9 g/dL (6.3-8.3)  L  04/17/17  10:58    


 


Albumin  3.2 g/dL (3.5-5.0)  L  04/17/17  10:58    


 


Globulin  2.7 gm/dL (2.2-3.9)   04/17/17  10:58    


 


Albumin/Globulin Ratio  1.2  (1.0-2.1)   04/17/17  10:58    


 


Triglycerides  70 mg/dL (0-149)   02/26/17  03:44    


 


Cholesterol  134 mg/dL (0-199)   02/26/17  03:44    


 


LDL Cholesterol Direct  92 mg/dL (0-129)   02/26/17  03:44    


 


HDL Cholesterol  33 mg/dL (30-70)   02/26/17  03:44    


 


Thyroxine (T4)  7.17 ug/dL (5.5-11.0)   02/26/17  03:44    


 


TSH 3rd Generation  1.02 mIU/L (0.46-4.68)   02/26/17  03:44    


 


Urine Color  Yellow  (YELLOW)   02/25/17  21:37    


 


Urine Clarity  Clear  (Clear)   02/25/17  21:37    


 


Urine pH  5.0  (5.0-8.0)   02/25/17  21:37    


 


Ur Specific Gravity  1.013  (1.003-1.030)   02/25/17  21:37    


 


Urine Protein  Negative mg/dL (NEGATIVE)   02/25/17  21:37    


 


Urine Glucose (UA)  Normal mg/dL (Normal)   02/25/17  21:37    


 


Urine Ketones  Negative mg/dL (NEGATIVE)   02/25/17  21:37    


 


Urine Blood  Negative  (NEGATIVE)   02/25/17  21:37    


 


Urine Nitrate  Negative  (NEGATIVE)   02/25/17  21:37    


 


Urine Bilirubin  Negative  (NEGATIVE)   02/25/17  21:37    


 


Urine Urobilinogen  2.0 mg/dL (0.2-1.0)   02/25/17  21:37    


 


Ur Leukocyte Esterase  Neg Sandie/uL (Negative)   02/25/17  21:37    


 


Urine RBC (Auto)  2 /hpf (0-3)   02/25/17  21:37    


 


Ur Squamous Epith Cells  8 /hpf (0-5)  H  02/25/17  21:37    


 


Digoxin  0.4 ng/mL (0.8-2.0)  L  04/05/17  10:59    


 


HIV 1&2 Antibody Screen  Negative  (NEGATIVE)   03/08/17  13:54    














- Hospital Course


Hospital Course: 


HPI: Pt is a 72 year old male with past medical history including MI, CAD with 

history of CABG and valve replacement 6-7 years ago.  Patient reports that for 

the 2-3 days prior to admission he had shortness of breath and dizziness that 

had been getting progressively worse.  Patient also reported right sided chest 

pain that is reproducible on palpation.  Patient stated that he had been living 

in his car and had not had medical care recently since moving to [place] from 

[place].  Patient denied palpitations, chest pressure, abdominal pain, nausea, 

or vomiting.  Patient also reported taking 5mg of coumadin nightly but did not 

know for what condition he takes it.  Patient denied history of blood clots





Hospital Course: Pt was admitted for COPD exacterbation and end stage lung 

disease. CT Chest revealed no evidence of interstitial lung disease. 

Cardiomegaly and mild-to-moderate pulmonary vascular congestion. Interval 

appearance of new round opacities/ nodules in the right upper lobe and left 

lower lobe since the previous study. The differential diagnosis includes 

multifocal pneumonia. The possibility of neoplasm is not totally excluded. 

Follow-up reassessment is recommended. Otherwise no significant interval change 

since the previous study dated 03/16/2017 (please see full report). Pt started 

on Bipap, steroids, and Iv antibiotics. Pt also started on nebulizer treatments 

and mucomyst. Infectious Disease, Dr. Guo, was consulted. Pt also has a 

history of systolic CHF. Echocardiogram revealed an ejection fraction of 10-15%

. BNP was 4650 on admission. Cardiology, Dr. Dotson, was consulted. Pt 

started on lasix, aldactone, and lisinopril. Pt also has a history of atrial 

fibrillation and mitral valve replacement with placement of a mechanical valve. 

Pt was continued on coumadin, but on certain days, refused labs so INR was 

unable to be measured accurately. Pt's crestor was held due to elevated LFTs. 

Pt was unable to be discharged and kept at the hospital because he was unable 

to get approval to [facility] since estranged wife would not answer calls from [facility]. Pt 

required oxygen thus could not be discharged to home. Pt's wife eventually came 

to the hospital and pt was accepted to [facility]. 








Discharge Exam





- Head Exam


Head Exam: ATRAUMATIC, NORMOCEPHALIC





- Eye Exam


Eye Exam: EOMI, PERRL





- ENT Exam


ENT Exam: Mucous Membranes Moist





- Neck Exam


Neck exam: Full Rom





- Respiratory Exam


Respiratory Exam: Rhonchi, Wheezes





- Cardiovascular Exam


Cardiovascular Exam: Gallop, +S1, +S2, Systolic Murmur





- GI/Abdominal Exam


GI & Abdominal Exam: absent: Diminished Bowel Sounds, Distended





- Extremities Exam


Extremities exam: full ROM





- Neurological Exam


Neurological exam: Alert, Oriented x3





- Psychiatric Exam


Psychiatric exam: Normal Affect, Normal Mood





- Skin


Skin Exam: Normal Color, Warm





Discharge Plan





- Follow Up Plan


Condition: STABLE


Disposition: REHAB FACILITY/REHAB UNIT


Patient education suggested?: Yes


Additional Instructions: 


Patient is stable for discharge per Dr. Elizabeth. Patient should resume all 

medications as outlined in this document. Please note, patient take Coumadin 

for hx of Mechanical heart valve + Atrial Fibrillation. Please follow INR and 

adjust coumadin to maintain INR 2.5-3.5.





1. Please make an appointment and follow up with Primary Doctor within one week 

of discharge. If patient does not have a Primary Doctor, please follow up with 

[facility] to establish medical care, at [phone]. 


2. Please follow up with a Pulmonologist (Dr. Varela) on discharge to help treat 

your COPD. He recommends a pulmonary function test as out pt. You will also 

require home oxygen. 


3. Please have a pulmonolgist evaluate and follow your new finding of 7 mm left 

upper lobe pulmonary nodule. You are considered a high-risk patient due to your 

smoking history, and follow-up is recommended 3-6 months.


4. Please follow up with a gastroenterologist regarding Numerous low-density 

lesions throughout the liver, possibly cysts.





Patient should return to ED immediately if symptoms return or worsen.  

Instructions discussed with patient who understood and agreed.








Clinical Quality Measures





- CQM - Stroke


Antithrombotic Prescribed: Yes


Anticoagulation Prescribed for Atrial Flutter, Atrial Fibrillation and History 

of:: Yes


Contraindication/Reason for not providing: Other


If Other selected, reason for not providing: Trasnaminitis





- CQM - VTE


Did patient receive overlap therapy during hosptialization?: Yes


If yes, what was given to the patient?: coumadin





- CQM - Heart Failure


Ejection Fraction: Less Than 40 %


ACE Inhibitor Prescribed: Yes


Beta-Blocker Prescribed: None


Contraindication/Reason for not providing: hypotension


Angiotensin II Receptor Blocker Prescribed: No


Contraindication/Reason for not providing: on ACE inhibitor


AnticoagulationTherapy for Atrial Fibrillation/Atrialflutter: Yes


Aldosterone Antagonist Prescribed: Yes


Hydralazine Nitrate Prescribed: No


Contraindication/Reason for not providing: on ACE inhibitor


Implantable Cardioverter Defibrillator Therapy: No


Contraindication/Reason for not providing: going to [facility]


Cardiac Resynchronization Therapy Prescribed: Yes


Will be discharged to: Skilled Nursing Facility

## 2017-04-18 NOTE — PCM.HF
Heart Failure Core Measure





- Heart Failure


Ejection Fraction: Less Than 40 % (lvef 12%)


ACE Inhibitor Prescribed: Yes


Beta-Blocker Prescribed: None


Contraindication/Reason for not providing: copd


Angiotensin II Receptor Blocker Prescribed: No


Contraindication/Reason for not providing: on ace


AnticoagulationTherapy for Atrial Fibrillation/Atrialflutter: Yes


Aldosterone Antagonist Prescribed: No


Contraindication/Reason for not providing: renal dysfunction 


Hydralazine Nitrate Prescribed: No


Contraindication/Reason for not providing: renal dysfunction 


Implantable Cardioverter Defibrillator Therapy: Yes


Cardiac Resynchronization Therapy Prescribed: No


Contraindication/Reason for not providing: pacemaker 





- Follow up


Will be discharged to: Skilled Nursing Facility


Follow Up Date (must be within 7 days from discharge): 04/21/17


Follow Up Time: 09:00

## 2017-04-19 NOTE — CP.PCM.PN
<Justin Avila - Last Filed: 04/19/17 22:44>





Subjective





- Date & Time of Evaluation


Date of Evaluation: 04/19/17


Time of Evaluation: 07:54





- Subjective


Subjective: 


Pt seen and examined. Pt reports shortness of breath. Pt denies fever, chills, 

chest pain, nausea, and vomiting.





Objective





- Vital Signs/Intake and Output


Vital Signs (last 24 hours): 


 











Temp Pulse Resp BP Pulse Ox


 


 97.1 F L  73   20   94/61 L  100 


 


 04/19/17 15:28  04/19/17 16:00  04/19/17 15:28  04/19/17 15:28  04/19/17 15:28











- Medications


Medications: 


 Current Medications





Acetylcysteine (Acetylcysteine 20%)  4 ml INH RQ4 [facility]


   Last Admin: 04/19/17 19:48 Dose:  4 ml


Benzocaine/Menthol (Cepacol Sore Throat)  1 keegan MT Q6H PRN


   PRN Reason: Sore Throat


   Last Admin: 04/16/17 09:09 Dose:  1 keegan


Budesonide (Pulmicort Respules)  0.5 mg INH RQ12 [facility]


   Last Admin: 04/17/17 19:12 Dose:  0.5 mg


Digoxin (Lanoxin)  0.25 mg PO DAILY@1800 [facility]


   Last Admin: 04/19/17 17:40 Dose:  0.25 mg


Famotidine (Pepcid)  20 mg PO DAILY [facility]


   Last Admin: 04/19/17 10:37 Dose:  20 mg


Guaifenesin (Mucinex La)  600 mg PO BID [facility]


   Last Admin: 04/19/17 17:40 Dose:  600 mg


Ipratropium Bromide (Atrovent)  0.5 mg IH RQ4 [facility]


   Last Admin: 04/19/17 19:48 Dose:  0.5 mg


Lisinopril (Zestril)  2.5 mg PO DAILY [facility]


   Last Admin: 04/19/17 10:40 Dose:  2.5 mg


Prednisone (Prednisone Tab)  20 mg PO DAILY [facility]


   Last Admin: 04/19/17 10:37 Dose:  20 mg


Spironolactone (Aldactone)  25 mg PO DAILY [facility]


   Last Admin: 04/19/17 10:37 Dose:  25 mg


Warfarin Sodium (Coumadin)  3 mg PO 1800 [facility]


   Last Admin: 04/19/17 17:40 Dose:  3 mg











- Labs


Labs: 


 





 04/17/17 10:58 





 04/17/17 10:58 





 











PT  12.4 SECONDS (9.7-12.2)  H D 04/19/17  16:50    


 


INR  1.1  D 04/19/17  16:50    


 


APTT  30 SECONDS (21-34)   04/17/17  10:58    














- Constitutional


Appears: No Acute Distress, Chronically Ill





- Head Exam


Head Exam: ATRAUMATIC, NORMOCEPHALIC





- Eye Exam


Eye Exam: EOMI





- ENT Exam


ENT Exam: Mucous Membranes Moist.  absent: Mucous Membranes Dry





- Respiratory Exam


Respiratory Exam: Rhonchi





- Cardiovascular Exam


Cardiovascular Exam: Gallop, Irregular Rhythm





- GI/Abdominal Exam


GI & Abdominal Exam: Soft.  absent: Tenderness





- Extremities Exam


Extremities Exam: Full ROM.  absent: Pedal Edema





- Neurological Exam


Neurological Exam: Alert, Awake, Oriented x3





- Psychiatric Exam


Psychiatric exam: Normal Affect, Normal Mood





- Skin


Skin Exam: Normal Color, Warm





Assessment and Plan





- Assessment and Plan (Free Text)


Assessment: 


Pt pending discharge to [facility] once insurance accepts. Coumadin 3 mg po given. INR 

1.1 today.





Shortness of breath


- secondary to chronic COPD vs pneumonia (hospital acquired)


- Cefepime, Vanc and Cipro started (4/7)


   - Have not been able to get vanc trough because patient refusing blood work


- ID consulted (Sari)  help appreciated


   - f/u recs


- Blood and sputum Cxs (4/7)  f/u


- CXR 4/5 - New opacity at right lung base suspicious for pneumonia. 

Questionable opacity at left base. Mild congestive change.


- CT Chest HR 3/30 - No definite CT evidence of interstitial lung disease. 

Cardiomegaly and mild-to-moderate pulmonary vascular congestion. Interval 

appearance of new round opacities/ nodules in the right upper lobe and left 

lower lobe since the previous study. The differential diagnosis includes 

multifocal pneumonia. The possibility of neoplasm is not totally excluded. 

Follow-up reassessment is recommended. Otherwise no significant interval change 

since the previous study dated 03/16/2017.





COPD (chronic obstructive pulmonary disease) Exacerbation


4/7: Duonebs every 4 hours for SOB


4/5: Added mucomyst to breathing treatments. Will get repeat CXR


3/30: CT chest with high resolution - see above


3/26: Prednisone 40mg PO daily; Taper on discharge. Will require home oxygen.


3/23: Stop Solumedrol IVP;  Start Prednisone 40mg PO daily; Taper on discharge. 

Will require home oxygen.


-3/22: Solumedrol reduced to 40mg IVP Q12H. Prior to discharge, change 

Solumedrol -> Prednisone


-3/13-3/22: Continue BIPAP, patient more compliant (sometimes refuses despite 

being SOB).


-3/10: RAPID called due to SOB. BP 80/50's. Bolused 500cc NS. Ordered BIPAP and 

ABG, however patient refused. Placed back on NC 3L.


-3/8-3/9: Patient complaining of SOB with exertion and orthopnea. Maintain Head 

of bed elevated 30 degrees.


-3/7:  PT session: 98% O2 at 2L at rest, 96% room air at rest sitting, 82% room 

air during ambulation, 88% at 2 liter ambulation.


-3/6: Walked 20 steps today down parsons, and patient became dizzy, SOB, and 

almost collapsed.


- Consulted Pulmonology, Dr. Varela, f/u recs


   -planning for home O2


   - Aware of HR in 140's and SOB after eating and low levels of exertion.


   -LDH 654H


   -HIV - negative


   -persistent shortness of breath


   - Patient with long history of smoking most likely has underlying COPD.  

Nebulizer treatment.  Inhaled steroids.  Spiriva.  PFT as out pt








Systolic dysfunction with acute on chronic heart failure


- 4/5: dig level low, increase to 0.25


- EF 10-15%, mechanical MV- no regurg, tricuspid regurg. see full report


-Chest CT 3/16 - Cardiomegaly. Cardiac valve prosthesis. Left-sided AICD. Dense 

coronary artery calcifications. 


   -Small consolidation (favored to reflect atelectasis rather than pneumonia) 

at the left lung base. 


   -Bibasilar atelectasis. Small airways disease. Mild ground-glass and fine 

nodular opacities within the right upper lobe, possibly infectious or 

inflammatory.    


   -7 mm left upper lobe pulmonary nodule. Guidelines by the Fleischner society 

(radiology 2005; 237:390 5-400) suggests that in patients with low risk for 

lung cancer, with nodules less than or equal to 8 mm in diameter should have 

follow-up in approximately 6-12 months.  In patients with high-risk, including 

smokers, follow-up is recommended 3-6 months.  Patient with a known malignancy 

for risk for metastases should receive 3 month follow-up. 


   -Hepatic capsular calcification. 


   -Numerous low-density lesions throughout the liver, possibly cysts. 

Decompressed gallbladder limits evaluation. Bilateral hypodense renal lesions. 

Right adrenal gland hypertrophy. 12 mm left adrenal gland nodule measures 

approximately 9 Hounsfield units, likely adenoma.


-Consulted Cardiology, Dr. Dotson - f/u recs


   -3/16: Lasix 40mg IVP daily, Spironolactone 25mg daily. CXR - no interval 

pathology change. 


   -3/13: HOLD LASIX FOR 1 TO 2 DAYS AND CONT MUCINEX.  PT PRERENAL AND APPEARS 

DRY.  ON BOTH LASIX AND SPIRONOLACTONE.


   -SOB APPEARS TO BE SECONDARY TO PULM ETIOLOGY.  IMPROVES WITH NEBS.  PT WITH 

RHONCHI B/L.  MAY BENEFIT FROM PULM TOILET.


   -will attempt to obtain company of [facility] for interrogation.


   -likely deconditioned.  will benefit from rehab


-3/10: RAPID called due to SOB. BP 80/50's. Bolused 500cc NS. Ordered BIPAP and 

ABG, however patient refused. Placed back on NC 3L. ProBNP 4070H (less than # 

on admission) and SHEYLA negative. 


-3/10: Decrease to Lasix 40mg PO daily (was BID).


Admit to telemetry, BNP 4560 on admission


Lisinopril 10mg daily


Aldactone 25mg PO Daily


patient with AICD, will check echo to determine EF


CXR 2/25: mild cardiomegaly, mild pulmonary venous congestion.  s/p sternotomy, 

aortic valve replacement, AICD








H/O mitral valve replacement with mechanical valve


Monitor INR and adjust Coumadin as necessary


Cardiac valve replacement 6-7yrs ago


Consulted Cardiology, Dr. Dotson - f/u recs


   -echocardiogram reviewed.  valve leaflets are opening and functional.  There 

is no signficant gradient across the valve.  


   -recommend INR 2.5 to 3.5.


   STOP Lovenox 3/5


   STOP Heparin Drip - cardiac protocol, with bolus (because patient is 

refusing blood draws)








Chronic atrial fibrillation


Consulted Cardiology, Dr. Dotson - f/u recs


   -Rate controlled


   -echocardiogram reviewed.  valve leaflets are opening and functional.  There 

is no significant gradient across the valve.  


   -recommend INR 2.5 to 3.5.


   -See PT/INR trends above


   Coumadin as above


   - Patient's INR is difficult to maintain with the therapeutic range 

therefore we will give another 3 mg tonight and check INR tomorrow. Will try to 

      convince the patient to get bloodwork done today.


   STOP Lovenox 3/5








Cough, acute


mucomyst


-Mucinex 600mg PO BID


-3/22: cough intermittent


-3/18 does not complain of cough


-3/13-3/16: Persists. Non-productive. Continue Mucinex.


-3/9: patient developed productive cough with yellow sputum today.








CAD (coronary artery disease) 


Consulted Cardiology, Dr. Dotson - f/u recs


   -Aware of HR in 140's and SOB after eating and low levels of exertion.


   -unknown graft status.  no evidence of ischemia at present


   -No current angina





History of MI (myocardial infarction)


Crestor 10mg PO HS resumed


HOLD Crestor 10mg PO HS (last dose 3/12- due to elevated LFTs)


ROMIs negative x3


EKG paced 


Denies chest pain 





Prophylactic measure


Coumadin as above


protonix 40mg daily





<Evgeny Elizabeth Jr. - Last Filed: 04/30/17 13:48>





Objective





- Vital Signs/Intake and Output


Vital Signs (last 24 hours): 


 











Temp Pulse Resp BP Pulse Ox


 


 97.4 F L  97 H  20   100/62   96 


 


 04/20/17 15:22  04/20/17 16:00  04/20/17 15:22  04/20/17 15:22  04/20/17 15:22











- Labs


Labs: 


 





 04/17/17 10:58 





 04/17/17 10:58 





 











PT  12.4 SECONDS (9.7-12.2)  H D 04/19/17  16:50    


 


INR  1.1  D 04/19/17  16:50    


 


APTT  30 SECONDS (21-34)   04/17/17  10:58    














Attending/Attestation





- Attestation


I have personally seen and examined this patient.: Yes


I have fully participated in the care of the patient.: Yes


I have reviewed all pertinent clinical information, including history, physical 

exam and plan: Yes


Notes (Text): 





04/30/17 13:48


Agree with findings and plan

## 2017-04-20 NOTE — CP.PCM.DIS
Provider





- Provider


Date of Admission: 


02/25/17 15:01





Attending physician: 


Evgeny Elizabeth Jr, MD





Time Spent in preparation of Discharge (in minutes): 31





Hospital Course





- Lab Results


Lab Results: 


 Micro Results





04/07/17 Unknown   Sputum   Gram Stain - Final


04/07/17 Unknown   Sputum   Sputum Culture - Final


                                NORMAL ORAL CHAGO





 Most Recent Lab Values











WBC  7.8 K/uL (4.8-10.8)   04/17/17  10:58    


 


RBC  4.80 Mil/uL (4.40-5.90)   04/17/17  10:58    


 


Hgb  12.5 g/dL (12.0-18.0)   04/17/17  10:58    


 


Hct  38.9 % (35.0-51.0)   04/17/17  10:58    


 


MCV  81.0 fL (80.0-94.0)   04/17/17  10:58    


 


MCH  26.0 pg (27.0-31.0)  L  04/17/17  10:58    


 


MCHC  32.1 g/dL (33.0-37.0)  L  04/17/17  10:58    


 


RDW  18.1 % (11.5-14.5)  H  04/17/17  10:58    


 


Plt Count  210 K/uL (130-400)   04/17/17  10:58    


 


MPV  7.4 fL (7.2-11.7)   04/17/17  10:58    


 


Neut % (Auto)  71.6 % (50.0-75.0)   04/17/17  10:58    


 


Lymph % (Auto)  12.0 % (20.0-40.0)  L  04/17/17  10:58    


 


Mono % (Auto)  15.8 % (0.0-10.0)  H  04/17/17  10:58    


 


Eos % (Auto)  0.2 % (0.0-4.0)   04/17/17  10:58    


 


Baso % (Auto)  0.4 % (0.0-2.0)   04/17/17  10:58    


 


Neut #  5.6 K/uL (1.8-7.0)   04/17/17  10:58    


 


Lymph #  0.9 K/uL (1.0-4.3)  L  04/17/17  10:58    


 


Mono #  1.2 K/uL (0.0-0.8)  H  04/17/17  10:58    


 


Eos #  0.0 K/uL (0.0-0.7)   04/17/17  10:58    


 


Baso #  0.0 K/uL (0.0-0.2)   04/17/17  10:58    


 


Neutrophils % (Manual)  66 % (50-75)   04/14/17  11:24    


 


Band Neutrophils %  8 % (0-2)  H  04/14/17  11:24    


 


Lymphocytes % (Manual)  16 % (20-40)  L  04/14/17  11:24    


 


Reactive Lymphs %  2 % (0-0)  H  03/11/17  10:32    


 


Monocytes % (Manual)  8 % (0-10)   04/14/17  11:24    


 


Eosinophils % (Manual)  1 % (0-4)   03/06/17  11:01    


 


Basophils % (Manual)  1 % (0-2)   03/06/17  11:01    


 


Myelocytes %  2 % (0-0)  H  04/14/17  11:24    


 


Differential Comment  Cancelled   04/14/17  11:24    


 


Toxic Granulation  Present   03/12/17  10:50    


 


Platelet Estimate  Normal  (NORMAL)   04/14/17  11:24    


 


Large Platelets  Present   03/23/17  17:03    


 


RBC Morphology  Normal   03/14/17  17:09    


 


Polychromasia  Slight   03/12/17  10:50    


 


Hypochromasia (manual)  Slight   04/14/17  11:24    


 


Poikilocytosis (manual  Slight   04/14/17  11:24    


 


Anisocytosis (manual)  Slight   04/14/17  11:24    


 


Microcytosis (manual)  Slight   03/23/17  17:03    


 


Macrocytosis (manual)  Slight   03/18/17  11:33    


 


Tear Drop Cells  Slight   03/22/17  14:59    


 


Ovalocytes  Slight   04/14/17  11:24    


 


Pito Cells  Slight   03/22/17  14:59    


 


PT  12.4 SECONDS (9.7-12.2)  H D 04/19/17  16:50    


 


INR  1.1  D 04/19/17  16:50    


 


APTT  30 SECONDS (21-34)   04/17/17  10:58    


 


D-Dimer, Quantitative  < 200 ng/mlDDU (0-243)   02/25/17  16:01    


 


Sodium  133 mmol/L (132-148)   04/17/17  10:58    


 


Potassium  4.5 mmol/L (3.6-5.2)   04/17/17  10:58    


 


Chloride  97 mmol/L ()  L  04/17/17  10:58    


 


Carbon Dioxide  31 mmol/L (22-30)  H  04/17/17  10:58    


 


Anion Gap  10  (10-20)   04/17/17  10:58    


 


BUN  23 mg/dL (9-20)  H  04/17/17  10:58    


 


Creatinine  0.9 MG/DL (0.8-1.5)   04/17/17  10:58    


 


Est GFR ( Amer)  > 60   04/17/17  10:58    


 


Est GFR (Non-Af Amer)  > 60   04/17/17  10:58    


 


POC Glucose (mg/dL)  83 mg/dL ()   04/15/17  11:48    


 


Random Glucose  85 mg/dL ()   04/17/17  10:58    


 


Calcium  9.3 mg/dl (8.6-10.4)   04/17/17  10:58    


 


Phosphorus  2.9 mg/dL (2.5-4.5)   04/14/17  11:24    


 


Magnesium  2.1 mg/dL (1.6-2.3)   04/14/17  11:24    


 


Total Bilirubin  0.5 mg/dL (0.2-1.3)   04/17/17  10:58    


 


AST  49 U/L (17-59)   04/17/17  10:58    


 


ALT  82 U/L (21-72)  H D 04/17/17  10:58    


 


Alkaline Phosphatase  55 U/L ()   04/17/17  10:58    


 


Lactate Dehydrogenase  654 U/L (313-618)  H  03/08/17  13:54    


 


Total Creatine Kinase  37 U/L ()  L  03/10/17  13:03    


 


CK-MB (Mass)  1.57 ng/mL (0.0-3.38)   03/10/17  13:03    


 


Troponin I  0.0360 ng/mL (0.00-0.120)   02/25/17  13:23    


 


Troponin I, Quant  < 0.0120 ng/mL (0.00-0.120)   03/10/17  13:03    


 


NT-Pro-B Natriuret Pep  4070 pg/mL (0-900)  H  03/10/17  13:03    


 


Total Protein  5.9 g/dL (6.3-8.3)  L  04/17/17  10:58    


 


Albumin  3.2 g/dL (3.5-5.0)  L  04/17/17  10:58    


 


Globulin  2.7 gm/dL (2.2-3.9)   04/17/17  10:58    


 


Albumin/Globulin Ratio  1.2  (1.0-2.1)   04/17/17  10:58    


 


Triglycerides  70 mg/dL (0-149)   02/26/17  03:44    


 


Cholesterol  134 mg/dL (0-199)   02/26/17  03:44    


 


LDL Cholesterol Direct  92 mg/dL (0-129)   02/26/17  03:44    


 


HDL Cholesterol  33 mg/dL (30-70)   02/26/17  03:44    


 


Thyroxine (T4)  7.17 ug/dL (5.5-11.0)   02/26/17  03:44    


 


TSH 3rd Generation  1.02 mIU/L (0.46-4.68)   02/26/17  03:44    


 


Urine Color  Yellow  (YELLOW)   02/25/17  21:37    


 


Urine Clarity  Clear  (Clear)   02/25/17  21:37    


 


Urine pH  5.0  (5.0-8.0)   02/25/17  21:37    


 


Ur Specific Gravity  1.013  (1.003-1.030)   02/25/17  21:37    


 


Urine Protein  Negative mg/dL (NEGATIVE)   02/25/17  21:37    


 


Urine Glucose (UA)  Normal mg/dL (Normal)   02/25/17  21:37    


 


Urine Ketones  Negative mg/dL (NEGATIVE)   02/25/17  21:37    


 


Urine Blood  Negative  (NEGATIVE)   02/25/17  21:37    


 


Urine Nitrate  Negative  (NEGATIVE)   02/25/17  21:37    


 


Urine Bilirubin  Negative  (NEGATIVE)   02/25/17  21:37    


 


Urine Urobilinogen  2.0 mg/dL (0.2-1.0)   02/25/17  21:37    


 


Ur Leukocyte Esterase  Neg Sandie/uL (Negative)   02/25/17  21:37    


 


Urine RBC (Auto)  2 /hpf (0-3)   02/25/17  21:37    


 


Ur Squamous Epith Cells  8 /hpf (0-5)  H  02/25/17  21:37    


 


Digoxin  0.4 ng/mL (0.8-2.0)  L  04/05/17  10:59    


 


HIV 1&2 Antibody Screen  Negative  (NEGATIVE)   03/08/17  13:54    














- Hospital Course


Hospital Course: 


HPI: Pt is a 72 year old male with past medical history including MI, CAD with 

history of CABG and valve replacement 6-7 years ago.  Patient reports that for 

the 2-3 days prior to admission he had shortness of breath and dizziness that 

had been getting progressively worse.  Patient also reported right sided chest 

pain that is reproducible on palpation.  Patient stated that he had been living 

in his car and had not had medical care recently since moving to [place] from 

[place].  Patient denied palpitations, chest pressure, abdominal pain, nausea, 

or vomiting.  Patient also reported taking 5mg of coumadin nightly but did not 

know for what condition he takes it.  Patient denied history of blood clots





Hospital Course: Pt was admitted for COPD exacterbation and end stage lung 

disease. CT Chest revealed no evidence of interstitial lung disease. 

Cardiomegaly and mild-to-moderate pulmonary vascular congestion. Interval 

appearance of new round opacities/ nodules in the right upper lobe and left 

lower lobe since the previous study. The differential diagnosis includes 

multifocal pneumonia. The possibility of neoplasm is not totally excluded. 

Follow-up reassessment is recommended. Otherwise no significant interval change 

since the previous study dated 03/16/2017 (please see full report). Pt started 

on Bipap, steroids, and Iv antibiotics. Pt also started on nebulizer treatments 

and mucomyst. Infectious Disease, Dr. Guo, was consulted. Pt also has a 

history of systolic CHF. Echocardiogram revealed an ejection fraction of 10-15%

. BNP was 4650 on admission. Cardiology, Dr. Dotson, was consulted. Pt 

started on lasix, aldactone, and lisinopril. Pt also has a history of atrial 

fibrillation and mitral valve replacement with placement of a mechanical valve. 

Pt was continued on coumadin, but on certain days, refused labs so INR was 

unable to be measured accurately. Pt's crestor was held due to elevated LFTs. 

Pt was unable to be discharged and kept at the hospital because he was unable 

to get approval to [facility] since estranged wife would not answer calls from [facility]. Pt 

required oxygen thus could not be discharged to home. Pt's wife eventually came 

to the hospital and pt was accepted to [facility]. 





Discharge Exam





- Head Exam


Head Exam: ATRAUMATIC, NORMOCEPHALIC





- Eye Exam


Eye Exam: EOMI, PERRL





- ENT Exam


ENT Exam: Mucous Membranes Moist.  absent: Mucous Membranes Dry





- Respiratory Exam


Respiratory Exam: Clear to PA & Lateral.  absent: Rales, Rhonchi





- Cardiovascular Exam


Cardiovascular Exam: Gallop, +S1, +S2.  absent: Rubs





- GI/Abdominal Exam


GI & Abdominal Exam: Soft.  absent: Distended, Rebound





- Extremities Exam


Extremities exam: full ROM





- Neurological Exam


Neurological exam: Alert, Oriented x3





- Psychiatric Exam


Psychiatric exam: Normal Affect, Normal Mood





- Skin


Skin Exam: Normal Color, Warm





Discharge Plan





- Discharge Medications


Prescriptions: 


Lisinopril [Zestril] 2.5 mg PO DAILY #30 tab





- Follow Up Plan


Condition: STABLE


Disposition: REHAB FACILITY/REHAB UNIT


Patient education suggested?: Yes


Instructions:  Heart Failure (DC), Heart Healthy Diet (DC)


Additional Instructions: 


Patient is stable for discharge per Dr. Elizabeth. Patient should resume all 

medications as outlined in this document. Please note, patient take Coumadin 

for hx of Mechanical heart valve + Atrial Fibrillation. Please follow INR and 

adjust coumadin to maintain INR 2.5-3.5.





1. Please make an appointment and follow up with Primary Doctor within one week 

of discharge. If patient does not have a Primary Doctor, please follow up with 

[facility] to establish medical care, at [phone]. 


2. Please follow up with a Pulmonologist (Dr. Varela) on discharge to help treat 

your COPD. He recommends a pulmonary function test as out pt. You will also 

require home oxygen. 


3. Please have a pulmonolgist evaluate and follow your new finding of 7 mm left 

upper lobe pulmonary nodule. You are considered a high-risk patient due to your 

smoking history, and follow-up is recommended 3-6 months.


4. Please follow up with a gastroenterologist regarding Numerous low-density 

lesions throughout the liver, possibly cysts.





Patient should return to ED immediately if symptoms return or worsen.  

Instructions discussed with patient who understood and agreed.





Referrals: 


Maikel Varela MD [Staff Provider] - 


Evgeny Elizabeth Jr., MD [Medical Doctor] -

## 2018-01-03 ENCOUNTER — HOSPITAL ENCOUNTER (INPATIENT)
Dept: HOSPITAL 31 - C.ER | Age: 73
LOS: 15 days | Discharge: NURSING, REHAB OR LONG-TERM CARE | DRG: 292 | End: 2018-01-18
Attending: INTERNAL MEDICINE | Admitting: INTERNAL MEDICINE
Payer: COMMERCIAL

## 2018-01-03 VITALS — BODY MASS INDEX: 29.1 KG/M2

## 2018-01-03 DIAGNOSIS — I95.1: ICD-10-CM

## 2018-01-03 DIAGNOSIS — Z95.1: ICD-10-CM

## 2018-01-03 DIAGNOSIS — F02.80: ICD-10-CM

## 2018-01-03 DIAGNOSIS — I48.2: ICD-10-CM

## 2018-01-03 DIAGNOSIS — G30.9: ICD-10-CM

## 2018-01-03 DIAGNOSIS — I50.23: ICD-10-CM

## 2018-01-03 DIAGNOSIS — I25.10: ICD-10-CM

## 2018-01-03 DIAGNOSIS — J98.11: ICD-10-CM

## 2018-01-03 DIAGNOSIS — J44.1: ICD-10-CM

## 2018-01-03 DIAGNOSIS — N17.9: ICD-10-CM

## 2018-01-03 DIAGNOSIS — Z95.2: ICD-10-CM

## 2018-01-03 DIAGNOSIS — I25.2: ICD-10-CM

## 2018-01-03 DIAGNOSIS — F17.210: ICD-10-CM

## 2018-01-03 DIAGNOSIS — Z95.0: ICD-10-CM

## 2018-01-03 DIAGNOSIS — I25.5: ICD-10-CM

## 2018-01-03 DIAGNOSIS — I11.0: Primary | ICD-10-CM

## 2018-01-03 DIAGNOSIS — I27.20: ICD-10-CM

## 2018-01-03 LAB
ALBUMIN SERPL-MCNC: 4 G/DL (ref 3.5–5)
ALBUMIN/GLOB SERPL: 1.2 {RATIO} (ref 1–2.1)
ALT SERPL-CCNC: 27 U/L (ref 21–72)
ANISOCYTOSIS BLD QL SMEAR: SLIGHT
APTT BLD: 23 SECONDS (ref 21–34)
AST SERPL-CCNC: 29 U/L (ref 17–59)
BASOPHILS # BLD AUTO: 0.1 K/UL (ref 0–0.2)
BASOPHILS NFR BLD: 0.7 % (ref 0–2)
BNP SERPL-MCNC: 8160 PG/ML (ref 0–900)
BUN SERPL-MCNC: 24 MG/DL (ref 9–20)
CALCIUM SERPL-MCNC: 11.7 MG/DL (ref 8.6–10.4)
EOSINOPHIL # BLD AUTO: 0 K/UL (ref 0–0.7)
EOSINOPHIL NFR BLD: 0.6 % (ref 0–4)
EOSINOPHIL NFR BLD: 1 % (ref 0–4)
ERYTHROCYTE [DISTWIDTH] IN BLOOD BY AUTOMATED COUNT: 17.8 % (ref 11.5–14.5)
GFR NON-AFRICAN AMERICAN: > 60
HGB BLD-MCNC: 12.1 G/DL (ref 12–18)
HYPOCHROMIC: SLIGHT
INR PPP: 1.5
LYMPHOCYTE: 8 % (ref 20–40)
LYMPHOCYTES # BLD AUTO: 0.6 K/UL (ref 1–4.3)
LYMPHOCYTES NFR BLD AUTO: 7.9 % (ref 20–40)
MCH RBC QN AUTO: 26.4 PG (ref 27–31)
MCHC RBC AUTO-ENTMCNC: 31.6 G/DL (ref 33–37)
MCV RBC AUTO: 83.7 FL (ref 80–94)
MONOCYTE: 7 % (ref 0–10)
MONOCYTES # BLD: 0.7 K/UL (ref 0–0.8)
MONOCYTES NFR BLD: 9.9 % (ref 0–10)
NEUTROPHILS # BLD: 5.9 K/UL (ref 1.8–7)
NEUTROPHILS NFR BLD AUTO: 80.9 % (ref 50–75)
NEUTROPHILS NFR BLD AUTO: 84 % (ref 50–75)
NRBC BLD AUTO-RTO: 0 % (ref 0–2)
OVALOCYTES BLD QL SMEAR: SLIGHT
PLATELET # BLD EST: NORMAL 10*3/UL
PLATELET # BLD: 248 K/UL (ref 130–400)
PMV BLD AUTO: 8.7 FL (ref 7.2–11.7)
POIKILOCYTOSIS BLD QL SMEAR: SLIGHT
PROTHROMBIN TIME: 16.8 SECONDS (ref 9.7–12.2)
RBC # BLD AUTO: 4.59 MIL/UL (ref 4.4–5.9)
TEARDROP CELLS: SLIGHT
TOTAL CELLS COUNTED BLD: 100
WBC # BLD AUTO: 7.3 K/UL (ref 4.8–10.8)

## 2018-01-03 RX ADMIN — ENOXAPARIN SODIUM SCH MG: 100 INJECTION SUBCUTANEOUS at 21:21

## 2018-01-03 NOTE — C.PDOC
History Of Present Illness


73 y/o male with PMHx of MI, CAD with CABG, CHF and uncertain of compliance 

brought to ED by EMS for worsening sob. Upon arrival to ED patient complaints 

of chest pain. Patient is speaking in full sentences and denies fever, cough, 

nausea, vomiting or any other complaints at this time. 


Time Seen by Provider: 18 09:40


Chief Complaint (Nursing): Chest Pain


History Per: Patient


History/Exam Limitations: no limitations


Onset/Duration Of Symptoms: Hrs


Current Symptoms Are (Timing): Still Present


Initiating Event: Upper Respiratory Illness


Quality: "Pain"





Past Medical History


Reviewed: Historical Data, Nursing Documentation, Vital Signs


Vital Signs: 


 Last Vital Signs











Temp  98.0 F   18 16:17


 


Pulse  76   18 16:17


 


Resp  20   18 16:17


 


BP  105/56 L  18 16:17


 


Pulse Ox  95   18 16:17














- Medical History


PMH: Atrial Fibrillation, CAD, Cardia Arrhythmia, CHF, COPD, HTN


Surgical History: CABG (4 yrs ago), Pacemaker





- [facility] Procedures








ASSISTANCE WITH RESPIRATORY VENTILATION, >96 HRS, CPAP (17)








Family History: States: No Known Family Hx





- Social History


Hx Tobacco Use: Yes


Hx Alcohol Use: No


Hx Substance Use: No





- Immunization History


Hx Tetanus Toxoid Vaccination: No


Hx Influenza Vaccination: No


Hx Pneumococcal Vaccination: No





Review Of Systems


Constitutional: Negative for: Fever, Chills


Cardiovascular: Positive for: Chest Pain


Respiratory: Positive for: Shortness of Breath.  Negative for: Cough


Gastrointestinal: Negative for: Nausea, Vomiting


Skin: Negative for: Rash





Physical Exam





- Physical Exam


Appears: Non-toxic, No Acute Distress


Skin: Normal Color, Warm, Dry, No Rash


Head: Atraumatic, Normacephalic


Oral Mucosa: Moist


Neck: Normal ROM, Supple


Cardiovascular: Rhythm Regular, Other (Tachycardic)


Respiratory: Normal Breath Sounds, No Rales, No Rhonchi, No Wheezing, Other (

Tachypneic)


Gastrointestinal/Abdominal: Soft, No Tenderness, No Guarding, No Rebound


Back: No CVA Tenderness


Extremity: Normal ROM, No Pedal Edema, No Deformity, No Swelling


Neurological/Psych: Oriented x3, Normal Speech (Speaking in full sentences)





ED Course And Treatment





- Laboratory Results


Result Diagrams: 


 18 09:58





 18 09:58


ECG: Interpreted By Me, Viewed By Me


ECG Rhythm: Atrial Fibrillation, R BBB


Interpretation Of ECG: A-fib with RVR and RBBB


Rate From EC (BPM)


O2 Sat by Pulse Oximetry: 100 (RA)


Pulse Ox Interpretation: Normal





Critical Care Time





- Critical Care Note


Total Time (in mins): 40


Documented critical care: time excludes all time spent performing seperately 

billable procedures.





Medical Decision Making


Medical Decision Makin: Patient refused VBG with lactate 


Discussed with Dr. Elizabeth will accept patient under service  cxr pulm vasc 

congestion as read by me. pt afebrile, no leukoctyosis, exam consistent with chf

, less likely pna





Disposition





- Disposition


Disposition: HOSPITALIZED


Disposition Time: 10:48


Condition: GUARDED





- Clinical Impression


Clinical Impression: 


 CHF (congestive heart failure)








- Scribe Statement


The provider has reviewed the documentation as recorded by the Scribe


Niels Cantu





All medical record entries made by the Scribe were at my direction and 

personally dictated by me. I have reviewed the chart and agree that the record 

accurately reflects my personal performance of the history, physical exam, 

medical decision making, and the department course for this patient. I have 

also personally directed, reviewed, and agree with the discharge instructions 

and disposition.





Decision To Admit





- Pt Status Changed To:


Hospital Disposition Of: Inpatient





- Admit Certification


Admit to Inpatient:: After my assessment, the patient will require 

hospitalization for at least two midnights.  This is because of the severity of 

symptoms shown, intensity of services needed, and/or the medical risk in this 

patient being treated as an outpatient.





- InPatient:


Physician Admission Certification: I certify that this patient requires 2 or 

more midnights of care for the following reason:: needs iv diuresis





- .


Bed Request Type: Telemetry


Admitting Physician: Evgeny Elizabeth Jr.


Patient Diagnosis: 


 CHF (congestive heart failure)

## 2018-01-03 NOTE — CP.PCM.HP
History of Present Illness





- History of Present Illness


History of Present Illness: 


CC: "chest pain"





HPI: Patient is a 72 y.o. male with PMHx of HTN, MI, CHF, CAD with CABG, who 

was brought to the ED by EMS with complaints of chest pain. Patient's history 

was provided by him and his wife. Patient states that he started experiencing 

chest pains on 1/2/18 while watching TV. He reports that the pain is localized 

to left sided chest, and left shoulder with no radiation. Patient describes the 

pain as dull and constant, and rates it at 10/10 when it started, and currently 

at 6/10. He denies any palliative or provocative factors. He also complains of 

cough, shortness of breath, and leg swelling which all started about one week 

ago. His wife states that he has not been taking any medications except Aspirin 

81 mg for the last month because he is overwhelmed by the amount of medication. 

At his baseline, patient states that he is unable to walk half a block without 

being short of breath. Patient states that he had a similar episode ~2 months 

ago and was hospitalized. Patient follows 


Patient denies headache, vision changes, abdominal pain, nausea, vomiting, 

diarrhea, constipation, dysuria, urinary frequency, urinary hesitancy, joint 

pain, loss of balance. 


 


Pulm: Dr. Elfego Palma


PMD: [facility]  


PMHx: MI, Atrial fibrillation, CAD, CHF, HTN


PSHx: CABG in 2010 (patient unsure of exact  year), mechanical valve placement


Medications: Midodrine 5 mg, Memantine 5 mg, Meclizine 12.5 mg, Aspirin 81 mg, 

Metoprolol 25 mg


Social Hx: Smokes cigarettes 1 pack/week since age 14, used to drink alcohol in 

his 20s, denies any recreational drug use. Lives at home with wife. 


Allergies: NKDA











Present on Admission





- Present on Admission


Any Indicators Present on Admission: No


History of DVT/PE: No


History of Uncontrolled Diabetes: No


Urinary Catheter: No


Decubitus Ulcer Present: No





Review of Systems





- Constitutional


Constitutional: Weakness.  absent: Daytime Sleepiness





- Cardiovascular


Cardiovascular: Chest Pain, Dyspnea, Edema.  absent: Palpitations





- Respiratory


Respiratory: Cough, Chest Congestion





- Gastrointestinal


Gastrointestinal: absent: Abdominal Pain, Diarrhea, Nausea, Vomiting





- Genitourinary


Genitourinary: absent: Difficulty Urinating, Dysuria





- Musculoskeletal


Musculoskeletal: absent: Numbness, Tingling





- Integumentary


Integumentary: absent: Rash





- Neurological


Neurological: Dizziness.  absent: Headaches





Past Patient History





- Infectious Disease


Hx of Infectious Diseases: None





- Past Medical History & Family History


Past Medical History?: Yes





- Past Social History


Smoking Status: Heavy Smoker > 10 Cigarettes Daily





- CARDIAC


Hx Atrial Fibrillation: Yes


Hx Cardia Arrhythmia: Yes


Hx Congestive Heart Failure: Yes


Hx Hypertension: Yes


Hx Pacemaker: Yes





- PULMONARY


Hx Chronic Obstructive Pulmonary Disease (COPD): Yes





- NEUROLOGICAL


Hx Alzheimer's Disease: Yes


Hx Dementia: Yes





- MUSCULOSKELETAL/RHEUMATOLOGICAL


Hx Falls: No





- PSYCHIATRIC


Hx Substance Use: No





- SURGICAL HISTORY


Hx Coronary Artery Bypass Graft: Yes (4 yrs ago)





- ANESTHESIA


Hx Anesthesia: Yes


Hx Anesthesia Reactions: No


Hx Malignant Hyperthermia: No





Meds


Allergies/Adverse Reactions: 


 Allergies











Allergy/AdvReac Type Severity Reaction Status Date / Time


 


No Known Allergies Allergy   Verified 01/03/18 09:54














Physical Exam





- Constitutional


Appears: Non-toxic, No Acute Distress





- Head Exam


Head Exam: absent: ATRAUMATIC, NORMAL INSPECTION, NORMOCEPHALIC





- Eye Exam


Eye Exam: EOMI, Normal appearance





- ENT Exam


ENT Exam: Mucous Membranes Dry





- Respiratory Exam


Respiratory Exam: Rales, NORMAL BREATHING PATTERN.  absent: Wheezes





- Cardiovascular Exam


Cardiovascular Exam: Irregular Rhythm, +S1, +S2





- GI/Abdominal Exam


GI & Abdominal Exam: Normal Bowel Sounds, Soft.  absent: Tenderness





- Extremities Exam


Extremities exam: Positive for: pedal edema, tenderness





- Back Exam


Back exam: NORMAL INSPECTION





- Neurological Exam


Neurological exam: Alert, Oriented x3





- Psychiatric Exam


Psychiatric exam: Normal Affect, Normal Mood





- Skin


Skin Exam: Intact, Normal Color, Warm





Results





- Vital Signs


Recent Vital Signs: 





 Last Vital Signs











Temp      


 


Pulse  84   01/03/18 12:53


 


Resp  24   01/03/18 12:53


 


BP  118/88   01/03/18 12:53


 


Pulse Ox  100   01/03/18 11:36














- Labs


Result Diagrams: 


 01/03/18 09:58





 01/03/18 09:58


Labs: 





 Laboratory Results - last 24 hr











  01/03/18 01/03/18 01/03/18





  09:58 09:58 09:58


 


WBC  7.3  


 


RBC  4.59  


 


Hgb  12.1  


 


Hct  38.4  


 


MCV  83.7  D  


 


MCH  26.4 L  


 


MCHC  31.6 L  


 


RDW  17.8 H  


 


Plt Count  248  


 


MPV  8.7  


 


Neut % (Auto)  80.9 H  


 


Lymph % (Auto)  7.9 L  


 


Mono % (Auto)  9.9  


 


Eos % (Auto)  0.6  


 


Baso % (Auto)  0.7  


 


Neut #  5.9  


 


Lymph #  0.6 L  


 


Mono #  0.7  


 


Eos #  0.0  


 


Baso #  0.1  


 


Neutrophils % (Manual)  84 H  


 


Lymphocytes % (Manual)  8 L  


 


Monocytes % (Manual)  7  


 


Eosinophils % (Manual)  1  


 


Platelet Estimate  Normal  


 


Hypochromasia (manual)  Slight  


 


Poikilocytosis (manual  Slight  


 


Anisocytosis (manual)  Slight  


 


Tear Drop Cells  Slight  


 


Ovalocytes  Slight  


 


PT   16.8 H 


 


INR   1.5 


 


APTT   23 


 


Sodium    135


 


Potassium    3.9


 


Chloride    102


 


Carbon Dioxide    25


 


Anion Gap    12


 


BUN    24 H


 


Creatinine    1.0


 


Est GFR ( Amer)    > 60


 


Est GFR (Non-Af Amer)    > 60


 


Random Glucose    112 H


 


Calcium    11.7 H


 


Total Bilirubin    1.9 H


 


AST    29


 


ALT    27


 


Alkaline Phosphatase    91


 


Troponin I    0.0510


 


NT-Pro-B Natriuret Pep    8160 H


 


Total Protein    7.3


 


Albumin    4.0


 


Globulin    3.3


 


Albumin/Globulin Ratio    1.2


 


Digoxin   














  01/03/18





  09:58


 


WBC 


 


RBC 


 


Hgb 


 


Hct 


 


MCV 


 


MCH 


 


MCHC 


 


RDW 


 


Plt Count 


 


MPV 


 


Neut % (Auto) 


 


Lymph % (Auto) 


 


Mono % (Auto) 


 


Eos % (Auto) 


 


Baso % (Auto) 


 


Neut # 


 


Lymph # 


 


Mono # 


 


Eos # 


 


Baso # 


 


Neutrophils % (Manual) 


 


Lymphocytes % (Manual) 


 


Monocytes % (Manual) 


 


Eosinophils % (Manual) 


 


Platelet Estimate 


 


Hypochromasia (manual) 


 


Poikilocytosis (manual 


 


Anisocytosis (manual) 


 


Tear Drop Cells 


 


Ovalocytes 


 


PT 


 


INR 


 


APTT 


 


Sodium 


 


Potassium 


 


Chloride 


 


Carbon Dioxide 


 


Anion Gap 


 


BUN 


 


Creatinine 


 


Est GFR ( Amer) 


 


Est GFR (Non-Af Amer) 


 


Random Glucose 


 


Calcium 


 


Total Bilirubin 


 


AST 


 


ALT 


 


Alkaline Phosphatase 


 


Troponin I 


 


NT-Pro-B Natriuret Pep 


 


Total Protein 


 


Albumin 


 


Globulin 


 


Albumin/Globulin Ratio 


 


Digoxin  < 0.4 L














Assessment & Plan





- Assessment and Plan (Free Text)


Assessment: 


CHF exacerbation


-Lasix 60 mg given in ED


-Lasix 40mg q8h


-Echo: 2/25/17: LVEF 12%


-Repeat ECHO


-BNP: 8160





Afib


-Cardizem 20 mg given in ED


-restart home med Metoprolol 12.5 mg po BID


-INR: 1.5


-Chadsvasc score: 4- High risk for stroke 


-Hasbled: 2 Moderate risk for bleeding 


-Lovenox 90 mg sc given in ED


-Lovenox 90 mg sc q12h


-Coumadin 5mg daily


-Cardiology, Dr. Dotson, consulted 





Vertigo


-Midodrine 5mg po daily 


-Meclizine 12.5mg po daily





Dementia


-Memantine 5mg po daily 





Prophylaxis


-Pepcid 20 mg po daily 


- SCDs contraindicated due to b/l LE edema

## 2018-01-03 NOTE — RAD
PROCEDURE:  CHEST RADIOGRAPH, 1 VIEW



HISTORY:

chest pain



COMPARISON:

4/8/2017



FINDINGS:



LUNGS:

Opacity at right lung base may represent pneumonia.  Followup 

advised. No other opacity appreciated.  Left base obscured by left 

heart border.



PLEURA:

Minimal hazy opacity at right costophrenic angle may reflect small 

pleural effusion.  No evidence of left pleural effusion.  No 

pneumothorax.



CARDIOVASCULAR:

Minimal cardiomegaly.  AICD.  Mitral valve prosthesis.  No congestive 

change.



OSSEOUS STRUCTURES:

No significant abnormalities.



VISUALIZED UPPER ABDOMEN:

Normal.



OTHER FINDINGS:

None. 



IMPRESSION:

Right basilar opacity may reflect pneumonia.  Follow-up advised. 

Possible very small right pleural effusion

## 2018-01-03 NOTE — CP.PCM.CON
History of Present Illness





- History of Present Illness


History of Present Illness: 





I was asked to evaluate patient by Dr Elizabeth.





Patient is a 72 year old male with PMH HTN, CAD s/p CABG, mechanical MVR, 

ischemic cardiomyopathy s/p AICD who presents with dsypnea.  The patient states 

symptoms began about 2 weeks ago.  the patient developed progressive dsypnea 

and lower extremity edema.  He was admitted for further management.





Review of Systems





- Constitutional


Constitutional: absent: As Per HPI, Anorexia, Chills, Daytime Sleepiness, 

Excessive Sweating, Fatigue, Fever, Frequent Falls, Headache, Increased Appetite

, Lethargy, Malaise, Night Sweats, Snoring, Sleep Apnea, Weight Gain, Weight 

Loss, Weakness, Other





- EENT


Eyes: absent: As Per HPI, Blind Spots, Blurred Vision, Change in Vision, 

Decreased Night Vision, Diplopia, Discharge, Dry Eye, Exophthalmos, Floaters, 

Irritation, Itchy Eyes, Loss of Peripheral Vision, Pain, Photophobia, Requires 

Corrective Lenses, Sees Flashes, Spots in Vision, Tunnel Vision, Other Visual 

Disturbances, Loss of Vision, Other


Ears: absent: As Per HPI, Decreased Hearing, Ear Discharge, Ear Pain, Tinnitus, 

Abnormal Hearing, Disequilibrium, Dizziness, Other


Nose/Mouth/Throat: absent: As Per HPI, Epistaxis, Nasal Congestion, Nasal 

Discharge, Nasal Obstruction, Nasal Trauma, Nose Pain, Post Nasal Drip, Sinus 

Pain, Sinus Pressure, Bleeding Gums, Change in Voice, Dental Pain, Dry Mouth, 

Dysphagia, Halitosis, Hoarsness, Lip Swelling, Mouth Lesions, Mouth Pain, 

Odynophagia, Sore Throat, Throat Swelling, Tongue Swelling, Facial Pain, Neck 

Pain, Neck Mass, Other





- Cardiovascular


Cardiovascular: Dyspnea, Dyspnea on Exertion, Leg Edema





- Respiratory


Respiratory: absent: As Per HPI, Cough, Dyspnea, Hemoptysis, Dyspnea on Exertion

, Wheezing, Snoring, Stridor, Pain on Inspiration, Chest Congestion, Excessive 

Mucous Production, Change in Mucous Color, Pain with Coughing, Other





- Gastrointestinal


Gastrointestinal: absent: As Per HPI, Abdominal Pain, Belching, Bloating, 

Change in Bowel Habits, Change in Stool Character, Coffee Ground Emesis, 

Constipation, Cramping, Diarrhea, Dyspepsia, Dysphagia, Early Satiety, 

Excessive Flatus, Fecal Incontinence, Heartburn, Hematemesis, Hematochezia, 

Loose Stools, Melena, Nausea, Odynophagia, Temesmus, Vomiting, Other





- Genitourinary


Genitourinary: absent: As Per HPI, Change in Urinary Stream, Difficulty 

Urinating, Dysuria, Flank Pain, Hematuria, Pyuria, Nocturia, Urinary 

Incontinence, Urinary Frequency, Urinary Hesitance, Urinary Urgency, Voiding 

Freq/Small Amts, Freq UTI, Hx Renal/Bladder Calculi, Hx /Renal Surgery, 

Bladder Distension, Other





- Musculoskeletal


Musculoskeletal: absent: As Per HPI, Abnormal Gait, Arthralgias, Atrophy, Back 

Pain, Deformity, Joint Swelling, Limited Range of Motion, Loss of Height, 

Muscle Cramps, Muscle Weakness, Myalgias, Neck Pain, Numbness, Radiating Pain 

into Limb, Stiffness, Tingling, Other





- Integumentary


Integumentary: absent: As Per HPI, Acne, Alopecia, Bleeding Lesions, Change in 

Hair, Change in Nails, Change in Pigmentation, Changing Lesions, Dry Skin, 

Erythema, Furuncle, Hirsutism, Lesions, New Lesions, Non-Healing Lesions, 

Photosensitivity, Pruritus, Rash, Skin Pain, Skin Ulcer, Sores, Striae, Swelling

, Unusual Bruising, Wounds, Jaundice, Other





- Neurological


Neurological: absent: As Per HPI, Abnormal Gait, Abnormal Hearing, Abnormal 

Movements, Abnormal Speech, Behavioral Changes, Burning Sensations, Confusion, 

Convulsions, Disequilibrium, Dizziness, Numbness, Focal Weakness, Frequent Falls

, Headaches, Lack of Coordination, Loss of Vision, Memory Loss, Paresthesias, 

Radicular Pain, Restless Legs, Sensory Deficit, Syncope, Tingling, Tremor, 

Vertigo, Weakness, Other Visual Disturbances, Other





- Psychiatric


Psychiatric: absent: As Per HPI, Abnormal Sleep Pattern, Anhedonia, Anxiety, 

Auditory Hallucinations, Behavioral Changes, Change in Appetite, Change in 

Libido, Confusion, Depression, Difficulty Concentrating, Hallucinations, 

Homicidal Ideation, Hopelessness, Irritability, Memory Loss, Mood Swings, Panic 

Attacks, Paranoia, Suicidal Ideation, Visual Hallucinations, Tactile 

Hallucinations, Other





- Endocrine


Endocrine: absent: As Per HPI, Change in Body Appearance, Change in Libido, 

Cold Intolorance, Deepening of Voice, Excessive Sweating, Fatigue, Flushing, 

Heat Intolorance, Increase in Ring/Shoe/Hat Size, Palpitations, Polydipsia, 

Polyphagia, Polyuria, Other





- Hematologic/Lymphatic


Hematologic: absent: As Per HPI, Easy Bleeding, Easy Bruising, Lymphadenopathy, 

Other





Past Patient History





- Infectious Disease


Hx of Infectious Diseases: None





- Past Medical History & Family History


Past Medical History?: Yes





- Past Social History


Smoking Status: Heavy Smoker > 10 Cigarettes Daily





- CARDIAC


Hx Atrial Fibrillation: Yes


Hx Cardia Arrhythmia: Yes


Hx Congestive Heart Failure: Yes


Hx Hypertension: Yes


Hx Pacemaker: Yes





- PULMONARY


Hx Chronic Obstructive Pulmonary Disease (COPD): Yes





- NEUROLOGICAL


Hx Alzheimer's Disease: Yes


Hx Dementia: Yes





- MUSCULOSKELETAL/RHEUMATOLOGICAL


Hx Falls: No





- PSYCHIATRIC


Hx Substance Use: No





- SURGICAL HISTORY


Hx Coronary Artery Bypass Graft: Yes (4 yrs ago)





- ANESTHESIA


Hx Anesthesia: Yes


Hx Anesthesia Reactions: No


Hx Malignant Hyperthermia: No





Meds


Allergies/Adverse Reactions: 


 Allergies











Allergy/AdvReac Type Severity Reaction Status Date / Time


 


No Known Allergies Allergy   Verified 01/03/18 09:54














- Medications


Medications: 


 Current Medications





Enoxaparin Sodium (Lovenox)  90 mg SC Q12 [facility]


Famotidine (Pepcid)  20 mg PO DAILY [facility]


Furosemide (Lasix)  40 mg IVP Q8H [facility]


   Last Admin: 01/03/18 14:41 Dose:  Not Given


Meclizine HCl (Antivert)  12.5 mg PO DAILY [facility]


   Last Admin: 01/03/18 14:46 Dose:  12.5 mg


Memantine (Namenda)  5 mg PO DAILY [facility]


Metoprolol Tartrate (Lopressor)  12.5 mg PO BID [facility]


   Last Admin: 01/03/18 17:53 Dose:  12.5 mg


Midodrine (Proamatine)  5 mg PO DAILY [facility]











Physical Exam





- Constitutional


Appears: Non-toxic





- Head Exam


Head Exam: NORMAL INSPECTION





- Eye Exam


Eye Exam: Normal appearance





- ENT Exam


ENT Exam: Mucous Membranes Moist





- Neck Exam


Neck exam: Positive for: Full Rom





- Respiratory Exam


Respiratory Exam: Decreased Breath Sounds





- Cardiovascular Exam


Cardiovascular Exam: REGULAR RHYTHM





- GI/Abdominal Exam


GI & Abdominal Exam: Normal Bowel Sounds





- Rectal Exam


Rectal Exam: Deferred





- Extremities Exam


Extremities exam: Negative for: pedal edema





- Back Exam


Back exam: NORMAL INSPECTION





- Neurological Exam


Neurological exam: Alert, Oriented x3





- Psychiatric Exam


Psychiatric exam: Normal Affect





- Skin


Skin Exam: Normal Color





Results





- Vital Signs


Recent Vital Signs: 


 Last Vital Signs











Temp  98.0 F   01/03/18 16:17


 


Pulse  76   01/03/18 16:17


 


Resp  20   01/03/18 16:17


 


BP  105/56 L  01/03/18 16:17


 


Pulse Ox  100   01/03/18 17:26














- Labs


Result Diagrams: 


 01/03/18 09:58





 01/03/18 09:58


Labs: 


 Laboratory Results - last 24 hr











  01/03/18 01/03/18 01/03/18





  09:58 09:58 09:58


 


WBC  7.3  


 


RBC  4.59  


 


Hgb  12.1  


 


Hct  38.4  


 


MCV  83.7  D  


 


MCH  26.4 L  


 


MCHC  31.6 L  


 


RDW  17.8 H  


 


Plt Count  248  


 


MPV  8.7  


 


Neut % (Auto)  80.9 H  


 


Lymph % (Auto)  7.9 L  


 


Mono % (Auto)  9.9  


 


Eos % (Auto)  0.6  


 


Baso % (Auto)  0.7  


 


Neut #  5.9  


 


Lymph #  0.6 L  


 


Mono #  0.7  


 


Eos #  0.0  


 


Baso #  0.1  


 


Neutrophils % (Manual)  84 H  


 


Lymphocytes % (Manual)  8 L  


 


Monocytes % (Manual)  7  


 


Eosinophils % (Manual)  1  


 


Platelet Estimate  Normal  


 


Hypochromasia (manual)  Slight  


 


Poikilocytosis (manual  Slight  


 


Anisocytosis (manual)  Slight  


 


Tear Drop Cells  Slight  


 


Ovalocytes  Slight  


 


PT   16.8 H 


 


INR   1.5 


 


APTT   23 


 


Sodium    135


 


Potassium    3.9


 


Chloride    102


 


Carbon Dioxide    25


 


Anion Gap    12


 


BUN    24 H


 


Creatinine    1.0


 


Est GFR ( Amer)    > 60


 


Est GFR (Non-Af Amer)    > 60


 


Random Glucose    112 H


 


Calcium    11.7 H


 


Total Bilirubin    1.9 H


 


AST    29


 


ALT    27


 


Alkaline Phosphatase    91


 


Troponin I    0.0510


 


NT-Pro-B Natriuret Pep    8160 H


 


Total Protein    7.3


 


Albumin    4.0


 


Globulin    3.3


 


Albumin/Globulin Ratio    1.2


 


Digoxin   














  01/03/18





  09:58


 


WBC 


 


RBC 


 


Hgb 


 


Hct 


 


MCV 


 


MCH 


 


MCHC 


 


RDW 


 


Plt Count 


 


MPV 


 


Neut % (Auto) 


 


Lymph % (Auto) 


 


Mono % (Auto) 


 


Eos % (Auto) 


 


Baso % (Auto) 


 


Neut # 


 


Lymph # 


 


Mono # 


 


Eos # 


 


Baso # 


 


Neutrophils % (Manual) 


 


Lymphocytes % (Manual) 


 


Monocytes % (Manual) 


 


Eosinophils % (Manual) 


 


Platelet Estimate 


 


Hypochromasia (manual) 


 


Poikilocytosis (manual 


 


Anisocytosis (manual) 


 


Tear Drop Cells 


 


Ovalocytes 


 


PT 


 


INR 


 


APTT 


 


Sodium 


 


Potassium 


 


Chloride 


 


Carbon Dioxide 


 


Anion Gap 


 


BUN 


 


Creatinine 


 


Est GFR ( Amer) 


 


Est GFR (Non-Af Amer) 


 


Random Glucose 


 


Calcium 


 


Total Bilirubin 


 


AST 


 


ALT 


 


Alkaline Phosphatase 


 


Troponin I 


 


NT-Pro-B Natriuret Pep 


 


Total Protein 


 


Albumin 


 


Globulin 


 


Albumin/Globulin Ratio 


 


Digoxin  < 0.4 L














- EKG Data


EKG Interpreted by: Myself





Assessment & Plan


(1) Systolic dysfunction with acute on chronic heart failure


Assessment and Plan: 


will need increased diuresis


Status: Acute   





(2) CAD (coronary artery disease)


Assessment and Plan: 


ASA therapy


Status: Chronic   





(3) Chronic atrial fibrillation


Assessment and Plan: 


will maintain coumadin, goal INR 2.5-3.5 given MVR


Status: Chronic   





(4) H/O mitral valve replacement with mechanical valve


Assessment and Plan: 


as above


Status: Chronic

## 2018-01-04 PROCEDURE — 5A09557 ASSISTANCE WITH RESPIRATORY VENTILATION, GREATER THAN 96 CONSECUTIVE HOURS, CONTINUOUS POSITIVE AIRWAY PRESSURE: ICD-10-PCS | Performed by: INTERNAL MEDICINE

## 2018-01-04 RX ADMIN — ENOXAPARIN SODIUM SCH MG: 100 INJECTION SUBCUTANEOUS at 21:57

## 2018-01-04 RX ADMIN — ENOXAPARIN SODIUM SCH MG: 100 INJECTION SUBCUTANEOUS at 10:08

## 2018-01-04 RX ADMIN — METHYLPREDNISOLONE SODIUM SUCCINATE SCH MG: 40 INJECTION, POWDER, FOR SOLUTION INTRAMUSCULAR; INTRAVENOUS at 23:35

## 2018-01-04 NOTE — CP.PCM.PN
Subjective





- Date & Time of Evaluation


Date of Evaluation: 01/04/18


Time of Evaluation: 10:00





- Subjective


Subjective: 





patient remains dyspneic.  





Objective





- Vital Signs/Intake and Output


Vital Signs (last 24 hours): 


 











Temp Pulse Resp BP Pulse Ox


 


 97.3 F L  140 H  20   111/82   95 


 


 01/04/18 08:26  01/04/18 08:26  01/04/18 00:28  01/04/18 08:26  01/04/18 00:28











- Medications


Medications: 


 Current Medications





Enoxaparin Sodium (Lovenox)  90 mg SC Q12 [facility]


   Last Admin: 01/03/18 21:21 Dose:  90 mg


Famotidine (Pepcid)  20 mg PO DAILY [facility]


Furosemide 100 mg/ Sodium (Chloride)  100 mls @ 4 mls/hr IVP .Q24H [facility]; 4 MG/HR


   PRN Reason: Protocol


   Last Admin: 01/04/18 00:21 Dose:  4 mls/hr


Meclizine HCl (Antivert)  12.5 mg PO DAILY [facility]


   Last Admin: 01/03/18 14:46 Dose:  12.5 mg


Memantine (Namenda)  5 mg PO DAILY [facility]


Metoprolol Tartrate (Lopressor)  12.5 mg PO BID [facility]


Midodrine (Proamatine)  5 mg PO DAILY [facility]











- Labs


Labs: 


 





 01/03/18 09:58 





 01/03/18 09:58 





 











PT  16.8 SECONDS (9.7-12.2)  H  01/03/18  09:58    


 


INR  1.5   01/03/18  09:58    


 


APTT  23 SECONDS (21-34)   01/03/18  09:58    














- Constitutional


Appears: Non-toxic





- Head Exam


Head Exam: NORMAL INSPECTION





- Eye Exam


Eye Exam: Normal appearance





- ENT Exam


ENT Exam: Mucous Membranes Moist





- Neck Exam


Neck Exam: Full ROM





- Respiratory Exam


Respiratory Exam: Decreased Breath Sounds, Rales





- Cardiovascular Exam


Cardiovascular Exam: REGULAR RHYTHM





- GI/Abdominal Exam


GI & Abdominal Exam: Normal Bowel Sounds





- Rectal Exam


Rectal Exam: Deferred





- Extremities Exam


Extremities Exam: Pedal Edema





- Back Exam


Back Exam: NORMAL INSPECTION





- Neurological Exam


Neurological Exam: Alert





- Psychiatric Exam


Psychiatric exam: Normal Affect





- Skin


Skin Exam: Normal Color





Assessment and Plan


(1) Systolic dysfunction with acute on chronic heart failure


Assessment & Plan: 


will increase diuresis


Status: Acute   





(2) CAD (coronary artery disease)


Assessment & Plan: 


asa therapy


Status: Chronic   





(3) Chronic atrial fibrillation


Assessment & Plan: 


maintain coumadin.


Status: Chronic   





(4) H/O mitral valve replacement with mechanical valve


Assessment & Plan: 


goal INR 2.5-3.5


Status: Chronic

## 2018-01-04 NOTE — RAD
HISTORY:

sob  



COMPARISON:

Chest x-ray performed 1/3/18 



TECHNIQUE:

Chest, one view.



FINDINGS:





LUNGS:

Pulmonary venous congestion. Small right pleural effusion.  Bibasilar 

atelectasis/infiltrates. No definite pneumothorax. 



CARDIOVASCULAR:

Median sternotomy wires.  Left-sided AICD.  Cardiomegaly.  Prosthetic 

cardiac valve.



OSSEOUS STRUCTURES:

 Osseous demineralization.  Degenerative changes.



VISUALIZED UPPER ABDOMEN:

Unremarkable.



OTHER FINDINGS:

None.



IMPRESSION:

Pulmonary venous congestion. Small right pleural effusion.  Bibasilar 

atelectasis/infiltrates.



Median sternotomy wires.  Left-sided AICD.  Cardiomegaly.  Prosthetic 

cardiac valve.

## 2018-01-04 NOTE — CARD
--------------- APPROVED REPORT --------------





EKG Measurement

Heart [id number]

[id number]

[id number]

[id number]



<Conclusion>

Atrial fibrillation with rapid ventricular response

Left axis deviation

Right bundle branch block

Inferior infarct, age undetermined

T wave abnormality, consider lateral ischemia

Abnormal ECG

## 2018-01-04 NOTE — CP.PCM.PN
Subjective





- Date & Time of Evaluation


Date of Evaluation: 01/04/18


Time of Evaluation: 10:16





- Subjective


Subjective: 





Medicine Progress Note: Dr Elizabeth Service





Patient seen and examined at bedside. Patient states that he is still short of 

breath. He is speaking in full sentences however states that he is tired. 

Patient also refusing labs this morning, phlebotomist attempted this afternoon, 

however was not able to draw the blood. Reports that he is urinating a normal 

amount. Patient currently denies headaches, dizziness, cp, palpitations, sob, 

abdominal pain, urinary symptoms, changes in bowel habits.  





Objective





- Vital Signs/Intake and Output


Vital Signs (last 24 hours): 


 











Temp Pulse Resp BP Pulse Ox


 


 97.3 F L  140 H  20   111/82   95 


 


 01/04/18 08:26  01/04/18 08:26  01/04/18 00:28  01/04/18 08:26  01/04/18 00:28











- Medications


Medications: 


 Current Medications





Enoxaparin Sodium (Lovenox)  90 mg SC Q12 [facility]


   Last Admin: 01/03/18 21:21 Dose:  90 mg


Famotidine (Pepcid)  20 mg PO DAILY [facility]


Furosemide 100 mg/ Sodium (Chloride)  100 mls @ 4 mls/hr IVP .Q24H [facility]; 4 MG/HR


   PRN Reason: Protocol


   Last Admin: 01/04/18 00:21 Dose:  4 mls/hr


Meclizine HCl (Antivert)  12.5 mg PO DAILY [facility]


   Last Admin: 01/03/18 14:46 Dose:  12.5 mg


Memantine (Namenda)  5 mg PO DAILY [facility]


Metoprolol Tartrate (Lopressor)  12.5 mg PO BID [facility]


Midodrine (Proamatine)  5 mg PO DAILY [facility]











- Labs


Labs: 


 





 01/03/18 09:58 





 01/03/18 09:58 





 











PT  16.8 SECONDS (9.7-12.2)  H  01/03/18  09:58    


 


INR  1.5   01/03/18  09:58    


 


APTT  23 SECONDS (21-34)   01/03/18  09:58    














- Head Exam


Head Exam: ATRAUMATIC, NORMAL INSPECTION





- Eye Exam


Eye Exam: EOMI, Normal appearance





- ENT Exam


ENT Exam: Mucous Membranes Moist





- Neck Exam


Neck Exam: Full ROM





- Respiratory Exam


Respiratory Exam: Decreased Breath Sounds, Rales, Respiratory Distress.  absent

: Accessory Muscle Use, Wheezes





- Cardiovascular Exam


Cardiovascular Exam: Tachycardia, +S1, +S2





- GI/Abdominal Exam


GI & Abdominal Exam: Soft, Normal Bowel Sounds.  absent: Firm, Guarding, Rigid, 

Tenderness





- Rectal Exam


Rectal Exam: Deferred





- Extremities Exam


Extremities Exam: absent: Calf Tenderness


Additional comments: 





+2 pitting edema bilaterally 





- Neurological Exam


Neurological Exam: Alert, Awake, Oriented x3





- Psychiatric Exam


Psychiatric exam: Anxious, Normal Affect, Normal Mood





- Skin


Skin Exam: Dry, Normal Color, Warm





Assessment and Plan





- Assessment and Plan (Free Text)


Assessment: 





Acute on chronic CHF exacerbation


-CXR showed pulmonary venous congestion, small R pleural effusion, bibasilar 

atelectasis/infiltrate


-Lasix 60 mg given in ED


-Patient was started on Lasix drip last night


-Order changed to Lasix 40mg q12h


-Midodrine 5mg PO daily


-Echo from 2/25/17 showed LVEF 12%


-Repeat ECHO 


-BNP on admission: 8160


-Bipap prn (patient does refuse the bipap and tries to take it off, educated 

the patient on importance of keeping bipap on)


-Cardiology on consult, Dr. Dotson, help appreciated 





Chronic Atrial fibrillation, Mechanical Mitral Valve


-Cardizem 20 mg given in ED, recieved Digoxin 0.25mg last night 


-Continue Metoprolol 12.5 mg po BID with holding parameters


-INR: 1.5 on admission (subtherapeutic), goal INR 2.5-3.5


-Chadsvasc score: 4- High risk for stroke, Hasbled: 2- Moderate risk for 

bleeding 


-Lovenox 90 mg sc q12h


-Coumadin 5mg daily


-Cardiology on consult, Dr. Dotson, help appreciated  





Vertigo


-Midodrine 5mg po daily 


-Meclizine 12.5mg po daily





Dementia


-Memantine 5mg po daily 





Prophylaxis


-Pepcid 20 mg po daily 


-SCDs contraindicated due to b/l LE edema 





Further management as per Dr Elizabeth

## 2018-01-05 LAB
ALBUMIN SERPL-MCNC: 3.8 G/DL (ref 3.5–5)
ALBUMIN/GLOB SERPL: 1.2 {RATIO} (ref 1–2.1)
ALT SERPL-CCNC: 39 U/L (ref 21–72)
ANISOCYTOSIS BLD QL SMEAR: SLIGHT
AST SERPL-CCNC: 44 U/L (ref 17–59)
BASOPHILS # BLD AUTO: 0 K/UL (ref 0–0.2)
BASOPHILS NFR BLD: 0.3 % (ref 0–2)
BUN SERPL-MCNC: 31 MG/DL (ref 9–20)
CALCIUM SERPL-MCNC: 11.7 MG/DL (ref 8.6–10.4)
EOSINOPHIL # BLD AUTO: 0 K/UL (ref 0–0.7)
EOSINOPHIL NFR BLD: 0.1 % (ref 0–4)
ERYTHROCYTE [DISTWIDTH] IN BLOOD BY AUTOMATED COUNT: 17.4 % (ref 11.5–14.5)
GFR NON-AFRICAN AMERICAN: > 60
HGB BLD-MCNC: 12.1 G/DL (ref 12–18)
INR PPP: 1.7
LYMPHOCYTE: 7 % (ref 20–40)
LYMPHOCYTES # BLD AUTO: 0.4 K/UL (ref 1–4.3)
LYMPHOCYTES NFR BLD AUTO: 7.7 % (ref 20–40)
MAGNESIUM SERPL-MCNC: 1.8 MG/DL (ref 1.6–2.3)
MCH RBC QN AUTO: 27.1 PG (ref 27–31)
MCHC RBC AUTO-ENTMCNC: 32.7 G/DL (ref 33–37)
MCV RBC AUTO: 82.7 FL (ref 80–94)
MICROCYTES BLD QL SMEAR: SLIGHT
MONOCYTE: 3 % (ref 0–10)
MONOCYTES # BLD: 0.2 K/UL (ref 0–0.8)
MONOCYTES NFR BLD: 3.2 % (ref 0–10)
NEUTROPHILS # BLD: 4.2 K/UL (ref 1.8–7)
NEUTROPHILS NFR BLD AUTO: 88.7 % (ref 50–75)
NEUTROPHILS NFR BLD AUTO: 90 % (ref 50–75)
NRBC BLD AUTO-RTO: 0.1 % (ref 0–2)
OVALOCYTES BLD QL SMEAR: SLIGHT
PLATELET # BLD EST: NORMAL 10*3/UL
PLATELET # BLD: 226 K/UL (ref 130–400)
PMV BLD AUTO: 8.8 FL (ref 7.2–11.7)
POIKILOCYTOSIS BLD QL SMEAR: SLIGHT
PROTHROMBIN TIME: 19.1 SECONDS (ref 9.7–12.2)
RBC # BLD AUTO: 4.46 MIL/UL (ref 4.4–5.9)
TOTAL CELLS COUNTED BLD: 100
WBC # BLD AUTO: 4.7 K/UL (ref 4.8–10.8)

## 2018-01-05 RX ADMIN — METHYLPREDNISOLONE SODIUM SUCCINATE SCH: 40 INJECTION, POWDER, FOR SOLUTION INTRAMUSCULAR; INTRAVENOUS at 23:16

## 2018-01-05 RX ADMIN — METHYLPREDNISOLONE SODIUM SUCCINATE SCH: 40 INJECTION, POWDER, FOR SOLUTION INTRAMUSCULAR; INTRAVENOUS at 16:30

## 2018-01-05 RX ADMIN — ENOXAPARIN SODIUM SCH MG: 100 INJECTION SUBCUTANEOUS at 10:47

## 2018-01-05 RX ADMIN — METHYLPREDNISOLONE SODIUM SUCCINATE SCH MG: 40 INJECTION, POWDER, FOR SOLUTION INTRAMUSCULAR; INTRAVENOUS at 10:47

## 2018-01-05 RX ADMIN — ENOXAPARIN SODIUM SCH MG: 100 INJECTION SUBCUTANEOUS at 22:34

## 2018-01-05 RX ADMIN — METHYLPREDNISOLONE SODIUM SUCCINATE SCH: 40 INJECTION, POWDER, FOR SOLUTION INTRAMUSCULAR; INTRAVENOUS at 04:25

## 2018-01-05 NOTE — CP.PCM.PN
Subjective





- Date & Time of Evaluation


Date of Evaluation: 01/05/18


Time of Evaluation: 07:16





- Subjective


Subjective: 


PGY-1 medicine note for Dr Elizabeth.





No acute events overnight. Patient refused his morning lab work yesterday. He 

also occasionally refuses bipap. Patient states that he is still short of 

breath. He is speaking in full sentences however states that he is tired. 

Patient currently denies headaches, dizziness, cp, palpitations, sob, abdominal 

pain, urinary symptoms, changes in bowel habits. 








Objective





- Vital Signs/Intake and Output


Vital Signs (last 24 hours): 


 











Temp Pulse Resp BP Pulse Ox


 


 97.1 F L  105 H  22   112/82   96 


 


 01/05/18 00:00  01/05/18 00:00  01/05/18 00:00  01/05/18 00:00  01/05/18 00:00











- Medications


Medications: 


 Current Medications





Enoxaparin Sodium (Lovenox)  90 mg SC Q12 [facility]


   Last Admin: 01/04/18 21:57 Dose:  90 mg


Famotidine (Pepcid)  20 mg PO DAILY [facility]


   Last Admin: 01/04/18 10:07 Dose:  20 mg


Furosemide 100 mg/ Sodium (Chloride)  100 mls @ 4 mls/hr IVP .Q24H [facility]


   PRN Reason: 4 MG/HR


   Last Admin: 01/04/18 19:43 Dose:  4 mls/hr


Meclizine HCl (Antivert)  12.5 mg PO DAILY [facility]


   Last Admin: 01/04/18 10:07 Dose:  12.5 mg


Memantine (Namenda)  5 mg PO DAILY [facility]


   Last Admin: 01/04/18 10:07 Dose:  5 mg


Methylprednisolone (Solu-Medrol)  40 mg IV Q6H [facility]


   Last Admin: 01/05/18 04:25 Dose:  Not Given


Metoprolol Tartrate (Lopressor)  12.5 mg PO BID [facility]


   Last Admin: 01/04/18 18:42 Dose:  12.5 mg


Midodrine (Proamatine)  5 mg PO DAILY [facility]


   Last Admin: 01/04/18 10:07 Dose:  5 mg











- Labs


Labs: 


 





 01/03/18 09:58 





 01/03/18 09:58 





 











PT  16.8 SECONDS (9.7-12.2)  H  01/03/18  09:58    


 


INR  1.5   01/03/18  09:58    


 


APTT  23 SECONDS (21-34)   01/03/18  09:58    














- Additional Findings


Additional findings: 


- Head Exam


Head Exam: ATRAUMATIC, NORMAL INSPECTION





- Eye Exam


Eye Exam: EOMI, Normal appearance





- ENT Exam


ENT Exam: Mucous Membranes Moist





- Neck Exam


Neck Exam: Full ROM





- Respiratory Exam


Respiratory Exam: Decreased Breath Sounds, Rales, Respiratory Distress.  absent

: Accessory Muscle Use, Wheezes





- Cardiovascular Exam


Cardiovascular Exam: Tachycardia, +S1, +S2





- GI/Abdominal Exam


GI & Abdominal Exam: Soft, Normal Bowel Sounds.  absent: Firm, Guarding, Rigid, 

Tenderness





- Rectal Exam


Rectal Exam: Deferred





- Extremities Exam


Extremities Exam: absent: Calf Tenderness


Additional comments: 





+2 pitting edema bilaterally 





- Neurological Exam


Neurological Exam: Alert, Awake, Oriented x3





- Psychiatric Exam


Psychiatric exam: Anxious, Normal Affect, Normal Mood





- Skin


Skin Exam: Dry, Normal Color, Warm








Assessment and Plan





- Assessment and Plan (Free Text)


Assessment: 


Systolic dysfunction with acute on chronic heart failure


-Cardiology on consult, Dr. Dotson, help appreciated 


-Bipap prn (patient does refuse the bipap and tries to take it off, educated 

the patient on importance of keeping bipap on)


-BNP on admission: 8160


-Monitor Ins/Outs





Imaging:


-CXR 1/4/18: showed pulmonary venous congestion, small R pleural effusion, 

bibasilar atelectasis/infiltrate


-Echo from 2/25/17 showed LVEF 12%


-F/U Repeat ECHO 





Meds:


-Lasix 60 mg given in ED


-Patient was started on Lasix drip 1/4/18


-Not on ACEi/ARB due to chronic hypotension


-Metoprolol Tartrate 12.5mg PO BID





Chronic Atrial fibrillation w/ AICD


-Cardiology on consult, Dr. Dotson, help appreciated  


-Chadsvasc score: 4- High risk for stroke, Hasbled: 2- Moderate risk for 

bleeding 





Meds:


-Cardizem 20 mg given in ED, recieved one dose Digoxin 0.25mg 1/3/18


-Continue Metoprolol 12.5 mg po BID with holding parameters


-Lovenox 90 mg sc q12h





Mechanical Mitral Valve


-INR: 1.5 on admission (subtherapeutic), goal INR 2.5-3.5


-Coumadin 5mg daily





Coronary Artery Disease


Aspirin 81mg PO QD





History of COPD


-Solumedrol 40mg IV QID


-Will not start Duonebs due to high HR





Vertigo


-Con't home med Meclizine 12.5mg po daily





Orthostatic Hypotension


-Con't home med Midodrine 5mg po daily 





Dementia


-Con't home med Memantine 5mg po daily 





Prophylaxis


-Pepcid 20 mg po daily 


-SCDs contraindicated due to b/l LE edema 


-Already on Lovenox 90mg SC BID


-Heart Healthy Diet





Further management as per Dr Elizabeth

## 2018-01-05 NOTE — CP.PCM.PN
Subjective





- Date & Time of Evaluation


Date of Evaluation: 01/05/18


Time of Evaluation: 08:10





- Subjective


Subjective: 





patient has less dyspnea





Objective





- Vital Signs/Intake and Output


Vital Signs (last 24 hours): 


 











Temp Pulse Resp BP Pulse Ox


 


 97.1 F L  105 H  22   112/82   96 


 


 01/05/18 00:00  01/05/18 00:00  01/05/18 00:00  01/05/18 00:00  01/05/18 00:00











- Medications


Medications: 


 Current Medications





Aspirin (Aspirin Chewable)  81 mg PO DAILY [facility]


Enoxaparin Sodium (Lovenox)  90 mg SC Q12 [facility]


   Last Admin: 01/04/18 21:57 Dose:  90 mg


Famotidine (Pepcid)  20 mg PO DAILY [facility]


   Last Admin: 01/04/18 10:07 Dose:  20 mg


Furosemide 100 mg/ Sodium (Chloride)  100 mls @ 4 mls/hr IVP .Q24H [facility]


   PRN Reason: 4 MG/HR


   Last Admin: 01/04/18 19:43 Dose:  4 mls/hr


Meclizine HCl (Antivert)  12.5 mg PO DAILY [facility]


   Last Admin: 01/04/18 10:07 Dose:  12.5 mg


Memantine (Namenda)  5 mg PO DAILY [facility]


   Last Admin: 01/04/18 10:07 Dose:  5 mg


Methylprednisolone (Solu-Medrol)  40 mg IV Q6H [facility]


   Last Admin: 01/05/18 04:25 Dose:  Not Given


Metoprolol Tartrate (Lopressor)  12.5 mg PO BID [facility]


   Last Admin: 01/04/18 18:42 Dose:  12.5 mg


Midodrine (Proamatine)  5 mg PO DAILY [facility]


   Last Admin: 01/04/18 10:07 Dose:  5 mg


Warfarin Sodium (Coumadin)  5 mg PO 1800 [facility]


   Stop: 01/05/18 18:01











- Labs


Labs: 


 





 01/05/18 07:58 





 01/03/18 09:58 





 











PT  16.8 SECONDS (9.7-12.2)  H  01/03/18  09:58    


 


INR  1.5   01/03/18  09:58    


 


APTT  23 SECONDS (21-34)   01/03/18  09:58    














- Constitutional


Appears: Non-toxic





- Head Exam


Head Exam: NORMAL INSPECTION





- Eye Exam


Eye Exam: Normal appearance





- ENT Exam


ENT Exam: Mucous Membranes Moist





- Neck Exam


Neck Exam: Full ROM





- Respiratory Exam


Respiratory Exam: NORMAL BREATHING PATTERN





- Cardiovascular Exam


Cardiovascular Exam: REGULAR RHYTHM





- GI/Abdominal Exam


GI & Abdominal Exam: Normal Bowel Sounds





- Rectal Exam


Rectal Exam: Deferred





- Extremities Exam


Extremities Exam: absent: Pedal Edema





- Back Exam


Back Exam: NORMAL INSPECTION





- Neurological Exam


Neurological Exam: Alert





- Psychiatric Exam


Psychiatric exam: Normal Affect





- Skin


Skin Exam: Normal Color





Assessment and Plan


(1) Systolic dysfunction with acute on chronic heart failure


Assessment & Plan: 


continue diuresis


Status: Acute   





(2) CAD (coronary artery disease)


Assessment & Plan: 


medical therapy


Status: Chronic   





(3) Chronic atrial fibrillation


Assessment & Plan: 


conitnue coumadin


Status: Chronic   





(4) H/O mitral valve replacement with mechanical valve


Assessment & Plan: 


continue couamdin


Status: Chronic

## 2018-01-06 LAB
INR PPP: 1.8
PROTHROMBIN TIME: 21.1 SECONDS (ref 9.7–12.2)

## 2018-01-06 RX ADMIN — METHYLPREDNISOLONE SODIUM SUCCINATE SCH: 40 INJECTION, POWDER, FOR SOLUTION INTRAMUSCULAR; INTRAVENOUS at 10:20

## 2018-01-06 RX ADMIN — ENOXAPARIN SODIUM SCH MG: 100 INJECTION SUBCUTANEOUS at 21:16

## 2018-01-06 RX ADMIN — METHYLPREDNISOLONE SODIUM SUCCINATE SCH: 40 INJECTION, POWDER, FOR SOLUTION INTRAMUSCULAR; INTRAVENOUS at 17:10

## 2018-01-06 RX ADMIN — METHYLPREDNISOLONE SODIUM SUCCINATE SCH: 40 INJECTION, POWDER, FOR SOLUTION INTRAMUSCULAR; INTRAVENOUS at 23:24

## 2018-01-06 RX ADMIN — METHYLPREDNISOLONE SODIUM SUCCINATE SCH: 40 INJECTION, POWDER, FOR SOLUTION INTRAMUSCULAR; INTRAVENOUS at 04:00

## 2018-01-06 RX ADMIN — ENOXAPARIN SODIUM SCH MG: 100 INJECTION SUBCUTANEOUS at 10:18

## 2018-01-06 NOTE — CP.PCM.PN
Subjective





- Date & Time of Evaluation


Date of Evaluation: 01/06/18


Time of Evaluation: 09:00





- Subjective


Subjective: 











Dr. Elizabeth progress note:





Patient is seen and examined in room.  He is resting comfortably in room is 

breathing well on room air.  He has agreed that he needs BIPAP and that he 

needs medication through the IV. 





Objective





- Vital Signs/Intake and Output


Vital Signs (last 24 hours): 


 











Temp Pulse Resp BP Pulse Ox


 


 97.7 F   101 H  22   113/81   97 


 


 01/05/18 23:41  01/06/18 00:00  01/05/18 23:41  01/05/18 23:41  01/05/18 23:41








Intake and Output: 


 











 01/06/18 01/06/18





 06:59 18:59


 


Intake Total 100 


 


Balance 100 














- Medications


Medications: 


 Current Medications





Aspirin (Aspirin Chewable)  81 mg PO DAILY [facility]


   Last Admin: 01/05/18 10:46 Dose:  81 mg


Enoxaparin Sodium (Lovenox)  90 mg SC Q12 [facility]


   Last Admin: 01/05/18 22:34 Dose:  90 mg


Famotidine (Pepcid)  20 mg PO DAILY [facility]


   Last Admin: 01/05/18 10:47 Dose:  20 mg


Furosemide 100 mg/ Sodium (Chloride)  100 mls @ 4 mls/hr IVP .Q24H [facility]


   PRN Reason: 4 MG/HR


   Last Admin: 01/05/18 19:34 Dose:  Not Given


Meclizine HCl (Antivert)  12.5 mg PO DAILY [facility]


   Last Admin: 01/05/18 10:47 Dose:  12.5 mg


Memantine (Namenda)  5 mg PO DAILY [facility]


   Last Admin: 01/05/18 10:46 Dose:  5 mg


Methylprednisolone (Solu-Medrol)  40 mg IV Q6H [facility]


   Last Admin: 01/06/18 04:00 Dose:  Not Given


Metoprolol Tartrate (Lopressor)  12.5 mg PO BID [facility]


   Last Admin: 01/05/18 19:07 Dose:  12.5 mg


Midodrine (Proamatine)  5 mg PO DAILY [facility]


   Last Admin: 01/05/18 10:46 Dose:  5 mg


Warfarin Sodium (Coumadin)  5 mg PO 1800 [facility]


   Stop: 01/06/18 18:01











- Labs


Labs: 


 





 01/05/18 07:58 





 01/05/18 07:58 





 











PT  19.1 SECONDS (9.7-12.2)  H  01/05/18  07:58    


 


INR  1.7   01/05/18  07:58    


 


APTT  23 SECONDS (21-34)   01/03/18  09:58    














- Constitutional


Appears: Non-toxic, No Acute Distress





- Eye Exam


Eye Exam: Normal appearance





- Respiratory Exam


Respiratory Exam: Decreased Breath Sounds.  absent: Rales, Rhonchi, Wheezes





- Cardiovascular Exam


Cardiovascular Exam: REGULAR RHYTHM, RRR, +S1, +S2.  absent: Gallop, Rubs





- GI/Abdominal Exam


GI & Abdominal Exam: Soft, Normal Bowel Sounds.  absent: Tenderness





- Extremities Exam


Extremities Exam: Pedal Edema.  absent: Normal Inspection





- Neurological Exam


Neurological Exam: Alert





- Skin


Skin Exam: Normal Color, Warm





Assessment and Plan





- Assessment and Plan (Free Text)


Assessment: 





Systolic dysfunction with acute on chronic heart failure


1/6:  Will get a PICC line so patient can get IV medications, will given PO 

lasix. BIPAP as needed.  Echo pending. 





Cardiology on consult, Dr. Dotson, help appreciated 


-Bipap prn (patient does refuse the bipap and tries to take it off, educated 

the patient on importance of keeping bipap on)


-BNP on admission: 8160


-Monitor Ins/Outs





Imaging:


-CXR 1/4/18: showed pulmonary venous congestion, small R pleural effusion, 

bibasilar atelectasis/infiltrate


-Echo from 2/25/17 showed LVEF 12%


-F/U Repeat ECHO 





Meds:


-Lasix 60 mg given in ED


-Patient was started on Lasix drip 1/4/18


-Not on ACEi/ARB due to chronic hypotension


-Metoprolol Tartrate 12.5mg PO BID





Chronic Atrial fibrillation w/ AICD


-Cardiology on consult, Dr. Dotson, help appreciated  


-Chadsvasc score: 4- High risk for stroke, Hasbled: 2- Moderate risk for 

bleeding 





Meds:


-Cardizem 20 mg given in ED, recieved one dose Digoxin 0.25mg 1/3/18


-Continue Metoprolol 12.5 mg po BID with holding parameters


-Lovenox 90 mg sc q12h





Mechanical Mitral Valve


-1/6:  Coumadin given, continue to bridge with theraputic Lovenox. 


INR: 1.5 on admission (subtherapeutic), goal INR 2.5-3.5


-Coumadin 5mg daily





Coronary Artery Disease


Aspirin 81mg PO QD





History of COPD


-Solumedrol 40mg IV QID


-Will not start Duonebs due to high HR





Vertigo


-Con't home med Meclizine 12.5mg po daily





Orthostatic Hypotension


-Con't home med Midodrine 5mg po daily 





Dementia


-Con't home med Memantine 5mg po daily 





Prophylaxis


-Pepcid 20 mg po daily 


-SCDs contraindicated due to b/l LE edema 


-Already on Lovenox 90mg SC BID


-Heart Healthy Diet





Further management as per Dr Elizabeth

## 2018-01-07 LAB
INR PPP: 2.3
PROTHROMBIN TIME: 26.5 SECONDS (ref 9.7–12.2)

## 2018-01-07 RX ADMIN — BUDESONIDE SCH: 0.5 SUSPENSION RESPIRATORY (INHALATION) at 21:00

## 2018-01-07 RX ADMIN — IPRATROPIUM BROMIDE AND ALBUTEROL SULFATE SCH: .5; 3 SOLUTION RESPIRATORY (INHALATION) at 21:00

## 2018-01-07 RX ADMIN — METHYLPREDNISOLONE SODIUM SUCCINATE SCH: 40 INJECTION, POWDER, FOR SOLUTION INTRAMUSCULAR; INTRAVENOUS at 06:45

## 2018-01-07 RX ADMIN — ENOXAPARIN SODIUM SCH MG: 100 INJECTION SUBCUTANEOUS at 22:29

## 2018-01-07 RX ADMIN — METHYLPREDNISOLONE SODIUM SUCCINATE SCH MG: 40 INJECTION, POWDER, FOR SOLUTION INTRAMUSCULAR; INTRAVENOUS at 10:44

## 2018-01-07 RX ADMIN — METHYLPREDNISOLONE SODIUM SUCCINATE SCH: 40 INJECTION, POWDER, FOR SOLUTION INTRAMUSCULAR; INTRAVENOUS at 21:48

## 2018-01-07 RX ADMIN — METHYLPREDNISOLONE SODIUM SUCCINATE SCH: 40 INJECTION, POWDER, FOR SOLUTION INTRAMUSCULAR; INTRAVENOUS at 10:50

## 2018-01-07 RX ADMIN — ENOXAPARIN SODIUM SCH MG: 100 INJECTION SUBCUTANEOUS at 10:48

## 2018-01-07 NOTE — CP.PCM.CON
History of Present Illness





- History of Present Illness


History of Present Illness: 





reason for consultation: shortness of breath





72-year-old male with MI, atrial fibrillation, CHF, long history of smoking was 

brought to emergency room for chest pain and shortness of breath. Patient also 

complaining of cough and swelling of legs. patient was placed on BiPAP for 

respiratory distress. Denies fever or chills. patient being treated for CHF.








PMHx: MI, Atrial fibrillation, CAD, CHF, HTN


PSHx: CABG in 2010 (patient unsure of exact  year), mechanical valve placement


Medications: Midodrine 5 mg, Memantine 5 mg, Meclizine 12.5 mg, Aspirin 81 mg, 

Metoprolol 25 mg


Social Hx: Smokes cigarettes 1 pack/week since age 14, used to drink alcohol in 

his 20s, denies any recreational drug use. Lives at home with wife. 


Allergies: NKDA








Review of Systems





- Review of Systems


All systems: reviewed and no additional remarkable complaints except (shortness 

of breath and cough)





Past Patient History





- Infectious Disease


Hx of Infectious Diseases: None





- Past Medical History & Family History


Past Medical History?: Yes





- Past Social History


Smoking Status: Heavy Smoker > 10 Cigarettes Daily





- CARDIAC


Hx Atrial Fibrillation: Yes


Hx Cardia Arrhythmia: Yes


Hx Congestive Heart Failure: Yes


Hx Hypertension: Yes


Hx Pacemaker: Yes





- PULMONARY


Hx Chronic Obstructive Pulmonary Disease (COPD): Yes





- NEUROLOGICAL


Hx Alzheimer's Disease: Yes


Hx Dementia: Yes





- MUSCULOSKELETAL/RHEUMATOLOGICAL


Hx Falls: No





- PSYCHIATRIC


Hx Substance Use: No





- SURGICAL HISTORY


Hx Coronary Artery Bypass Graft: Yes (4 yrs ago)





- ANESTHESIA


Hx Anesthesia: Yes


Hx Anesthesia Reactions: No


Hx Malignant Hyperthermia: No





Meds


Allergies/Adverse Reactions: 


 Allergies











Allergy/AdvReac Type Severity Reaction Status Date / Time


 


No Known Allergies Allergy   Verified 01/03/18 09:54














- Medications


Medications: 


 Current Medications





Aspirin (Aspirin Chewable)  81 mg PO DAILY [facility]


   Last Admin: 01/07/18 10:43 Dose:  81 mg


Enoxaparin Sodium (Lovenox)  90 mg SC Q12 [facility]


   Last Admin: 01/07/18 10:48 Dose:  90 mg


Famotidine (Pepcid)  20 mg PO DAILY [facility]


   Last Admin: 01/07/18 10:43 Dose:  20 mg


Furosemide (Lasix)  20 mg PO BID [facility]


   Last Admin: 01/07/18 10:44 Dose:  20 mg


Meclizine HCl (Antivert)  12.5 mg PO DAILY [facility]


   Last Admin: 01/07/18 10:48 Dose:  12.5 mg


Memantine (Namenda)  5 mg PO DAILY [facility]


   Last Admin: 01/07/18 10:43 Dose:  5 mg


Methylprednisolone (Solu-Medrol)  40 mg IV Q6H [facility]


   Last Admin: 01/07/18 10:50 Dose:  Not Given


Metoprolol Tartrate (Lopressor)  25 mg PO BID [facility]


Midodrine (Proamatine)  5 mg PO DAILY [facility]


   Last Admin: 01/07/18 10:48 Dose:  5 mg


Warfarin Sodium (Coumadin)  5 mg PO 1800 [facility]


   Stop: 01/07/18 18:01











Physical Exam





- Head Exam


Head Exam: ATRAUMATIC, NORMOCEPHALIC





- Eye Exam


Eye Exam: Normal appearance





- ENT Exam


ENT Exam: Mucous Membranes Moist





- Neck Exam


Neck exam: Positive for: Normal Inspection





- Respiratory Exam


Respiratory Exam: Rales





- Cardiovascular Exam


Cardiovascular Exam: Irregular Rhythm





- GI/Abdominal Exam


GI & Abdominal Exam: Normal Bowel Sounds, Soft





Results





- Vital Signs


Recent Vital Signs: 


 Last Vital Signs











Temp  97.3 F L  01/06/18 23:30


 


Pulse  119 H  01/06/18 23:30


 


Resp  20   01/06/18 23:30


 


BP  109/67   01/07/18 10:44


 


Pulse Ox  98   01/06/18 23:30














- Labs


Result Diagrams: 


 01/05/18 07:58





 01/05/18 07:58





Assessment & Plan


(1) Acute exacerbation of CHF (congestive heart failure)


Status: Acute   


Comment: continue IV Lasix.  Cardiology workup   





(2) COPD (chronic obstructive pulmonary disease)


Status: Acute   


Comment: Patient with known history of smoking.  Start nebulizer treatment.  

Pulmonary function test once patient is stable.  Continue BiPAP   





(3) Chronic atrial fibrillation


Status: Chronic

## 2018-01-07 NOTE — CP.PCM.PN
Subjective





- Date & Time of Evaluation


Date of Evaluation: 01/07/18


Time of Evaluation: 11:00





- Subjective


Subjective: 











Dr. Elizabeth note:





Patient is seen and examined in room.  Patient is on BIPAP but in no distress.  

I set patient up in bed and explained to him his need for sitting up to help 

him breath. 





Objective





- Vital Signs/Intake and Output


Vital Signs (last 24 hours): 


 











Temp Pulse Resp BP Pulse Ox


 


 97.3 F L  119 H  20   117/85   98 


 


 01/06/18 23:30  01/06/18 23:30  01/06/18 23:30  01/06/18 23:30  01/06/18 23:30











- Medications


Medications: 


 Current Medications





Aspirin (Aspirin Chewable)  81 mg PO DAILY [facility]


   Last Admin: 01/06/18 10:17 Dose:  81 mg


Enoxaparin Sodium (Lovenox)  90 mg SC Q12 [facility]


   Last Admin: 01/06/18 21:16 Dose:  90 mg


Famotidine (Pepcid)  20 mg PO DAILY [facility]


   Last Admin: 01/06/18 10:19 Dose:  20 mg


Furosemide (Lasix)  20 mg PO BID [facility]


   Last Admin: 01/06/18 21:16 Dose:  20 mg


Furosemide 100 mg/ Sodium (Chloride)  100 mls @ 4 mls/hr IVP .Q24H [facility]


   PRN Reason: 4 MG/HR


   Last Admin: 01/06/18 19:00 Dose:  Not Given


Meclizine HCl (Antivert)  12.5 mg PO DAILY [facility]


   Last Admin: 01/06/18 10:16 Dose:  12.5 mg


Memantine (Namenda)  5 mg PO DAILY [facility]


   Last Admin: 01/06/18 10:19 Dose:  5 mg


Methylprednisolone (Solu-Medrol)  40 mg IV Q6H [facility]


   Last Admin: 01/07/18 06:45 Dose:  Not Given


Metoprolol Tartrate (Lopressor)  12.5 mg PO BID [facility]


   Last Admin: 01/06/18 17:37 Dose:  12.5 mg


Midodrine (Proamatine)  5 mg PO DAILY [facility]


   Last Admin: 01/06/18 10:20 Dose:  5 mg











- Labs


Labs: 


 





 01/05/18 07:58 





 01/05/18 07:58 





 











PT  21.1 SECONDS (9.7-12.2)  H  01/06/18  08:23    


 


INR  1.8   01/06/18  08:23    


 


APTT  23 SECONDS (21-34)   01/03/18  09:58    














- Constitutional


Appears: Non-toxic, No Acute Distress





- Eye Exam


Eye Exam: Normal appearance


Pupil Exam: NORMAL ACCOMODATION





- Respiratory Exam


Respiratory Exam: Clear to Ausculation Bilateral.  absent: Decreased Breath 

Sounds, Rales, Rhonchi, Wheezes





- Cardiovascular Exam


Cardiovascular Exam: REGULAR RHYTHM, RRR, +S1, +S2, Murmur.  absent: Gallop, 

Rubs





- GI/Abdominal Exam


GI & Abdominal Exam: Soft, Normal Bowel Sounds.  absent: Tenderness





- Extremities Exam


Extremities Exam: Normal Inspection.  absent: Pedal Edema





- Back Exam


Back Exam: NORMAL INSPECTION





- Neurological Exam


Neurological Exam: Oriented x3





- Psychiatric Exam


Psychiatric exam: Normal Affect, Normal Mood





- Skin


Skin Exam: Normal Color, Warm





Assessment and Plan





- Assessment and Plan (Free Text)


Assessment: 


Systolic dysfunction with acute on chronic heart failure


1/7:  Patient will need to continue BIPAP as needed, Echo pending, Lasix PO for 

now until we get PICC line. 


1/6:  Will get a PICC line so patient can get IV medications, will given PO 

lasix. BIPAP as needed.  Echo pending. 





Cardiology on consult, Dr. Dotson, help appreciated 


-Bipap prn (patient does refuse the bipap and tries to take it off, educated 

the patient on importance of keeping bipap on)


-BNP on admission: 8160


-Monitor Ins/Outs





Imaging:


-CXR 1/4/18: showed pulmonary venous congestion, small R pleural effusion, 

bibasilar atelectasis/infiltrate


-Echo from 2/25/17 showed LVEF 12%


-F/U Repeat ECHO 





Meds:


-Lasix 60 mg given in ED


-Patient was started on Lasix drip 1/4/18


-Not on ACEi/ARB due to chronic hypotension


-Metoprolol Tartrate 12.5mg PO BID





Chronic Atrial fibrillation w/ AICD


-Cardiology on consult, Dr. Dotson, help appreciated  


-Chadsvasc score: 4- High risk for stroke, Hasbled: 2- Moderate risk for 

bleeding 





Meds:


-Cardizem 20 mg given in ED, recieved one dose Digoxin 0.25mg 1/3/18


-Continue Metoprolol 12.5 mg po BID with holding parameters


-Lovenox 90 mg sc q12h





Mechanical Mitral Valve


-1/7: continue Coumadin, INR 2.3.  Continue to bridge with therapeutic Lovenox.

  INR GOAL IS 2.5-3.5. 


1/6:  Coumadin given, continue to bridge with theraputic Lovenox. 


INR: 1.5 on admission (subtherapeutic), goal INR 2.5-3.5


-Coumadin 5mg daily





Coronary Artery Disease


Aspirin 81mg PO QD





History of COPD


-Solumedrol 40mg IV QID


-Will not start Duonebs due to high HR





Vertigo


-Con't home med Meclizine 12.5mg po daily





Orthostatic Hypotension


-Con't home med Midodrine 5mg po daily 





Dementia


-Con't home med Memantine 5mg po daily 





Prophylaxis


-Pepcid 20 mg po daily 


-SCDs contraindicated due to b/l LE edema 


-Already on Lovenox 90mg SC BID


-Heart Healthy Diet





Further management as per Dr Elizabeth

## 2018-01-08 LAB
ALBUMIN SERPL-MCNC: 3.9 G/DL (ref 3.5–5)
ALBUMIN/GLOB SERPL: 1.3 {RATIO} (ref 1–2.1)
ALT SERPL-CCNC: 236 U/L (ref 21–72)
AST SERPL-CCNC: 127 U/L (ref 17–59)
BASOPHILS # BLD AUTO: 0 K/UL (ref 0–0.2)
BASOPHILS NFR BLD: 0.5 % (ref 0–2)
BUN SERPL-MCNC: 47 MG/DL (ref 9–20)
CALCIUM SERPL-MCNC: 11.3 MG/DL (ref 8.6–10.4)
EOSINOPHIL # BLD AUTO: 0 K/UL (ref 0–0.7)
EOSINOPHIL NFR BLD: 0.6 % (ref 0–4)
ERYTHROCYTE [DISTWIDTH] IN BLOOD BY AUTOMATED COUNT: 17.4 % (ref 11.5–14.5)
GFR NON-AFRICAN AMERICAN: 60
HGB BLD-MCNC: 11.9 G/DL (ref 12–18)
INR PPP: 2.8
LYMPHOCYTES # BLD AUTO: 0.9 K/UL (ref 1–4.3)
LYMPHOCYTES NFR BLD AUTO: 11.9 % (ref 20–40)
MAGNESIUM SERPL-MCNC: 2.1 MG/DL (ref 1.6–2.3)
MCH RBC QN AUTO: 25.6 PG (ref 27–31)
MCHC RBC AUTO-ENTMCNC: 31.1 G/DL (ref 33–37)
MCV RBC AUTO: 82.3 FL (ref 80–94)
MONOCYTES # BLD: 1 K/UL (ref 0–0.8)
MONOCYTES NFR BLD: 12.9 % (ref 0–10)
NEUTROPHILS # BLD: 5.5 K/UL (ref 1.8–7)
NEUTROPHILS NFR BLD AUTO: 74.1 % (ref 50–75)
NRBC BLD AUTO-RTO: 0.2 % (ref 0–2)
PLATELET # BLD: 310 K/UL (ref 130–400)
PMV BLD AUTO: 8.5 FL (ref 7.2–11.7)
PROTHROMBIN TIME: 33.2 SECONDS (ref 9.7–12.2)
RBC # BLD AUTO: 4.66 MIL/UL (ref 4.4–5.9)
WBC # BLD AUTO: 7.4 K/UL (ref 4.8–10.8)

## 2018-01-08 PROCEDURE — 02HV33Z INSERTION OF INFUSION DEVICE INTO SUPERIOR VENA CAVA, PERCUTANEOUS APPROACH: ICD-10-PCS | Performed by: INTERNAL MEDICINE

## 2018-01-08 RX ADMIN — METHYLPREDNISOLONE SODIUM SUCCINATE SCH MG: 40 INJECTION, POWDER, FOR SOLUTION INTRAMUSCULAR; INTRAVENOUS at 21:20

## 2018-01-08 RX ADMIN — IPRATROPIUM BROMIDE AND ALBUTEROL SULFATE SCH ML: .5; 3 SOLUTION RESPIRATORY (INHALATION) at 19:13

## 2018-01-08 RX ADMIN — BUDESONIDE SCH MG: 0.5 SUSPENSION RESPIRATORY (INHALATION) at 19:12

## 2018-01-08 RX ADMIN — IPRATROPIUM BROMIDE AND ALBUTEROL SULFATE SCH: .5; 3 SOLUTION RESPIRATORY (INHALATION) at 13:19

## 2018-01-08 RX ADMIN — METHYLPREDNISOLONE SODIUM SUCCINATE SCH MG: 40 INJECTION, POWDER, FOR SOLUTION INTRAMUSCULAR; INTRAVENOUS at 14:56

## 2018-01-08 RX ADMIN — IPRATROPIUM BROMIDE AND ALBUTEROL SULFATE SCH: .5; 3 SOLUTION RESPIRATORY (INHALATION) at 02:16

## 2018-01-08 RX ADMIN — METHYLPREDNISOLONE SODIUM SUCCINATE SCH: 40 INJECTION, POWDER, FOR SOLUTION INTRAMUSCULAR; INTRAVENOUS at 05:21

## 2018-01-08 RX ADMIN — BUDESONIDE SCH: 0.5 SUSPENSION RESPIRATORY (INHALATION) at 07:16

## 2018-01-08 RX ADMIN — IPRATROPIUM BROMIDE AND ALBUTEROL SULFATE SCH: .5; 3 SOLUTION RESPIRATORY (INHALATION) at 07:16

## 2018-01-08 RX ADMIN — ENOXAPARIN SODIUM SCH MG: 100 INJECTION SUBCUTANEOUS at 11:57

## 2018-01-08 NOTE — CP.PCM.PN
Subjective





- Date & Time of Evaluation


Date of Evaluation: 01/08/18


Time of Evaluation: 11:38





- Subjective


Subjective: 





Medicine Progress Note: Dr Elizabeth Service





Patient seen and examined at bedside. Per nursing no acute events overnight. 

Patient is refusing telemetry. Patient asking where he is and why is he still 

here. States breathing is improving and states that he is thirsty. Denies 

headaches, dizziness, cp, palpitations, sob, abdominal pain, urinary symptoms, 

changes in bowel habits. 





Objective





- Vital Signs/Intake and Output


Vital Signs (last 24 hours): 


 











Temp Pulse Resp BP Pulse Ox


 


 97.2 F L  106 H  20   118/74   98 


 


 01/08/18 07:00  01/08/18 07:00  01/08/18 07:00  01/08/18 11:02  01/08/18 07:00











- Medications


Medications: 


 Current Medications





Albuterol/Ipratropium (Duoneb 3 Mg/0.5 Mg (3 Ml) Ud)  3 ml INH RQ6 [facility]


   Last Admin: 01/08/18 07:16 Dose:  Not Given


Aspirin (Aspirin Chewable)  81 mg PO DAILY [facility]


   Last Admin: 01/08/18 11:02 Dose:  81 mg


Budesonide (Pulmicort Respules)  0.5 mg INH RQ12 [facility]


   Last Admin: 01/08/18 07:16 Dose:  Not Given


Famotidine (Pepcid)  20 mg PO DAILY [facility]


   Last Admin: 01/08/18 11:02 Dose:  20 mg


Furosemide (Lasix)  20 mg IVP BID [facility]


Meclizine HCl (Antivert)  12.5 mg PO DAILY [facility]


   Last Admin: 01/07/18 10:48 Dose:  12.5 mg


Memantine (Namenda)  5 mg PO DAILY [facility]


   Last Admin: 01/08/18 11:03 Dose:  5 mg


Methylprednisolone (Solu-Medrol)  40 mg IV Q8 [facility]


   Last Admin: 01/08/18 05:21 Dose:  Not Given


Metoprolol Tartrate (Lopressor)  25 mg PO BID [facility]


   Last Admin: 01/08/18 11:02 Dose:  25 mg


Midodrine (Proamatine)  5 mg PO DAILY [facility]


   Last Admin: 01/07/18 10:48 Dose:  5 mg











- Labs


Labs: 


 





 01/08/18 08:44 





 01/08/18 08:44 





 











PT  33.2 SECONDS (9.7-12.2)  H* D 01/08/18  09:10    


 


INR  2.8  D 01/08/18  09:10    


 


APTT  23 SECONDS (21-34)   01/03/18  09:58    














- Additional Findings


Additional findings: 





- Constitutional


Appears: Non-toxic, No Acute Distress





- Eye Exam


Eye Exam: Normal appearance


Pupil Exam: NORMAL ACCOMODATION





- Respiratory Exam


Respiratory Exam: Decreased breath sounds absent: Rales, Rhonchi, Wheezes





- Cardiovascular Exam


Cardiovascular Exam: REGULAR RHYTHM, RRR, +S1, +S2, Murmur.  absent: Gallop, 

Rubs





- GI/Abdominal Exam


GI & Abdominal Exam: Soft, Normal Bowel Sounds.  absent: Tenderness





- Extremities Exam


Extremities Exam: +1 non pitting edema, no calf tenderness  absent: Pedal Edema





- Back Exam


Back Exam: NORMAL INSPECTION





- Neurological Exam


Neurological Exam: Oriented x3





- Psychiatric Exam


Psychiatric exam: Normal Affect, Normal Mood





- Skin


Skin Exam: Normal Color, Warm





Assessment and Plan





- Assessment and Plan (Free Text)


Assessment: 





1) Systolic dysfunction with Acute on Chronic heart failure


-Stable, afebrile


-BNP on admission: 8160


-CXR 1/4/18: showed pulmonary venous congestion, small R pleural effusion, 

bibasilar atelectasis/infiltrate


-Echo from 2/25/17 showed LVEF 12%


-Shortness of breath is improving


-Patient received Midline today


-IV Lasix 20mg BID


-Daily weights, Fluid restriction, Keep HOB elevated, Monitor I/Os


-Cardiology on consult, Dr Dotson, help appreciated


-Not on ACEi/ARB due to chronic hypotension


-Metoprolol Tartrate 25mg PO BID


-Bipap prn (patient does refuse the bipap and tries to take it off, educated 

the patient on importance of keeping bipap on)


-Physical Therapy 





2) Chronic Atrial fibrillation w/ AICD


-Cardiology on consult, Dr. Dotson, help appreciated  


-Chadsvasc score: 4- High risk for stroke, Hasbled: 2- Moderate risk for 

bleeding 


-Cardizem 20 mg given in ED, recieved one dose Digoxin 0.25mg 1/3/18


-Continue Metoprolol 25mg PO BID with holding parameters


-Lovenox 90 mg SC q12h discontinued


-Coumadin 5mg PO daily





3) History of Mechanical Mitral Valve


-INR: 1.5 on admission (subtherapeutic), goal INR 2.5-3.5


-Patient was bridged with Lovenox 90mg Q12H (discontinued)


-INR today 2.8, which is in the therapeutic range


-Coumadin 5mg daily





4) History of Coronary Artery Disease


-Aspirin 81mg PO QD





5) History of COPD


-Solumedrol 40mg IV Q8H


-Pulmicort 0.5mg Q12H


-Pulm on consult, Dr Varela, Help appreciated





6) Vertigo


-Con't home med Meclizine 12.5mg PO daily





7) Orthostatic Hypotension


-Con't home med Midodrine 5mg PO daily 





8) Dementia


-Con't home med Memantine 5mg PO daily 





Prophylactic Measures


-Pepcid 20 mg po daily 


-SCDs contraindicated due to b/l LE edema 


-Coumadin 5mg PO daily


-Heart Healthy Diet

## 2018-01-08 NOTE — CP.PCM.PN
Subjective





- Date & Time of Evaluation


Date of Evaluation: 01/08/18


Time of Evaluation: 09:30





- Subjective


Subjective: 





Patient was seen and evaluated at bedside.  Pt sitting in chair on room air. 

Complains of orthopnea that improves with sitting up, shortness of breath with 

exertion, and bilateral leg swelling.  Denies chest pain, fever, chills. 











Objective





- Vital Signs/Intake and Output


Vital Signs (last 24 hours): 


 











Temp Pulse Resp BP Pulse Ox


 


 97.9 F   56 L  20   100/75   98 


 


 01/08/18 16:03  01/08/18 16:03  01/08/18 16:03  01/08/18 16:03  01/08/18 16:03











- Medications


Medications: 


 Current Medications





Albuterol/Ipratropium (Duoneb 3 Mg/0.5 Mg (3 Ml) Ud)  3 ml INH RQ6 [facility]


   Last Admin: 01/08/18 13:19 Dose:  Not Given


Aspirin (Aspirin Chewable)  81 mg PO DAILY [facility]


   Last Admin: 01/08/18 11:02 Dose:  81 mg


Budesonide (Pulmicort Respules)  0.5 mg INH RQ12 [facility]


   Last Admin: 01/08/18 07:16 Dose:  Not Given


Famotidine (Pepcid)  20 mg PO DAILY [facility]


   Last Admin: 01/08/18 11:02 Dose:  20 mg


Furosemide (Lasix)  20 mg IVP BID [facility]


   Last Admin: 01/08/18 13:09 Dose:  Not Given


Meclizine HCl (Antivert)  12.5 mg PO DAILY [facility]


   Last Admin: 01/08/18 11:57 Dose:  12.5 mg


Memantine (Namenda)  5 mg PO DAILY [facility]


   Last Admin: 01/08/18 11:03 Dose:  5 mg


Methylprednisolone (Solu-Medrol)  40 mg IV Q8 [facility]


   Last Admin: 01/08/18 14:56 Dose:  40 mg


Metoprolol Tartrate (Lopressor)  25 mg PO BID [facility]


   Last Admin: 01/08/18 11:02 Dose:  25 mg


Midodrine (Proamatine)  5 mg PO DAILY [facility]


   Last Admin: 01/08/18 11:57 Dose:  5 mg











- Labs


Labs: 


 





 01/08/18 08:44 





 01/08/18 08:44 





 











PT  33.2 SECONDS (9.7-12.2)  H* D 01/08/18  09:10    


 


INR  2.8  D 01/08/18  09:10    


 


APTT  23 SECONDS (21-34)   01/03/18  09:58    














- Head Exam


Head Exam: ATRAUMATIC, NORMOCEPHALIC





- Eye Exam


Eye Exam: Normal appearance





- ENT Exam


ENT Exam: Mucous Membranes Moist





- Neck Exam


Neck Exam: Normal Inspection





- Respiratory Exam


Respiratory Exam: Decreased Breath Sounds





- Cardiovascular Exam


Cardiovascular Exam: REGULAR RHYTHM





- GI/Abdominal Exam


GI & Abdominal Exam: Soft, Normal Bowel Sounds





- Neurological Exam


Neurological Exam: Alert





Assessment and Plan


(1) Acute exacerbation of CHF (congestive heart failure)


Status: Acute   





(2) COPD (chronic obstructive pulmonary disease)


Assessment & Plan: 


Continue nebulizer treatment


Continue BiPAP


Follow up ABG


Discontinue Lovenox- INR 2.8 today


Status: Acute   





(3) Chronic atrial fibrillation


Status: Chronic

## 2018-01-09 LAB
ALBUMIN SERPL-MCNC: 4 G/DL (ref 3.5–5)
ALBUMIN/GLOB SERPL: 1.3 {RATIO} (ref 1–2.1)
ALT SERPL-CCNC: 225 U/L (ref 21–72)
ANISOCYTOSIS BLD QL SMEAR: SLIGHT
AST SERPL-CCNC: 98 U/L (ref 17–59)
BASOPHILS # BLD AUTO: 0 K/UL (ref 0–0.2)
BASOPHILS NFR BLD: 0.2 % (ref 0–2)
BUN SERPL-MCNC: 49 MG/DL (ref 9–20)
CALCIUM SERPL-MCNC: 11 MG/DL (ref 8.6–10.4)
EOSINOPHIL # BLD AUTO: 0 K/UL (ref 0–0.7)
EOSINOPHIL NFR BLD: 0 % (ref 0–4)
ERYTHROCYTE [DISTWIDTH] IN BLOOD BY AUTOMATED COUNT: 17.7 % (ref 11.5–14.5)
GFR NON-AFRICAN AMERICAN: 54
HGB BLD-MCNC: 12.1 G/DL (ref 12–18)
HYPOCHROMIC: SLIGHT
INR PPP: 3.9
LYMPHOCYTE: 7 % (ref 20–40)
LYMPHOCYTES # BLD AUTO: 0.3 K/UL (ref 1–4.3)
LYMPHOCYTES NFR BLD AUTO: 4.4 % (ref 20–40)
MCH RBC QN AUTO: 26.4 PG (ref 27–31)
MCHC RBC AUTO-ENTMCNC: 31.9 G/DL (ref 33–37)
MCV RBC AUTO: 82.9 FL (ref 80–94)
MONOCYTE: 5 % (ref 0–10)
MONOCYTES # BLD: 0.3 K/UL (ref 0–0.8)
MONOCYTES NFR BLD: 4.5 % (ref 0–10)
NEUTROPHILS # BLD: 7 K/UL (ref 1.8–7)
NEUTROPHILS NFR BLD AUTO: 88 % (ref 50–75)
NEUTROPHILS NFR BLD AUTO: 90.9 % (ref 50–75)
NRBC BLD AUTO-RTO: 0.1 % (ref 0–2)
OVALOCYTES BLD QL SMEAR: SLIGHT
PLATELET # BLD EST: NORMAL 10*3/UL
PLATELET # BLD: 305 K/UL (ref 130–400)
PMV BLD AUTO: 8.7 FL (ref 7.2–11.7)
POLYCHROMIC: SLIGHT
PROTHROMBIN TIME: 46.3 SECONDS (ref 9.7–12.2)
RBC # BLD AUTO: 4.57 MIL/UL (ref 4.4–5.9)
TARGETS BLD QL SMEAR: SLIGHT
TOTAL CELLS COUNTED BLD: 100
WBC # BLD AUTO: 7.7 K/UL (ref 4.8–10.8)

## 2018-01-09 RX ADMIN — IPRATROPIUM BROMIDE AND ALBUTEROL SULFATE SCH: .5; 3 SOLUTION RESPIRATORY (INHALATION) at 01:32

## 2018-01-09 RX ADMIN — IPRATROPIUM BROMIDE AND ALBUTEROL SULFATE SCH ML: .5; 3 SOLUTION RESPIRATORY (INHALATION) at 07:51

## 2018-01-09 RX ADMIN — IPRATROPIUM BROMIDE AND ALBUTEROL SULFATE SCH: .5; 3 SOLUTION RESPIRATORY (INHALATION) at 19:43

## 2018-01-09 RX ADMIN — DIGOXIN SCH MG: 0.12 TABLET ORAL at 18:21

## 2018-01-09 RX ADMIN — BUDESONIDE SCH: 0.5 SUSPENSION RESPIRATORY (INHALATION) at 19:43

## 2018-01-09 RX ADMIN — METHYLPREDNISOLONE SODIUM SUCCINATE SCH MG: 40 INJECTION, POWDER, FOR SOLUTION INTRAMUSCULAR; INTRAVENOUS at 06:27

## 2018-01-09 RX ADMIN — IPRATROPIUM BROMIDE AND ALBUTEROL SULFATE SCH: .5; 3 SOLUTION RESPIRATORY (INHALATION) at 13:11

## 2018-01-09 RX ADMIN — BUDESONIDE SCH MG: 0.5 SUSPENSION RESPIRATORY (INHALATION) at 07:51

## 2018-01-09 RX ADMIN — METHYLPREDNISOLONE SODIUM SUCCINATE SCH MG: 40 INJECTION, POWDER, FOR SOLUTION INTRAMUSCULAR; INTRAVENOUS at 21:23

## 2018-01-09 RX ADMIN — METHYLPREDNISOLONE SODIUM SUCCINATE SCH MG: 40 INJECTION, POWDER, FOR SOLUTION INTRAMUSCULAR; INTRAVENOUS at 13:09

## 2018-01-09 NOTE — CP.PCM.PN
Subjective





- Date & Time of Evaluation


Date of Evaluation: 01/09/18


Time of Evaluation: 11:36





- Subjective


Subjective: 





Medicine Progress Note: Dr Elizabeth Service





Patient seen and examined at bedside. Patient is doing well, offers no 

complaints at this time. Still SOB. Denies headaches, dizziness, cp, 

palpitations, abdominal pain, urinary symptoms, changes in bowel habits. 





Objective





- Vital Signs/Intake and Output


Vital Signs (last 24 hours): 


 











Temp Pulse Resp BP Pulse Ox


 


 97.2 F L  114 H  22   118/65   98 


 


 01/09/18 08:00  01/09/18 08:00  01/09/18 08:00  01/09/18 10:36  01/09/18 08:00








Intake and Output: 


 











 01/09/18 01/09/18





 06:59 18:59


 


Intake Total 150 


 


Balance 150 














- Medications


Medications: 


 Current Medications





Albuterol/Ipratropium (Duoneb 3 Mg/0.5 Mg (3 Ml) Ud)  3 ml INH RQ6 [facility]


   Last Admin: 01/09/18 07:51 Dose:  3 ml


Aspirin (Aspirin Chewable)  81 mg PO DAILY [facility]


   Last Admin: 01/09/18 10:36 Dose:  81 mg


Budesonide (Pulmicort Respules)  0.5 mg INH RQ12 [facility]


   Last Admin: 01/09/18 07:51 Dose:  0.5 mg


Digoxin (Lanoxin)  0.125 mg PO DAILY@1800 [facility]


Famotidine (Pepcid)  20 mg PO DAILY [facility]


   Last Admin: 01/09/18 10:36 Dose:  20 mg


Furosemide 120 mg/ Sodium (Chloride)  112 mls @ 3.73 mls/hr IVP .Q24H [facility]


   PRN Reason: 4 MG/HR


Methylprednisolone (Solu-Medrol)  40 mg IV Q8 [facility]


   Last Admin: 01/09/18 06:27 Dose:  40 mg


Metolazone (Zaroxolyn)  5 mg PO TID [facility]


Metoprolol Tartrate (Lopressor)  25 mg PO BID [facility]


   Last Admin: 01/09/18 10:36 Dose:  25 mg


Midodrine (Proamatine)  5 mg PO DAILY [facility]


   Last Admin: 01/08/18 11:57 Dose:  5 mg











- Labs


Labs: 


 





 01/09/18 07:21 





 01/09/18 07:21 





 











PT  46.3 SECONDS (9.7-12.2)  H* D 01/09/18  07:21    


 


INR  3.9  D 01/09/18  07:21    


 


APTT  23 SECONDS (21-34)   01/03/18  09:58    














- Constitutional


Appears: Well, No Acute Distress





- Head Exam


Head Exam: ATRAUMATIC, NORMAL INSPECTION





- Eye Exam


Eye Exam: EOMI, Normal appearance





- ENT Exam


ENT Exam: Mucous Membranes Moist





- Respiratory Exam


Respiratory Exam: Decreased Breath Sounds, NORMAL BREATHING PATTERN.  absent: 

Rales, Rhonchi, Wheezes





- Cardiovascular Exam


Cardiovascular Exam: REGULAR RHYTHM, +S1, +S2, Murmur





- GI/Abdominal Exam


GI & Abdominal Exam: Soft, Normal Bowel Sounds.  absent: Firm, Guarding, Rigid, 

Tenderness





- Extremities Exam


Additional comments: 





+1 non-pitting edema, +pedal pulses





- Neurological Exam


Neurological Exam: Alert, Awake, Normal Gait





- Psychiatric Exam


Psychiatric exam: Normal Affect, Normal Mood





- Skin


Skin Exam: Dry, Normal Color, Warm





Assessment and Plan





- Assessment and Plan (Free Text)


Assessment: 





1) Systolic dysfunction with Acute on Chronic heart failure


-Stable, afebrile


-BNP on admission: 8160


-CXR 1/4/18: showed pulmonary venous congestion, small R pleural effusion, 

bibasilar atelectasis/infiltrate


-Echo from 2/25/17 showed LVEF 12%, echo repeated today, f/u official report


-Shortness of breath is improving


-Patient s/p Midline


-Lasix drip ordered


-Patient started on Digoxin 0.125mg and Metolazone 5mg PO TID


-Metoprolol Tartrate 25mg PO BID


-Daily weights, Fluid restriction, Keep HOB elevated, Monitor I/Os


-Cardiology on consult, Dr Dotson, help appreciated


-Not on ACEi/ARB due to chronic hypotension


-Bipap prn (patient does refuse the bipap and tries to take it off, educated 

the patient on importance of keeping bipap on)


-Physical Therapy on board





2) Chronic Atrial fibrillation w/ AICD


-Cardiology on consult, Dr. Dotson, help appreciated  


-Chadsvasc score: 4- High risk for stroke, Hasbled: 2- Moderate risk for 

bleeding 


-Cardizem 20 mg given in ED, recieved one dose Digoxin 0.25mg 1/3/18


-Continue Metoprolol 25mg PO BID with holding parameters


-Lovenox 90 mg SC q12h discontinued


-Coumadin on hold today





3) History of Mechanical Mitral Valve


-INR: 1.5 on admission (subtherapeutic), goal INR 2.5-3.5


-Patient was bridged with Lovenox 90mg Q12H (discontinued)


-INR today 3.9


-Coumadin on hold, will likely restart tomorrow





4) History of Coronary Artery Disease


-Aspirin 81mg PO QD





5) History of COPD


-Solumedrol 40mg IV Q8H


-Pulmicort 0.5mg Q12H


-Pulm on consult, Dr Varela, Help appreciated





6) Vertigo


-Con't home med Meclizine 12.5mg PO daily





7) Orthostatic Hypotension


-Con't home med Midodrine 5mg PO daily 





8) Dementia


-Will hold Memantine at this time





Prophylactic Measures


-Pepcid 20 mg po daily 


-SCDs contraindicated due to b/l LE edema 


-Coumadin 5mg PO daily (on hold)


-Heart Healthy Diet

## 2018-01-09 NOTE — CP.PCM.PN
Subjective





- Date & Time of Evaluation


Date of Evaluation: 01/09/18


Time of Evaluation: 13:30





- Subjective


Subjective: 





Pt was seen and evaluated at bedside. Pt sitting in chair comfortably. NAD. Pt 

states he feels much better and shortness of breath has resolved. complains of 

persitent cough and bilateral leg swelling. Denies chest pain and fever.





Objective





- Vital Signs/Intake and Output


Vital Signs (last 24 hours): 


 











Temp Pulse Resp BP Pulse Ox


 


 98.5 F   90   22   100/61   96 


 


 01/09/18 15:00  01/09/18 15:00  01/09/18 15:00  01/09/18 18:20  01/09/18 15:00








Intake and Output: 


 











 01/09/18 01/09/18





 06:59 18:59


 


Intake Total 150 


 


Balance 150 














- Medications


Medications: 


 Current Medications





Albuterol/Ipratropium (Duoneb 3 Mg/0.5 Mg (3 Ml) Ud)  3 ml INH RQ6 [facility]


   Last Admin: 01/09/18 13:11 Dose:  Not Given


Aspirin (Aspirin Chewable)  81 mg PO DAILY [facility]


   Last Admin: 01/09/18 10:36 Dose:  81 mg


Budesonide (Pulmicort Respules)  0.5 mg INH RQ12 [facility]


   Last Admin: 01/09/18 07:51 Dose:  0.5 mg


Digoxin (Lanoxin)  0.125 mg PO DAILY@1800 [facility]


   Last Admin: 01/09/18 18:21 Dose:  0.125 mg


Famotidine (Pepcid)  20 mg PO DAILY [facility]


   Last Admin: 01/09/18 10:36 Dose:  20 mg


Furosemide 100 mg/ Sodium (Chloride)  100 mls @ 4 mls/hr IVP .Q24H [facility]


   PRN Reason: 4 MG/HR


   Last Admin: 01/09/18 17:32 Dose:  4 mls/hr


Methylprednisolone (Solu-Medrol)  40 mg IV Q8 [facility]


   Last Admin: 01/09/18 13:09 Dose:  40 mg


Metolazone (Zaroxolyn)  5 mg PO TID [facility]


   Last Admin: 01/09/18 18:20 Dose:  5 mg


Metoprolol Tartrate (Lopressor)  25 mg PO BID [facility]


   Last Admin: 01/09/18 18:20 Dose:  25 mg


Midodrine (Proamatine)  5 mg PO DAILY [facility]


   Last Admin: 01/09/18 13:07 Dose:  5 mg











- Labs


Labs: 


 





 01/09/18 07:21 





 01/09/18 07:21 





 











PT  46.3 SECONDS (9.7-12.2)  H* D 01/09/18  07:21    


 


INR  3.9  D 01/09/18  07:21    


 


APTT  23 SECONDS (21-34)   01/03/18  09:58    














Assessment and Plan


(1) Acute exacerbation of CHF (congestive heart failure)


Status: Acute   





(2) COPD (chronic obstructive pulmonary disease)


Status: Acute   





(3) Chronic atrial fibrillation


Status: Chronic

## 2018-01-10 VITALS — RESPIRATION RATE: 20 BRPM

## 2018-01-10 LAB
ALBUMIN SERPL-MCNC: 4 G/DL (ref 3.5–5)
ALBUMIN/GLOB SERPL: 1.3 {RATIO} (ref 1–2.1)
ALT SERPL-CCNC: 188 U/L (ref 21–72)
ANISOCYTOSIS BLD QL SMEAR: SLIGHT
AST SERPL-CCNC: 62 U/L (ref 17–59)
BASOPHILS # BLD AUTO: 0 K/UL (ref 0–0.2)
BASOPHILS NFR BLD: 0.3 % (ref 0–2)
BUN SERPL-MCNC: 59 MG/DL (ref 9–20)
CALCIUM SERPL-MCNC: 10.2 MG/DL (ref 8.6–10.4)
EOSINOPHIL # BLD AUTO: 0 K/UL (ref 0–0.7)
EOSINOPHIL NFR BLD: 0 % (ref 0–4)
ERYTHROCYTE [DISTWIDTH] IN BLOOD BY AUTOMATED COUNT: 17.5 % (ref 11.5–14.5)
GFR NON-AFRICAN AMERICAN: 40
HGB BLD-MCNC: 12.3 G/DL (ref 12–18)
HYPOCHROMIC: SLIGHT
INR PPP: 5.2
LYMPHOCYTE: 2 % (ref 20–40)
LYMPHOCYTES # BLD AUTO: 0.4 K/UL (ref 1–4.3)
LYMPHOCYTES NFR BLD AUTO: 2.8 % (ref 20–40)
MCH RBC QN AUTO: 26.2 PG (ref 27–31)
MCHC RBC AUTO-ENTMCNC: 31.9 G/DL (ref 33–37)
MCV RBC AUTO: 81.9 FL (ref 80–94)
MONOCYTE: 3 % (ref 0–10)
MONOCYTES # BLD: 0.7 K/UL (ref 0–0.8)
MONOCYTES NFR BLD: 4.8 % (ref 0–10)
NEUTROPHILS # BLD: 13.5 K/UL (ref 1.8–7)
NEUTROPHILS NFR BLD AUTO: 92.1 % (ref 50–75)
NEUTROPHILS NFR BLD AUTO: 95 % (ref 50–75)
NRBC BLD AUTO-RTO: 0.2 % (ref 0–2)
OVALOCYTES BLD QL SMEAR: SLIGHT
PLATELET # BLD EST: NORMAL 10*3/UL
PLATELET # BLD: 338 K/UL (ref 130–400)
PMV BLD AUTO: 8.7 FL (ref 7.2–11.7)
POLYCHROMIC: SLIGHT
PROTHROMBIN TIME: 62.4 SECONDS (ref 9.7–12.2)
RBC # BLD AUTO: 4.7 MIL/UL (ref 4.4–5.9)
TARGETS BLD QL SMEAR: SLIGHT
TOTAL CELLS COUNTED BLD: 100
WBC # BLD AUTO: 14.6 K/UL (ref 4.8–10.8)

## 2018-01-10 RX ADMIN — METHYLPREDNISOLONE SODIUM SUCCINATE SCH MG: 40 INJECTION, POWDER, FOR SOLUTION INTRAMUSCULAR; INTRAVENOUS at 05:23

## 2018-01-10 RX ADMIN — IPRATROPIUM BROMIDE AND ALBUTEROL SULFATE SCH: .5; 3 SOLUTION RESPIRATORY (INHALATION) at 01:19

## 2018-01-10 RX ADMIN — METHYLPREDNISOLONE SODIUM SUCCINATE SCH MG: 40 INJECTION, POWDER, FOR SOLUTION INTRAMUSCULAR; INTRAVENOUS at 21:49

## 2018-01-10 RX ADMIN — ALBUTEROL SULFATE SCH MG: 1.25 SOLUTION RESPIRATORY (INHALATION) at 20:44

## 2018-01-10 RX ADMIN — METHYLPREDNISOLONE SODIUM SUCCINATE SCH MG: 40 INJECTION, POWDER, FOR SOLUTION INTRAMUSCULAR; INTRAVENOUS at 13:33

## 2018-01-10 RX ADMIN — DIGOXIN SCH MG: 0.12 TABLET ORAL at 18:19

## 2018-01-10 RX ADMIN — BUDESONIDE SCH MG: 0.5 SUSPENSION RESPIRATORY (INHALATION) at 07:44

## 2018-01-10 RX ADMIN — IPRATROPIUM BROMIDE AND ALBUTEROL SULFATE SCH ML: .5; 3 SOLUTION RESPIRATORY (INHALATION) at 07:44

## 2018-01-10 RX ADMIN — BUDESONIDE SCH MG: 0.5 SUSPENSION RESPIRATORY (INHALATION) at 20:44

## 2018-01-10 RX ADMIN — ALBUTEROL SULFATE SCH MG: 1.25 SOLUTION RESPIRATORY (INHALATION) at 13:21

## 2018-01-10 NOTE — RAD
HISTORY:

SOB  



COMPARISON:

Comparison chest 01/04/18 



FINDINGS:



LUNGS:

Mild vascular congestive changes with what slight improvement 

previously noted bilateral lower lobe alveolar-type infiltrates.  

Small residual right-sided effusion.  Questionable tiny left effusion.



PLEURA:

No significant pleural effusion identified, no pneumothorax apparent.



CARDIOVASCULAR:

Sternotomy wires and prosthetic valve unchanged. Multi lead 

pacemaker/ defibrillator also unchanged. Heart remains enlarged. 

Heart remains enlarged.



OSSEOUS STRUCTURES:

No significant abnormalities.



VISUALIZED UPPER ABDOMEN:

Normal.



OTHER FINDINGS:

None.



IMPRESSION:

Mild vascular congestive changes with what slight improvement 

previously noted bilateral lower lobe alveolar-type infiltrates.  

Small residual right-sided effusion.  Questionable tiny left effusion.

## 2018-01-10 NOTE — CP.PCM.PN
Subjective





- Date & Time of Evaluation


Date of Evaluation: 01/10/18


Time of Evaluation: 12:05





- Subjective


Subjective: 





Pt was seen and evaluated at bedside. Pt in NAD, sitting in chair. On 3L NC. 

Staes he feels "so-so," but offers no specific complaints at this time.  O2 

saturation decreases to 93-94 when on room air, increases to  on 3L 

oxygen.  As per nurse, pt has been difficult, not cooperating and hiding in 

bathrooms. 





Objective





- Vital Signs/Intake and Output


Vital Signs (last 24 hours): 


 











Temp Pulse Resp BP Pulse Ox


 


 97.3 F L  84   20   121/64   97 


 


 01/10/18 11:00  01/10/18 11:27  01/10/18 11:00  01/10/18 11:00  01/10/18 11:00








Intake and Output: 


 











 01/10/18 01/10/18





 06:59 18:59


 


Intake Total 532 240


 


Balance 532 240














- Medications


Medications: 


 Current Medications





Albuterol Sulfate (Albuterol 0.042% Inhal Sol (1.25mg/3ml) Ud)  1.25 mg INH RQ6 

[facility]


   Last Admin: 01/10/18 13:21 Dose:  1.25 mg


Aspirin (Aspirin Chewable)  81 mg PO DAILY [facility]


   Last Admin: 01/10/18 09:35 Dose:  81 mg


Budesonide (Pulmicort Respules)  0.5 mg INH RQ12 [facility]


   Last Admin: 01/10/18 07:44 Dose:  0.5 mg


Digoxin (Lanoxin)  0.125 mg PO DAILY@1800 [facility]


   Last Admin: 01/09/18 18:21 Dose:  0.125 mg


Famotidine (Pepcid)  20 mg PO DAILY [facility]


   Last Admin: 01/10/18 09:35 Dose:  20 mg


Furosemide 100 mg/ Sodium (Chloride)  100 mls @ 4 mls/hr IVP .Q24H [facility]


   PRN Reason: 4 MG/HR


   Last Admin: 01/09/18 17:32 Dose:  4 mls/hr


Methylprednisolone (Solu-Medrol)  40 mg IV Q8 [facility]


   Last Admin: 01/10/18 13:33 Dose:  40 mg


Metolazone (Zaroxolyn)  5 mg PO TID [facility]


   Last Admin: 01/10/18 13:33 Dose:  5 mg


Metoprolol Tartrate (Lopressor)  25 mg PO BID [facility]


   Last Admin: 01/10/18 09:35 Dose:  Not Given


Midodrine (Proamatine)  5 mg PO DAILY [facility]


   Last Admin: 01/10/18 09:41 Dose:  5 mg











- Labs


Labs: 


 





 01/10/18 07:02 





 01/10/18 07:02 





 











PT  62.4 SECONDS (9.7-12.2)  H* D 01/10/18  07:02    


 


INR  5.2  D 01/10/18  07:02    


 


APTT  23 SECONDS (21-34)   01/03/18  09:58    














Assessment and Plan


(1) Acute exacerbation of CHF (congestive heart failure)


Status: Acute   





(2) COPD (chronic obstructive pulmonary disease)


Status: Acute   





(3) Chronic atrial fibrillation


Status: Chronic

## 2018-01-10 NOTE — CARD
--------------- APPROVED REPORT --------------





EXAM: Two-dimensional and M-mode echocardiogram with Doppler and 

color Doppler.



Other Information 

Quality : GoodRhythm : 



INDICATION

Congestive Heart Failure 



2D DIMENSIONS 

IVSd1.2   (0.7-1.1cm)LVDd6.5   (3.9-5.9cm)

PWd1.1   (0.7-1.1cm)LVDs6.3   (2.5-4.0cm)

FS (%) 4.1   %LVEF (%)9.0   (>50%)



M-Mode DIMENSIONS 

Left Atrium (MM)5.95   (2.5-4.0cm)Aortic Root3.58   (2.2-3.7cm)

Aortic Cusp Exc.1.56   (1.5-2.0cm)



Mitral Valve

MV E Alayxadn599.9cm/sMV E Peak Gr.11mmHgMV A Ioxybenv76.2cm/s

MV E Mean Gr.3mmHgMV [id number]/A ratio2.3

MVA (PHT)2.44cm2



TDI

E/Lateral E'0.0E/Medial E'0.0



Tricuspid Valve

TR Peak [id number]/sTR Peak Gr.[id number]



 LEFT VENTRICLE 

The Left Ventricle is severely dilated.

There is normal left ventricular wall thickness.

Left ventricle systolic function is severely impaired.

The Ejection Fraction is 10-15%.

There is severe global hypokinesis of the left ventricle.

The left ventricular diastolic function is normal.

No left ventricle thrombus noted on this study.



 RIGHT VENTRICLE 

The right ventricle is moderately dilated.

There is normal right ventricular wall thickness.

Systolic function is severely reduced.

There is a pacemaker lead in the right ventricle.



 ATRIA 

The left atrium is severely dilated.

The right atrium is moderately dilated.

A pacemaker is seen in the right atrium.

The interatrial septum is intact with no evidence for an atrial 

septal defect.



 AORTIC VALVE 

The aortic valve is normal in structure.

No aortic regurgitation is present.

There is no aortic valvular stenosis. 

There is no aortic valvular vegetation.



 MITRAL VALVE 

The mitral valve is normal in structure.

Mitral annular calcification is moderate.

There is no evidence of mitral valve prolapse.

Mitral regurgitation is mild.

The prosthetic mitral valve is not well visualized due to imaging 

artifacts from the prosthesis.



 TRICUSPID VALVE 

The tricuspid valve is normal in structure.

There is mild to moderate tricuspid regurgitation.

Right ventricular systolic pressure is estimated at 30-40 mmHg. 

There is mild pulmonary hypertension.



 PULMONIC VALVE 

The pulmonic valve is not well visualized.

There is no pulmonic valvular regurgitation. 



 GREAT VESSELS 

The aortic root is normal in size.



 PERICARDIAL EFFUSION 

There is no significant pericardial effusion.



<Conclusion>

Left ventricle systolic function is severely impaired.

The Ejection Fraction is 10-15%.

No aortic regurgitation is present.

Mitral regurgitation is mild.

The prosthetic mitral valve is not well visualized due to imaging 

artifacts from the prosthesis.

There is mild to moderate tricuspid regurgitation.

There is mild pulmonary hypertension.

There is no pulmonic valvular regurgitation.

## 2018-01-10 NOTE — CP.PCM.PN
Subjective





- Date & Time of Evaluation


Date of Evaluation: 01/10/18


Time of Evaluation: 09:10





- Subjective


Subjective: 





Medicine Progress Note: Dr Elizabeth Service





Patient seen and examined at bedside. Per nursing, patients INR was 5.6 this 

morning, will continue to hold coumadin and monitor. Patient still SOB on 

ambulation, currently on Lasix drip. Denies headaches, dizziness, cp, 

palpitations, abdominal pain, urinary symptoms, changes in bowel habits. Last 

BM was 2 days ago. 








Objective





- Vital Signs/Intake and Output


Vital Signs (last 24 hours): 


 











Temp Pulse Resp BP Pulse Ox


 


 97.1 F L  98 H  22   101/64   98 


 


 01/09/18 22:48  01/09/18 23:10  01/09/18 22:48  01/09/18 22:48  01/09/18 22:48








Intake and Output: 


 











 01/10/18 01/10/18





 06:59 18:59


 


Intake Total 532 


 


Balance 532 














- Medications


Medications: 


 Current Medications





Aspirin (Aspirin Chewable)  81 mg PO DAILY [facility]


   Last Admin: 01/09/18 10:36 Dose:  81 mg


Budesonide (Pulmicort Respules)  0.5 mg INH RQ12 [facility]


   Last Admin: 01/10/18 07:44 Dose:  0.5 mg


Digoxin (Lanoxin)  0.125 mg PO DAILY@1800 [facility]


   Last Admin: 01/09/18 18:21 Dose:  0.125 mg


Famotidine (Pepcid)  20 mg PO DAILY [facility]


   Last Admin: 01/09/18 10:36 Dose:  20 mg


Furosemide 100 mg/ Sodium (Chloride)  100 mls @ 4 mls/hr IVP .Q24H [facility]


   PRN Reason: 4 MG/HR


   Last Admin: 01/09/18 17:32 Dose:  4 mls/hr


Levalbuterol HCl (Xopenex)  0.63 mg INH RQ6 [facility]


Methylprednisolone (Solu-Medrol)  40 mg IV Q8 [facility]


   Last Admin: 01/10/18 05:23 Dose:  40 mg


Metolazone (Zaroxolyn)  5 mg PO TID [facility]


   Last Admin: 01/09/18 18:20 Dose:  5 mg


Metoprolol Tartrate (Lopressor)  25 mg PO BID [facility]


   Last Admin: 01/09/18 18:20 Dose:  25 mg


Midodrine (Proamatine)  5 mg PO DAILY [facility]


   Last Admin: 01/09/18 13:07 Dose:  5 mg











- Labs


Labs: 


 





 01/10/18 07:02 





 01/10/18 07:02 





 











PT  62.4 SECONDS (9.7-12.2)  H* D 01/10/18  07:02    


 


INR  5.2  D 01/10/18  07:02    


 


APTT  23 SECONDS (21-34)   01/03/18  09:58    














- Constitutional


Appears: Well, No Acute Distress





- Head Exam


Head Exam: ATRAUMATIC, NORMAL INSPECTION, NORMOCEPHALIC





- Eye Exam


Eye Exam: EOMI, Normal appearance





- ENT Exam


ENT Exam: Mucous Membranes Moist





- Neck Exam


Neck Exam: Full ROM





- Respiratory Exam


Respiratory Exam: Decreased Breath Sounds, NORMAL BREATHING PATTERN.  absent: 

Rales, Rhonchi, Wheezes, Respiratory Distress





- Cardiovascular Exam


Cardiovascular Exam: Tachycardia, +S1, +S2





- GI/Abdominal Exam


GI & Abdominal Exam: Soft, Normal Bowel Sounds.  absent: Distended, Firm, 

Guarding, Rigid, Tenderness





- Extremities Exam


Extremities Exam: Pedal Edema


Additional comments: 





+1 pitting edema bilaterally


+pedal pulses


+midline in right upper arm





- Back Exam


Back Exam: NORMAL INSPECTION





- Neurological Exam


Neurological Exam: Alert, Awake, Normal Gait





- Psychiatric Exam


Psychiatric exam: Normal Affect, Normal Mood





- Skin


Skin Exam: Normal Color, Warm





Assessment and Plan





- Assessment and Plan (Free Text)


Assessment: 





1) Systolic dysfunction with Acute on Chronic heart failure


-Stable, afebrile


-BNP on admission: 8160


-CXR 1/4/18: showed pulmonary venous congestion, small R pleural effusion, 

bibasilar atelectasis/infiltrate


-Echo from 2/25/17 showed LVEF 12%, 


-Echo report: EF 10-15%, Systolic dysfunction severely impaired, mild to mod TR

, mild Pulm HTN (see full report)


-Will order repeat CXR today


-Continue Lasix drip, hold is SBP < 90


-Continue Digoxin 0.125mg and Metolazone 5mg PO TID


-Metoprolol Tartrate 25mg PO BID


-Daily weights, Fluid restriction, Keep HOB elevated, Monitor I/Os


-Cardiology on consult, Dr Dotson, help appreciated


-Not on ACEi/ARB due to chronic hypotension


-Bipap prn (patient does refuse the bipap and tries to take it off, educated 

the patient on importance of keeping bipap on)


-Physical Therapy on board


-LFTs elevated but downtrending, likely secondary to worsening CHF, will 

continue to monitor





2) Chronic Atrial fibrillation w/ AICD


-Cardiology on consult, Dr. Dotson, help appreciated  


-Chadsvasc score: 4- High risk for stroke, Hasbled: 2- Moderate risk for 

bleeding 


-Cardizem 20 mg given in ED, recieved one dose Digoxin 0.25mg 1/3/18


-Continue Metoprolol 25mg PO BID with holding parameters


-Continue Digoxin 0.125mg daily


-Lovenox 90 mg SC q12h discontinued


-Coumadin on hold today





3) History of Mechanical Mitral Valve


-INR: 1.5 on admission (subtherapeutic), goal INR 2.5-3.5


-Patient was bridged with Lovenox 90mg Q12H (discontinued)


-INR today 5.2, supratherapeutic


-Coumadin on hold


-Monitor INR closely





4) History of Coronary Artery Disease


-Aspirin 81mg PO QD





5) History of COPD


-Solumedrol 40mg IV Q8H


-Pulmicort 0.5mg Q12H


-Duonebs 


-Pulm on consult, Dr Varela, Help appreciated





6) Vertigo


-Meclizine 12.5mg discontinued





7) Orthostatic Hypotension


-Con't home med Midodrine 5mg PO daily 





8) Dementia


-Will hold Memantine at this time





9) Acute Kidney Injury


-BUN 59/1.7


-F/U urine sodium and urine creatinine


-Will continue to monitor at this time





Prophylactic Measures


-Pepcid 20 mg po daily 


-SCDs contraindicated due to b/l LE edema 


-Coumadin 5mg PO daily (on hold)


-Heart Healthy Diet

## 2018-01-11 LAB
ALBUMIN SERPL-MCNC: 3.8 G/DL (ref 3.5–5)
ALBUMIN/GLOB SERPL: 1.4 {RATIO} (ref 1–2.1)
ALT SERPL-CCNC: 147 U/L (ref 21–72)
ANISOCYTOSIS BLD QL SMEAR: SLIGHT
AST SERPL-CCNC: 44 U/L (ref 17–59)
BASOPHILS # BLD AUTO: 0 K/UL (ref 0–0.2)
BASOPHILS NFR BLD: 0.1 % (ref 0–2)
BUN SERPL-MCNC: 66 MG/DL (ref 9–20)
CALCIUM SERPL-MCNC: 9.6 MG/DL (ref 8.6–10.4)
EOSINOPHIL # BLD AUTO: 0 K/UL (ref 0–0.7)
EOSINOPHIL NFR BLD: 0 % (ref 0–4)
ERYTHROCYTE [DISTWIDTH] IN BLOOD BY AUTOMATED COUNT: 17.7 % (ref 11.5–14.5)
GFR NON-AFRICAN AMERICAN: 40
HGB BLD-MCNC: 11.4 G/DL (ref 12–18)
HYPOCHROMIC: SLIGHT
INR PPP: 4
LYMPHOCYTE: 1 % (ref 20–40)
LYMPHOCYTES # BLD AUTO: 0.1 K/UL (ref 1–4.3)
LYMPHOCYTES NFR BLD AUTO: 1.3 % (ref 20–40)
MCH RBC QN AUTO: 25.8 PG (ref 27–31)
MCHC RBC AUTO-ENTMCNC: 31.5 G/DL (ref 33–37)
MCV RBC AUTO: 82 FL (ref 80–94)
MONOCYTE: 2 % (ref 0–10)
MONOCYTES # BLD: 0.6 K/UL (ref 0–0.8)
MONOCYTES NFR BLD: 5 % (ref 0–10)
NEUTROPHILS # BLD: 10.7 K/UL (ref 1.8–7)
NEUTROPHILS NFR BLD AUTO: 93.6 % (ref 50–75)
NEUTROPHILS NFR BLD AUTO: 97 % (ref 50–75)
NRBC BLD AUTO-RTO: 0.2 % (ref 0–2)
OVALOCYTES BLD QL SMEAR: SLIGHT
PLATELET # BLD EST: NORMAL 10*3/UL
PLATELET # BLD: 295 K/UL (ref 130–400)
PMV BLD AUTO: 8.5 FL (ref 7.2–11.7)
POLYCHROMIC: SLIGHT
PROTHROMBIN TIME: 46.8 SECONDS (ref 9.7–12.2)
RBC # BLD AUTO: 4.43 MIL/UL (ref 4.4–5.9)
TARGETS BLD QL SMEAR: SLIGHT
TOTAL CELLS COUNTED BLD: 100
WBC # BLD AUTO: 11.4 K/UL (ref 4.8–10.8)

## 2018-01-11 RX ADMIN — BUDESONIDE SCH MG: 0.5 SUSPENSION RESPIRATORY (INHALATION) at 19:46

## 2018-01-11 RX ADMIN — ALBUTEROL SULFATE SCH: 1.25 SOLUTION RESPIRATORY (INHALATION) at 14:38

## 2018-01-11 RX ADMIN — ALBUTEROL SULFATE SCH MG: 1.25 SOLUTION RESPIRATORY (INHALATION) at 01:25

## 2018-01-11 RX ADMIN — ALBUTEROL SULFATE SCH MG: 1.25 SOLUTION RESPIRATORY (INHALATION) at 19:46

## 2018-01-11 RX ADMIN — BUDESONIDE SCH MG: 0.5 SUSPENSION RESPIRATORY (INHALATION) at 07:50

## 2018-01-11 RX ADMIN — DIGOXIN SCH MG: 0.12 TABLET ORAL at 17:34

## 2018-01-11 RX ADMIN — METHYLPREDNISOLONE SODIUM SUCCINATE SCH MG: 40 INJECTION, POWDER, FOR SOLUTION INTRAMUSCULAR; INTRAVENOUS at 21:14

## 2018-01-11 RX ADMIN — ALBUTEROL SULFATE SCH MG: 1.25 SOLUTION RESPIRATORY (INHALATION) at 07:50

## 2018-01-11 RX ADMIN — METHYLPREDNISOLONE SODIUM SUCCINATE SCH MG: 40 INJECTION, POWDER, FOR SOLUTION INTRAMUSCULAR; INTRAVENOUS at 05:30

## 2018-01-11 RX ADMIN — METHYLPREDNISOLONE SODIUM SUCCINATE SCH MG: 40 INJECTION, POWDER, FOR SOLUTION INTRAMUSCULAR; INTRAVENOUS at 13:22

## 2018-01-11 RX ADMIN — GUAIFENESIN PRN MG: 100 SOLUTION ORAL at 10:32

## 2018-01-11 NOTE — CP.PCM.PN
Subjective





- Date & Time of Evaluation


Date of Evaluation: 01/11/18


Time of Evaluation: 10:16





- Subjective


Subjective: 





Medicine Progress Note: Dr Elizabeth Service





Patient seen and examined at bedside. Per nursing no acute events overnight. 

Patient states that he has a cough with clear sputum production. Everytime he 

coughs he gets epigastric discomfort. SOB improving. Denies headaches, dizziness

, cp, palpitations, abdominal pain, urinary symptoms, changes in bowel habits. 





Objective





- Vital Signs/Intake and Output


Vital Signs (last 24 hours): 


 











Temp Pulse Resp BP Pulse Ox


 


 97.2 F L  114 H  20   107/69   97 


 


 01/10/18 23:40  01/11/18 01:42  01/10/18 23:40  01/11/18 09:30  01/10/18 23:40








Intake and Output: 


 











 01/11/18 01/11/18





 06:59 18:59


 


Intake Total 804 


 


Balance 804 














- Medications


Medications: 


 Current Medications





Albuterol Sulfate (Albuterol 0.042% Inhal Sol (1.25mg/3ml) Ud)  1.25 mg INH RQ6 

[facility]


   Last Admin: 01/11/18 07:50 Dose:  1.25 mg


Aspirin (Aspirin Chewable)  81 mg PO DAILY [facility]


   Last Admin: 01/11/18 09:30 Dose:  81 mg


Budesonide (Pulmicort Respules)  0.5 mg INH RQ12 [facility]


   Last Admin: 01/11/18 07:50 Dose:  0.5 mg


Digoxin (Lanoxin)  0.125 mg PO DAILY@1800 [facility]


   Last Admin: 01/10/18 18:19 Dose:  0.125 mg


Famotidine (Pepcid)  20 mg PO DAILY [facility]


   Last Admin: 01/11/18 09:30 Dose:  20 mg


Guaifenesin (Robitussin)  100 mg PO Q4H [facility]


   PRN Reason: Cough


Furosemide 100 mg/ Sodium (Chloride)  100 mls @ 4 mls/hr IVP .Q24H [facility]


   PRN Reason: 4 MG/HR


   Last Admin: 01/10/18 16:52 Dose:  4 mls/hr


Methylprednisolone (Solu-Medrol)  40 mg IV Q8 [facility]


   Last Admin: 01/11/18 05:30 Dose:  40 mg


Metolazone (Zaroxolyn)  5 mg PO TID [facility]


   Last Admin: 01/11/18 09:31 Dose:  5 mg


Metoprolol Tartrate (Lopressor)  25 mg PO BID [facility]


   Last Admin: 01/11/18 09:30 Dose:  25 mg


Midodrine (Proamatine)  5 mg PO DAILY [facility]


   Last Admin: 01/11/18 09:31 Dose:  5 mg











- Labs


Labs: 


 





 01/11/18 07:21 





 01/11/18 07:21 





 











PT  46.8 SECONDS (9.7-12.2)  H* D 01/11/18  07:21    


 


INR  4.0  D 01/11/18  07:21    


 


APTT  23 SECONDS (21-34)   01/03/18  09:58    














- Constitutional


Appears: Non-toxic, No Acute Distress





- Head Exam


Head Exam: ATRAUMATIC, NORMAL INSPECTION





- Eye Exam


Eye Exam: EOMI, Normal appearance





- ENT Exam


ENT Exam: Mucous Membranes Moist





- Neck Exam


Neck Exam: Full ROM





- Respiratory Exam


Respiratory Exam: Decreased Breath Sounds, Clear to Ausculation Bilateral, 

NORMAL BREATHING PATTERN.  absent: Rales, Rhonchi, Wheezes





- Cardiovascular Exam


Cardiovascular Exam: REGULAR RHYTHM, +S1, +S2





- GI/Abdominal Exam


GI & Abdominal Exam: Soft, Normal Bowel Sounds.  absent: Guarding, Rigid, 

Tenderness





- Extremities Exam


Extremities Exam: Normal Capillary Refill


Additional comments: 





+1 pitting edema bilaterally





- Neurological Exam


Neurological Exam: Alert, Awake, Normal Gait





- Psychiatric Exam


Psychiatric exam: Normal Affect, Normal Mood





- Skin


Skin Exam: Dry, Normal Color, Warm





Assessment and Plan





- Assessment and Plan (Free Text)


Assessment: 





1) Systolic dysfunction with Acute on Chronic heart failure


-Stable, afebrile


-BNP on admission: 8160


-CXR 1/4/18: showed pulmonary venous congestion, small R pleural effusion, 

bibasilar atelectasis/infiltrate


-Echo from 2/25/17 showed LVEF 12%, 


-Echo report: EF 10-15%, Systolic dysfunction severely impaired, mild to mod TR

, mild Pulm HTN (see full report)


-Continue Lasix drip, hold is SBP < 90


-Continue Digoxin 0.125mg and Metolazone 5mg PO TID


-Metoprolol Tartrate 25mg PO BID


-Daily weights, Fluid restriction, Keep HOB elevated, Monitor I/Os


-Cardiology on consult, Dr Dotson, help appreciated


-Not on ACEi/ARB due to chronic hypotension


-Bipap prn (patient does refuse the bipap and tries to take it off, educated 

the patient on importance of keeping bipap on)


-Physical Therapy on board


-LFTs elevated but downtrending, likely secondary to worsening CHF, will 

continue to monitor





2) Chronic Atrial fibrillation w/ AICD


-Cardiology on consult, Dr. Dotson, help appreciated  


-Chadsvasc score: 4- High risk for stroke, Hasbled: 2- Moderate risk for 

bleeding 


-Cardizem 20 mg given in ED, recieved one dose Digoxin 0.25mg 1/3/18


-Continue Metoprolol 25mg PO BID with holding parameters


-Continue Digoxin 0.125mg daily


-Lovenox 90 mg SC q12h discontinued


-Coumadin on hold today





3) History of Mechanical Mitral Valve


-INR: 1.5 on admission (subtherapeutic), goal INR 2.5-3.5


-Patient was bridged with Lovenox 90mg Q12H (discontinued)


-INR today 4.0, supratherapeutic


-Coumadin on hold


-Monitor INR closely





4) History of Coronary Artery Disease


-Aspirin 81mg PO QD





5) History of COPD


-Decreased Solumedrol 40mg IV Q12H


-Pulmicort 0.5mg Q12H


-Duonebs 


-Pulm on consult, Dr Varela, Help appreciated





6) Vertigo


-Meclizine 12.5mg discontinued





7) Orthostatic Hypotension


-Con't home med Midodrine 5mg PO daily 





8) Dementia


-Will hold Memantine at this time





9) Acute Kidney Injury


-BUN 66/1.7


-F/U urine sodium, urine creatinine, urine urea


-Will continue to monitor at this time





Prophylactic Measures


-Pepcid 20 mg po daily 


-SCDs contraindicated due to b/l LE edema 


-Coumadin 5mg PO daily (on hold)


-Heart Healthy Diet

## 2018-01-12 LAB
ALBUMIN SERPL-MCNC: 3.9 G/DL (ref 3.5–5)
ALBUMIN/GLOB SERPL: 1.4 {RATIO} (ref 1–2.1)
ALT SERPL-CCNC: 146 U/L (ref 21–72)
ANISOCYTOSIS BLD QL SMEAR: SLIGHT
AST SERPL-CCNC: 62 U/L (ref 17–59)
BASOPHILS # BLD AUTO: 0 K/UL (ref 0–0.2)
BASOPHILS NFR BLD: 0.1 % (ref 0–2)
BUN SERPL-MCNC: 71 MG/DL (ref 9–20)
CALCIUM SERPL-MCNC: 9.4 MG/DL (ref 8.6–10.4)
EOSINOPHIL # BLD AUTO: 0 K/UL (ref 0–0.7)
EOSINOPHIL NFR BLD: 0.1 % (ref 0–4)
ERYTHROCYTE [DISTWIDTH] IN BLOOD BY AUTOMATED COUNT: 17.7 % (ref 11.5–14.5)
GFR NON-AFRICAN AMERICAN: 40
HGB BLD-MCNC: 11.7 G/DL (ref 12–18)
INR PPP: 2.9
LYMPHOCYTE: 2 % (ref 20–40)
LYMPHOCYTES # BLD AUTO: 0.2 K/UL (ref 1–4.3)
LYMPHOCYTES NFR BLD AUTO: 2.1 % (ref 20–40)
MCH RBC QN AUTO: 26.1 PG (ref 27–31)
MCHC RBC AUTO-ENTMCNC: 32 G/DL (ref 33–37)
MCV RBC AUTO: 81.4 FL (ref 80–94)
MONOCYTE: 6 % (ref 0–10)
MONOCYTES # BLD: 0.8 K/UL (ref 0–0.8)
MONOCYTES NFR BLD: 7.1 % (ref 0–10)
NEUTROPHILS # BLD: 10.3 K/UL (ref 1.8–7)
NEUTROPHILS NFR BLD AUTO: 90.6 % (ref 50–75)
NEUTROPHILS NFR BLD AUTO: 92 % (ref 50–75)
NRBC BLD AUTO-RTO: 0.4 % (ref 0–2)
OVALOCYTES BLD QL SMEAR: SLIGHT
PLATELET # BLD EST: NORMAL 10*3/UL
PLATELET # BLD: 302 K/UL (ref 130–400)
PMV BLD AUTO: 8.7 FL (ref 7.2–11.7)
PROTHROMBIN TIME: 34 SECONDS (ref 9.7–12.2)
RBC # BLD AUTO: 4.5 MIL/UL (ref 4.4–5.9)
TOTAL CELLS COUNTED BLD: 100
WBC # BLD AUTO: 11.4 K/UL (ref 4.8–10.8)

## 2018-01-12 RX ADMIN — METHYLPREDNISOLONE SODIUM SUCCINATE SCH: 40 INJECTION, POWDER, FOR SOLUTION INTRAMUSCULAR; INTRAVENOUS at 22:26

## 2018-01-12 RX ADMIN — ALBUTEROL SULFATE SCH: 1.25 SOLUTION RESPIRATORY (INHALATION) at 01:16

## 2018-01-12 RX ADMIN — ALBUTEROL SULFATE SCH MG: 1.25 SOLUTION RESPIRATORY (INHALATION) at 08:14

## 2018-01-12 RX ADMIN — DIGOXIN SCH MG: 0.12 TABLET ORAL at 17:20

## 2018-01-12 RX ADMIN — BUDESONIDE SCH MG: 0.5 SUSPENSION RESPIRATORY (INHALATION) at 08:14

## 2018-01-12 RX ADMIN — ALBUTEROL SULFATE SCH MG: 1.25 SOLUTION RESPIRATORY (INHALATION) at 19:29

## 2018-01-12 RX ADMIN — GUAIFENESIN PRN MG: 100 SOLUTION ORAL at 11:05

## 2018-01-12 RX ADMIN — METHYLPREDNISOLONE SODIUM SUCCINATE SCH MG: 40 INJECTION, POWDER, FOR SOLUTION INTRAMUSCULAR; INTRAVENOUS at 11:04

## 2018-01-12 RX ADMIN — GUAIFENESIN PRN MG: 100 SOLUTION ORAL at 22:52

## 2018-01-12 RX ADMIN — ALBUTEROL SULFATE SCH: 1.25 SOLUTION RESPIRATORY (INHALATION) at 13:52

## 2018-01-12 RX ADMIN — BUDESONIDE SCH MG: 0.5 SUSPENSION RESPIRATORY (INHALATION) at 19:29

## 2018-01-12 NOTE — CP.PCM.PN
Subjective





- Date & Time of Evaluation


Date of Evaluation: 01/12/18


Time of Evaluation: 09:40





- Subjective


Subjective: 





patient seen and examined


Shortness of breath and cough improving


Afebrile





Objective





- Vital Signs/Intake and Output


Vital Signs (last 24 hours): 


 











Temp Pulse Resp BP Pulse Ox


 


 97.2 F L  102 H  20   118/75   95 


 


 01/12/18 08:33  01/12/18 11:35  01/12/18 08:33  01/12/18 11:04  01/12/18 08:33








Intake and Output: 


 











 01/12/18 01/12/18





 06:59 18:59


 


Intake Total 232 


 


Output Total 600 


 


Balance -368 














- Medications


Medications: 


 Current Medications





Albuterol Sulfate (Albuterol 0.042% Inhal Sol (1.25mg/3ml) Ud)  1.25 mg INH RQ6 

[facility]


   Last Admin: 01/12/18 08:14 Dose:  1.25 mg


Aspirin (Aspirin Chewable)  81 mg PO DAILY [facility]


   Last Admin: 01/12/18 11:05 Dose:  81 mg


Budesonide (Pulmicort Respules)  0.5 mg INH RQ12 [facility]


   Last Admin: 01/12/18 08:14 Dose:  0.5 mg


Digoxin (Lanoxin)  0.125 mg PO DAILY@1800 [facility]


   Last Admin: 01/11/18 17:34 Dose:  0.125 mg


Famotidine (Pepcid)  20 mg PO DAILY [facility]


   Last Admin: 01/12/18 11:05 Dose:  20 mg


Guaifenesin (Robitussin)  100 mg PO Q4H [facility]


   PRN Reason: Cough


   Last Admin: 01/12/18 11:05 Dose:  100 mg


Furosemide 100 mg/ Sodium (Chloride)  100 mls @ 4 mls/hr IVP .Q24H [facility]


   PRN Reason: 4 MG/HR


   Last Admin: 01/11/18 16:42 Dose:  4 mls/hr


Methylprednisolone (Solu-Medrol)  40 mg IV Q12 [facility]


   Last Admin: 01/12/18 11:04 Dose:  40 mg


Metolazone (Zaroxolyn)  5 mg PO TID [facility]


   Last Admin: 01/12/18 11:04 Dose:  5 mg


Metoprolol Tartrate (Lopressor)  25 mg PO BID [facility]


   Last Admin: 01/12/18 11:04 Dose:  25 mg


Midodrine (Proamatine)  5 mg PO DAILY [facility]


   Last Admin: 01/12/18 11:03 Dose:  5 mg


Warfarin Sodium (Coumadin)  3 mg PO 1800 [facility]


   Stop: 01/12/18 18:01











- Labs


Labs: 


 





 01/12/18 07:14 





 01/12/18 07:14 





 











PT  34.0 SECONDS (9.7-12.2)  H* D 01/12/18  07:14    


 


INR  2.9  D 01/12/18  07:14    


 


APTT  23 SECONDS (21-34)   01/03/18  09:58    














- Eye Exam


Eye Exam: EOMI





- ENT Exam


ENT Exam: Mucous Membranes Moist





- Neck Exam


Neck Exam: Normal Inspection





- Respiratory Exam


Respiratory Exam: Decreased Breath Sounds





Assessment and Plan


(1) Acute exacerbation of CHF (congestive heart failure)


Assessment & Plan: 


continue present treatment


Status: Acute   





(2) COPD (chronic obstructive pulmonary disease)


Assessment & Plan: 


continue BiPAP as needed


Taper steroids


Nebulizer treatment





Status: Acute   





(3) Chronic atrial fibrillation


Status: Chronic

## 2018-01-12 NOTE — CP.PCM.PN
Subjective





- Date & Time of Evaluation


Date of Evaluation: 01/12/18


Time of Evaluation: 09:24





- Subjective


Subjective: 





Medicine Progress Note: Dr Elizabeth Service





Patient seen and examined at bedside. Per nursing no acute events overnight. 

Patient was confused, and asking why he was here. States that SOB improving and 

so is the cough. Swelling in lower extremities also improving. Denies headaches

, dizziness, cp, palpitations, abdominal pain, urinary symptoms, changes in 

bowel habits. 





Objective





- Vital Signs/Intake and Output


Vital Signs (last 24 hours): 


 











Temp Pulse Resp BP Pulse Ox


 


 97.2 F L  99 H  20   119/67   95 


 


 01/12/18 08:33  01/12/18 08:33  01/12/18 08:33  01/12/18 08:33  01/12/18 08:33








Intake and Output: 


 











 01/12/18 01/12/18





 06:59 18:59


 


Intake Total 232 


 


Output Total 600 


 


Balance -368 














- Medications


Medications: 


 Current Medications





Albuterol Sulfate (Albuterol 0.042% Inhal Sol (1.25mg/3ml) Ud)  1.25 mg INH RQ6 

[facility]


   Last Admin: 01/12/18 08:14 Dose:  1.25 mg


Aspirin (Aspirin Chewable)  81 mg PO DAILY [facility]


   Last Admin: 01/11/18 09:30 Dose:  81 mg


Budesonide (Pulmicort Respules)  0.5 mg INH RQ12 [facility]


   Last Admin: 01/12/18 08:14 Dose:  0.5 mg


Digoxin (Lanoxin)  0.125 mg PO DAILY@1800 [facility]


   Last Admin: 01/11/18 17:34 Dose:  0.125 mg


Famotidine (Pepcid)  20 mg PO DAILY [facility]


   Last Admin: 01/11/18 09:30 Dose:  20 mg


Guaifenesin (Robitussin)  100 mg PO Q4H PRN


   PRN Reason: Cough


   Last Admin: 01/11/18 10:32 Dose:  100 mg


Furosemide 100 mg/ Sodium (Chloride)  100 mls @ 4 mls/hr IVP .Q24H [facility]


   PRN Reason: 4 MG/HR


   Last Admin: 01/11/18 16:42 Dose:  4 mls/hr


Methylprednisolone (Solu-Medrol)  40 mg IV Q12 [facility]


   Last Admin: 01/11/18 21:14 Dose:  40 mg


Metolazone (Zaroxolyn)  5 mg PO TID [facility]


   Last Admin: 01/11/18 17:33 Dose:  5 mg


Metoprolol Tartrate (Lopressor)  25 mg PO BID [facility]


   Last Admin: 01/11/18 17:34 Dose:  25 mg


Midodrine (Proamatine)  5 mg PO DAILY [facility]


   Last Admin: 01/11/18 09:31 Dose:  5 mg


Warfarin Sodium (Coumadin)  3 mg PO 1800 [facility]


   Stop: 01/12/18 18:01











- Labs


Labs: 


 





 01/12/18 07:14 





 01/12/18 07:14 





 











PT  34.0 SECONDS (9.7-12.2)  H* D 01/12/18  07:14    


 


INR  2.9  D 01/12/18  07:14    


 


APTT  23 SECONDS (21-34)   01/03/18  09:58    














- Additional Findings


Additional findings: 


- Constitutional


Appears: Non-toxic, No Acute Distress





- Head Exam


Head Exam: ATRAUMATIC, NORMAL INSPECTION





- Eye Exam


Eye Exam: EOMI, Normal appearance





- ENT Exam


ENT Exam: Mucous Membranes Moist





- Neck Exam


Neck Exam: Full ROM





- Respiratory Exam


Respiratory Exam: Decreased Breath Sounds, Clear to Ausculation Bilateral, 

NORMAL BREATHING PATTERN.  absent: Rales, Rhonchi, Wheezes





- Cardiovascular Exam


Cardiovascular Exam: REGULAR RHYTHM, +S1, +S2





- GI/Abdominal Exam


GI & Abdominal Exam: Soft, Normal Bowel Sounds.  absent: Guarding, Rigid, 

Tenderness





- Extremities Exam


Extremities Exam: Normal Capillary Refill


Additional comments: 





+1 pitting edema bilaterally





- Neurological Exam


Neurological Exam: Alert, Awake, Normal Gait





- Psychiatric Exam


Psychiatric exam: Normal Affect, Normal Mood





- Skin


Skin Exam: Dry, Normal Color, Warm





Assessment and Plan





- Assessment and Plan (Free Text)


Assessment: 





1) Systolic dysfunction with Acute on Chronic heart failure


-Stable, afebrile


-BNP on admission: 8160


-CXR 1/4/18: showed pulmonary venous congestion, small R pleural effusion, 

bibasilar atelectasis/infiltrate


-Echo from 2/25/17 showed LVEF 12%, 


-Echo report: EF 10-15%, Systolic dysfunction severely impaired, mild to mod TR

, mild Pulm HTN (see full report)


-Continue Lasix drip, hold is SBP < 90


-Continue Digoxin 0.125mg


-Metolazone 5mg PO TID on hold due to CRISTINO


-Metoprolol Tartrate 25mg PO BID


-Daily weights, Fluid restriction, Keep HOB elevated, Monitor I/Os


-Cardiology on consult, Dr Dotson, help appreciated


-Not on ACEi/ARB due to chronic hypotension


-Bipap prn (patient does refuse the bipap and tries to take it off, educated 

the patient on importance of keeping bipap on)


-Physical Therapy on board


-LFTs elevated but downtrending, likely secondary to worsening CHF, will 

continue to monitor





2) Chronic Atrial fibrillation w/ AICD


-Cardiology on consult, Dr. Dotson, help appreciated  


-Chadsvasc score: 4- High risk for stroke, Hasbled: 2- Moderate risk for 

bleeding 


-Cardizem 20 mg given in ED, recieved one dose Digoxin 0.25mg 1/3/18


-Continue Metoprolol 25mg PO BID with holding parameters


-Continue Digoxin 0.125mg daily


-Lovenox 90 mg SC q12h discontinued


-Coumadin 3mg PO today





3) History of Mechanical Mitral Valve


-INR: 1.5 on admission (subtherapeutic), goal INR 2.5-3.5


-Patient was bridged with Lovenox 90mg Q12H (discontinued)


-INR today 2.9


-Coumadin 3mg ordered


-Monitor INR closely





4) History of Coronary Artery Disease


-Aspirin 81mg PO QD





5) History of COPD


-Solumedrol 40mg IV Q12H


-Pulmicort 0.5mg Q12H


-Duonebs 


-Pulm on consult, Dr Varela, Help appreciated





6) Vertigo


-Meclizine 12.5mg discontinued





7) Orthostatic Hypotension


-Midodrine 5mg PO daily 





8) Dementia


-Will hold Memantine at this time





9) Acute Kidney Injury


-BUN 71/1.7 today 


-Will hold Metolazone 5mg TID at this time and monitor kidney function


-FeUrea 34.3% suggesting pre-renal etiology


-Will continue to monitor at this time





Prophylactic Measures


-Pepcid 20 mg po daily 


-SCDs contraindicated due to b/l LE edema 


-Coumadin 3mg PO daily


-Heart Healthy Diet





DISPO: Patient is clear for discharge home, however unable to get in touch with 

the patients son for discharge. Will DC once family can be contacted for 

transportation.

## 2018-01-13 LAB
ALBUMIN SERPL-MCNC: 3.7 G/DL (ref 3.5–5)
ALBUMIN/GLOB SERPL: 1.3 {RATIO} (ref 1–2.1)
ALT SERPL-CCNC: 210 U/L (ref 21–72)
ANISOCYTOSIS BLD QL SMEAR: SLIGHT
APTT BLD: 30 SECONDS (ref 21–34)
AST SERPL-CCNC: 101 U/L (ref 17–59)
BASOPHILS # BLD AUTO: 0 K/UL (ref 0–0.2)
BASOPHILS NFR BLD: 0.1 % (ref 0–2)
BUN SERPL-MCNC: 68 MG/DL (ref 9–20)
CALCIUM SERPL-MCNC: 9.4 MG/DL (ref 8.6–10.4)
EOSINOPHIL # BLD AUTO: 0 K/UL (ref 0–0.7)
EOSINOPHIL NFR BLD: 0 % (ref 0–4)
ERYTHROCYTE [DISTWIDTH] IN BLOOD BY AUTOMATED COUNT: 17.6 % (ref 11.5–14.5)
GFR NON-AFRICAN AMERICAN: 50
HGB BLD-MCNC: 12.5 G/DL (ref 12–18)
HYPOCHROMIC: (no result)
INR PPP: 1.9
LG PLATELETS BLD QL SMEAR: PRESENT
LYMPHOCYTE: 5 % (ref 20–40)
LYMPHOCYTES # BLD AUTO: 0.3 K/UL (ref 1–4.3)
LYMPHOCYTES NFR BLD AUTO: 3.3 % (ref 20–40)
MCH RBC QN AUTO: 26.7 PG (ref 27–31)
MCHC RBC AUTO-ENTMCNC: 33.2 G/DL (ref 33–37)
MCV RBC AUTO: 80.5 FL (ref 80–94)
MICROCYTES BLD QL SMEAR: SLIGHT
MONOCYTE: 14 % (ref 0–10)
MONOCYTES # BLD: 1.1 K/UL (ref 0–0.8)
MONOCYTES NFR BLD: 11.5 % (ref 0–10)
NEUTROPHILS # BLD: 8.4 K/UL (ref 1.8–7)
NEUTROPHILS NFR BLD AUTO: 76 % (ref 50–75)
NEUTROPHILS NFR BLD AUTO: 85.1 % (ref 50–75)
NEUTS BAND NFR BLD: 5 % (ref 0–2)
NRBC BLD AUTO-RTO: 0.2 % (ref 0–2)
PLATELET # BLD EST: NORMAL 10*3/UL
PLATELET # BLD: 238 K/UL (ref 130–400)
PMV BLD AUTO: 8.3 FL (ref 7.2–11.7)
POIKILOCYTOSIS BLD QL SMEAR: SLIGHT
PROTHROMBIN TIME: 21.6 SECONDS (ref 9.7–12.2)
RBC # BLD AUTO: 4.68 MIL/UL (ref 4.4–5.9)
SMUDGE CELLS # BLD: PRESENT 10*3/UL
TOTAL CELLS COUNTED BLD: 100
TOXIC GRANULES BLD QL SMEAR: PRESENT
WBC # BLD AUTO: 9.9 K/UL (ref 4.8–10.8)

## 2018-01-13 RX ADMIN — BUDESONIDE SCH MG: 0.5 SUSPENSION RESPIRATORY (INHALATION) at 19:37

## 2018-01-13 RX ADMIN — ALBUTEROL SULFATE SCH MG: 1.25 SOLUTION RESPIRATORY (INHALATION) at 01:44

## 2018-01-13 RX ADMIN — ALBUTEROL SULFATE SCH MG: 1.25 SOLUTION RESPIRATORY (INHALATION) at 14:05

## 2018-01-13 RX ADMIN — METHYLPREDNISOLONE SODIUM SUCCINATE SCH MG: 40 INJECTION, POWDER, FOR SOLUTION INTRAMUSCULAR; INTRAVENOUS at 21:04

## 2018-01-13 RX ADMIN — ALBUTEROL SULFATE SCH MG: 1.25 SOLUTION RESPIRATORY (INHALATION) at 19:37

## 2018-01-13 RX ADMIN — DIGOXIN SCH MG: 0.12 TABLET ORAL at 17:39

## 2018-01-13 RX ADMIN — GUAIFENESIN PRN MG: 100 SOLUTION ORAL at 21:17

## 2018-01-13 RX ADMIN — METHYLPREDNISOLONE SODIUM SUCCINATE SCH: 40 INJECTION, POWDER, FOR SOLUTION INTRAMUSCULAR; INTRAVENOUS at 10:11

## 2018-01-13 RX ADMIN — BUDESONIDE SCH MG: 0.5 SUSPENSION RESPIRATORY (INHALATION) at 08:18

## 2018-01-13 RX ADMIN — ALBUTEROL SULFATE SCH MG: 1.25 SOLUTION RESPIRATORY (INHALATION) at 08:18

## 2018-01-13 NOTE — CP.PCM.PN
Subjective





- Date & Time of Evaluation


Date of Evaluation: 01/13/18


Time of Evaluation: 11:00





- Subjective


Subjective: 





PGY2 medicine progress note for Dr. Elizabeth





Patient seen and examined.  Patient states he feels well and is eating well.  

Patient states he cannot leave because he does not know where to go.  Patient's 

family not returning calls.  Patient refusing labs and IV. 





Objective





- Vital Signs/Intake and Output


Vital Signs (last 24 hours): 


 











Temp Pulse Resp BP Pulse Ox


 


 97.3 F L  64   20   107/70   100 


 


 01/13/18 11:00  01/13/18 11:00  01/13/18 11:00  01/13/18 11:00  01/13/18 11:00








Intake and Output: 


 











 01/13/18 01/13/18





 06:59 18:59


 


Output Total 300 


 


Balance -300 














- Medications


Medications: 


 Current Medications





Albuterol Sulfate (Albuterol 0.042% Inhal Sol (1.25mg/3ml) Ud)  1.25 mg INH RQ6 

[facility]


   Last Admin: 01/13/18 14:05 Dose:  1.25 mg


Aspirin (Aspirin Chewable)  81 mg PO DAILY [facility]


   Last Admin: 01/13/18 10:32 Dose:  81 mg


Budesonide (Pulmicort Respules)  0.5 mg INH RQ12 [facility]


   Last Admin: 01/13/18 08:18 Dose:  0.5 mg


Digoxin (Lanoxin)  0.125 mg PO DAILY@1800 [facility]


   Last Admin: 01/12/18 17:20 Dose:  0.125 mg


Famotidine (Pepcid)  20 mg PO DAILY [facility]


   Last Admin: 01/13/18 10:32 Dose:  20 mg


Furosemide (Lasix)  40 mg PO DAILY [facility]


Guaifenesin (Robitussin)  100 mg PO Q4H PRN


   PRN Reason: Cough


   Last Admin: 01/12/18 22:52 Dose:  100 mg


Methylprednisolone (Solu-Medrol)  40 mg IV Q12 [facility]


   Last Admin: 01/13/18 10:11 Dose:  Not Given


Metolazone (Zaroxolyn)  2.5 mg PO DAILY [facility]


Metoprolol Tartrate (Lopressor)  25 mg PO BID [facility]


   Last Admin: 01/13/18 10:32 Dose:  25 mg


Midodrine (Proamatine)  5 mg PO DAILY [facility]


   Last Admin: 01/13/18 10:37 Dose:  Not Given











- Labs


Labs: 


 





 01/12/18 07:14 





 01/12/18 07:14 





 











PT  34.0 SECONDS (9.7-12.2)  H* D 01/12/18  07:14    


 


INR  2.9  D 01/12/18  07:14    


 


APTT  23 SECONDS (21-34)   01/03/18  09:58    














- Constitutional


Appears: No Acute Distress, Chronically Ill





- Head Exam


Head Exam: ATRAUMATIC, NORMOCEPHALIC





- Eye Exam


Eye Exam: EOMI





- ENT Exam


ENT Exam: Mucous Membranes Moist





- Respiratory Exam


Respiratory Exam: Rales (b/l bases)





- Cardiovascular Exam


Cardiovascular Exam: Irregular Rhythm, +S1, +S2





- GI/Abdominal Exam


GI & Abdominal Exam: Soft, Normal Bowel Sounds.  absent: Tenderness





- Extremities Exam


Extremities Exam: Pedal Edema


Additional comments: 





b/l lower extremity stasis color changes





- Neurological Exam


Neurological Exam: Alert, Awake





- Psychiatric Exam


Psychiatric exam: Normal Affect





- Skin


Skin Exam: Warm





Assessment and Plan





- Assessment and Plan (Free Text)


Assessment: 





1) Systolic dysfunction with Acute on Chronic heart failure


-Stable, afebrile


-BNP on admission: 8160


-CXR 1/4/18: showed pulmonary venous congestion, small R pleural effusion, 

bibasilar atelectasis/infiltrate


-Echo from 2/25/17 showed LVEF 12%, 


-Echo report 1/9/18: EF 10-15%, Systolic dysfunction severely impaired, mild to 

mod TR, mild Pulm HTN (see full report)





-Cardiology on consult, Dr Dotson, help appreciated


-LFTs elevated but downtrending, likely secondary to worsening CHF, will 

continue to monitor


-LFTs elevated but downtrending, likely secondary to worsening CHF, will 

continue to monitor





-Not on ACEi/ARB due to chronic hypotension


-Continue Digoxin 0.125mg


-Metolazone 2.5mg PO daily


-Lasix 40mg PO daily


-Metoprolol Tartrate 25mg PO BID





-Daily weights, Fluid restriction, Keep HOB elevated, Monitor I/Os


-Bipap prn (patient does refuse the bipap and tries to take it off, educated 

the patient on importance of keeping bipap on)





2) Chronic Atrial fibrillation w/ AICD


-Cardiology on consult, Dr. Dotson, help appreciated  


-Chadsvasc score: 4- High risk for stroke, Hasbled: 2- Moderate risk for 

bleeding 


-Cardizem 20 mg given in ED, recieved one dose Digoxin 0.25mg 1/3/18





-Continue Metoprolol 25mg PO BID with holding parameters


-Continue Digoxin 0.125mg daily


-Coumadin 3mg PO tonight





3) History of Mechanical Mitral Valve


-INR: 1.5 on admission (subtherapeutic), goal INR 2.5-3.5


-Patient was bridged with Lovenox 90mg Q12H (discontinued)


-INR today 2.9


-Coumadin 3mg ordered


-Monitor INR closely





4) History of Coronary Artery Disease


-Aspirin 81mg PO QD





5) History of COPD


-Solumedrol 40mg IV Q12H (started 1/11/18)


-Pulmicort 0.5mg Q12H


-Duonebs 


-Pulm on consult, Dr Varela, Help appreciated





6) Vertigo


-Meclizine 12.5mg discontinued due to sedation





7) Orthostatic Hypotension


-Midodrine 5mg PO daily 





8) Dementia


-Will hold Memantine at this time





9) Acute Kidney Injury


-BUN 71/1.7 yesterday, pt refused labs today 


-Will continue to monitor at this time


-will decrease metolazone to 2.5mg daily rather than TID





Prophylactic Measures


-Pepcid 20 mg po daily 


-SCDs contraindicated due to b/l LE edema 


-Coumadin 3mg PO daily


-Heart Healthy Diet





DISPO: Patient is clear for discharge home, however unable to get in touch with 

the patients son for discharge. Will DC once family can be contacted for 

transportation. 





Patient seen and examined with Dr. Elizabeth.  All medical management as per Dr. Elizabeth.

## 2018-01-13 NOTE — CP.PCM.PN
Subjective





- Date & Time of Evaluation


Date of Evaluation: 01/13/18


Time of Evaluation: 15:10





- Subjective


Subjective: 





Patient seen and examined the


Complaining of dyspnea on exertion


Also complaining of cough


Afebrile


No chest pain





Objective





- Vital Signs/Intake and Output


Vital Signs (last 24 hours): 


 











Temp Pulse Resp BP Pulse Ox


 


 98.2 F   84   20   95/64 L  95 


 


 01/13/18 15:00  01/13/18 16:29  01/13/18 15:00  01/13/18 15:00  01/13/18 15:00








Intake and Output: 


 











 01/13/18 01/13/18





 06:59 18:59


 


Intake Total  300


 


Output Total 300 


 


Balance -300 300














- Medications


Medications: 


 Current Medications





Albuterol Sulfate (Albuterol 0.042% Inhal Sol (1.25mg/3ml) Ud)  1.25 mg INH RQ6 

[facility]


   Last Admin: 01/13/18 14:05 Dose:  1.25 mg


Aspirin (Aspirin Chewable)  81 mg PO DAILY [facility]


   Last Admin: 01/13/18 10:32 Dose:  81 mg


Budesonide (Pulmicort Respules)  0.5 mg INH RQ12 [facility]


   Last Admin: 01/13/18 08:18 Dose:  0.5 mg


Digoxin (Lanoxin)  0.125 mg PO DAILY@1800 [facility]


   Last Admin: 01/12/18 17:20 Dose:  0.125 mg


Famotidine (Pepcid)  20 mg PO DAILY [facility]


   Last Admin: 01/13/18 10:32 Dose:  20 mg


Furosemide (Lasix)  40 mg PO DAILY [facility]


Guaifenesin (Robitussin)  100 mg PO Q4H PRN


   PRN Reason: Cough


   Last Admin: 01/12/18 22:52 Dose:  100 mg


Methylprednisolone (Solu-Medrol)  40 mg IV Q12 [facility]


   Last Admin: 01/13/18 10:11 Dose:  Not Given


Metolazone (Zaroxolyn)  2.5 mg PO DAILY [facility]


Metoprolol Tartrate (Lopressor)  25 mg PO BID [facility]


   Last Admin: 01/13/18 10:32 Dose:  25 mg


Midodrine (Proamatine)  5 mg PO DAILY [facility]


   Last Admin: 01/13/18 10:37 Dose:  Not Given


Warfarin Sodium (Coumadin)  3 mg PO 1800 [facility]


   Stop: 01/13/18 18:01











- Labs


Labs: 


 





 01/12/18 07:14 





 01/12/18 07:14 





 











PT  34.0 SECONDS (9.7-12.2)  H* D 01/12/18  07:14    


 


INR  2.9  D 01/12/18  07:14    


 


APTT  23 SECONDS (21-34)   01/03/18  09:58    














- Head Exam


Head Exam: ATRAUMATIC, NORMOCEPHALIC





- Eye Exam


Eye Exam: Normal appearance





- ENT Exam


ENT Exam: Mucous Membranes Moist





- Neck Exam


Neck Exam: Normal Inspection





- Respiratory Exam


Respiratory Exam: Decreased Breath Sounds





- Cardiovascular Exam


Cardiovascular Exam: Irregular Rhythm





- GI/Abdominal Exam


GI & Abdominal Exam: Soft, Normal Bowel Sounds





Assessment and Plan


(1) Acute exacerbation of CHF (congestive heart failure)


Assessment & Plan: 


continue diuretics


on Coumadin


Status: Acute   





(2) COPD (chronic obstructive pulmonary disease)


Assessment & Plan: 


taper IV steroids


Continue budesonide


Continue nebulizer treatment


Status: Acute   





(3) Chronic atrial fibrillation


Status: Chronic

## 2018-01-14 LAB
ALBUMIN SERPL-MCNC: 3.8 G/DL (ref 3.5–5)
ALBUMIN/GLOB SERPL: 1.3 {RATIO} (ref 1–2.1)
ALT SERPL-CCNC: 257 U/L (ref 21–72)
ANISOCYTOSIS BLD QL SMEAR: (no result)
AST SERPL-CCNC: 123 U/L (ref 17–59)
BASOPHILS # BLD AUTO: 0 K/UL (ref 0–0.2)
BASOPHILS NFR BLD: 0.1 % (ref 0–2)
BUN SERPL-MCNC: 54 MG/DL (ref 9–20)
CALCIUM SERPL-MCNC: 9.4 MG/DL (ref 8.6–10.4)
EOSINOPHIL # BLD AUTO: 0 K/UL (ref 0–0.7)
EOSINOPHIL NFR BLD: 0.1 % (ref 0–4)
ERYTHROCYTE [DISTWIDTH] IN BLOOD BY AUTOMATED COUNT: 17.9 % (ref 11.5–14.5)
GFR NON-AFRICAN AMERICAN: 54
HGB BLD-MCNC: 12.7 G/DL (ref 12–18)
HYPOCHROMIC: SLIGHT
INR PPP: 1.9
LG PLATELETS BLD QL SMEAR: PRESENT
LYMPHOCYTE: 1 % (ref 20–40)
LYMPHOCYTES # BLD AUTO: 0.2 K/UL (ref 1–4.3)
LYMPHOCYTES NFR BLD AUTO: 1.6 % (ref 20–40)
MCH RBC QN AUTO: 26 PG (ref 27–31)
MCHC RBC AUTO-ENTMCNC: 32 G/DL (ref 33–37)
MCV RBC AUTO: 81.3 FL (ref 80–94)
MONOCYTE: 11 % (ref 0–10)
MONOCYTES # BLD: 1.2 K/UL (ref 0–0.8)
MONOCYTES NFR BLD: 11 % (ref 0–10)
NEUTROPHILS # BLD: 9.7 K/UL (ref 1.8–7)
NEUTROPHILS NFR BLD AUTO: 86 % (ref 50–75)
NEUTROPHILS NFR BLD AUTO: 87.2 % (ref 50–75)
NEUTS BAND NFR BLD: 1 % (ref 0–2)
NRBC BLD AUTO-RTO: 0.1 % (ref 0–2)
OVALOCYTES BLD QL SMEAR: SLIGHT
PLATELET # BLD EST: NORMAL 10*3/UL
PLATELET # BLD: 268 K/UL (ref 130–400)
PMV BLD AUTO: 8.2 FL (ref 7.2–11.7)
POIKILOCYTOSIS BLD QL SMEAR: SLIGHT
POLYCHROMIC: SLIGHT
PROTHROMBIN TIME: 22.5 SECONDS (ref 9.7–12.2)
RBC # BLD AUTO: 4.9 MIL/UL (ref 4.4–5.9)
TOTAL CELLS COUNTED BLD: 100
VARIANT LYMPHS NFR BLD MANUAL: 1 % (ref 0–0)
WBC # BLD AUTO: 11.1 K/UL (ref 4.8–10.8)

## 2018-01-14 RX ADMIN — METHYLPREDNISOLONE SODIUM SUCCINATE SCH MG: 40 INJECTION, POWDER, FOR SOLUTION INTRAMUSCULAR; INTRAVENOUS at 21:54

## 2018-01-14 RX ADMIN — ALBUTEROL SULFATE SCH MG: 1.25 SOLUTION RESPIRATORY (INHALATION) at 20:06

## 2018-01-14 RX ADMIN — METHYLPREDNISOLONE SODIUM SUCCINATE SCH MG: 40 INJECTION, POWDER, FOR SOLUTION INTRAMUSCULAR; INTRAVENOUS at 09:35

## 2018-01-14 RX ADMIN — DIGOXIN SCH MG: 0.12 TABLET ORAL at 17:15

## 2018-01-14 RX ADMIN — ALBUTEROL SULFATE SCH MG: 1.25 SOLUTION RESPIRATORY (INHALATION) at 08:10

## 2018-01-14 RX ADMIN — BUDESONIDE SCH MG: 0.5 SUSPENSION RESPIRATORY (INHALATION) at 20:05

## 2018-01-14 RX ADMIN — ALBUTEROL SULFATE SCH MG: 1.25 SOLUTION RESPIRATORY (INHALATION) at 13:54

## 2018-01-14 RX ADMIN — BUDESONIDE SCH MG: 0.5 SUSPENSION RESPIRATORY (INHALATION) at 08:10

## 2018-01-14 NOTE — CP.PCM.PN
Subjective





- Date & Time of Evaluation


Date of Evaluation: 01/14/18


Time of Evaluation: 09:30





- Subjective


Subjective: 





PGY2 medicine progress note for Dr. Elizabeth





Patient seen and examined.  Per nursing staff, patient was agitated earlier 

this morning, but patient is now calm and resting in chair.  Patient is not 

walking around much and states he cannot walk far.  Patient denies other 

complaints at this time.





Objective





- Vital Signs/Intake and Output


Vital Signs (last 24 hours): 


 











Temp Pulse Resp BP Pulse Ox


 


 97.5 F L  91 H  20   114/60   95 


 


 01/14/18 07:00  01/14/18 07:00  01/14/18 07:00  01/14/18 09:32  01/14/18 07:00








Intake and Output: 


 











 01/14/18 01/14/18





 06:59 18:59


 


Intake Total 600 


 


Balance 600 














- Medications


Medications: 


 Current Medications





Albuterol Sulfate (Albuterol 0.042% Inhal Sol (1.25mg/3ml) Ud)  1.25 mg INH RQ6 

[facility]


   Last Admin: 01/14/18 13:54 Dose:  1.25 mg


Aspirin (Aspirin Chewable)  81 mg PO DAILY [facility]


   Last Admin: 01/14/18 09:32 Dose:  81 mg


Budesonide (Pulmicort Respules)  0.5 mg INH RQ12 [facility]


   Last Admin: 01/14/18 08:10 Dose:  0.5 mg


Digoxin (Lanoxin)  0.125 mg PO DAILY@1800 [facility]


   Last Admin: 01/13/18 17:39 Dose:  0.125 mg


Famotidine (Pepcid)  20 mg PO DAILY [facility]


   Last Admin: 01/14/18 09:31 Dose:  20 mg


Furosemide (Lasix)  40 mg PO DAILY [facility]


   Last Admin: 01/14/18 09:31 Dose:  40 mg


Guaifenesin (Robitussin)  100 mg PO Q4H PRN


   PRN Reason: Cough


   Last Admin: 01/13/18 21:17 Dose:  100 mg


Methylprednisolone (Solu-Medrol)  20 mg IV Q12 [facility]


   Last Admin: 01/14/18 09:35 Dose:  20 mg


Metolazone (Zaroxolyn)  2.5 mg PO DAILY [facility]


   Last Admin: 01/14/18 10:52 Dose:  2.5 mg


Metoprolol Tartrate (Lopressor)  25 mg PO BID [facility]


   Last Admin: 01/14/18 09:32 Dose:  25 mg


Midodrine (Proamatine)  5 mg PO DAILY [facility]


   Last Admin: 01/14/18 09:32 Dose:  5 mg


Warfarin Sodium (Coumadin)  3 mg PO 1800 [facility]


   Stop: 01/14/18 18:01











- Labs


Labs: 


 





 01/14/18 11:32 





 01/14/18 11:32 





 











PT  22.5 SECONDS (9.7-12.2)  H  01/14/18  11:32    


 


INR  1.9   01/14/18  11:32    


 


APTT  30 SECONDS (21-34)   01/13/18  17:16    














- Constitutional


Appears: No Acute Distress, Unkempt, Chronically Ill





- Head Exam


Head Exam: ATRAUMATIC





- Eye Exam


Eye Exam: EOMI





- ENT Exam


ENT Exam: Mucous Membranes Moist





- Respiratory Exam


Respiratory Exam: Rales





- Cardiovascular Exam


Cardiovascular Exam: Irregular Rhythm, +S1, +S2





- GI/Abdominal Exam


GI & Abdominal Exam: Soft.  absent: Tenderness





- Extremities Exam


Additional comments: 





1+ pitting edema with chronic stasis color changes





- Neurological Exam


Neurological Exam: Alert, Awake





- Skin


Skin Exam: Warm





Assessment and Plan





- Assessment and Plan (Free Text)


Assessment: 





1) Systolic dysfunction with Acute on Chronic heart failure


-Stable, afebrile


-BNP on admission: 8160


-CXR 1/4/18: showed pulmonary venous congestion, small R pleural effusion, 

bibasilar atelectasis/infiltrate


-Echo from 2/25/17 showed LVEF 12%, 


-Echo report 1/9/18: EF 10-15%, Systolic dysfunction severely impaired, mild to 

mod TR, mild Pulm HTN (see full report)





-Cardiology on consult, Dr Dotson, help appreciated


-LFTs elevated but downtrending, likely secondary to worsening CHF, will 

continue to monitor


-LFTs elevated but downtrending, likely secondary to worsening CHF, will 

continue to monitor





-Not on ACEi/ARB due to chronic hypotension


-Continue Digoxin 0.125mg


-Metolazone 2.5mg PO daily


-Lasix 40mg PO daily


-Metoprolol Tartrate 25mg PO BID





-Daily weights, Fluid restriction, Keep HOB elevated, Monitor I/Os


-Bipap prn (patient does refuse the bipap and tries to take it off, educated 

the patient on importance of keeping bipap on)





2) Chronic Atrial fibrillation w/ AICD


-Cardiology on consult, Dr. Dotson, help appreciated  


-Chadsvasc score: 4- High risk for stroke, Hasbled: 2- Moderate risk for 

bleeding 


-Cardizem 20 mg given in ED, recieved one dose Digoxin 0.25mg 1/3/18





-Continue Metoprolol 25mg PO BID with holding parameters


-Continue Digoxin 0.125mg daily


-Coumadin 3mg PO tonight





3) History of Mechanical Mitral Valve


-INR: 1.5 on admission (subtherapeutic), goal INR 2.5-3.5


-Patient was bridged with Lovenox 90mg Q12H (discontinued)


-INR today 1.9


-Coumadin 3mg ordered


-Monitor INR closely


-diet to exclude leafy green vegetables





4) History of Coronary Artery Disease


-Aspirin 81mg PO QD





5) History of COPD


-Solumedrol 20mg IV Q12H (started 1/14/18)


-Pulmicort 0.5mg Q12H


-Duonebs 


-Pulm on consult, Dr Varela, Help appreciated





6) Vertigo


-Meclizine 12.5mg discontinued due to sedation





7) Orthostatic Hypotension


-Midodrine 5mg PO daily 





8) Dementia


-Will hold Memantine at this time





9) Acute Kidney Injury


-BUN 54/1.3 today, improved since decreasing diuretics


-Will continue to monitor at this time





Prophylactic Measures


-Pepcid 20 mg po daily 


-SCDs contraindicated due to b/l LE edema 


-Coumadin 3mg PO daily


-Heart Healthy Diet





DISPO: Patient is clear for discharge home, however unable to get in touch with 

the patients son for discharge. Will DC once family can be contacted for 

transportation. 





All medical management as per Dr. Elizabeth.

## 2018-01-15 RX ADMIN — ALBUTEROL SULFATE SCH: 1.25 SOLUTION RESPIRATORY (INHALATION) at 07:06

## 2018-01-15 RX ADMIN — BUDESONIDE SCH MG: 0.5 SUSPENSION RESPIRATORY (INHALATION) at 19:04

## 2018-01-15 RX ADMIN — BUDESONIDE SCH: 0.5 SUSPENSION RESPIRATORY (INHALATION) at 07:06

## 2018-01-15 RX ADMIN — ALBUTEROL SULFATE SCH MG: 1.25 SOLUTION RESPIRATORY (INHALATION) at 13:21

## 2018-01-15 RX ADMIN — METHYLPREDNISOLONE SODIUM SUCCINATE SCH MG: 40 INJECTION, POWDER, FOR SOLUTION INTRAMUSCULAR; INTRAVENOUS at 10:45

## 2018-01-15 RX ADMIN — DIGOXIN SCH MG: 0.12 TABLET ORAL at 17:55

## 2018-01-15 RX ADMIN — ALBUTEROL SULFATE SCH: 1.25 SOLUTION RESPIRATORY (INHALATION) at 01:34

## 2018-01-15 RX ADMIN — ALBUTEROL SULFATE SCH MG: 1.25 SOLUTION RESPIRATORY (INHALATION) at 19:04

## 2018-01-15 RX ADMIN — METHYLPREDNISOLONE SODIUM SUCCINATE SCH MG: 40 INJECTION, POWDER, FOR SOLUTION INTRAMUSCULAR; INTRAVENOUS at 21:31

## 2018-01-15 NOTE — CP.PCM.PN
Subjective





- Date & Time of Evaluation


Date of Evaluation: 01/15/18


Time of Evaluation: 08:30





- Subjective


Subjective: 





patient seen and examined


Cough and shortness of breath much improved but still has dyspnea on exertion


Afebrile


Denies any chest pain


Off BiPAP


Ambulatory








Objective





- Vital Signs/Intake and Output


Vital Signs (last 24 hours): 


 











Temp Pulse Resp BP Pulse Ox


 


 97.7 F   75   20   99/62 L  94 L


 


 01/15/18 16:00  01/15/18 16:00  01/15/18 16:00  01/15/18 16:00  01/15/18 16:00











- Medications


Medications: 


 Current Medications





Albuterol Sulfate (Albuterol 0.042% Inhal Sol (1.25mg/3ml) Ud)  1.25 mg INH RQ6 

[facility]


   Last Admin: 01/15/18 13:21 Dose:  1.25 mg


Aspirin (Aspirin Chewable)  81 mg PO DAILY [facility]


   Last Admin: 01/15/18 10:44 Dose:  81 mg


Budesonide (Pulmicort Respules)  0.5 mg INH RQ12 [facility]


   Last Admin: 01/15/18 07:06 Dose:  Not Given


Digoxin (Lanoxin)  0.125 mg PO DAILY@1800 [facility]


   Last Admin: 01/15/18 17:55 Dose:  0.125 mg


Famotidine (Pepcid)  20 mg PO DAILY [facility]


   Last Admin: 01/15/18 10:44 Dose:  20 mg


Furosemide (Lasix)  40 mg PO DAILY [facility]


   Last Admin: 01/15/18 10:44 Dose:  40 mg


Guaifenesin (Robitussin)  100 mg PO Q4H PRN


   PRN Reason: Cough


   Last Admin: 01/13/18 21:17 Dose:  100 mg


Methylprednisolone (Solu-Medrol)  20 mg IV Q12 [facility]


   Last Admin: 01/15/18 10:45 Dose:  20 mg


Metolazone (Zaroxolyn)  2.5 mg PO DAILY [facility]


   Last Admin: 01/15/18 10:45 Dose:  Not Given


Metoprolol Tartrate (Lopressor)  25 mg PO BID [facility]


   Last Admin: 01/15/18 17:50 Dose:  Not Given


Midodrine (Proamatine)  5 mg PO DAILY [facility]


   Last Admin: 01/15/18 10:44 Dose:  5 mg


Warfarin Sodium (Coumadin)  3 mg PO 1800 [facility]


   Stop: 01/15/18 18:01











- Labs


Labs: 


 





 01/14/18 11:32 





 01/14/18 11:32 





 











PT  22.5 SECONDS (9.7-12.2)  H  01/14/18  11:32    


 


INR  1.9   01/14/18  11:32    


 


APTT  30 SECONDS (21-34)   01/13/18  17:16    














Assessment and Plan


(1) Acute exacerbation of CHF (congestive heart failure)


Status: Acute   





(2) COPD (chronic obstructive pulmonary disease)


Status: Acute   





(3) Chronic atrial fibrillation


Status: Chronic

## 2018-01-15 NOTE — CP.PCM.PN
Subjective





- Date & Time of Evaluation


Date of Evaluation: 01/15/18


Time of Evaluation: 07:46





- Subjective


Subjective: 





Medicine Progress note for Dr. Elizabeth's service





Patient was seen and examined at bedside in no acute distress. Patient was 

sitting in chair. He reports feeling short of breath, but otherwise has no 

complaints. He says his shortness of breath worsens when laying down, which is 

affecting his sleep. Patient denies having chest pain, abdominal pain, headaches

, leg pain, nausea, vomiting, diarrhea/constipation. 





Objective





- Vital Signs/Intake and Output


Vital Signs (last 24 hours): 


 











Temp Pulse Resp BP Pulse Ox


 


 97.9 F   96 H  20   94/58 L  96 


 


 01/14/18 23:58  01/15/18 01:40  01/14/18 23:58  01/14/18 23:58  01/14/18 23:58











- Medications


Medications: 


 Current Medications





Albuterol Sulfate (Albuterol 0.042% Inhal Sol (1.25mg/3ml) Ud)  1.25 mg INH RQ6 

[facility]


   Last Admin: 01/15/18 07:06 Dose:  Not Given


Aspirin (Aspirin Chewable)  81 mg PO DAILY [facility]


   Last Admin: 01/14/18 09:32 Dose:  81 mg


Budesonide (Pulmicort Respules)  0.5 mg INH RQ12 [facility]


   Last Admin: 01/15/18 07:06 Dose:  Not Given


Digoxin (Lanoxin)  0.125 mg PO DAILY@1800 [facility]


   Last Admin: 01/14/18 17:15 Dose:  0.125 mg


Famotidine (Pepcid)  20 mg PO DAILY [facility]


   Last Admin: 01/14/18 09:31 Dose:  20 mg


Furosemide (Lasix)  40 mg PO DAILY [facility]


   Last Admin: 01/14/18 09:31 Dose:  40 mg


Guaifenesin (Robitussin)  100 mg PO Q4H PRN


   PRN Reason: Cough


   Last Admin: 01/13/18 21:17 Dose:  100 mg


Methylprednisolone (Solu-Medrol)  20 mg IV Q12 [facility]


   Last Admin: 01/14/18 21:54 Dose:  20 mg


Metolazone (Zaroxolyn)  2.5 mg PO DAILY [facility]


   Last Admin: 01/14/18 10:52 Dose:  2.5 mg


Metoprolol Tartrate (Lopressor)  25 mg PO BID [facility]


   Last Admin: 01/14/18 17:15 Dose:  25 mg


Midodrine (Proamatine)  5 mg PO DAILY [facility]


   Last Admin: 01/14/18 09:32 Dose:  5 mg











- Labs


Labs: 


 





 01/14/18 11:32 





 01/14/18 11:32 





 











PT  22.5 SECONDS (9.7-12.2)  H  01/14/18  11:32    


 


INR  1.9   01/14/18  11:32    


 


APTT  30 SECONDS (21-34)   01/13/18  17:16    














- Constitutional


Appears: No Acute Distress





- Head Exam


Head Exam: ATRAUMATIC, NORMAL INSPECTION





- Eye Exam


Eye Exam: EOMI





- ENT Exam


ENT Exam: Mucous Membranes Moist





- Respiratory Exam


Respiratory Exam: Decreased Breath Sounds, Rales.  absent: Clear to Ausculation 

Bilateral





- Cardiovascular Exam


Cardiovascular Exam: Irregular Rhythm, +S1, +S2





- GI/Abdominal Exam


GI & Abdominal Exam: Soft, Normal Bowel Sounds.  absent: Distended, Firm, 

Tenderness





- Extremities Exam


Extremities Exam: Pedal Edema (extending from knee down to ankle b/l).  absent: 

Tenderness





- Neurological Exam


Neurological Exam: Alert, Awake





- Psychiatric Exam


Psychiatric exam: Normal Affect, Normal Mood





- Skin


Skin Exam: Dry, Intact, Warm





Assessment and Plan





- Assessment and Plan (Free Text)


Plan: 





1) Systolic dysfunction with Acute on Chronic heart failure


-Stable, afebrile


-BNP on admission: 8160


-CXR 1/4/18: showed pulmonary venous congestion, small R pleural effusion, 

bibasilar atelectasis/infiltrate


-Echo from 2/25/17 showed LVEF 12%, 


-Echo report 1/9/18: EF 10-15%, Systolic dysfunction severely impaired, mild to 

mod TR, mild Pulm HTN (see full report)





-Cardiology on consult, Dr Dotson, help appreciated


-LFTs elevated but downtrending, likely secondary to worsening CHF, will 

continue to monitor


-LFTs elevated but downtrending, likely secondary to worsening CHF, will 

continue to monitor





-Not on ACEi/ARB due to chronic hypotension


-Continue Digoxin 0.125mg


-Metolazone 2.5mg PO daily


-Lasix 40mg PO daily


-Metoprolol Tartrate 25mg PO BID





-Daily weights, Fluid restriction, Keep HOB elevated, Monitor I/Os


-Bipap prn (patient does refuse the bipap and tries to take it off, educated 

the patient on importance of keeping bipap on)





2) Chronic Atrial fibrillation w/ AICD


-Cardiology on consult, Dr. Dotson, help appreciated  


-Chadsvasc score: 4- High risk for stroke, Hasbled: 2- Moderate risk for 

bleeding 


-Cardizem 20 mg given in ED, received one dose Digoxin 0.25mg 1/3/18





-Continue Metoprolol 25mg PO BID with holding parameters


-Continue Digoxin 0.125mg daily


-Coumadin 3mg PO tonight





3) History of Mechanical Mitral Valve


-INR: 1.5 on admission (subtherapeutic), goal INR 2.5-3.5


-Patient was bridged with Lovenox 90mg Q12H (discontinued)


-INR today 1.9 on 1/14


-Coumadin 3mg ordered


-Monitor INR closely


-diet to exclude leafy green vegetables





4) History of Coronary Artery Disease


-Aspirin 81mg PO QD





5) History of COPD


-Solumedrol 20mg IV Q12H (started 1/14/18)


-Pulmicort 0.5mg Q12H


-Duonebs 


-Pulm on consult, Dr Varela, Help appreciated





6) Vertigo


-Meclizine 12.5mg discontinued due to sedation





7) Orthostatic Hypotension


-Midodrine 5mg PO daily 





8) Dementia


-Will hold Memantine at this time





9) Acute Kidney Injury


-BUN 54/1.3 on 1/14, improved since decreasing diuretics


-Will continue to monitor at this time





Prophylactic Measures


-Pepcid 20 mg po daily 


-SCDs contraindicated due to b/l LE edema 


-Coumadin 3mg PO daily


-Heart Healthy Diet





DISPO: Patient is clear for discharge home, however unable to get in touch with 

the patients son for discharge. Will DC once family can be contacted for 

transportation. 





Will discuss with Dr. Elizabeth.

## 2018-01-16 LAB
ALBUMIN SERPL-MCNC: 3.5 G/DL (ref 3.5–5)
ALBUMIN/GLOB SERPL: 1 {RATIO} (ref 1–2.1)
ALT SERPL-CCNC: 169 U/L (ref 21–72)
ANISOCYTOSIS BLD QL SMEAR: SLIGHT
APTT BLD: 29 SECONDS (ref 21–34)
AST SERPL-CCNC: 54 U/L (ref 17–59)
BASOPHILS # BLD AUTO: 0 K/UL (ref 0–0.2)
BASOPHILS NFR BLD: 0 % (ref 0–2)
BUN SERPL-MCNC: 47 MG/DL (ref 9–20)
CALCIUM SERPL-MCNC: 9.4 MG/DL (ref 8.6–10.4)
EOSINOPHIL # BLD AUTO: 0 K/UL (ref 0–0.7)
EOSINOPHIL NFR BLD: 0.1 % (ref 0–4)
ERYTHROCYTE [DISTWIDTH] IN BLOOD BY AUTOMATED COUNT: 17.5 % (ref 11.5–14.5)
GFR NON-AFRICAN AMERICAN: 60
HGB BLD-MCNC: 13 G/DL (ref 12–18)
INR PPP: 1.5
LYMPHOCYTE: 3 % (ref 20–40)
LYMPHOCYTES # BLD AUTO: 0.3 K/UL (ref 1–4.3)
LYMPHOCYTES NFR BLD AUTO: 2.5 % (ref 20–40)
MCH RBC QN AUTO: 25.6 PG (ref 27–31)
MCHC RBC AUTO-ENTMCNC: 31.7 G/DL (ref 33–37)
MCV RBC AUTO: 80.8 FL (ref 80–94)
MONOCYTE: 11 % (ref 0–10)
MONOCYTES # BLD: 1.3 K/UL (ref 0–0.8)
MONOCYTES NFR BLD: 9.8 % (ref 0–10)
NEUTROPHILS # BLD: 11.7 K/UL (ref 1.8–7)
NEUTROPHILS NFR BLD AUTO: 86 % (ref 50–75)
NEUTROPHILS NFR BLD AUTO: 87.6 % (ref 50–75)
NRBC BLD AUTO-RTO: 0 % (ref 0–2)
OVALOCYTES BLD QL SMEAR: SLIGHT
PLATELET # BLD EST: NORMAL 10*3/UL
PLATELET # BLD: 255 K/UL (ref 130–400)
PMV BLD AUTO: 8.4 FL (ref 7.2–11.7)
POIKILOCYTOSIS BLD QL SMEAR: SLIGHT
PROTHROMBIN TIME: 17.7 SECONDS (ref 9.7–12.2)
RBC # BLD AUTO: 5.05 MIL/UL (ref 4.4–5.9)
TARGETS BLD QL SMEAR: SLIGHT
TOTAL CELLS COUNTED BLD: 100
WBC # BLD AUTO: 13.4 K/UL (ref 4.8–10.8)

## 2018-01-16 RX ADMIN — ALBUTEROL SULFATE SCH MG: 1.25 SOLUTION RESPIRATORY (INHALATION) at 19:43

## 2018-01-16 RX ADMIN — GUAIFENESIN PRN MG: 100 SOLUTION ORAL at 09:40

## 2018-01-16 RX ADMIN — ALBUTEROL SULFATE SCH MG: 1.25 SOLUTION RESPIRATORY (INHALATION) at 07:22

## 2018-01-16 RX ADMIN — METHYLPREDNISOLONE SODIUM SUCCINATE SCH MG: 40 INJECTION, POWDER, FOR SOLUTION INTRAMUSCULAR; INTRAVENOUS at 21:10

## 2018-01-16 RX ADMIN — METHYLPREDNISOLONE SODIUM SUCCINATE SCH MG: 40 INJECTION, POWDER, FOR SOLUTION INTRAMUSCULAR; INTRAVENOUS at 09:38

## 2018-01-16 RX ADMIN — ALBUTEROL SULFATE SCH: 1.25 SOLUTION RESPIRATORY (INHALATION) at 01:40

## 2018-01-16 RX ADMIN — BUDESONIDE SCH MG: 0.5 SUSPENSION RESPIRATORY (INHALATION) at 19:43

## 2018-01-16 RX ADMIN — BUDESONIDE SCH MG: 0.5 SUSPENSION RESPIRATORY (INHALATION) at 07:22

## 2018-01-16 RX ADMIN — DIGOXIN SCH MG: 0.12 TABLET ORAL at 17:39

## 2018-01-16 RX ADMIN — ALBUTEROL SULFATE SCH MG: 1.25 SOLUTION RESPIRATORY (INHALATION) at 13:22

## 2018-01-16 NOTE — CP.PCM.PN
Subjective





- Date & Time of Evaluation


Date of Evaluation: 01/16/18


Time of Evaluation: 11:00





- Subjective


Subjective: 





Pt was seen and evaluated at bedside. Pt in NAD, sitting in chair. On 2L NC. 

Complains of some shortness of breath and occassional cough, which has improved 

overall. Pt denies chest pain, hemoptysis, fever, and chills. 





Exam: 


Clinically improved


Lungs: Slightly diminsihed breath sounds bilaterally


Ext: bilateral lower extremity edema R>L, slight improvement





A/P:


CHF


continue present treatment


COPD


continue nebulizer


continue budesonide


Taper steroid


Pt may be discharged from pulmonary standpoint with PO steroids


1/16/18-Moderate cardiomegaly, improving pulmonary venous congestion and 

persistent small right pleural effusion. 





Objective





- Vital Signs/Intake and Output


Vital Signs (last 24 hours): 


 











Temp Pulse Resp BP Pulse Ox


 


 97.5 F L  90   20   110/80   98 


 


 01/16/18 07:00  01/16/18 08:00  01/16/18 07:00  01/16/18 17:39  01/16/18 07:00











- Medications


Medications: 


 Current Medications





Albuterol Sulfate (Albuterol 0.042% Inhal Sol (1.25mg/3ml) Ud)  1.25 mg INH RQ6 

[facility]


   Last Admin: 01/16/18 13:22 Dose:  1.25 mg


Aspirin (Aspirin Chewable)  81 mg PO DAILY [facility]


   Last Admin: 01/16/18 09:37 Dose:  81 mg


Budesonide (Pulmicort Respules)  0.5 mg INH RQ12 [facility]


   Last Admin: 01/16/18 07:22 Dose:  0.5 mg


Digoxin (Lanoxin)  0.125 mg PO DAILY@1800 [facility]


   Last Admin: 01/16/18 17:39 Dose:  0.125 mg


Famotidine (Pepcid)  20 mg PO DAILY [facility]


   Last Admin: 01/16/18 09:37 Dose:  20 mg


Furosemide (Lasix)  40 mg PO DAILY [facility]


   Last Admin: 01/16/18 09:37 Dose:  40 mg


Guaifenesin (Robitussin)  100 mg PO Q4H PRN


   PRN Reason: Cough


   Last Admin: 01/16/18 09:40 Dose:  100 mg


Methylprednisolone (Solu-Medrol)  20 mg IV Q12 [facility]


   Last Admin: 01/16/18 09:38 Dose:  20 mg


Metolazone (Zaroxolyn)  2.5 mg PO DAILY [facility]


   Last Admin: 01/16/18 09:39 Dose:  2.5 mg


Metoprolol Tartrate (Lopressor)  25 mg PO BID [facility]


   Last Admin: 01/16/18 17:39 Dose:  25 mg


Midodrine (Proamatine)  5 mg PO DAILY [facility]


   Last Admin: 01/16/18 09:38 Dose:  5 mg











- Labs


Labs: 


 





 01/16/18 10:05 





 01/16/18 10:05 





 











PT  17.7 SECONDS (9.7-12.2)  H  01/16/18  10:05    


 


INR  1.5   01/16/18  10:05    


 


APTT  29 SECONDS (21-34)   01/16/18  10:05    














Assessment and Plan


(1) Acute exacerbation of CHF (congestive heart failure)


Status: Acute   





(2) COPD (chronic obstructive pulmonary disease)


Status: Acute   





(3) Chronic atrial fibrillation


Status: Chronic

## 2018-01-16 NOTE — CP.PCM.PN
Subjective





- Date & Time of Evaluation


Date of Evaluation: 01/16/18


Time of Evaluation: 06:46





- Subjective


Subjective: 


Medicine progress note for Dr. Elizabeth's service





Patient was seen and examined at bedside in no acute distress. Patient was 

complaining of shortness of breath/difficultly breathing; however, he still 

does not want to use BiPap. He also states he sometimes feels dizzy if he 

stands up quickly. Patient denies chest pain, abdominal pain, leg pain, nausea, 

vomiting, fevers, and headaches. 





Objective





- Vital Signs/Intake and Output


Vital Signs (last 24 hours): 


 











Temp Pulse Resp BP Pulse Ox


 


 97 F L  97 H  20   109/75   95 


 


 01/15/18 23:30  01/16/18 01:06  01/15/18 23:30  01/15/18 23:30  01/15/18 23:30











- Medications


Medications: 


 Current Medications





Albuterol Sulfate (Albuterol 0.042% Inhal Sol (1.25mg/3ml) Ud)  1.25 mg INH RQ6 

[facility]


   Last Admin: 01/16/18 01:40 Dose:  Not Given


Aspirin (Aspirin Chewable)  81 mg PO DAILY [facility]


   Last Admin: 01/15/18 10:44 Dose:  81 mg


Budesonide (Pulmicort Respules)  0.5 mg INH RQ12 [facility]


   Last Admin: 01/15/18 19:04 Dose:  0.5 mg


Digoxin (Lanoxin)  0.125 mg PO DAILY@1800 [facility]


   Last Admin: 01/15/18 17:55 Dose:  0.125 mg


Famotidine (Pepcid)  20 mg PO DAILY [facility]


   Last Admin: 01/15/18 10:44 Dose:  20 mg


Furosemide (Lasix)  40 mg PO DAILY [facility]


   Last Admin: 01/15/18 10:44 Dose:  40 mg


Guaifenesin (Robitussin)  100 mg PO Q4H PRN


   PRN Reason: Cough


   Last Admin: 01/13/18 21:17 Dose:  100 mg


Methylprednisolone (Solu-Medrol)  20 mg IV Q12 [facility]


   Last Admin: 01/15/18 21:31 Dose:  20 mg


Metolazone (Zaroxolyn)  2.5 mg PO DAILY [facility]


   Last Admin: 01/15/18 10:45 Dose:  Not Given


Metoprolol Tartrate (Lopressor)  25 mg PO BID [facility]


   Last Admin: 01/15/18 17:50 Dose:  Not Given


Midodrine (Proamatine)  5 mg PO DAILY [facility]


   Last Admin: 01/15/18 10:44 Dose:  5 mg


Warfarin Sodium (Coumadin)  3 mg PO 1800 [facility]


   Stop: 01/15/18 18:01











- Labs


Labs: 


 





 01/14/18 11:32 





 01/14/18 11:32 





 











PT  22.5 SECONDS (9.7-12.2)  H  01/14/18  11:32    


 


INR  1.9   01/14/18  11:32    


 


APTT  30 SECONDS (21-34)   01/13/18  17:16    














- Additional Findings


Additional findings: 





- Constitutional


Appears: No Acute Distress





- Head Exam


Head Exam: ATRAUMATIC, NORMAL INSPECTION





- Eye Exam


Eye Exam: EOMI





- ENT Exam


ENT Exam: Mucous Membranes Moist





- Respiratory Exam


Respiratory Exam: Decreased Breath Sounds, Rales, wheezing.  absent: Clear to 

Ausculation Bilateral





- Cardiovascular Exam


Cardiovascular Exam: Irregular Rhythm, +S1, +S2





- GI/Abdominal Exam


GI & Abdominal Exam: Soft, Normal Bowel Sounds.  absent: Distended, Firm, 

Tenderness





- Extremities Exam


Extremities Exam: Pedal Edema (extending from knee down to ankle b/l).  absent: 

Tenderness





- Neurological Exam


Neurological Exam: Alert, Awake





- Psychiatric Exam


Psychiatric exam: Normal Affect, Normal Mood





- Skin


Skin Exam: Dry, Intact, Warm





Assessment and Plan





- Assessment and Plan (Free Text)


Plan: 





1) Systolic dysfunction with Acute on Chronic heart failure


* Stable, afebrile


* BNP on admission: 8160


* CXR 1/4/18: showed pulmonary venous congestion, small R pleural effusion, 

bibasilar atelectasis/infiltrate


* Echo from 2/25/17 showed LVEF 12%, 


* Echo report 1/9/18: EF 10-15%, Systolic dysfunction severely impaired, mild 

to mod TR, mild Pulm HTN (see full report)


* Cardiology on consult, Dr Dotson, help appreciated


* LFTs elevated but downtrending, likely secondary to worsening CHF, will 

continue to monitor


* Not on ACEi/ARB due to chronic hypotension


* Continue Digoxin 0.125mg


* Metolazone 2.5mg PO daily


* Lasix 40mg PO daily


* Metoprolol Tartrate 25mg PO BID


* Daily weights, Fluid restriction, Keep HOB elevated, Monitor I/Os


* Bipap prn (patient does refuse the bipap and tries to take it off, educated 

the patient on importance of keeping bipap on)


 * Patient CO2 increased to 40 on 1/16/18-- need to enforce use of BiPap; 

discussed and educated patient importance of using bipap.


* Repeat CXR (1/16/18): moderate cardiomegaly; improving pulmonary venous 

congestion and persistent small right pleural effusion.





2) Chronic Atrial fibrillation w/ AICD


* Cardiology on consult, Dr. Dotson, help appreciated  


* Chadsvasc score: 4- High risk for stroke, Hasbled: 2- Moderate risk for 

bleeding 


* Cardizem 20 mg given in ED, received one dose Digoxin 0.25mg 1/3/18


* Continue Metoprolol 25mg PO BID with holding parameters


* Continue Digoxin 0.125mg daily


* Coumadin 3mg PO tonight





3) History of Mechanical Mitral Valve


* INR: 1.5 on admission (subtherapeutic), goal INR 2.5-3.5


* Patient was bridged with Lovenox 90mg Q12H (discontinued)


* Coumadin 3mg ordered


* Diet to exclude leafy green vegetables


* Monitor INR closely


 * 1/15- patient refused blood work. pharmacy will not authorize giving 

coumadin unless INR is drawn. Coumadin was not given on 1/15/18. 


 * INR was drawn on 1/16/18-- INR 1.5. Coumadin 3mg PO ordered for tonight. 

Coumadin must be given nightly to reach therapeutic range.





4) History of Coronary Artery Disease


* Aspirin 81mg PO QD





5) History of COPD


* Solumedrol 20mg IV Q12H (started 1/14/18)


* Pulmicort 0.5mg Q12H


* Duonebs 


* Pulm on consult, Dr Varela, Help appreciated





6) Vertigo


* Meclizine 12.5mg discontinued due to sedation





7) Orthostatic Hypotension


* Midodrine 5mg PO daily 





8) Dementia


* Will hold Memantine at this time





9) Acute Kidney Injury


* BUN 47/1.2 on 1/16, improved since decreasing diuretics


* Will continue to monitor at this time





Prophylactic Measures


* Pepcid 20 mg po daily 


* SCDs contraindicated due to b/l LE edema 


* Coumadin 3mg PO daily


* Heart Healthy Diet





DISPO: Pending placement in rehab.





Discussed with Dr. Elizabeth.

## 2018-01-17 LAB
APTT BLD: 26 SECONDS (ref 21–34)
INR PPP: 1.4
PROTHROMBIN TIME: 16.6 SECONDS (ref 9.7–12.2)

## 2018-01-17 RX ADMIN — METHYLPREDNISOLONE SODIUM SUCCINATE SCH MG: 40 INJECTION, POWDER, FOR SOLUTION INTRAMUSCULAR; INTRAVENOUS at 21:23

## 2018-01-17 RX ADMIN — DIGOXIN SCH MG: 0.12 TABLET ORAL at 17:02

## 2018-01-17 RX ADMIN — BUDESONIDE SCH MG: 0.5 SUSPENSION RESPIRATORY (INHALATION) at 08:23

## 2018-01-17 RX ADMIN — BUDESONIDE SCH MG: 0.5 SUSPENSION RESPIRATORY (INHALATION) at 19:26

## 2018-01-17 RX ADMIN — METHYLPREDNISOLONE SODIUM SUCCINATE SCH MG: 40 INJECTION, POWDER, FOR SOLUTION INTRAMUSCULAR; INTRAVENOUS at 09:30

## 2018-01-17 RX ADMIN — ALBUTEROL SULFATE SCH MG: 1.25 SOLUTION RESPIRATORY (INHALATION) at 08:23

## 2018-01-17 RX ADMIN — ALBUTEROL SULFATE SCH MG: 1.25 SOLUTION RESPIRATORY (INHALATION) at 19:26

## 2018-01-17 RX ADMIN — METHYLPREDNISOLONE SODIUM SUCCINATE SCH MG: 40 INJECTION, POWDER, FOR SOLUTION INTRAMUSCULAR; INTRAVENOUS at 21:19

## 2018-01-17 RX ADMIN — ALBUTEROL SULFATE SCH: 1.25 SOLUTION RESPIRATORY (INHALATION) at 02:23

## 2018-01-17 RX ADMIN — ALBUTEROL SULFATE SCH MG: 1.25 SOLUTION RESPIRATORY (INHALATION) at 13:42

## 2018-01-17 NOTE — CP.PCM.PN
Subjective





- Date & Time of Evaluation


Date of Evaluation: 01/17/18


Time of Evaluation: 07:13





- Subjective


Subjective: 





Medicine progress note for Dr. Elizabeth's service





Patient was seen and examined at bedside in no acute distress. Patient reports 

his shortness of breath/difficultly breathing is slightly better today. Patient 

reports he is eating well and has normal bowel movements. Patient denies chest 

pain, abdominal pain, leg pain, nausea, vomiting, fevers, and headaches. 





Objective





- Vital Signs/Intake and Output


Vital Signs (last 24 hours): 


 











Temp Pulse Resp BP Pulse Ox


 


 98.4 F   93 H  20   110/80   98 


 


 01/16/18 14:00  01/17/18 01:00  01/16/18 14:00  01/16/18 17:39  01/16/18 14:00











- Medications


Medications: 


 Current Medications





Albuterol Sulfate (Albuterol 0.042% Inhal Sol (1.25mg/3ml) Ud)  1.25 mg INH RQ6 

[facility]


   Last Admin: 01/17/18 02:23 Dose:  Not Given


Aspirin (Aspirin Chewable)  81 mg PO DAILY [facility]


   Last Admin: 01/16/18 09:37 Dose:  81 mg


Budesonide (Pulmicort Respules)  0.5 mg INH RQ12 [facility]


   Last Admin: 01/16/18 19:43 Dose:  0.5 mg


Digoxin (Lanoxin)  0.125 mg PO DAILY@1800 [facility]


   Last Admin: 01/16/18 17:39 Dose:  0.125 mg


Famotidine (Pepcid)  20 mg PO DAILY [facility]


   Last Admin: 01/16/18 09:37 Dose:  20 mg


Furosemide (Lasix)  40 mg PO DAILY [facility]


   Last Admin: 01/16/18 09:37 Dose:  40 mg


Guaifenesin (Robitussin)  100 mg PO Q4H PRN


   PRN Reason: Cough


   Last Admin: 01/16/18 09:40 Dose:  100 mg


Methylprednisolone (Solu-Medrol)  20 mg IV Q12 [facility]


   Last Admin: 01/16/18 21:10 Dose:  20 mg


Metolazone (Zaroxolyn)  2.5 mg PO DAILY [facility]


   Last Admin: 01/16/18 09:39 Dose:  2.5 mg


Metoprolol Tartrate (Lopressor)  25 mg PO BID [facility]


   Last Admin: 01/16/18 17:39 Dose:  25 mg


Midodrine (Proamatine)  5 mg PO DAILY [facility]


   Last Admin: 01/16/18 09:38 Dose:  5 mg


Warfarin Sodium (Coumadin)  3 mg PO 1800 [facility]


   Stop: 01/17/18 18:01











- Labs


Labs: 


 





 01/16/18 10:05 





 01/16/18 10:05 





 











PT  17.7 SECONDS (9.7-12.2)  H  01/16/18  10:05    


 


INR  1.5   01/16/18  10:05    


 


APTT  29 SECONDS (21-34)   01/16/18  10:05    














- Additional Findings


Additional findings: 





- Constitutional


Appears: No Acute Distress





- Head Exam


Head Exam: ATRAUMATIC, NORMAL INSPECTION





- Eye Exam


Eye Exam: EOMI





- ENT Exam


ENT Exam: Mucous Membranes Moist





- Respiratory Exam


Respiratory Exam: Decreased Breath Sounds, Rales, wheezing.  absent: Clear to 

Ausculation Bilateral





- Cardiovascular Exam


Cardiovascular Exam: Irregular Rhythm, +S1, +S2





- GI/Abdominal Exam


GI & Abdominal Exam: Soft, Normal Bowel Sounds.  absent: Distended, Firm, 

Tenderness





- Extremities Exam


Extremities Exam: Pedal Edema (extending from knee down to ankle b/l).  absent: 

Tenderness





- Neurological Exam


Neurological Exam: Alert, Awake





- Psychiatric Exam


Psychiatric exam: Normal Affect, Normal Mood





- Skin


Skin Exam: Dry, Intact, Warm





Assessment and Plan





- Assessment and Plan (Free Text)


Plan: 





1) Systolic dysfunction with Acute on Chronic heart failure


* Stable, afebrile


* BNP on admission: 8160


* CXR 1/4/18: showed pulmonary venous congestion, small R pleural effusion, 

bibasilar atelectasis/infiltrate


* Echo from 2/25/17 showed LVEF 12%, 


* Echo report 1/9/18: EF 10-15%, Systolic dysfunction severely impaired, mild 

to mod TR, mild Pulm HTN (see full report)


* Cardiology on consult, Dr Dotson, help appreciated


* LFTs elevated but downtrending, likely secondary to worsening CHF, will 

continue to monitor


* Not on ACEi/ARB due to chronic hypotension


* Continue Digoxin 0.125mg


* Metolazone 2.5mg PO daily


* Lasix 40mg PO daily


* Metoprolol Tartrate 25mg PO BID


* Daily weights, Fluid restriction, Keep HOB elevated, Monitor I/Os


* Bipap prn (patient does refuse the bipap and tries to take it off, educated 

the patient on importance of keeping bipap on)


 * Patient CO2 increased to 40 on 1/16/18-- need to enforce use of BiPap; 

discussed and educated patient importance of using bipap.


* Repeat CXR (1/16/18): moderate cardiomegaly; improving pulmonary venous 

congestion and persistent small right pleural effusion.





2) Chronic Atrial fibrillation w/ AICD


* Cardiology on consult, Dr. Dotson, help appreciated  


* Chadsvasc score: 4- High risk for stroke, Hasbled: 2- Moderate risk for 

bleeding 


* Cardizem 20 mg given in ED, received one dose Digoxin 0.25mg 1/3/18


* Continue Metoprolol 25mg PO BID with holding parameters


* Continue Digoxin 0.125mg daily


* Coumadin 3mg PO tonight





3) History of Mechanical Mitral Valve


* INR: 1.5 on admission (subtherapeutic), goal INR 2.5-3.5


* Patient was bridged with Lovenox 90mg Q12H (discontinued)


* Coumadin 3mg 


* Diet to exclude leafy green vegetables


* Monitor INR closely


 * 1/15- patient refused blood work. pharmacy will not authorize giving 

coumadin unless INR is drawn. Coumadin was not given on 1/15/18. 


 * INR was drawn on 1/16/18-- INR 1.5. Coumadin 3mg PO ordered for tonight. 

Coumadin must be given nightly to reach therapeutic range.


 * INR 1.4 on 1/17- gave Coumadin 6mg PO 





4) History of Coronary Artery Disease


* Aspirin 81mg PO QD





5) History of COPD


* Solumedrol 20mg IV Q12H (started 1/14/18)


* Pulmicort 0.5mg Q12H


* Duonebs 


* Pulm on consult, Dr Varela, Help appreciated





6) Vertigo


* Meclizine 12.5mg discontinued due to sedation





7) Orthostatic Hypotension


* Midodrine 5mg PO daily 





8) Dementia


* Will hold Memantine at this time





9) Acute Kidney Injury


* BUN 47/1.2 on 1/16, improved since decreasing diuretics


* Will continue to monitor at this time





Prophylactic Measures


* Pepcid 20 mg po daily 


* SCDs contraindicated due to b/l LE edema 


* Coumadin 3mg PO daily


* Heart Healthy Diet





DISPO: Pending placement in rehab. Continue to give Coumadin to achieve 

therapeutic range.

## 2018-01-18 VITALS — SYSTOLIC BLOOD PRESSURE: 107 MMHG | DIASTOLIC BLOOD PRESSURE: 69 MMHG | HEART RATE: 82 BPM

## 2018-01-18 VITALS — OXYGEN SATURATION: 95 % | TEMPERATURE: 98.1 F | HEART RATE: 80 BPM

## 2018-01-18 LAB
ALBUMIN SERPL-MCNC: 3.6 G/DL (ref 3.5–5)
ALBUMIN/GLOB SERPL: 1.2 {RATIO} (ref 1–2.1)
ALT SERPL-CCNC: 156 U/L (ref 21–72)
ANISOCYTOSIS BLD QL SMEAR: SLIGHT
APTT BLD: 25 SECONDS (ref 21–34)
AST SERPL-CCNC: 61 U/L (ref 17–59)
BASOPHILS # BLD AUTO: 0 K/UL (ref 0–0.2)
BASOPHILS NFR BLD: 0.2 % (ref 0–2)
BUN SERPL-MCNC: 44 MG/DL (ref 9–20)
CALCIUM SERPL-MCNC: 9.5 MG/DL (ref 8.6–10.4)
EOSINOPHIL # BLD AUTO: 0 K/UL (ref 0–0.7)
EOSINOPHIL NFR BLD: 0 % (ref 0–4)
ERYTHROCYTE [DISTWIDTH] IN BLOOD BY AUTOMATED COUNT: 17.3 % (ref 11.5–14.5)
GFR NON-AFRICAN AMERICAN: > 60
HGB BLD-MCNC: 14.1 G/DL (ref 12–18)
HYPOCHROMIC: SLIGHT
INR PPP: 1.3
LYMPHOCYTE: 7 % (ref 20–40)
LYMPHOCYTES # BLD AUTO: 0.4 K/UL (ref 1–4.3)
LYMPHOCYTES NFR BLD AUTO: 3.4 % (ref 20–40)
MCH RBC QN AUTO: 25.9 PG (ref 27–31)
MCHC RBC AUTO-ENTMCNC: 32.2 G/DL (ref 33–37)
MCV RBC AUTO: 80.3 FL (ref 80–94)
MONOCYTE: 5 % (ref 0–10)
MONOCYTES # BLD: 1.5 K/UL (ref 0–0.8)
MONOCYTES NFR BLD: 12.6 % (ref 0–10)
NEUTROPHILS # BLD: 9.8 K/UL (ref 1.8–7)
NEUTROPHILS NFR BLD AUTO: 83.8 % (ref 50–75)
NEUTROPHILS NFR BLD AUTO: 88 % (ref 50–75)
NRBC BLD AUTO-RTO: 0.1 % (ref 0–2)
PLATELET # BLD EST: NORMAL 10*3/UL
PLATELET # BLD: 268 K/UL (ref 130–400)
PMV BLD AUTO: 8.1 FL (ref 7.2–11.7)
POLYCHROMIC: SLIGHT
PROTHROMBIN TIME: 14.7 SECONDS (ref 9.7–12.2)
RBC # BLD AUTO: 5.45 MIL/UL (ref 4.4–5.9)
TARGETS BLD QL SMEAR: SLIGHT
TOTAL CELLS COUNTED BLD: 100
WBC # BLD AUTO: 11.8 K/UL (ref 4.8–10.8)

## 2018-01-18 RX ADMIN — ALBUTEROL SULFATE SCH: 1.25 SOLUTION RESPIRATORY (INHALATION) at 13:48

## 2018-01-18 RX ADMIN — METHYLPREDNISOLONE SODIUM SUCCINATE SCH MG: 40 INJECTION, POWDER, FOR SOLUTION INTRAMUSCULAR; INTRAVENOUS at 09:44

## 2018-01-18 RX ADMIN — ALBUTEROL SULFATE SCH: 1.25 SOLUTION RESPIRATORY (INHALATION) at 19:24

## 2018-01-18 RX ADMIN — DIGOXIN SCH MG: 0.12 TABLET ORAL at 18:46

## 2018-01-18 RX ADMIN — ALBUTEROL SULFATE SCH: 1.25 SOLUTION RESPIRATORY (INHALATION) at 01:08

## 2018-01-18 RX ADMIN — BUDESONIDE SCH: 0.5 SUSPENSION RESPIRATORY (INHALATION) at 07:36

## 2018-01-18 RX ADMIN — BUDESONIDE SCH: 0.5 SUSPENSION RESPIRATORY (INHALATION) at 19:25

## 2018-01-18 RX ADMIN — ALBUTEROL SULFATE SCH MG: 1.25 SOLUTION RESPIRATORY (INHALATION) at 07:36

## 2018-01-18 NOTE — CP.PCM.PN
Subjective





- Date & Time of Evaluation


Date of Evaluation: 01/18/18


Time of Evaluation: 07:03





Objective





- Vital Signs/Intake and Output


Vital Signs (last 24 hours): 


 











Temp Pulse Resp BP Pulse Ox


 


 97.5 F L  97 H  20   105/68   95 


 


 01/17/18 23:30  01/18/18 01:00  01/17/18 23:30  01/17/18 23:30  01/17/18 23:30








Intake and Output: 


 











 01/18/18 01/18/18





 06:59 18:59


 


Intake Total 800 


 


Balance 800 














- Medications


Medications: 


 Current Medications





Albuterol Sulfate (Albuterol 0.042% Inhal Sol (1.25mg/3ml) Ud)  1.25 mg INH RQ6 

[facility]


   Last Admin: 01/18/18 01:08 Dose:  Not Given


Aspirin (Aspirin Chewable)  81 mg PO DAILY [facility]


   Last Admin: 01/17/18 09:29 Dose:  81 mg


Budesonide (Pulmicort Respules)  0.5 mg INH RQ12 [facility]


   Last Admin: 01/17/18 19:26 Dose:  0.5 mg


Digoxin (Lanoxin)  0.125 mg PO DAILY@1800 [facility]


   Last Admin: 01/17/18 17:02 Dose:  0.125 mg


Famotidine (Pepcid)  20 mg PO DAILY [facility]


   Last Admin: 01/17/18 09:29 Dose:  20 mg


Furosemide (Lasix)  40 mg PO DAILY [facility]


   Last Admin: 01/17/18 09:29 Dose:  40 mg


Guaifenesin (Robitussin)  100 mg PO Q4H PRN


   PRN Reason: Cough


   Last Admin: 01/16/18 09:40 Dose:  100 mg


Methylprednisolone (Solu-Medrol)  20 mg IV Q12 [facility]


   Last Admin: 01/17/18 21:23 Dose:  20 mg


Metolazone (Zaroxolyn)  2.5 mg PO DAILY [facility]


   Last Admin: 01/17/18 09:29 Dose:  2.5 mg


Metoprolol Tartrate (Lopressor)  25 mg PO BID [facility]


   Last Admin: 01/17/18 17:04 Dose:  Not Given


Midodrine (Proamatine)  5 mg PO DAILY [facility]


   Last Admin: 01/17/18 09:29 Dose:  5 mg











- Labs


Labs: 


 





 01/16/18 10:05 





 01/16/18 10:05 





 











PT  16.6 SECONDS (9.7-12.2)  H  01/17/18  08:04    


 


INR  1.4   01/17/18  08:04    


 


APTT  26 SECONDS (21-34)   01/17/18  08:04

## 2018-01-18 NOTE — CP.PCM.DIS
Provider





- Provider


Date of Admission: 


01/03/18 10:47





Attending physician: 


Evgeny Elizabeth Jr, MD





Primary care physician: 


Dr. Oviedo


Consults: 


Cardiology: Dr. Dotson


Pulmonology: Dr. Varela


Time Spent in preparation of Discharge (in minutes): 45





Hospital Course





- Lab Results


Lab Results: 


 Most Recent Lab Values











WBC  11.8 K/uL (4.8-10.8)  H  01/18/18  11:48    


 


RBC  5.45 Mil/uL (4.40-5.90)   01/18/18  11:48    


 


Hgb  14.1 g/dL (12.0-18.0)   01/18/18  11:48    


 


Hct  43.8 % (35.0-51.0)   01/18/18  11:48    


 


MCV  80.3 fL (80.0-94.0)   01/18/18  11:48    


 


MCH  25.9 pg (27.0-31.0)  L  01/18/18  11:48    


 


MCHC  32.2 g/dL (33.0-37.0)  L  01/18/18  11:48    


 


RDW  17.3 % (11.5-14.5)  H  01/18/18  11:48    


 


Plt Count  268 K/uL (130-400)   01/18/18  11:48    


 


MPV  8.1 fL (7.2-11.7)   01/18/18  11:48    


 


Neut % (Auto)  83.8 % (50.0-75.0)  H  01/18/18  11:48    


 


Lymph % (Auto)  3.4 % (20.0-40.0)  L  01/18/18  11:48    


 


Mono % (Auto)  12.6 % (0.0-10.0)  H  01/18/18  11:48    


 


Eos % (Auto)  0.0 % (0.0-4.0)   01/18/18  11:48    


 


Baso % (Auto)  0.2 % (0.0-2.0)   01/18/18  11:48    


 


Neut #  9.8 K/uL (1.8-7.0)  H  01/18/18  11:48    


 


Lymph #  0.4 K/uL (1.0-4.3)  L  01/18/18  11:48    


 


Mono #  1.5 K/uL (0.0-0.8)  H  01/18/18  11:48    


 


Eos #  0.0 K/uL (0.0-0.7)   01/18/18  11:48    


 


Baso #  0.0 K/uL (0.0-0.2)   01/18/18  11:48    


 


Neutrophils % (Manual)  88 % (50-75)  H  01/18/18  11:48    


 


Band Neutrophils %  1 % (0-2)   01/14/18  11:32    


 


Lymphocytes % (Manual)  7 % (20-40)  L  01/18/18  11:48    


 


Reactive Lymphs %  1 % (0-0)  H  01/14/18  11:32    


 


Monocytes % (Manual)  5 % (0-10)   01/18/18  11:48    


 


Eosinophils % (Manual)  1 % (0-4)   01/03/18  09:58    


 


Smudge Cells  Present   01/13/18  17:16    


 


Toxic Granulation  Present   01/13/18  17:16    


 


Platelet Estimate  Normal  (NORMAL)   01/18/18  11:48    


 


Large Platelets  Present   01/14/18  11:32    


 


Polychromasia  Slight   01/18/18  11:48    


 


Hypochromasia (manual)  Slight   01/18/18  11:48    


 


Poikilocytosis (manual  Slight   01/16/18  10:05    


 


Anisocytosis (manual)  Slight   01/18/18  11:48    


 


Microcytosis (manual)  Slight   01/13/18  17:16    


 


Target Cells  Slight   01/18/18  11:48    


 


Tear Drop Cells  Slight   01/03/18  09:58    


 


Ovalocytes  Slight   01/16/18  10:05    


 


PT  14.7 SECONDS (9.7-12.2)  H  01/18/18  11:48    


 


INR  1.3   01/18/18  11:48    


 


APTT  25 SECONDS (21-34)   01/18/18  11:48    


 


Sodium  128 mmol/L (132-148)  L  01/18/18  11:48    


 


Potassium  3.9 mmol/L (3.6-5.2)   01/18/18  11:48    


 


Chloride  82 mmol/L ()  L  01/18/18  11:48    


 


Carbon Dioxide  39 mmol/L (22-30)  H  01/18/18  11:48    


 


Anion Gap  12  (10-20)   01/18/18  11:48    


 


BUN  44 mg/dL (9-20)  H  01/18/18  11:48    


 


Creatinine  1.0 mg/dL (0.8-1.5)   01/18/18  11:48    


 


Est GFR ( Amer)  > 60   01/18/18  11:48    


 


Est GFR (Non-Af Amer)  > 60   01/18/18  11:48    


 


POC Glucose (mg/dL)  208 mg/dL ()  H  01/09/18  11:13    


 


Random Glucose  213 mg/dL ()  H  01/18/18  11:48    


 


Calcium  9.5 mg/dl (8.6-10.4)   01/18/18  11:48    


 


Phosphorus  2.4 mg/dL (2.5-4.5)  L  01/08/18  08:44    


 


Magnesium  2.1 mg/dL (1.6-2.3)   01/08/18  08:44    


 


Total Bilirubin  0.9 mg/dL (0.2-1.3)   01/18/18  11:48    


 


AST  61 U/L (17-59)  H  01/18/18  11:48    


 


ALT  156 U/L (21-72)  H  01/18/18  11:48    


 


Alkaline Phosphatase  110 U/L ()   01/18/18  11:48    


 


Troponin I  0.0510 ng/mL (0.00-0.120)   01/03/18  09:58    


 


NT-Pro-B Natriuret Pep  8160 pg/mL (0-900)  H  01/03/18  09:58    


 


Total Protein  6.6 g/dL (6.3-8.3)   01/18/18  11:48    


 


Albumin  3.6 g/dL (3.5-5.0)   01/18/18  11:48    


 


Globulin  2.9 gm/dL (2.2-3.9)   01/18/18  11:48    


 


Albumin/Globulin Ratio  1.2  (1.0-2.1)   01/18/18  11:48    


 


Ur Random Creatinine  31.5 mg/dL  01/12/18  01:20    


 


Ur Random Sodium  66 mmol/L  01/12/18  01:20    


 


Ur Random Urea Nitrogn  451 mg/dL  01/12/18  01:23    


 


Digoxin  < 0.4 ng/mL (0.8-2.0)  L  01/03/18  09:58    














- Hospital Course


Hospital Course: 





CC: "chest pain"





HPI: Patient is a 72 y.o. male with PMHx of HTN, MI, CHF, CAD with CABG, who 

was brought to the ED by EMS with complaints of chest pain. Patient's history 

was provided by him and his wife. Patient states that he started experiencing 

chest pains on 1/2/18 while watching TV. He reports that the pain is localized 

to left sided chest, and left shoulder with no radiation. Patient describes the 

pain as dull and constant, and rates it at 10/10 when it started, and currently 

at 6/10. He denies any palliative or provocative factors. He also complains of 

cough, shortness of breath, and leg swelling which all started about one week 

ago. His wife states that he has not been taking any medications except Aspirin 

81 mg for the last month because he is overwhelmed by the amount of medication. 

At his baseline, patient states that he is unable to walk half a block without 

being short of breath. Patient states that he had a similar episode ~2 months 

ago and was hospitalized. Patient follows 


Patient denies headache, vision changes, abdominal pain, nausea, vomiting, 

diarrhea, constipation, dysuria, urinary frequency, urinary hesitancy, joint 

pain, loss of balance. 


 


Pulm: Dr. Elfego Palma


PMD: [facility]  


PMHx: MI, Atrial fibrillation, CAD, CHF, HTN


PSHx: CABG in 2010 (patient unsure of exact  year), mechanical valve placement


Medications: Midodrine 5 mg, Memantine 5 mg, Meclizine 12.5 mg, Aspirin 81 mg, 

Metoprolol 25 mg


Social Hx: Smokes cigarettes 1 pack/week since age 14, used to drink alcohol in 

his 20s, denies any recreational drug use. Lives at home with wife. 


Allergies: NKDA





Discharge Exam





- Head Exam


Head Exam: NORMAL INSPECTION





- Eye Exam


Eye Exam: EOMI, Normal appearance





- ENT Exam


ENT Exam: Mucous Membranes Moist





- Respiratory Exam


Respiratory Exam: Decreased Breath Sounds, Rhonchi, Wheezes.  absent: 

Respiratory Distress





- Cardiovascular Exam


Cardiovascular Exam: +S1, +S2.  absent: Bradycardia, Tachycardia





- GI/Abdominal Exam


GI & Abdominal Exam: Normal Bowel Sounds, Unremarkable.  absent: Distended, Soft

, Tenderness





- Extremities Exam


Extremities exam: pedal edema





- Neurological Exam


Neurological exam: Alert, Oriented x3





- Psychiatric Exam


Psychiatric exam: Normal Affect, Normal Mood





- Skin


Skin Exam: Dry, Intact, Warm





Discharge Plan





- Discharge Medications


Prescriptions: 


Aspirin [Aspirin Chewable] 81 mg PO DAILY #30 chew


Furosemide [Lasix] 40 mg PO BID #60 tablet


Methylprednisolone [Medrol Dose Pack (21 tabs)] 4 mg PO DAILY #21 mg


Metoprolol Tartrate [Lopressor] 25 mg PO BID #60 tab


Midodrine [Proamatine] 5 mg PO DAILY #30 tab


Warfarin [Coumadin] 3 mg PO 1800 #30 tab





- Follow Up Plan


Condition: GUARDED


Disposition: REHAB FACILITY/REHAB UNIT


Instructions:  Warfarin (By mouth), Heart Failure (DC), Heart Healthy Diet (DC)

, Vitamin K in Foods (DC)


Additional Instructions: 


Patient is clear for discharge to rehab, [facility].


Please continue medications as prescribed. Continue to monitor INR.


Please follow up with PMD, Dr. Elizabeth, within 1-2 weeks of discharge


Please follow up with Cardiology upon discharge


If symptoms reoccur or worsen, patient should return to the ED. 


 





Referrals: 


Evgeny Elizabeth Jr., MD [Medical Doctor] - 


Soham Dotson MD [Staff Provider] -

## 2018-01-18 NOTE — CP.PCM.PN
Subjective





- Date & Time of Evaluation


Date of Evaluation: 01/18/18


Time of Evaluation: 11:00





- Subjective


Subjective: 





patient denies chest pain.  He feels better.





Objective





- Vital Signs/Intake and Output


Vital Signs (last 24 hours): 


 











Temp Pulse Resp BP Pulse Ox


 


 97.3 F L  75   20   106/67   100 


 


 01/18/18 07:00  01/18/18 07:00  01/18/18 07:00  01/18/18 09:43  01/18/18 07:00








Intake and Output: 


 











 01/18/18 01/18/18





 06:59 18:59


 


Intake Total 800 


 


Balance 800 














- Medications


Medications: 


 Current Medications





Albuterol Sulfate (Albuterol 0.042% Inhal Sol (1.25mg/3ml) Ud)  1.25 mg INH RQ6 

[facility]


   Last Admin: 01/18/18 07:36 Dose:  1.25 mg


Aspirin (Aspirin Chewable)  81 mg PO DAILY [facility]


   Last Admin: 01/18/18 09:43 Dose:  81 mg


Budesonide (Pulmicort Respules)  0.5 mg INH RQ12 [facility]


   Last Admin: 01/18/18 07:36 Dose:  Not Given


Digoxin (Lanoxin)  0.125 mg PO DAILY@1800 [facility]


   Last Admin: 01/17/18 17:02 Dose:  0.125 mg


Famotidine (Pepcid)  20 mg PO DAILY [facility]


   Last Admin: 01/18/18 09:42 Dose:  20 mg


Furosemide (Lasix)  40 mg PO DAILY [facility]


   Last Admin: 01/18/18 09:43 Dose:  40 mg


Guaifenesin (Robitussin)  100 mg PO Q4H PRN


   PRN Reason: Cough


   Last Admin: 01/16/18 09:40 Dose:  100 mg


Methylprednisolone (Solu-Medrol)  20 mg IV Q12 [facility]


   Last Admin: 01/18/18 09:44 Dose:  20 mg


Metolazone (Zaroxolyn)  2.5 mg PO DAILY [facility]


   Last Admin: 01/18/18 09:42 Dose:  2.5 mg


Metoprolol Tartrate (Lopressor)  25 mg PO BID [facility]


   Last Admin: 01/18/18 09:41 Dose:  25 mg


Midodrine (Proamatine)  5 mg PO DAILY [facility]


   Last Admin: 01/18/18 09:41 Dose:  5 mg











- Labs


Labs: 


 





 01/18/18 11:48 





 01/18/18 11:48 





 











PT  14.7 SECONDS (9.7-12.2)  H  01/18/18  11:48    


 


INR  1.3   01/18/18  11:48    


 


APTT  25 SECONDS (21-34)   01/18/18  11:48    














- Constitutional


Appears: Non-toxic





- Head Exam


Head Exam: NORMAL INSPECTION





- Eye Exam


Eye Exam: Normal appearance





- ENT Exam


ENT Exam: Mucous Membranes Moist





- Neck Exam


Neck Exam: Full ROM





- Respiratory Exam


Respiratory Exam: Decreased Breath Sounds





- Cardiovascular Exam


Cardiovascular Exam: REGULAR RHYTHM





- GI/Abdominal Exam


GI & Abdominal Exam: Normal Bowel Sounds





- Rectal Exam


Rectal Exam: Deferred





- Extremities Exam


Extremities Exam: Pedal Edema





- Back Exam


Back Exam: NORMAL INSPECTION





- Neurological Exam


Neurological Exam: Alert





- Psychiatric Exam


Psychiatric exam: Normal Affect





- Skin


Skin Exam: Normal Color





Assessment and Plan


(1) Systolic dysfunction with acute on chronic heart failure


Assessment & Plan: 


improving.  continue current medication


Status: Acute   





(2) CAD (coronary artery disease)


Status: Chronic   





(3) Chronic atrial fibrillation


Assessment & Plan: 


coitnue coumadin


Status: Chronic   





(4) H/O mitral valve replacement with mechanical valve


Assessment & Plan: 


coumadin therapy


Status: Chronic

## 2018-01-19 NOTE — PCM.HF
Heart Failure Core Measure





- Heart Failure


Ejection Fraction: Less Than 40 %


ACE Inhibitor Prescribed: No


Contraindication/Reason for not providing: RENAL FUNCTION


Beta-Blocker Prescribed: Metoprolol Succinate


Angiotensin II Receptor Blocker Prescribed: No


Contraindication/Reason for not providing: RENAL FUNCTION


AnticoagulationTherapy for Atrial Fibrillation/Atrialflutter: Yes


Aldosterone Antagonist Prescribed: No


Contraindication/Reason for not providing: LOW BP


Hydralazine Nitrate Prescribed: No


Contraindication/Reason for not providing: LOW BP


Implantable Cardioverter Defibrillator Therapy: Yes


Cardiac Resynchronization Therapy Prescribed: No


Contraindication/Reason for not providing: PACEMAKER

## 2020-03-02 NOTE — RAD
HISTORY:

Shortness of breath



COMPARISON:

01/10/2018. 



FINDINGS:



LUNGS:

The lungs are well inflated. There is haziness in the right lower 

lobe.  There is interval mild improvement in mild pulmonary venous 

congestion.



PLEURA:

Small right pleural effusion. No pneumothorax apparent.



CARDIOVASCULAR:

There is persistent moderate cardiomegaly.  Status post CABG and 

aortic valve replacement.  There is stable position of a left-sided 

transvenous permanent pacing device.



OSSEOUS STRUCTURES:

No significant abnormalities.



VISUALIZED UPPER ABDOMEN:

Normal.



OTHER FINDINGS:

None.



IMPRESSION:

Moderate cardiomegaly, improving pulmonary venous congestion and 

persistent small right pleural effusion. Manual Repair Warning Statement: We plan on removing the manually selected variable below in favor of our much easier automatic structured text blocks found in the previous tab. We decided to do this to help make the flow better and give you the full power of structured data. Manual selection is never going to be ideal in our platform and I would encourage you to avoid using manual selection from this point on, especially since I will be sunsetting this feature. It is important that you do one of two things with the customized text below. First, you can save all of the text in a word file so you can have it for future reference. Second, transfer the text to the appropriate area in the Library tab. Lastly, if there is a flap or graft type which we do not have you need to let us know right away so I can add it in before the variable is hidden. No need to panic, we plan to give you roughly 6 months to make the change.

## 2022-12-30 NOTE — CP.PCM.PN
"     Patient ID: Jaime Bar is a 67 y.o. male.  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





Subjective





- Date & Time of Evaluation


Date of Evaluation: 04/02/17


Time of Evaluation: 00:41





- Subjective


Subjective: 


PGY-1 note for Dr Elizabeth's service





Pt seen and examined at bedside. Pt states that he has some chest wall 

tenderness over rib 2 on the right side. He admits to his chronic cough making 

it worse. Denies any fevers, chills, palpitations, nausea or vomiting. 





Objective





- Vital Signs/Intake and Output


Vital Signs (last 24 hours): 


 











Temp Pulse Resp BP Pulse Ox


 


 97.9 F   60   24   91/55 L  98 


 


 04/01/17 16:00  04/01/17 16:00  04/01/17 16:00  04/01/17 16:00  04/01/17 16:00








Intake and Output: 


 











 04/01/17 04/02/17





 18:59 06:59


 


Intake Total 800 400


 


Balance 800 400














- Medications


Medications: 


 Current Medications





Albuterol/Ipratropium (Duoneb 3 Mg/0.5 Mg (3 Ml) Ud)  3 ml INH RQ6 [facility]


   Last Admin: 04/01/17 19:15 Dose:  3 ml


Benzocaine/Menthol (Cepacol Sore Throat)  1 keegan MT Q6H PRN


   PRN Reason: Sore Throat


   Last Admin: 04/01/17 01:23 Dose:  1 keegan


Budesonide (Pulmicort Respules)  0.5 mg INH RQ12 [facility]


   Last Admin: 04/01/17 19:16 Dose:  0.5 mg


Digoxin (Lanoxin)  0.125 mg PO DAILY@1800 [facility]


   Last Admin: 04/01/17 18:01 Dose:  0.125 mg


Famotidine (Pepcid)  20 mg IVP DAILY [facility]


   Last Admin: 04/01/17 10:30 Dose:  20 mg


Furosemide (Lasix)  40 mg PO DAILY [facility]


   Last Admin: 04/01/17 10:30 Dose:  40 mg


Guaifenesin (Mucinex La)  600 mg PO BID [facility]


   Last Admin: 04/01/17 18:01 Dose:  600 mg


Prednisone (Prednisone Tab)  40 mg PO DAILY [facility]


   Last Admin: 04/01/17 10:30 Dose:  40 mg


Rosuvastatin Calcium (Crestor)  10 mg PO HS [facility]


   Last Admin: 04/01/17 22:04 Dose:  10 mg


Spironolactone (Aldactone)  25 mg PO DAILY [facility]


   Last Admin: 04/01/17 10:30 Dose:  25 mg


Tiotropium Bromide (Spiriva)  18 mcg INH RQ24 [facility]


   Last Admin: 04/01/17 08:18 Dose:  18 mcg











- Labs


Labs: 


 





 03/29/17 16:46 





 04/01/17 17:11 





 











PT  41.2 SECONDS (9.7-12.2)  H* D 03/31/17  10:45    


 


INR  3.4   03/31/17  10:45    


 


APTT  37 SECONDS (21-34)  H  03/28/17  13:50    














- Constitutional


Appears: Non-toxic, No Acute Distress, Chronically Ill





- Head Exam


Head Exam: ATRAUMATIC, NORMOCEPHALIC





- Respiratory Exam


Respiratory Exam: Chest Wall Tenderness (point tenderness over 2nd rib on right)

, Rhonchi, NORMAL BREATHING PATTERN





- Cardiovascular Exam


Cardiovascular Exam: +S1, +S2





- GI/Abdominal Exam


GI & Abdominal Exam: Soft, Normal Bowel Sounds





- Neurological Exam


Neurological Exam: Alert, Awake





- Skin


Skin Exam: Dry, Warm





Assessment and Plan





- Assessment and Plan (Free Text)


Assessment: 


- [facility] will accept pt, waiting on pt's wife to confirm address that pt will 

be discharged home to. 


- Pt not a safe discharge to home due to functional status





Numbness and tingling - left sided


- resolved


- CT head - normal head CT





Shortness of breath


- secondary to chronic COPD vs pneumonia vs malignancy


- CT Chest HR - No definite CT evidence of interstitial lung disease. 

Cardiomegaly and mild-to-moderate pulmonary vascular congestion. Interval 

appearance of new round opacities/ nodules in the right upper lobe and left 

lower lobe since the previous study. The differential diagnosis includes 

multifocal pneumonia. The possibility of neoplasm is not totally excluded. 

Follow-up reassessment is recommended. Otherwise no significant interval change 

since the previous study dated 03/16/2017.





COPD (chronic obstructive pulmonary disease)


3/30: CT chest with high resolution - see above


3/26: Prednisone 40mg PO daily; Taper on discharge. Will require home oxygen.


3/23: Stop Solumedrol IVP;  Start Prednisone 40mg PO daily; Taper on discharge. 

Will require home oxygen.


-3/22: Solumedrol reduced to 40mg IVP Q12H. Prior to discharge, change 

Solumedrol -> Prednisone


-3/13-3/22: Continue BIPAP, patient more compliant (sometimes refuses despite 

being SOB).


-3/10: RAPID called due to SOB. BP 80/50's. Bolused 500cc NS. Ordered BIPAP and 

ABG, however patient refused. Placed back on NC 3L.


-3/8-3/9: Patient complaining of SOB with exertion and orthopnea. Maintain Head 

of bed elevated 30 degrees.


-3/7:  PT session: 98% O2 at 2L at rest, 96% room air at rest sitting, 82% room 

air during ambulation, 88% at 2 liter ambulation.


-3/6: Walked 20 steps today down [address], and patient became dizzy, SOB, and 

almost collapsed.


- Consulted Pulmonology, Dr. Varela, f/u recs


   -planning for home O2


   - Aware of HR in 140's and SOB after eating and low levels of exertion.


   -f/u ABG (not collected)


   -LDH 654H


   -HIV - negative


   -persistent shortness of breath


   - Patient with long history of smoking most likely has underlying COPD.  

Nebulizer treatment.  Inhaled steroids.  Spiriva.  PFT as out pt








Systolic dysfunction with acute on chronic heart failure


3/26: pt at baseline


- EF 10-15%, mechanical MV- no regurg, tricuspid regurg. see full report


-Chest CT 3/16 - Cardiomegaly. Cardiac valve prosthesis. Left-sided AICD. Dense 

coronary artery calcifications. 


   -Small consolidation (favored to reflect atelectasis rather than pneumonia) 

at the left lung base. 


   -Bibasilar atelectasis. Small airways disease. Mild ground-glass and fine 

nodular opacities within the right upper lobe, possibly infectious or 

inflammatory.    


   -7 mm left upper lobe pulmonary nodule. Guidelines by the Fleischner society 

(radiology 2005; 237:390 5-400) suggests that in patients with low risk for 

lung cancer, with nodules less than or equal to 8 mm in diameter should have 

follow-up in approximately 6-12 months.  In patients with high-risk, including 

smokers, follow-up is recommended 3-6 months.  Patient with a known malignancy 

for risk for metastases should receive 3 month follow-up. 


   -Hepatic capsular calcification. 


   -Numerous low-density lesions throughout the liver, possibly cysts. 

Decompressed gallbladder limits evaluation. Bilateral hypodense renal lesions. 

Right adrenal gland hypertrophy. 12 mm left adrenal gland nodule measures 

approximately 9 Hounsfield units, likely adenoma.


-Consulted Cardiology, Dr. Dotson - f/u recs


   -3/16: Lasix 40mg IVP daily, Spironolactone 25mg daily. CXR - no interval 

pathology change. 


   -3/13: HOLD LASIX FOR 1 TO 2 DAYS AND CONT MUCINEX.  PT PRERENAL AND APPEARS 

DRY.  ON BOTH LASIX AND SPIRONOLACTONE.


   -SOB APPEARS TO BE SECONDARY TO PULM ETIOLOGY.  IMPROVES WITH NEBS.  PT WITH 

RHONCHI B/L.  MAY BENEFIT FROM PULM TOILET.


   -will attempt to obtain company of [facility] for interrogation.


   -likely deconditioned.  will benefit from rehab


-3/10: RAPID called due to SOB. BP 80/50's. Bolused 500cc NS. Ordered BIPAP and 

ABG, however patient refused. Placed back on NC 3L. ProBNP 4070H (less than # 

on admission) and SHEYLA negative. 


-3/10: Decrease to Lasix 40mg PO daily (was BID).


Admit to telemetry, BNP 4560 on admission


Digoxin 0.125mg


Lisinopril 10mg daily


Aldactone 25mg PO Daily


patient with AICD, will check echo to determine EF


CXR 2/25: mild cardiomegaly, mild pulmonary venous congestion.  s/p sternotomy, 

aortic valve replacement, AICD








H/O mitral valve replacement with mechanical valve


4/1: INR 3.4, coninue Coumadin and monitor INR.  Patient will be discharged 

when able. 





Previous note


Cardiac valve replacement 6-7yrs ago


Consulted Cardiology, Dr. Dotson - f/u recs


   -echocardiogram reviewed.  valve leaflets are opening and functional.  There 

is no signficant gradient across the valve.  


   -recommend INR 2.5 to 3.5.


   STOP Lovenox 3/5


   STOP Heparin Drip - cardiac protocol, with bolus (because patient is 

refusing blood draws)


3/1: INR 1.3


3/2: INR refused, Coumadin 10mg


3/3: INR 1.5, Coumadin 10mg


3/4: INR 2.0, Coumadin 10mg


3/5: INR 2.4, Coumadin 7.5mg; patient received one more dose of Lovenox 80mg SC 

today, now discontinued as bridge complete.


3/6: INR 2.5, Coumadin 7.5mg


3/7: INR 3.1, Coumadin 5mg


3/8: INR 3.7, Coumadin 5mg (stopped by pharmacy)


3/9: INR 2.8, Coumadin 5mg


3/10: INR 2.6, Coumadin 4mg


3/11: INR 2.7, Coumadin 4 mg


3/12: INR 2.3  Coumadin 4mg 


3/13: INR 2.1, Coumadin 6mg


3/14: INR 2.6, Coumadin 5mg


3/15: INR 2.6, Coumadin 5mg


3/16: INR 2.5, Coumadin 6mg


3/17: INR 2.9, Coumadin 6mg, 


3/18: INR 3.6, Coumadin 6mg, f/u INR


3/19: HELD Coumadin, Pt. denied blood work today


3/20: HELD Coumadin, Pt. denied blood work today


3/21: INR 1.3, Coumadin 7mg, f/u


3/22: INR 1.5, Coumadin 7mg, f/u INR


3/23: INR 2.4, Coumadin 7mg, f/u INR


3/24: INR 3.3, Coumadin 6mg, f/u INR


3/25: INR 4.1 Hold Coumadin, f/u INR


3/26: INR 2.8, Coumadin 5mg, f/u INR


3/28: INR 2.4, Coumadin 6mg, f/u INR


3/29: INR 3.1, Coumadin 5mg, f/u INR


3/30: INR 2.9, Coumadin 6mg, f/u INR


3/31: INR 3.4, Coumadin 5.5mg f/u INR


4/1: Coumadin 5 mg, f/u INR





Chronic atrial fibrillation


Consulted Cardiology, Dr. Dotson - f/u recs


   -Rate controlled


   -echocardiogram reviewed.  valve leaflets are opening and functional.  There 

is no signficant gradient across the valve.  


   -recommend INR 2.5 to 3.5.


   -See PT/INR trends above


   Coumadin as above


   STOP Lovenox 3/5








Cough, acute


-Mucinex 600mg PO BID


-3/22: cough intermittent


-3/18 does not complain of cough


-3/13-3/16: Persists. Non-productive. Continue Mucinex.


-3/9: patient developed productive cough with yellow sputum today.








CAD (coronary artery disease) 


Consulted Cardiology, Dr. Dotson - f/u recs


   -Aware of HR in 140's and SOB after eating and low levels of exertion.


   -unknown graft status.  no evidence of ischemia at present


   -No current angina





History of MI (myocardial infarction)


HOLD Crestor 10mg PO HS (last dose 3/12- due to elevated LFTs)


ROMIs negative x3


EKG paced 


Denies chest pain 





Transaminitis, acute


3/25: AST/ALT normalized; consider resuming crestor.


3/24  patient refusing labs. will re-attempt


3/22-3/23: Improving. hold crestor (since 3/12)


3/21: AST 37 /  - continue to hold crestor.


3/19-3/20: Patient refused blood work


3/18: 51/164 AST / ALT down trending


3/16-3/17: AST / ALT down trending - continue to hold Crestor


3/15: AST 57 / , decreasing. HOLD Crestor 10mg PO HS (last dose 3/12- 

due to elevated LFTs)


3/14: AST 75 /   


3/13: AST 70 /  - increasing -> hold crestor for 2 nights 3/13, 3/14


3/10: AST 90 /  - increasing -> hold Crestor tonight.


- AST 77 / ALT 98 -> previously normal.


- Continue to monitor





Electrolyte Imbalance


3/24 patient refusing labs, will re-attempt


3/15-3/23: Hyponatremia, stable


Hyperkalemia - resolved


Hypokalemia, 








Tachycardia


HR grossly WNL, continue to monitor


3/14-3/17: HR WNL


3/7: Patient becomes SOB very easily, desaturating to 82% while ambulating. 

Anytime he eats or gets up, HR climbs into 140's


Continue Digoxin 0.125mg


EKG in ER: sinus tachycardia at 100, paced, incomplete RBBB


Discontinued cardizem drip of 5mg/h started in the ER





Lower extremity edema


LE Duplex - negative. see full report.





Prophylactic measure


Coumadin as above.


protonix 40mg daily


SCD contraindicated due to LE edema


D/C when bed available








<Evgeny Elizabeth Jr. - Last Filed: 04/02/17 15:11>





Objective





- Vital Signs/Intake and Output


Vital Signs (last 24 hours): 


 











Temp Pulse Resp BP Pulse Ox


 


 97.8 F   72   20   123/76   96 


 


 04/02/17 07:20  04/02/17 08:00  04/02/17 07:20  04/02/17 09:52  04/02/17 07:20








Intake and Output: 


 











 04/02/17 04/02/17





 06:59 18:59


 


Intake Total 640 


 


Output Total 500 


 


Balance 140 














- Medications


Medications: 


 Current Medications





Albuterol/Ipratropium (Duoneb 3 Mg/0.5 Mg (3 Ml) Ud)  3 ml INH RQ6 [facility]


   Last Admin: 04/02/17 13:13 Dose:  3 ml


Benzocaine/Menthol (Cepacol Sore Throat)  1 keegan MT Q6H PRN


   PRN Reason: Sore Throat


   Last Admin: 04/01/17 01:23 Dose:  1 keegan


Budesonide (Pulmicort Respules)  0.5 mg INH RQ12 [facility]


   Last Admin: 04/02/17 07:42 Dose:  0.5 mg


Digoxin (Lanoxin)  0.125 mg PO DAILY@1800 [facility]


   Last Admin: 04/01/17 18:01 Dose:  0.125 mg


Famotidine (Pepcid)  20 mg IVP DAILY [facility]


   Last Admin: 04/02/17 09:53 Dose:  20 mg


Furosemide (Lasix)  40 mg PO DAILY [facility]


   Last Admin: 04/02/17 09:52 Dose:  40 mg


Guaifenesin (Mucinex La)  600 mg PO BID [facility]


   Last Admin: 04/02/17 09:53 Dose:  600 mg


Prednisone (Prednisone Tab)  40 mg PO DAILY [facility]


   Last Admin: 04/02/17 09:53 Dose:  40 mg


Rosuvastatin Calcium (Crestor)  10 mg PO HS [facility]


   Last Admin: 04/01/17 22:04 Dose:  10 mg


Spironolactone (Aldactone)  25 mg PO DAILY [facility]


   Last Admin: 04/02/17 09:52 Dose:  25 mg


Tiotropium Bromide (Spiriva)  18 mcg INH RQ24 [facility]


   Last Admin: 04/02/17 07:42 Dose:  18 mcg











- Labs


Labs: 


 





 03/29/17 16:46 





 04/01/17 17:11 





 











PT  41.2 SECONDS (9.7-12.2)  H* D 03/31/17  10:45    


 


INR  3.4   03/31/17  10:45    


 


APTT  37 SECONDS (21-34)  H  03/28/17  13:50    














Attending/Attestation





- Attestation


I have personally seen and examined this patient.: Yes


I have fully participated in the care of the patient.: Yes


I have reviewed all pertinent clinical information, including history, physical 

exam and plan: Yes


Notes (Text): 





04/02/17 15:11


Patient seen and examined. Agree with resident note and plan of care

## 2024-03-04 NOTE — CP.PCM.PN
Subjective





- Date & Time of Evaluation


Date of Evaluation: 03/22/17


Time of Evaluation: 09:10





- Subjective


Subjective: 


Patient seen and examined at bedside.


Pt was sleeping comfortably upon initial examination. Upon awakening, he relied 

on using nasal cannula to aid in his SOB


Patient stated that this treatment with nasal canula was enough to keep him 

comfortable, and he did not use BiPAP last night.





Pt states similar complaints of SOB with exertion, orthopnea. Pt continues to 

deny congestion, CP and subjective fever today. 





Objective





- Vital Signs/Intake and Output


Vital Signs (last 24 hours): 


 











Temp Pulse Resp BP Pulse Ox


 


 97.9 F   82   20   100/65   97 


 


 03/22/17 07:39  03/22/17 08:00  03/22/17 07:39  03/22/17 09:34  03/22/17 07:39








Intake and Output: 


 











 03/22/17 03/22/17





 06:59 18:59


 


Output Total 600 


 


Balance -600 














- Medications


Medications: 


 Current Medications





Albuterol/Ipratropium (Duoneb 3 Mg/0.5 Mg (3 Ml) Ud)  3 ml INH RQ6 [facility]


   Last Admin: 03/22/17 07:27 Dose:  3 ml


Budesonide (Pulmicort Respules)  0.5 mg INH RQ12 [facility]


   Last Admin: 03/22/17 07:27 Dose:  0.5 mg


Digoxin (Lanoxin)  0.125 mg PO DAILY@1800 [facility]


   Last Admin: 03/21/17 17:26 Dose:  0.125 mg


Furosemide (Lasix)  40 mg PO DAILY [facility]


   Last Admin: 03/22/17 09:34 Dose:  40 mg


Guaifenesin (Mucinex La)  600 mg PO BID [facility]


   Last Admin: 03/22/17 09:34 Dose:  600 mg


Methylprednisolone (Solu-Medrol)  40 mg IVP Q8 [facility]


   Last Admin: 03/22/17 05:30 Dose:  40 mg


Rosuvastatin Calcium (Crestor)  10 mg PO HS [facility]


   Last Admin: 03/12/17 22:03 Dose:  10 mg


Spironolactone (Aldactone)  25 mg PO DAILY [facility]


   Last Admin: 03/22/17 09:35 Dose:  25 mg


Tiotropium Bromide (Spiriva)  18 mcg INH RQ24 [facility]


   Last Admin: 03/22/17 07:27 Dose:  18 mcg











- Labs


Labs: 


 





 03/21/17 13:36 





 03/21/17 13:42 





 











PT  14.9 SECONDS (9.7-12.2)  H  03/21/17  13:36    


 


INR  1.3   03/21/17  13:36    


 


APTT  25 SECONDS (21-34)   03/21/17  13:36    














- Constitutional


Appears: No Acute Distress





- Head Exam


Head Exam: NORMAL INSPECTION





- Eye Exam


Eye Exam: Normal appearance





- ENT Exam


ENT Exam: Mucous Membranes Moist





- Neck Exam


Neck Exam: Normal Inspection





- Respiratory Exam


Respiratory Exam: Rhonchi





- Cardiovascular Exam


Cardiovascular Exam: REGULAR RHYTHM, RRR, +S1, +S2





- Neurological Exam


Neurological Exam: Alert, Awake, Oriented x3





- Psychiatric Exam


Psychiatric exam: Anxious





- Skin


Skin Exam: Dry, Intact, Normal Color, Warm





Assessment and Plan


(1) COPD (chronic obstructive pulmonary disease)


Assessment & Plan: 


Continue nebulizer treatment as needed


Continue oxygen and BiPAP as needed





Pending placement. Pt will need home oxygen upon discharge. 





Continue to monitor for increased SOB, productive cough, fever, and CP. 





Patient will be switched from Solu-Medrol to prednisone prior to discharge.


Status: Acute





(2) Acute exacerbation of CHF (congestive heart failure)


Status: Acute





(3) Chronic atrial fibrillation


Status: Chronic significant other

## 2024-07-28 NOTE — CP.PCM.PN
Objective





- Vital Signs/Intake and Output


Vital Signs (last 24 hours): 


 











Temp Pulse Resp BP Pulse Ox


 


 98.1 F   72   20   99/61 L  98 


 


 03/05/17 07:39  03/05/17 09:00  03/05/17 07:39  03/05/17 11:02  03/05/17 07:39











- Medications


Medications: 


 Current Medications





Albuterol/Ipratropium (Duoneb 3 Mg/0.5 Mg (3 Ml) Ud)  3 ml INH RQ6 [facility]


   Last Admin: 03/05/17 13:33 Dose:  3 ml


Budesonide (Pulmicort Respules)  0.5 mg INH RQ12 [facility]


   Last Admin: 03/05/17 07:39 Dose:  0.5 mg


Digoxin (Lanoxin)  0.125 mg PO DAILY@1800 [facility]


   Last Admin: 03/04/17 18:15 Dose:  0.125 mg


Enoxaparin Sodium (Lovenox)  80 mg SC Q12 [facility]


   Last Admin: 03/05/17 11:03 Dose:  80 mg


Furosemide (Lasix)  40 mg IVP BID [facility]


   Last Admin: 03/05/17 11:02 Dose:  40 mg


Lisinopril (Zestril)  10 mg PO DAILY [facility]


   Last Admin: 03/05/17 11:00 Dose:  Not Given


Pantoprazole Sodium (Protonix Ec Tab)  40 mg PO DAILY [facility]


   Last Admin: 03/05/17 11:03 Dose:  40 mg


Rosuvastatin Calcium (Crestor)  10 mg PO HS [facility]


   Last Admin: 03/04/17 23:03 Dose:  Not Given


Spironolactone (Aldactone)  25 mg PO DAILY [facility]


   Last Admin: 03/05/17 10:59 Dose:  Not Given


Tiotropium Bromide (Spiriva)  18 mcg INH RQ24 [facility]


   Last Admin: 03/05/17 07:39 Dose:  18 mcg











- Labs


Labs: 


 





 03/05/17 11:00 





 03/05/17 11:00 





 











PT  27.4 SECONDS (9.7-12.2)  H D 03/05/17  11:00    


 


INR  2.4   03/05/17  11:00    


 


APTT  35 SECONDS (21-34)  H D 03/05/17  11:00    














Assessment and Plan


(1) COPD (chronic obstructive pulmonary disease)


Status: Acute





(2) Acute exacerbation of CHF (congestive heart failure)


Status: Acute





(3) Chronic atrial fibrillation


Status: Acute No